# Patient Record
Sex: FEMALE | Race: WHITE | NOT HISPANIC OR LATINO
[De-identification: names, ages, dates, MRNs, and addresses within clinical notes are randomized per-mention and may not be internally consistent; named-entity substitution may affect disease eponyms.]

---

## 2017-02-06 ENCOUNTER — APPOINTMENT (OUTPATIENT)
Dept: CARDIOLOGY | Facility: CLINIC | Age: 82
End: 2017-02-06

## 2017-02-06 VITALS
HEART RATE: 100 BPM | HEIGHT: 64 IN | SYSTOLIC BLOOD PRESSURE: 130 MMHG | DIASTOLIC BLOOD PRESSURE: 80 MMHG | BODY MASS INDEX: 19.81 KG/M2 | WEIGHT: 116 LBS | OXYGEN SATURATION: 95 %

## 2017-02-12 LAB
ALBUMIN SERPL ELPH-MCNC: 4 G/DL
ALP BLD-CCNC: 78 U/L
ALT SERPL-CCNC: 15 U/L
ANION GAP SERPL CALC-SCNC: 14 MMOL/L
AST SERPL-CCNC: 16 U/L
BASOPHILS # BLD AUTO: 0.03 K/UL
BASOPHILS NFR BLD AUTO: 0.7 %
BILIRUB SERPL-MCNC: 0.2 MG/DL
BUN SERPL-MCNC: 34 MG/DL
CALCIUM SERPL-MCNC: 9.7 MG/DL
CHLORIDE SERPL-SCNC: 106 MMOL/L
CO2 SERPL-SCNC: 24 MMOL/L
CREAT SERPL-MCNC: 1.11 MG/DL
EOSINOPHIL # BLD AUTO: 0.13 K/UL
EOSINOPHIL NFR BLD AUTO: 2.9 %
GLUCOSE SERPL-MCNC: 95 MG/DL
HCT VFR BLD CALC: 44.9 %
HGB BLD-MCNC: 13.9 G/DL
IMM GRANULOCYTES NFR BLD AUTO: 0 %
LYMPHOCYTES # BLD AUTO: 0.59 K/UL
LYMPHOCYTES NFR BLD AUTO: 13.3 %
MAGNESIUM SERPL-MCNC: 2.2 MG/DL
MAN DIFF?: NORMAL
MCHC RBC-ENTMCNC: 31 GM/DL
MCHC RBC-ENTMCNC: 31 PG
MCV RBC AUTO: 100 FL
MONOCYTES # BLD AUTO: 0.32 K/UL
MONOCYTES NFR BLD AUTO: 7.2 %
NEUTROPHILS # BLD AUTO: 3.35 K/UL
NEUTROPHILS NFR BLD AUTO: 75.9 %
PLATELET # BLD AUTO: 218 K/UL
POTASSIUM SERPL-SCNC: 4.7 MMOL/L
PROT SERPL-MCNC: 6 G/DL
RBC # BLD: 4.49 M/UL
RBC # FLD: 14.2 %
SODIUM SERPL-SCNC: 144 MMOL/L
TSH SERPL-ACNC: 5.51 UIU/ML
WBC # FLD AUTO: 4.42 K/UL

## 2017-08-24 ENCOUNTER — RX RENEWAL (OUTPATIENT)
Age: 82
End: 2017-08-24

## 2017-09-25 ENCOUNTER — LABORATORY RESULT (OUTPATIENT)
Age: 82
End: 2017-09-25

## 2017-09-25 ENCOUNTER — APPOINTMENT (OUTPATIENT)
Dept: CARDIOLOGY | Facility: CLINIC | Age: 82
End: 2017-09-25
Payer: MEDICARE

## 2017-09-25 ENCOUNTER — NON-APPOINTMENT (OUTPATIENT)
Age: 82
End: 2017-09-25

## 2017-09-25 VITALS
HEIGHT: 64 IN | HEART RATE: 90 BPM | WEIGHT: 117 LBS | SYSTOLIC BLOOD PRESSURE: 130 MMHG | BODY MASS INDEX: 19.97 KG/M2 | OXYGEN SATURATION: 96 % | DIASTOLIC BLOOD PRESSURE: 80 MMHG

## 2017-09-25 PROCEDURE — 99215 OFFICE O/P EST HI 40 MIN: CPT

## 2017-09-25 PROCEDURE — 93000 ELECTROCARDIOGRAM COMPLETE: CPT

## 2017-09-29 LAB
25(OH)D3 SERPL-MCNC: 39 NG/ML
ALBUMIN SERPL ELPH-MCNC: 4 G/DL
ALP BLD-CCNC: 79 U/L
ALT SERPL-CCNC: 18 U/L
ANION GAP SERPL CALC-SCNC: 13 MMOL/L
AST SERPL-CCNC: 20 U/L
BASOPHILS # BLD AUTO: 0.03 K/UL
BASOPHILS NFR BLD AUTO: 0.7 %
BILIRUB SERPL-MCNC: 0.3 MG/DL
BUN SERPL-MCNC: 32 MG/DL
CALCIUM SERPL-MCNC: 9.7 MG/DL
CHLORIDE SERPL-SCNC: 107 MMOL/L
CHOLEST SERPL-MCNC: 184 MG/DL
CHOLEST/HDLC SERPL: 3.3 RATIO
CO2 SERPL-SCNC: 25 MMOL/L
CREAT SERPL-MCNC: 1.19 MG/DL
CRP SERPL HS-MCNC: 2.2 MG/L
EOSINOPHIL # BLD AUTO: 0.07 K/UL
EOSINOPHIL NFR BLD AUTO: 1.7 %
FOLATE SERPL-MCNC: >20 NG/ML
GLUCOSE SERPL-MCNC: 94 MG/DL
HBA1C MFR BLD HPLC: 5.7 %
HCT VFR BLD CALC: 43.2 %
HDLC SERPL-MCNC: 55 MG/DL
HGB BLD-MCNC: 13.5 G/DL
IMM GRANULOCYTES NFR BLD AUTO: 0.2 %
LDLC SERPL CALC-MCNC: 102 MG/DL
LYMPHOCYTES # BLD AUTO: 0.44 K/UL
LYMPHOCYTES NFR BLD AUTO: 10.4 %
MAGNESIUM SERPL-MCNC: 2.2 MG/DL
MAN DIFF?: NORMAL
MCHC RBC-ENTMCNC: 31.3 GM/DL
MCHC RBC-ENTMCNC: 31.8 PG
MCV RBC AUTO: 101.9 FL
MONOCYTES # BLD AUTO: 0.33 K/UL
MONOCYTES NFR BLD AUTO: 7.8 %
NEUTROPHILS # BLD AUTO: 3.36 K/UL
NEUTROPHILS NFR BLD AUTO: 79.2 %
PLATELET # BLD AUTO: 229 K/UL
POTASSIUM SERPL-SCNC: 4.6 MMOL/L
PROT SERPL-MCNC: 6.2 G/DL
RBC # BLD: 4.24 M/UL
RBC # FLD: 15.2 %
SODIUM SERPL-SCNC: 145 MMOL/L
T3FREE SERPL-MCNC: 2.66 PG/ML
T4 FREE SERPL-MCNC: 1.3 NG/DL
TRIGL SERPL-MCNC: 133 MG/DL
TSH SERPL-ACNC: 4.85 UIU/ML
VIT B12 SERPL-MCNC: 1586 PG/ML
WBC # FLD AUTO: 4.24 K/UL

## 2017-11-06 ENCOUNTER — RX RENEWAL (OUTPATIENT)
Age: 82
End: 2017-11-06

## 2018-03-26 ENCOUNTER — APPOINTMENT (OUTPATIENT)
Dept: CARDIOLOGY | Facility: CLINIC | Age: 83
End: 2018-03-26
Payer: MEDICARE

## 2018-03-26 ENCOUNTER — NON-APPOINTMENT (OUTPATIENT)
Age: 83
End: 2018-03-26

## 2018-03-26 VITALS
HEART RATE: 88 BPM | BODY MASS INDEX: 20.32 KG/M2 | DIASTOLIC BLOOD PRESSURE: 80 MMHG | HEIGHT: 64 IN | OXYGEN SATURATION: 95 % | WEIGHT: 119 LBS | SYSTOLIC BLOOD PRESSURE: 140 MMHG

## 2018-03-26 PROCEDURE — 93306 TTE W/DOPPLER COMPLETE: CPT

## 2018-03-26 PROCEDURE — 99215 OFFICE O/P EST HI 40 MIN: CPT

## 2018-03-26 PROCEDURE — 93000 ELECTROCARDIOGRAM COMPLETE: CPT

## 2018-03-28 ENCOUNTER — EMERGENCY (EMERGENCY)
Facility: HOSPITAL | Age: 83
LOS: 0 days | Discharge: HOME | End: 2018-03-28
Attending: EMERGENCY MEDICINE
Payer: MEDICARE

## 2018-03-28 VITALS
WEIGHT: 119.05 LBS | TEMPERATURE: 98 F | SYSTOLIC BLOOD PRESSURE: 183 MMHG | HEIGHT: 63 IN | DIASTOLIC BLOOD PRESSURE: 95 MMHG | RESPIRATION RATE: 19 BRPM | HEART RATE: 82 BPM | OXYGEN SATURATION: 95 %

## 2018-03-28 VITALS
RESPIRATION RATE: 18 BRPM | SYSTOLIC BLOOD PRESSURE: 153 MMHG | OXYGEN SATURATION: 96 % | DIASTOLIC BLOOD PRESSURE: 79 MMHG | HEART RATE: 88 BPM

## 2018-03-28 DIAGNOSIS — Z86.73 PERSONAL HISTORY OF TRANSIENT ISCHEMIC ATTACK (TIA), AND CEREBRAL INFARCTION WITHOUT RESIDUAL DEFICITS: ICD-10-CM

## 2018-03-28 DIAGNOSIS — I10 ESSENTIAL (PRIMARY) HYPERTENSION: ICD-10-CM

## 2018-03-28 DIAGNOSIS — K92.2 GASTROINTESTINAL HEMORRHAGE, UNSPECIFIED: ICD-10-CM

## 2018-03-28 DIAGNOSIS — R19.00 INTRA-ABDOMINAL AND PELVIC SWELLING, MASS AND LUMP, UNSPECIFIED SITE: ICD-10-CM

## 2018-03-28 DIAGNOSIS — M54.5 LOW BACK PAIN: ICD-10-CM

## 2018-03-28 DIAGNOSIS — K64.9 UNSPECIFIED HEMORRHOIDS: ICD-10-CM

## 2018-03-28 DIAGNOSIS — Z85.41 PERSONAL HISTORY OF MALIGNANT NEOPLASM OF CERVIX UTERI: ICD-10-CM

## 2018-03-28 DIAGNOSIS — Z88.0 ALLERGY STATUS TO PENICILLIN: ICD-10-CM

## 2018-03-28 DIAGNOSIS — K57.92 DIVERTICULITIS OF INTESTINE, PART UNSPECIFIED, WITHOUT PERFORATION OR ABSCESS WITHOUT BLEEDING: ICD-10-CM

## 2018-03-28 DIAGNOSIS — F32.9 MAJOR DEPRESSIVE DISORDER, SINGLE EPISODE, UNSPECIFIED: ICD-10-CM

## 2018-03-28 DIAGNOSIS — Z98.89 OTHER SPECIFIED POSTPROCEDURAL STATES: Chronic | ICD-10-CM

## 2018-03-28 PROCEDURE — 71045 X-RAY EXAM CHEST 1 VIEW: CPT | Mod: 26

## 2018-03-28 PROCEDURE — 70450 CT HEAD/BRAIN W/O DYE: CPT | Mod: 26

## 2018-03-28 PROCEDURE — 73502 X-RAY EXAM HIP UNI 2-3 VIEWS: CPT | Mod: 26,LT

## 2018-03-28 PROCEDURE — 99284 EMERGENCY DEPT VISIT MOD MDM: CPT

## 2018-03-28 NOTE — ED PROVIDER NOTE - MUSCULOSKELETAL, MLM
Spine appears normal, range of motion is not limited, no muscle or joint tenderness. Able to ambulate. Legs equal lengths

## 2018-03-28 NOTE — ED ADULT NURSE NOTE - PMH
Abdominal mass    Cervical cancer  last treatment May 25, 2011. s/p external and internal radiation  CVA (cerebral infarction)    Depression    Depression (emotion)    Diverticulitis    GI (gastrointestinal bleed)    Hemorrhoid    Hypertension

## 2018-03-28 NOTE — ED PROVIDER NOTE - OBJECTIVE STATEMENT
Pt is a 87 yo lady with a pmhx of HTN, HL, CVA w residual l. sided weakness who presents to the ED with buttock pain after a fall. Was a mechanical fall, foot tanged in rug when she turned around, and she fell on L. side. No head strike, no neck pain, no loc. Is on plavix. No weakness or back pain, no bowel or bladder incontinence, no saddle anesthesia. Able to ambulate here. Was feeling fine, but EMS convinced her to come and get checked out. Was only on ground for 15 minutes. Lives with daughter.

## 2018-03-28 NOTE — ED PROVIDER NOTE - MEDICAL DECISION MAKING DETAILS
Ddx: ro fracture, although pt ambulating normally here/ ro ich  Plan: ct head, pelvic and cxr, pt declining pain medication.

## 2018-03-28 NOTE — ED PROVIDER NOTE - PROGRESS NOTE DETAILS
Pt is comfortable, joking with daughter. CT head, and xrays negative. Pt declines admission, her and daughter feels safe going home. Ok for d/c.

## 2018-04-03 ENCOUNTER — MEDICATION RENEWAL (OUTPATIENT)
Age: 83
End: 2018-04-03

## 2018-05-20 ENCOUNTER — MESSAGE (OUTPATIENT)
Age: 83
End: 2018-05-20

## 2018-05-22 ENCOUNTER — RX RENEWAL (OUTPATIENT)
Age: 83
End: 2018-05-22

## 2018-06-27 ENCOUNTER — OTHER (OUTPATIENT)
Age: 83
End: 2018-06-27

## 2018-06-29 ENCOUNTER — RESULT REVIEW (OUTPATIENT)
Age: 83
End: 2018-06-29

## 2018-10-01 ENCOUNTER — APPOINTMENT (OUTPATIENT)
Dept: CARDIOLOGY | Facility: CLINIC | Age: 83
End: 2018-10-01
Payer: MEDICARE

## 2018-10-01 ENCOUNTER — NON-APPOINTMENT (OUTPATIENT)
Age: 83
End: 2018-10-01

## 2018-10-01 VITALS
DIASTOLIC BLOOD PRESSURE: 60 MMHG | WEIGHT: 120 LBS | OXYGEN SATURATION: 94 % | HEART RATE: 59 BPM | HEIGHT: 64 IN | SYSTOLIC BLOOD PRESSURE: 130 MMHG | BODY MASS INDEX: 20.49 KG/M2

## 2018-10-01 PROCEDURE — 99214 OFFICE O/P EST MOD 30 MIN: CPT

## 2018-10-01 PROCEDURE — 93000 ELECTROCARDIOGRAM COMPLETE: CPT

## 2018-12-26 NOTE — ED ADULT NURSE NOTE - CARDIO ASSESSMENT
Called patient and she states she is in a lot of pain with her tooth, she is still taking the antibiotic but does not seem to be helping.  She states she is going to try to get into a free clinic tomorrow , please advise?   WDL

## 2019-04-10 ENCOUNTER — APPOINTMENT (OUTPATIENT)
Dept: CARDIOLOGY | Facility: CLINIC | Age: 84
End: 2019-04-10
Payer: MEDICARE

## 2019-04-10 VITALS
DIASTOLIC BLOOD PRESSURE: 60 MMHG | HEIGHT: 64 IN | HEART RATE: 94 BPM | OXYGEN SATURATION: 96 % | WEIGHT: 118 LBS | SYSTOLIC BLOOD PRESSURE: 144 MMHG | BODY MASS INDEX: 20.14 KG/M2

## 2019-04-10 PROCEDURE — 93000 ELECTROCARDIOGRAM COMPLETE: CPT

## 2019-04-10 PROCEDURE — 99215 OFFICE O/P EST HI 40 MIN: CPT

## 2019-04-19 LAB
ALBUMIN SERPL ELPH-MCNC: 4.1 G/DL
ALP BLD-CCNC: 85 U/L
ALT SERPL-CCNC: 15 U/L
ANION GAP SERPL CALC-SCNC: 14 MMOL/L
AST SERPL-CCNC: 17 U/L
BASOPHILS # BLD AUTO: 0.05 K/UL
BASOPHILS NFR BLD AUTO: 0.9 %
BILIRUB SERPL-MCNC: 0.3 MG/DL
BUN SERPL-MCNC: 33 MG/DL
CALCIUM SERPL-MCNC: 9.2 MG/DL
CHLORIDE SERPL-SCNC: 108 MMOL/L
CO2 SERPL-SCNC: 22 MMOL/L
CREAT SERPL-MCNC: 1.26 MG/DL
EOSINOPHIL # BLD AUTO: 0.1 K/UL
EOSINOPHIL NFR BLD AUTO: 1.9 %
ESTIMATED AVERAGE GLUCOSE: 120 MG/DL
GLUCOSE SERPL-MCNC: 160 MG/DL
HBA1C MFR BLD HPLC: 5.8 %
HCT VFR BLD CALC: 44.6 %
HGB BLD-MCNC: 13.9 G/DL
IMM GRANULOCYTES NFR BLD AUTO: 0.2 %
LYMPHOCYTES # BLD AUTO: 0.56 K/UL
LYMPHOCYTES NFR BLD AUTO: 10.5 %
MAGNESIUM SERPL-MCNC: 2.4 MG/DL
MAN DIFF?: NORMAL
MCHC RBC-ENTMCNC: 31 PG
MCHC RBC-ENTMCNC: 31.2 GM/DL
MCV RBC AUTO: 99.6 FL
MONOCYTES # BLD AUTO: 0.36 K/UL
MONOCYTES NFR BLD AUTO: 6.7 %
NEUTROPHILS # BLD AUTO: 4.26 K/UL
NEUTROPHILS NFR BLD AUTO: 79.8 %
PLATELET # BLD AUTO: 237 K/UL
POTASSIUM SERPL-SCNC: 4.4 MMOL/L
PROT SERPL-MCNC: 6.4 G/DL
RBC # BLD: 4.48 M/UL
RBC # FLD: 14.2 %
SODIUM SERPL-SCNC: 144 MMOL/L
T3FREE SERPL-MCNC: 2.47 PG/ML
T4 FREE SERPL-MCNC: 1.1 NG/DL
TSH SERPL-ACNC: 5.77 UIU/ML
WBC # FLD AUTO: 5.34 K/UL

## 2019-04-23 ENCOUNTER — RESULT REVIEW (OUTPATIENT)
Age: 84
End: 2019-04-23

## 2019-04-23 LAB
CHOLEST SERPL-MCNC: 180 MG/DL
CHOLEST/HDLC SERPL: 3.3 RATIO
HDLC SERPL-MCNC: 55 MG/DL
LDLC SERPL CALC-MCNC: 82 MG/DL
TRIGL SERPL-MCNC: 217 MG/DL

## 2019-05-13 ENCOUNTER — MEDICATION RENEWAL (OUTPATIENT)
Age: 84
End: 2019-05-13

## 2019-05-20 ENCOUNTER — RX RENEWAL (OUTPATIENT)
Age: 84
End: 2019-05-20

## 2019-05-22 ENCOUNTER — NON-APPOINTMENT (OUTPATIENT)
Age: 84
End: 2019-05-22

## 2019-05-22 NOTE — REASON FOR VISIT
[Follow-Up - Clinic] : a clinic follow-up of [Cardiomyopathy] : cardiomyopathy [Heart Failure] : congestive heart failure

## 2019-05-22 NOTE — PHYSICAL EXAM
[General Appearance - Well Developed] : well developed [Normal Appearance] : normal appearance [Well Groomed] : well groomed [General Appearance - Well Nourished] : well nourished [No Deformities] : no deformities [General Appearance - In No Acute Distress] : no acute distress [Normal Conjunctiva] : the conjunctiva exhibited no abnormalities [Eyelids - No Xanthelasma] : the eyelids demonstrated no xanthelasmas [Normal Oral Mucosa] : normal oral mucosa [No Oral Pallor] : no oral pallor [No Oral Cyanosis] : no oral cyanosis [Normal Jugular Venous A Waves Present] : normal jugular venous A waves present [Normal Jugular Venous V Waves Present] : normal jugular venous V waves present [No Jugular Venous Yang A Waves] : no jugular venous yang A waves [Respiration, Rhythm And Depth] : normal respiratory rhythm and effort [Exaggerated Use Of Accessory Muscles For Inspiration] : no accessory muscle use [Auscultation Breath Sounds / Voice Sounds] : lungs were clear to auscultation bilaterally [Heart Rate And Rhythm] : heart rate and rhythm were normal [Heart Sounds] : normal S1 and S2 [Murmurs] : no murmurs present [Abdomen Soft] : soft [Abdomen Tenderness] : non-tender [Abdomen Mass (___ Cm)] : no abdominal mass palpated [Abnormal Walk] : normal gait [Gait - Sufficient For Exercise Testing] : the gait was sufficient for exercise testing [Nail Clubbing] : no clubbing of the fingernails [Cyanosis, Localized] : no localized cyanosis [Petechial Hemorrhages (___cm)] : no petechial hemorrhages [Skin Color & Pigmentation] : normal skin color and pigmentation [] : no rash [No Venous Stasis] : no venous stasis [Skin Lesions] : no skin lesions [No Skin Ulcers] : no skin ulcer [No Xanthoma] : no  xanthoma was observed [Oriented To Time, Place, And Person] : oriented to person, place, and time [Affect] : the affect was normal [Mood] : the mood was normal [No Anxiety] : not feeling anxious

## 2019-05-22 NOTE — HISTORY OF PRESENT ILLNESS
[FreeTextEntry1] : 89-year-old female here for routine followup. \par \par H/O chronic systolic/diastolic heart failure , known ejection fraction of 40% with evidence of diastolic dysfunction, moderate mitral insufficiency and moderate aortic insufficiency. . No further dyspnea noted  but patient is having continued difficulty to ambulate because of generalized leg weakness and right knee pain/swelling. No significant edema noted.

## 2019-05-22 NOTE — DISCUSSION/SUMMARY
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 \par Patient told to continue her current medical regimen. I reiterated the management of her heart failure and advised increasing her water pill as needed if she notices any dyspnea, lower extremity edema or increase in weight. Patient needs to have labs every 6 months, to be done soon by PCP. . Advised increase activity daily and leg elevation as much as feasible.\par

## 2019-06-08 ENCOUNTER — EMERGENCY (EMERGENCY)
Facility: HOSPITAL | Age: 84
LOS: 0 days | Discharge: HOME | End: 2019-06-08
Attending: EMERGENCY MEDICINE
Payer: MEDICARE

## 2019-06-08 VITALS
OXYGEN SATURATION: 100 % | HEART RATE: 91 BPM | DIASTOLIC BLOOD PRESSURE: 84 MMHG | WEIGHT: 113.1 LBS | HEIGHT: 60 IN | TEMPERATURE: 98 F | SYSTOLIC BLOOD PRESSURE: 145 MMHG | RESPIRATION RATE: 19 BRPM

## 2019-06-08 VITALS
OXYGEN SATURATION: 100 % | SYSTOLIC BLOOD PRESSURE: 150 MMHG | RESPIRATION RATE: 18 BRPM | TEMPERATURE: 98 F | HEART RATE: 90 BPM | DIASTOLIC BLOOD PRESSURE: 85 MMHG

## 2019-06-08 DIAGNOSIS — R53.1 WEAKNESS: ICD-10-CM

## 2019-06-08 DIAGNOSIS — Z98.89 OTHER SPECIFIED POSTPROCEDURAL STATES: Chronic | ICD-10-CM

## 2019-06-08 DIAGNOSIS — K92.2 GASTROINTESTINAL HEMORRHAGE, UNSPECIFIED: ICD-10-CM

## 2019-06-08 DIAGNOSIS — S09.90XA UNSPECIFIED INJURY OF HEAD, INITIAL ENCOUNTER: ICD-10-CM

## 2019-06-08 DIAGNOSIS — K64.9 UNSPECIFIED HEMORRHOIDS: ICD-10-CM

## 2019-06-08 DIAGNOSIS — S50.12XA CONTUSION OF LEFT FOREARM, INITIAL ENCOUNTER: ICD-10-CM

## 2019-06-08 DIAGNOSIS — Z79.01 LONG TERM (CURRENT) USE OF ANTICOAGULANTS: ICD-10-CM

## 2019-06-08 DIAGNOSIS — Z88.0 ALLERGY STATUS TO PENICILLIN: ICD-10-CM

## 2019-06-08 DIAGNOSIS — Z86.73 PERSONAL HISTORY OF TRANSIENT ISCHEMIC ATTACK (TIA), AND CEREBRAL INFARCTION WITHOUT RESIDUAL DEFICITS: ICD-10-CM

## 2019-06-08 DIAGNOSIS — I10 ESSENTIAL (PRIMARY) HYPERTENSION: ICD-10-CM

## 2019-06-08 DIAGNOSIS — F32.9 MAJOR DEPRESSIVE DISORDER, SINGLE EPISODE, UNSPECIFIED: ICD-10-CM
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
## 2019-06-08 DIAGNOSIS — Z04.3 ENCOUNTER FOR EXAMINATION AND OBSERVATION FOLLOWING OTHER ACCIDENT: ICD-10-CM
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
## 2019-06-08 PROCEDURE — 73090 X-RAY EXAM OF FOREARM: CPT | Mod: 26,LT

## 2019-06-08 PROCEDURE — 72125 CT NECK SPINE W/O DYE: CPT | Mod: 26

## 2019-06-08 PROCEDURE — 72170 X-RAY EXAM OF PELVIS: CPT | Mod: 26

## 2019-06-08 PROCEDURE — 70450 CT HEAD/BRAIN W/O DYE: CPT | Mod: 26

## 2019-06-08 PROCEDURE — 99284 EMERGENCY DEPT VISIT MOD MDM: CPT

## 2019-06-08 NOTE — ED PROVIDER NOTE - OBJECTIVE STATEMENT
89yo female from home with pmh HTN, HL, CVA with residual left side weakness, on plavix presents s/p mechanical fall. Pt was using walking going down ramp at store when she was trying to turn and walker got caught and she fell onto buttock and backwards and hit her head. denies loc. Pt with some abrasion to left forearm but otherwise denies any pan.     ROS: No fever/chills. No photophobia/eye pain/changes in vision, No ear pain/sore throat/dysphagia, No chest pain/palpitations. No SOB/cough. No abdominal pain, No N/V/D, no black/bloody bm. No dysuria/frequency/discharge, No headache. No Dizziness.  No rash.  No numbness/tingling/weakness.

## 2019-06-08 NOTE — ED PROVIDER NOTE - CLINICAL SUMMARY MEDICAL DECISION MAKING FREE TEXT BOX
CT scan demonstrates no acute pathology. xray no fx. Discussed results and outcome of testing with the patient.  Patient given copy of available results. Patient advised to please follow up with their PMD within the next 24 hours and return to the Emergency Department for worsening symptoms or any other concerns.

## 2019-06-08 NOTE — ED PROVIDER NOTE - PHYSICAL EXAMINATION
Gen: Alert, Well appearing. NAD    Head: NC, AT, PERRL, normal lids/conjunctiva   ENT: Bilateral TM WNL, patent oropharynx without erythema/exudate, uvula midline  Neck: supple, no tenderness/meningismus  Pulm: Bilateral clear BS, normal resp effort  CV: RRR, no M/R/G, +dist pulses   Abd: soft, NT/ND, +BS, no guarding/rebound tenderness  Mskel: extremities x4 with normal ROM. no ctl spine ttp. no edema/erythema/cyanosis   Skin: + eccymosis to mid forearm, no gross tenderness  Neuro: AAOx3, + baseline drift of LUE and LLE, no sensory deficits, CN 2-12 intact

## 2019-10-02 ENCOUNTER — APPOINTMENT (OUTPATIENT)
Dept: CARDIOLOGY | Facility: CLINIC | Age: 84
End: 2019-10-02
Payer: MEDICARE

## 2019-10-02 ENCOUNTER — NON-APPOINTMENT (OUTPATIENT)
Age: 84
End: 2019-10-02

## 2019-10-02 VITALS
HEART RATE: 71 BPM | HEIGHT: 64 IN | OXYGEN SATURATION: 92 % | SYSTOLIC BLOOD PRESSURE: 140 MMHG | DIASTOLIC BLOOD PRESSURE: 70 MMHG | BODY MASS INDEX: 19.46 KG/M2 | WEIGHT: 114 LBS

## 2019-10-02 PROCEDURE — 99214 OFFICE O/P EST MOD 30 MIN: CPT

## 2019-10-02 PROCEDURE — 93000 ELECTROCARDIOGRAM COMPLETE: CPT

## 2019-10-02 NOTE — DISCUSSION/SUMMARY
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 Patient told to continue her current medical regimen. Continue dosing water pill as needed if she notices any dyspnea, lower extremity edema or increase in weight. Patient needs to have surveillance labs every 6 months. Advised continued activity daily and leg elevation as much as feasible.\par \par Lengthy discussion with daughter regarding safety issues eg. removing obstacles from floor, availability of hand holds etc.

## 2019-10-02 NOTE — HISTORY OF PRESENT ILLNESS
[FreeTextEntry1] : 89-year-old female here for routine followup. \par \par H/O chronic systolic/diastolic heart failure , known ejection fraction of 45% with evidence of diastolic dysfunction, moderate mitral insufficiency and moderate aortic insufficiency. . No further dyspnea noted  but patient is having continued difficulty to ambulate because of generalized leg weakness and right knee pain/swelling. s/p fall. No change in edema noted.

## 2020-01-10 ENCOUNTER — RX RENEWAL (OUTPATIENT)
Age: 85
End: 2020-01-10

## 2020-03-26 ENCOUNTER — RX RENEWAL (OUTPATIENT)
Age: 85
End: 2020-03-26

## 2020-04-20 ENCOUNTER — APPOINTMENT (OUTPATIENT)
Dept: CARDIOLOGY | Facility: CLINIC | Age: 85
End: 2020-04-20

## 2020-05-22 ENCOUNTER — RX RENEWAL (OUTPATIENT)
Age: 85
End: 2020-05-22

## 2020-07-27 ENCOUNTER — EMERGENCY (EMERGENCY)
Facility: HOSPITAL | Age: 85
LOS: 0 days | Discharge: HOME | End: 2020-07-27
Attending: STUDENT IN AN ORGANIZED HEALTH CARE EDUCATION/TRAINING PROGRAM
Payer: MEDICARE

## 2020-07-27 VITALS
SYSTOLIC BLOOD PRESSURE: 191 MMHG | RESPIRATION RATE: 15 BRPM | HEART RATE: 70 BPM | OXYGEN SATURATION: 95 % | DIASTOLIC BLOOD PRESSURE: 85 MMHG

## 2020-07-27 VITALS
TEMPERATURE: 98 F | RESPIRATION RATE: 17 BRPM | DIASTOLIC BLOOD PRESSURE: 81 MMHG | HEART RATE: 89 BPM | HEIGHT: 62 IN | WEIGHT: 110.01 LBS | OXYGEN SATURATION: 98 % | SYSTOLIC BLOOD PRESSURE: 188 MMHG

## 2020-07-27 DIAGNOSIS — Z98.89 OTHER SPECIFIED POSTPROCEDURAL STATES: Chronic | ICD-10-CM

## 2020-07-27 PROCEDURE — 99284 EMERGENCY DEPT VISIT MOD MDM: CPT

## 2020-07-27 PROCEDURE — 70490 CT SOFT TISSUE NECK W/O DYE: CPT | Mod: 26

## 2020-07-27 NOTE — ED PROVIDER NOTE - OBJECTIVE STATEMENT
89 year old female presents today c/o feeling some food particles stuck in her throat, pt states that she was eating lasagna with meat and 89 year old female presents today c/o feeling some food particles stuck in her throat, pt states that she was eating lasagna with meat (-) sob (-) cough (-) wheezing, pt is able to speak in complete sentences

## 2020-07-27 NOTE — ED PROVIDER NOTE - PATIENT PORTAL LINK FT
You can access the FollowMyHealth Patient Portal offered by [facility] by registering at the following website: [email or web address]. By joining [facility]’s FollowMyHealth portal, you will also be able to view your health information using other applications (apps) compatible with our system.

## 2020-07-27 NOTE — ED ADULT NURSE NOTE - CAS EDN DISCHARGE ASSESSMENT
Met with dtr Keri and informed her PT is recommending home with PT   Deon Calloway stated pt lives in apartment attached to son's house  Pt has family support daily with someone in and out  the home  Dtr also refused home PT   Dtr stated pt uses [facility] on [address] and could afford meds  The discharge plan is home with family support  Dtr had no other issues or concerns  Alert and oriented to person, place and time

## 2020-07-27 NOTE — ED PROVIDER NOTE - CLINICAL SUMMARY MEDICAL DECISION MAKING FREE TEXT BOX
pt presented c/o feeling she may have food stuck in her throat, ct today Is negative for foreign body, pt discharged home, told to follow up with her pmd

## 2020-07-27 NOTE — ED ADULT NURSE NOTE - BREATHING, MLM
In order to meet Medicare requirements, the clinical documentation must support the information cited in the admission order.  Please be sure to provide detailed and clear documentation about the following in the admitting note/history and physical:
Spontaneous, unlabored and symmetrical

## 2020-07-27 NOTE — ED ADULT NURSE NOTE - OBJECTIVE STATEMENT
present to ED with c/o foreign sensation her throat, as per Pt she was eating lasagna for dinner with new set of dentures that she had worn this evening for the first time, Pt also report prior swallowing difficulty and Left sided weakness due to CVA 10 years ago, as she was eating she found it difficult to chew and swallow and started coughing to spit out the food but reports no food particles came out, now reports foreign sensation in throat, Pt is no apparent acute respiratory distress, no drooling, no dysphonia, speaking in full clear sentences, denies chest pain, SOB, N/V

## 2020-07-27 NOTE — ED ADULT NURSE NOTE - CHPI ED NUR SYMPTOMS NEG
no loss of consciousness/no chills/no fever/no nausea/no vomiting/no bleeding gums/no weakness/no numbness/no syncope

## 2020-07-29 DIAGNOSIS — R09.89 OTHER SPECIFIED SYMPTOMS AND SIGNS INVOLVING THE CIRCULATORY AND RESPIRATORY SYSTEMS: ICD-10-CM

## 2020-07-29 DIAGNOSIS — K64.9 UNSPECIFIED HEMORRHOIDS: ICD-10-CM

## 2020-07-29 DIAGNOSIS — Z88.0 ALLERGY STATUS TO PENICILLIN: ICD-10-CM

## 2020-07-29 DIAGNOSIS — I10 ESSENTIAL (PRIMARY) HYPERTENSION: ICD-10-CM

## 2020-07-29 DIAGNOSIS — Z86.73 PERSONAL HISTORY OF TRANSIENT ISCHEMIC ATTACK (TIA), AND CEREBRAL INFARCTION WITHOUT RESIDUAL DEFICITS: ICD-10-CM

## 2020-07-29 DIAGNOSIS — F32.9 MAJOR DEPRESSIVE DISORDER, SINGLE EPISODE, UNSPECIFIED: ICD-10-CM

## 2020-10-05 ENCOUNTER — RX RENEWAL (OUTPATIENT)
Age: 85
End: 2020-10-05

## 2020-10-26 ENCOUNTER — RX RENEWAL (OUTPATIENT)
Age: 85
End: 2020-10-26

## 2020-12-03 ENCOUNTER — APPOINTMENT (OUTPATIENT)
Dept: CARDIOLOGY | Facility: CLINIC | Age: 85
End: 2020-12-03
Payer: MEDICARE

## 2020-12-03 VITALS
HEIGHT: 64 IN | TEMPERATURE: 97.7 F | SYSTOLIC BLOOD PRESSURE: 130 MMHG | DIASTOLIC BLOOD PRESSURE: 70 MMHG | HEART RATE: 71 BPM | OXYGEN SATURATION: 92 % | BODY MASS INDEX: 19.81 KG/M2 | WEIGHT: 116 LBS

## 2020-12-03 PROCEDURE — 99214 OFFICE O/P EST MOD 30 MIN: CPT

## 2020-12-03 PROCEDURE — 93000 ELECTROCARDIOGRAM COMPLETE: CPT

## 2020-12-03 RX ORDER — TOLTERODINE TARTRATE 4 MG/1
4 CAPSULE, EXTENDED RELEASE ORAL
Refills: 0 | Status: ACTIVE | COMMUNITY

## 2020-12-03 RX ORDER — BRINZOLAMIDE/BRIMONIDINE TARTRATE 10; 2 MG/ML; MG/ML
1-0.2 SUSPENSION/ DROPS OPHTHALMIC
Refills: 0 | Status: ACTIVE | COMMUNITY

## 2020-12-04 LAB
ALBUMIN SERPL ELPH-MCNC: 4.1 G/DL
ALP BLD-CCNC: 91 U/L
ALT SERPL-CCNC: 18 U/L
ANION GAP SERPL CALC-SCNC: 8 MMOL/L
AST SERPL-CCNC: 24 U/L
BASOPHILS # BLD AUTO: 0.04 K/UL
BASOPHILS NFR BLD AUTO: 0.9 %
BILIRUB SERPL-MCNC: 0.2 MG/DL
BUN SERPL-MCNC: 34 MG/DL
CALCIUM SERPL-MCNC: 9.4 MG/DL
CHLORIDE SERPL-SCNC: 105 MMOL/L
CHOLEST SERPL-MCNC: 184 MG/DL
CO2 SERPL-SCNC: 28 MMOL/L
CREAT SERPL-MCNC: 1.07 MG/DL
EOSINOPHIL # BLD AUTO: 0.12 K/UL
EOSINOPHIL NFR BLD AUTO: 2.8 %
ESTIMATED AVERAGE GLUCOSE: 120 MG/DL
GLUCOSE SERPL-MCNC: 129 MG/DL
HBA1C MFR BLD HPLC: 5.8 %
HCT VFR BLD CALC: 44.5 %
HDLC SERPL-MCNC: 52 MG/DL
HGB BLD-MCNC: 13.7 G/DL
IMM GRANULOCYTES NFR BLD AUTO: 0.2 %
LDLC SERPL CALC-MCNC: 75 MG/DL
LYMPHOCYTES # BLD AUTO: 0.63 K/UL
LYMPHOCYTES NFR BLD AUTO: 14.5 %
MAGNESIUM SERPL-MCNC: 2.5 MG/DL
MAN DIFF?: NORMAL
MCHC RBC-ENTMCNC: 30.6 PG
MCHC RBC-ENTMCNC: 30.8 GM/DL
MCV RBC AUTO: 99.6 FL
MONOCYTES # BLD AUTO: 0.44 K/UL
MONOCYTES NFR BLD AUTO: 10.2 %
NEUTROPHILS # BLD AUTO: 3.09 K/UL
NEUTROPHILS NFR BLD AUTO: 71.4 %
NONHDLC SERPL-MCNC: 131 MG/DL
PLATELET # BLD AUTO: 216 K/UL
POTASSIUM SERPL-SCNC: 5.1 MMOL/L
PROT SERPL-MCNC: 6.2 G/DL
RBC # BLD: 4.47 M/UL
RBC # FLD: 14.2 %
SODIUM SERPL-SCNC: 142 MMOL/L
TRIGL SERPL-MCNC: 282 MG/DL
TSH SERPL-ACNC: 5.12 UIU/ML
WBC # FLD AUTO: 4.33 K/UL

## 2020-12-13 NOTE — HISTORY OF PRESENT ILLNESS
[FreeTextEntry1] : 89-year-old female here for routine followup. \par \par H/O chronic systolic/diastolic heart failure , known ejection fraction of 45% with evidence of diastolic dysfunction, moderate mitral insufficiency and moderate aortic insufficiency. . No further dyspnea noted  but patient is having continued difficulty to ambulate because of generalized leg weakness and right knee pain/swelling.  No change in edema noted.

## 2020-12-13 NOTE — DISCUSSION/SUMMARY
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 Patient told to continue her current medical regimen. Continue dosing water pill as needed if she notices any dyspnea, lower extremity edema or increase in weight. Labs drawn today.\par

## 2020-12-22 ENCOUNTER — RX RENEWAL (OUTPATIENT)
Age: 85
End: 2020-12-22

## 2021-02-17 ENCOUNTER — RX RENEWAL (OUTPATIENT)
Age: 86
End: 2021-02-17

## 2021-06-09 ENCOUNTER — APPOINTMENT (OUTPATIENT)
Dept: CARDIOLOGY | Facility: CLINIC | Age: 86
End: 2021-06-09
Payer: MEDICARE

## 2021-06-09 ENCOUNTER — NON-APPOINTMENT (OUTPATIENT)
Age: 86
End: 2021-06-09

## 2021-06-09 VITALS
HEART RATE: 87 BPM | OXYGEN SATURATION: 96 % | TEMPERATURE: 98 F | WEIGHT: 118 LBS | SYSTOLIC BLOOD PRESSURE: 120 MMHG | DIASTOLIC BLOOD PRESSURE: 70 MMHG | BODY MASS INDEX: 20.14 KG/M2 | HEIGHT: 64 IN

## 2021-06-09 DIAGNOSIS — R00.1 BRADYCARDIA, UNSPECIFIED: ICD-10-CM

## 2021-06-09 DIAGNOSIS — T50.905A BRADYCARDIA, UNSPECIFIED: ICD-10-CM

## 2021-06-09 PROCEDURE — 93306 TTE W/DOPPLER COMPLETE: CPT

## 2021-06-09 PROCEDURE — 99214 OFFICE O/P EST MOD 30 MIN: CPT

## 2021-06-09 PROCEDURE — 93000 ELECTROCARDIOGRAM COMPLETE: CPT

## 2021-06-09 NOTE — DISCUSSION/SUMMARY
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  Continue current medical regimen.  Labs from PCP were reviewed with patient.

## 2021-06-09 NOTE — HISTORY OF PRESENT ILLNESS
Detail Level: Simple
Quality 137: Melanoma: Continuity Of Care - Recall System: Patient information entered into a recall system that includes: target date for the next exam specified AND a process to follow up with patients regarding missed or unscheduled appointments
When Should The Patient Follow-Up For Their Next Full-Body Skin Exam?: 1 Year
Detail Level: Detailed
Detail Level: Zone
[FreeTextEntry1] : 89-year-old female here for routine followup. \par \par H/O chronic systolic/diastolic heart failure , known ejection fraction of 45% with evidence of diastolic dysfunction, moderate mitral insufficiency and moderate aortic insufficiency. . No further dyspnea noted  but patient is having continued difficulty to ambulate because of generalized leg weakness and right knee pain/swelling.  No change in edema noted.

## 2021-06-09 NOTE — CARDIOLOGY SUMMARY
[No Ischemia] : no Ischemia [No Exercise Ind Arr] : no exercise induced arrhythmias [No Symptoms] : no Symptoms [___] : [unfilled] [LVEF ___%] : LVEF [unfilled]% [de-identified] : 6/9/21, Sinus  Rhythm \par -Left atrial enlargement. \par  -Poor R-wave progression -nonspecific -consider old anterior infarct. \par  -  Nonspecific T-abnormality. \par  [de-identified] : 2015 - WNL [de-identified] : 6/9/21,

## 2021-06-09 NOTE — PHYSICAL EXAM
[Well Nourished] : well nourished [No Acute Distress] : no acute distress [Frail] : frail [Normal Conjunctiva] : normal conjunctiva [Normal Venous Pressure] : normal venous pressure [No Carotid Bruit] : no carotid bruit [Normal S1, S2] : normal S1, S2 [No Rub] : no rub [No Gallop] : no gallop [Clear Lung Fields] : clear lung fields [Good Air Entry] : good air entry [No Respiratory Distress] : no respiratory distress  [Soft] : abdomen soft [Non Tender] : non-tender [No Masses/organomegaly] : no masses/organomegaly [Normal Bowel Sounds] : normal bowel sounds [Normal Gait] : normal gait [No Edema] : no edema [No Cyanosis] : no cyanosis [No Clubbing] : no clubbing [No Varicosities] : no varicosities [No Rash] : no rash [No Skin Lesions] : no skin lesions [Moves all extremities] : moves all extremities [No Focal Deficits] : no focal deficits [Normal Speech] : normal speech [Alert and Oriented] : alert and oriented [Normal memory] : normal memory [de-identified] : 2/6 systolic ejection murmur at LLSB and apex

## 2021-06-21 ENCOUNTER — NON-APPOINTMENT (OUTPATIENT)
Age: 86
End: 2021-06-21

## 2021-07-27 ENCOUNTER — NON-APPOINTMENT (OUTPATIENT)
Age: 86
End: 2021-07-27

## 2021-11-28 ENCOUNTER — INPATIENT (INPATIENT)
Facility: HOSPITAL | Age: 86
LOS: 1 days | Discharge: HOME | End: 2021-11-30
Attending: INTERNAL MEDICINE | Admitting: INTERNAL MEDICINE
Payer: MEDICARE

## 2021-11-28 VITALS
WEIGHT: 115.96 LBS | RESPIRATION RATE: 20 BRPM | TEMPERATURE: 98 F | SYSTOLIC BLOOD PRESSURE: 166 MMHG | OXYGEN SATURATION: 99 % | HEIGHT: 62 IN | HEART RATE: 84 BPM | DIASTOLIC BLOOD PRESSURE: 76 MMHG

## 2021-11-28 DIAGNOSIS — Z98.89 OTHER SPECIFIED POSTPROCEDURAL STATES: Chronic | ICD-10-CM

## 2021-11-28 DIAGNOSIS — I10 ESSENTIAL (PRIMARY) HYPERTENSION: ICD-10-CM

## 2021-11-28 DIAGNOSIS — I50.32 CHRONIC DIASTOLIC (CONGESTIVE) HEART FAILURE: ICD-10-CM

## 2021-11-28 LAB
ALBUMIN SERPL ELPH-MCNC: 3.3 G/DL — SIGNIFICANT CHANGE UP (ref 3.3–5)
ALP SERPL-CCNC: 87 U/L — SIGNIFICANT CHANGE UP (ref 40–120)
ALT FLD-CCNC: 24 U/L — SIGNIFICANT CHANGE UP (ref 12–78)
ANION GAP SERPL CALC-SCNC: 4 MMOL/L — LOW (ref 5–17)
APTT BLD: 26.7 SEC — LOW (ref 27.5–35.5)
AST SERPL-CCNC: 18 U/L — SIGNIFICANT CHANGE UP (ref 15–37)
BASOPHILS # BLD AUTO: 0.03 K/UL — SIGNIFICANT CHANGE UP (ref 0–0.2)
BASOPHILS NFR BLD AUTO: 0.7 % — SIGNIFICANT CHANGE UP (ref 0–2)
BILIRUB SERPL-MCNC: 0.4 MG/DL — SIGNIFICANT CHANGE UP (ref 0.2–1.2)
BUN SERPL-MCNC: 33 MG/DL — HIGH (ref 7–23)
CALCIUM SERPL-MCNC: 9.4 MG/DL — SIGNIFICANT CHANGE UP (ref 8.5–10.1)
CHLORIDE SERPL-SCNC: 110 MMOL/L — HIGH (ref 96–108)
CO2 SERPL-SCNC: 30 MMOL/L — SIGNIFICANT CHANGE UP (ref 22–31)
CREAT SERPL-MCNC: 1.03 MG/DL — SIGNIFICANT CHANGE UP (ref 0.5–1.3)
D DIMER BLD IA.RAPID-MCNC: 216 NG/ML DDU — SIGNIFICANT CHANGE UP
EOSINOPHIL # BLD AUTO: 0.18 K/UL — SIGNIFICANT CHANGE UP (ref 0–0.5)
EOSINOPHIL NFR BLD AUTO: 4.2 % — SIGNIFICANT CHANGE UP (ref 0–6)
FLUAV AG NPH QL: SIGNIFICANT CHANGE UP
FLUBV AG NPH QL: SIGNIFICANT CHANGE UP
GLUCOSE SERPL-MCNC: 94 MG/DL — SIGNIFICANT CHANGE UP (ref 70–99)
HCT VFR BLD CALC: 43.3 % — SIGNIFICANT CHANGE UP (ref 34.5–45)
HGB BLD-MCNC: 13.7 G/DL — SIGNIFICANT CHANGE UP (ref 11.5–15.5)
IMM GRANULOCYTES NFR BLD AUTO: 0.2 % — SIGNIFICANT CHANGE UP (ref 0–1.5)
INR BLD: 0.97 RATIO — SIGNIFICANT CHANGE UP (ref 0.88–1.16)
LYMPHOCYTES # BLD AUTO: 0.73 K/UL — LOW (ref 1–3.3)
LYMPHOCYTES # BLD AUTO: 16.9 % — SIGNIFICANT CHANGE UP (ref 13–44)
MAGNESIUM SERPL-MCNC: 2.6 MG/DL — SIGNIFICANT CHANGE UP (ref 1.6–2.6)
MCHC RBC-ENTMCNC: 30.8 PG — SIGNIFICANT CHANGE UP (ref 27–34)
MCHC RBC-ENTMCNC: 31.6 GM/DL — LOW (ref 32–36)
MCV RBC AUTO: 97.3 FL — SIGNIFICANT CHANGE UP (ref 80–100)
MONOCYTES # BLD AUTO: 0.46 K/UL — SIGNIFICANT CHANGE UP (ref 0–0.9)
MONOCYTES NFR BLD AUTO: 10.6 % — SIGNIFICANT CHANGE UP (ref 2–14)
NEUTROPHILS # BLD AUTO: 2.91 K/UL — SIGNIFICANT CHANGE UP (ref 1.8–7.4)
NEUTROPHILS NFR BLD AUTO: 67.4 % — SIGNIFICANT CHANGE UP (ref 43–77)
NRBC # BLD: 0 /100 WBCS — SIGNIFICANT CHANGE UP (ref 0–0)
NT-PROBNP SERPL-SCNC: 5212 PG/ML — HIGH (ref 0–450)
PLATELET # BLD AUTO: 220 K/UL — SIGNIFICANT CHANGE UP (ref 150–400)
POTASSIUM SERPL-MCNC: 4.4 MMOL/L — SIGNIFICANT CHANGE UP (ref 3.5–5.3)
POTASSIUM SERPL-SCNC: 4.4 MMOL/L — SIGNIFICANT CHANGE UP (ref 3.5–5.3)
PROT SERPL-MCNC: 6.5 GM/DL — SIGNIFICANT CHANGE UP (ref 6–8.3)
PROTHROM AB SERPL-ACNC: 11.3 SEC — SIGNIFICANT CHANGE UP (ref 10.6–13.6)
RBC # BLD: 4.45 M/UL — SIGNIFICANT CHANGE UP (ref 3.8–5.2)
RBC # FLD: 13.9 % — SIGNIFICANT CHANGE UP (ref 10.3–14.5)
SARS-COV-2 RNA SPEC QL NAA+PROBE: SIGNIFICANT CHANGE UP
SODIUM SERPL-SCNC: 144 MMOL/L — SIGNIFICANT CHANGE UP (ref 135–145)
TROPONIN I, HIGH SENSITIVITY RESULT: 34.3 NG/L — SIGNIFICANT CHANGE UP
WBC # BLD: 4.32 K/UL — SIGNIFICANT CHANGE UP (ref 3.8–10.5)
WBC # FLD AUTO: 4.32 K/UL — SIGNIFICANT CHANGE UP (ref 3.8–10.5)

## 2021-11-28 PROCEDURE — 71045 X-RAY EXAM CHEST 1 VIEW: CPT | Mod: 26

## 2021-11-28 PROCEDURE — 99285 EMERGENCY DEPT VISIT HI MDM: CPT

## 2021-11-28 PROCEDURE — 93970 EXTREMITY STUDY: CPT | Mod: 26

## 2021-11-28 PROCEDURE — 93010 ELECTROCARDIOGRAM REPORT: CPT

## 2021-11-28 RX ORDER — FUROSEMIDE 40 MG
40 TABLET ORAL DAILY
Refills: 0 | Status: DISCONTINUED | OUTPATIENT
Start: 2021-11-29 | End: 2021-11-29

## 2021-11-28 RX ORDER — CLOPIDOGREL BISULFATE 75 MG/1
75 TABLET, FILM COATED ORAL DAILY
Refills: 0 | Status: DISCONTINUED | OUTPATIENT
Start: 2021-11-28 | End: 2021-11-30

## 2021-11-28 RX ORDER — LOSARTAN POTASSIUM 100 MG/1
50 TABLET, FILM COATED ORAL DAILY
Refills: 0 | Status: DISCONTINUED | OUTPATIENT
Start: 2021-11-28 | End: 2021-11-30

## 2021-11-28 RX ORDER — LOSARTAN POTASSIUM 100 MG/1
50 TABLET, FILM COATED ORAL ONCE
Refills: 0 | Status: COMPLETED | OUTPATIENT
Start: 2021-11-28 | End: 2021-11-28

## 2021-11-28 RX ORDER — ENOXAPARIN SODIUM 100 MG/ML
40 INJECTION SUBCUTANEOUS DAILY
Refills: 0 | Status: DISCONTINUED | OUTPATIENT
Start: 2021-11-28 | End: 2021-11-30

## 2021-11-28 RX ORDER — DORZOLAMIDE HYDROCHLORIDE 20 MG/ML
1 SOLUTION/ DROPS OPHTHALMIC THREE TIMES A DAY
Refills: 0 | Status: DISCONTINUED | OUTPATIENT
Start: 2021-11-29 | End: 2021-11-30

## 2021-11-28 RX ORDER — METOPROLOL TARTRATE 50 MG
25 TABLET ORAL DAILY
Refills: 0 | Status: DISCONTINUED | OUTPATIENT
Start: 2021-11-29 | End: 2021-11-30

## 2021-11-28 RX ORDER — SENNA PLUS 8.6 MG/1
2 TABLET ORAL AT BEDTIME
Refills: 0 | Status: DISCONTINUED | OUTPATIENT
Start: 2021-11-28 | End: 2021-11-30

## 2021-11-28 RX ORDER — DORZOLAMIDE HYDROCHLORIDE 20 MG/ML
SOLUTION/ DROPS OPHTHALMIC
Refills: 0 | Status: DISCONTINUED | OUTPATIENT
Start: 2021-11-29 | End: 2021-11-30

## 2021-11-28 RX ORDER — MAGNESIUM HYDROXIDE 400 MG/1
30 TABLET, CHEWABLE ORAL DAILY
Refills: 0 | Status: DISCONTINUED | OUTPATIENT
Start: 2021-11-28 | End: 2021-11-30

## 2021-11-28 RX ORDER — OXYBUTYNIN CHLORIDE 5 MG
5 TABLET ORAL
Refills: 0 | Status: DISCONTINUED | OUTPATIENT
Start: 2021-11-28 | End: 2021-11-30

## 2021-11-28 RX ORDER — DORZOLAMIDE HYDROCHLORIDE 20 MG/ML
1 SOLUTION/ DROPS OPHTHALMIC ONCE
Refills: 0 | Status: COMPLETED | OUTPATIENT
Start: 2021-11-28 | End: 2021-11-28

## 2021-11-28 RX ORDER — FUROSEMIDE 40 MG
40 TABLET ORAL ONCE
Refills: 0 | Status: COMPLETED | OUTPATIENT
Start: 2021-11-28 | End: 2021-11-28

## 2021-11-28 NOTE — ED ADULT NURSE REASSESSMENT NOTE - NS ED NURSE REASSESS COMMENT FT1
Recv'd patient A&Ox3. Hard of hearing. Patient c/o weakness x 3 days. Admitted to [facility] for CHF. Patient sent to [place] to R/O dvts for bi lateral leg swelling.

## 2021-11-28 NOTE — ED PROVIDER NOTE - NSICDXPASTMEDICALHX_GEN_ALL_CORE_FT
PAST MEDICAL HISTORY:  Abdominal mass     Cervical cancer last treatment May 25, 2011. s/p external and internal radiation    CVA (cerebral infarction)     Depression     Depression (emotion)     Diverticulitis     GI (gastrointestinal bleed)     Hemorrhoid     Hypertension

## 2021-11-28 NOTE — ED PROVIDER NOTE - CLINICAL SUMMARY MEDICAL DECISION MAKING FREE TEXT BOX
Pt well appearing, comfortable, sat 96%, no obv rales on exam, elev probnp. pt likely with some fluid overload. d/w pt and daughter, two options, either diuresis in ER and repeat CE or admit. pt and family would like admission. dw dr valencia and dr unger. Pt well appearing, comfortable, sat 96%, no obv rales on exam, elev probnp. pt likely with some fluid overload. d/w pt and daughter, two options, either diuresis in ER and repeat CE or admit. pt and family would like admission. nelida valencia.  dr cornel pritchard

## 2021-11-28 NOTE — ED ADULT NURSE NOTE - OBJECTIVE STATEMENT
Pt c/o shortness of breath on exertion. Pt was recently taken off of Lasix by cardiologist. Per family, shortness of breath has persisted for 1x week, in the past three days it has gotten worse. Pt has unsteady gait and weakness in legs. Pt recently fell on thanksgiving. Did not hit head. Pt has hx of fall. PMH stroke, dysphagia, HTN, glaucoma. Pt is AxOx3 at baseline with loss of long-term memory.

## 2021-11-28 NOTE — H&P ADULT - NSICDXFAMHXNEG_GEN_ALL
asthma/atrial fibrillation/brain aneurysm/COPD/coronary disease/irritable bowel syndrome/stroke/thyroid disease

## 2021-11-28 NOTE — H&P ADULT - NSHPREVIEWOFSYSTEMS_GEN_ALL_CORE
REVIEW OF SYSTEMS:    CONSTITUTIONAL: No weakness, fevers or chills  EYES/ENT: No visual changes;  No vertigo or throat pain   NECK: No pain or stiffness  RESPIRATORY: No cough, wheezing, hemoptysis; No shortness of breath  CARDIOVASCULAR: No chest pain or palpitations [ ] CHF [ ] MI [ ] CABG [ ]  GASTROINTESTINAL: No abdominal or epigastric pain. No nausea, vomiting, or hematemesis; No diarrhea or constipation. No melena or hematochezia. [ ]abdominal surgery [ ] prostrate problems   GENITOURINARY: No dysuria, frequency or hematuria   NEUROLOGICAL: Minimal left sided weakness from old cva. No hx of seizures  SKIN: No itching, burning, rashes, or lesions   All other review of systems is negative unless indicated above.

## 2021-11-28 NOTE — ED PROVIDER NOTE - PHYSICAL EXAMINATION
Gen: Alert, Well appearing. NAD    Head: NC, AT, PERRL, normal lids/conjunctiva   ENT: patent oropharynx without erythema/exudate, uvula midline  Neck: supple, no tenderness/meningismus  Pulm: scant rales  CV: RRR, no M/R/G, +dist pulses   Abd: soft, NT/ND, +BS, no guarding/rebound tenderness  Mskel: extremities x4 with normal ROM. no ctl spine ttp. no edema/erythema/cyanosis   Skin: no rash, no bruising  Neuro: AAOx3, no sensory/motor deficits, CN 2-12 intact

## 2021-11-28 NOTE — ED PROVIDER NOTE - OBJECTIVE STATEMENT
89 yo F w/PMH of HLD, HTN, Stroke w/ mild left residual weakness (on Plavix) presents to the ED for with dyspnea  for x1 week. Pt states it was worse today and she unable to walk a few steps. Denies any fevers, abdominal pain, dysuria, N/V/D, or CP. Pt has H/O DVT /PE. Pt's daughter states pt was dehydrated x2 months ago and her PMD took her off Lasix 1 mg daily. Called cardiologist today and told pt to take Lasix 20 mg.     No fever/chills, No photophobia/eye pain/changes in vision, No ear pain/sore throat/dysphagia, No chest pain/palpitations, no SOB/cough/wheeze/stridor, No abdominal pain, No N/V/D, no dysuria/frequency/discharge, No neck/back pain, no rash, no changes in neurological status/function.

## 2021-11-28 NOTE — ED ADULT NURSE NOTE - CHPI ED NUR SYMPTOMS NEG
no back pain/no chest pain/no chills/no congestion/no diaphoresis/no fever/no nausea/no syncope/no vomiting

## 2021-11-28 NOTE — H&P ADULT - ASSESSMENT
89 year old female with dyspnea. and wekkaness.  ankle edema   chf  HTN   HLD   Old cva with mild residual left sided weakness

## 2021-11-28 NOTE — ED ADULT NURSE NOTE - NSIMPLEMENTINTERV_GEN_ALL_ED
Implemented All Fall with Harm Risk Interventions:  [place] to call system. Call bell, personal items and telephone within reach. Instruct patient to call for assistance. Room bathroom lighting operational. Non-slip footwear when patient is off stretcher. Physically safe environment: no spills, clutter or unnecessary equipment. Stretcher in lowest position, wheels locked, appropriate side rails in place. Provide visual cue, wrist band, yellow gown, etc. Monitor gait and stability. Monitor for mental status changes and reorient to person, place, and time. Review medications for side effects contributing to fall risk. Reinforce activity limits and safety measures with patient and family. Provide visual clues: red socks.

## 2021-11-28 NOTE — H&P ADULT - NSHPPHYSICALEXAM_GEN_ALL_CORE
General: A/ox 3, No acute Distress  skin : Normal  Head. [place]. No lacerations  Neck: Supple, NO JVD  Cardiac: S1 S2, No M/R/G  Pulmonary: CTAP, Breathing unlabored, No Rhonchi/Rales/Wheezing  Abdomen: Soft, Non -tender, +BS x 4 quads  Neuro: A/o x 3, No focal deficits. Normal Motor and sensory exam  Vascular: Normal distal pulses.  Extremities: . No rashes. Mild ankle edema  Decubiti ; None

## 2021-11-28 NOTE — H&P ADULT - HISTORY OF PRESENT ILLNESS
89 yo F w/PMH of HLD, HTN, Stroke w/ mild left residual weakness (on Plavix) presents to the ED for with dyspnea  for x1 week. Pt states it was worse today and she unable to walk a few steps. Denies any fevers, abdominal pain, dysuria, N/V/D, or CP. Pt has H/O DVT /PE. Pt's daughter states pt was dehydrated x2 months ago and her PMD took her off Lasix 1 mg daily. Called cardiologist today and told pt to take Lasix 20 mg.     	No fever/chills, No photophobia/eye pain/changes in vision, No ear pain/sore throat/dysphagia, No chest pain/palpitations, no SOB/cough/wheeze/stridor, No abdominal pain, No N/V/D, no dysuria/frequency/discharge, No neck/back pain, no rash, no changes in neurological status/function.

## 2021-11-28 NOTE — H&P ADULT - NSHPLABSRESULTS_GEN_ALL_CORE
13.7                 144  | 30   | 33           4.32  >-----------< 220     ------------------------< 94                    43.3                 4.4  | 110  | 1.03                                         Ca 9.4   Mg 2.6   Ph x              Probnp -5K  Troponin , high sensitivity-53  Chest Xray- cardiomegaly    EKG sinus rhythm,LVH

## 2021-11-29 DIAGNOSIS — R53.1 WEAKNESS: ICD-10-CM

## 2021-11-29 DIAGNOSIS — R06.02 SHORTNESS OF BREATH: ICD-10-CM

## 2021-11-29 DIAGNOSIS — Z51.5 ENCOUNTER FOR PALLIATIVE CARE: ICD-10-CM

## 2021-11-29 LAB
ANION GAP SERPL CALC-SCNC: 7 MMOL/L — SIGNIFICANT CHANGE UP (ref 5–17)
BUN SERPL-MCNC: 30 MG/DL — HIGH (ref 7–23)
CALCIUM SERPL-MCNC: 9.2 MG/DL — SIGNIFICANT CHANGE UP (ref 8.5–10.1)
CHLORIDE SERPL-SCNC: 108 MMOL/L — SIGNIFICANT CHANGE UP (ref 96–108)
CHOLEST SERPL-MCNC: 187 MG/DL — SIGNIFICANT CHANGE UP
CO2 SERPL-SCNC: 26 MMOL/L — SIGNIFICANT CHANGE UP (ref 22–31)
COVID-19 NUCLEOCAPSID GAM AB INTERP: POSITIVE
COVID-19 NUCLEOCAPSID TOTAL GAM ANTIBODY RESULT: 7.61 INDEX — HIGH
COVID-19 SPIKE DOMAIN AB INTERP: POSITIVE
COVID-19 SPIKE DOMAIN ANTIBODY RESULT: >250 U/ML — HIGH
CREAT SERPL-MCNC: 1.09 MG/DL — SIGNIFICANT CHANGE UP (ref 0.5–1.3)
GLUCOSE SERPL-MCNC: 95 MG/DL — SIGNIFICANT CHANGE UP (ref 70–99)
HCT VFR BLD CALC: 44.7 % — SIGNIFICANT CHANGE UP (ref 34.5–45)
HDLC SERPL-MCNC: 55 MG/DL — SIGNIFICANT CHANGE UP
HGB BLD-MCNC: 14.4 G/DL — SIGNIFICANT CHANGE UP (ref 11.5–15.5)
LIPID PNL WITH DIRECT LDL SERPL: 113 MG/DL — HIGH
MCHC RBC-ENTMCNC: 30.8 PG — SIGNIFICANT CHANGE UP (ref 27–34)
MCHC RBC-ENTMCNC: 32.2 GM/DL — SIGNIFICANT CHANGE UP (ref 32–36)
MCV RBC AUTO: 95.7 FL — SIGNIFICANT CHANGE UP (ref 80–100)
NON HDL CHOLESTEROL: 133 MG/DL — HIGH
NRBC # BLD: 0 /100 WBCS — SIGNIFICANT CHANGE UP (ref 0–0)
PLATELET # BLD AUTO: 228 K/UL — SIGNIFICANT CHANGE UP (ref 150–400)
POTASSIUM SERPL-MCNC: 4 MMOL/L — SIGNIFICANT CHANGE UP (ref 3.5–5.3)
POTASSIUM SERPL-SCNC: 4 MMOL/L — SIGNIFICANT CHANGE UP (ref 3.5–5.3)
RBC # BLD: 4.67 M/UL — SIGNIFICANT CHANGE UP (ref 3.8–5.2)
RBC # FLD: 13.9 % — SIGNIFICANT CHANGE UP (ref 10.3–14.5)
SARS-COV-2 IGG+IGM SERPL QL IA: 7.61 INDEX — HIGH
SARS-COV-2 IGG+IGM SERPL QL IA: >250 U/ML — HIGH
SARS-COV-2 IGG+IGM SERPL QL IA: POSITIVE
SARS-COV-2 IGG+IGM SERPL QL IA: POSITIVE
SODIUM SERPL-SCNC: 141 MMOL/L — SIGNIFICANT CHANGE UP (ref 135–145)
TRIGL SERPL-MCNC: 96 MG/DL — SIGNIFICANT CHANGE UP
TSH SERPL-MCNC: 3.31 UIU/ML — SIGNIFICANT CHANGE UP (ref 0.36–3.74)
WBC # BLD: 5.29 K/UL — SIGNIFICANT CHANGE UP (ref 3.8–10.5)
WBC # FLD AUTO: 5.29 K/UL — SIGNIFICANT CHANGE UP (ref 3.8–10.5)

## 2021-11-29 PROCEDURE — 99223 1ST HOSP IP/OBS HIGH 75: CPT

## 2021-11-29 RX ORDER — FUROSEMIDE 40 MG
20 TABLET ORAL DAILY
Refills: 0 | Status: DISCONTINUED | OUTPATIENT
Start: 2021-11-29 | End: 2021-11-30

## 2021-11-29 RX ADMIN — LOSARTAN POTASSIUM 50 MILLIGRAM(S): 100 TABLET, FILM COATED ORAL at 00:03

## 2021-11-29 RX ADMIN — Medication 5 MILLIGRAM(S): at 06:42

## 2021-11-29 RX ADMIN — DORZOLAMIDE HYDROCHLORIDE 1 DROP(S): 20 SOLUTION/ DROPS OPHTHALMIC at 13:36

## 2021-11-29 RX ADMIN — CLOPIDOGREL BISULFATE 75 MILLIGRAM(S): 75 TABLET, FILM COATED ORAL at 11:35

## 2021-11-29 RX ADMIN — Medication 25 MILLIGRAM(S): at 06:42

## 2021-11-29 RX ADMIN — ENOXAPARIN SODIUM 40 MILLIGRAM(S): 100 INJECTION SUBCUTANEOUS at 11:35

## 2021-11-29 RX ADMIN — Medication 40 MILLIGRAM(S): at 02:49

## 2021-11-29 RX ADMIN — LOSARTAN POTASSIUM 50 MILLIGRAM(S): 100 TABLET, FILM COATED ORAL at 06:42

## 2021-11-29 RX ADMIN — DORZOLAMIDE HYDROCHLORIDE 1 DROP(S): 20 SOLUTION/ DROPS OPHTHALMIC at 21:53

## 2021-11-29 RX ADMIN — Medication 5 MILLIGRAM(S): at 17:35

## 2021-11-29 NOTE — CONSULT NOTE ADULT - ASSESSMENT
The chart has been reviewed but the patient has not yet been examined.  Full note to follow. 89-year-old female, well-known to me in my service for many years.  History of hyperlipidemia, hypertension and history of stroke.  No prior history of any significant coronary disease but has had on and off diastolic heart failure.  She has been off diuretic therapy for several weeks because of episodes of low blood pressure.  Brought in by daughter because she was under the impression that she had labored breathing.  There is no evidence of edema or chest pain noted.  EKG appears unchanged, chest x-ray shows no evidence of infiltrate, effusion or congestion.  All in all patient appears quite comfortable lying in bed today and denies any dyspnea whatsoever.  Initial troponins were negative, elevated BNP likely from chronic diastolic dysfunction.    Suggest ambulation and assessment for hypoxemia.  If patient stable, no barriers to discharge from cardiovascular perspective.  Given patient's age, would not pursue aggressive diagnostic testing and patient can be managed as an outpatient.  This was discussed at length via telephone with her daughter Daniel.  Suggest follow-up in our office within 7 to 14 days for reevaluation.  Suggested we keep her on a low-dose of diuretic therapy on discharge and continue to monitor her blood pressures closely.

## 2021-11-29 NOTE — DIETITIAN INITIAL EVALUATION ADULT. - OTHER INFO
Pt sleeping during visit; unable to obtain information regarding diet/wt hx. Per nursing staff, pt on soft & bite sized diet & tolerating/managing well; consuming 50-60% of meals. Per chart review, pt weighed 117.5 lbs. in 02/2016, & current wt 95 lbs. Unable to conduct nutrition-focused physical exam to assess for malnutrition, as pt under covers and sleeping on side, so unable to accurately assess for signs of muscle wasting/fat depletion. Pt sleeping during visit; unable to obtain information regarding diet/wt hx. Per nursing staff, pt on soft & bite sized diet & tolerating/managing well; consuming mostly 50% of meals. Per chart review, pt weighed 117.5 lbs. in 02/2016, & current wt 95 lbs. Unable to conduct nutrition-focused physical exam to assess for malnutrition, as pt under covers and sleeping on side, so unable to accurately assess for signs of muscle wasting/fat depletion.

## 2021-11-29 NOTE — PROGRESS NOTE ADULT - SUBJECTIVE AND OBJECTIVE BOX
Patient is a 89y old  Female who presents with a chief complaint of dyspnea at rest and on exertion/HTN/ leg edema (29 Nov 2021 07:43)  alert , oriented X3. . states her breathing is Ok. denies chest pains.    Vitals stable   No fever. no leg or calf pains   Telemetry- no events.  sinus rhythm.   Labs reviewed.   Venous doppler of legs - No dvt   TTE pending.   Seen by cardiogy- Dr Reynolds    INTERVAL HPI/OVERNIGHT EVENTS:  PAST MEDICAL & SURGICAL HISTORY:  Hypertension    Cervical cancer  last treatment May 25, 2011. s/p external and internal radiation    Abdominal mass    Depression (emotion)    GI (gastrointestinal bleed)    Hemorrhoid    CVA (cerebral infarction)    Depression    Diverticulitis    S/P abdominal surgery, follow-up exam    S/P lumpectomy of breast        MEDICATIONS  (STANDING):  clopidogrel Tablet 75 milliGRAM(s) Oral daily  dorzolamide 2% Ophthalmic Solution      dorzolamide 2% Ophthalmic Solution 1 Drop(s) Both EYES three times a day  enoxaparin Injectable 40 milliGRAM(s) SubCutaneous daily  furosemide   Injectable 40 milliGRAM(s) IV Push daily  losartan 50 milliGRAM(s) Oral daily  metoprolol succinate ER 25 milliGRAM(s) Oral daily  oxybutynin 5 milliGRAM(s) Oral two times a day  senna 2 Tablet(s) Oral at bedtime    MEDICATIONS  (PRN):  magnesium hydroxide Suspension 30 milliLiter(s) Oral daily PRN Constipation      Allergies    penicillin (Hives)    Intolerances        REVIEW OF SYSTEMS:  CONSTITUTIONAL: No fever, weight loss, or fatigue  EYES: No eye pain, visual disturbances, or discharge  ENMT:  No difficulty hearing, tinnitus, vertigo; No sinus or throat pain  NECK: No pain or stiffness  BREASTS: No pain, masses, or nipple discharge  RESPIRATORY: No cough, wheezing, chills or hemoptysis; No shortness of breath  CARDIOVASCULAR: No chest pain, palpitations, dizziness, or leg swelling  GASTROINTESTINAL: No abdominal or epigastric pain. No nausea, vomiting, or hematemesis; No diarrhea or constipation. No melena or hematochezia.  GENITOURINARY: No dysuria, frequency, hematuria, or incontinence  NEUROLOGICAL: No headaches, memory loss, loss of strength, numbness, or tremors  SKIN: No itching, burning, rashes, or lesions   LYMPH NODES: No enlarged glands  ENDOCRINE: No heat or cold intolerance; No hair loss  MUSCULOSKELETAL: No joint pain or swelling; No muscle, back, or extremity pain  PSYCHIATRIC: No depression, anxiety, mood swings, or difficulty sleeping  HEME/LYMPH: No easy bruising, or bleeding gums  ALLERY AND IMMUNOLOGIC: No hives or eczema    Vital Signs Last 24 Hrs  T(C): 36.6 (29 Nov 2021 05:59), Max: 36.8 (28 Nov 2021 21:06)  T(F): 97.9 (29 Nov 2021 05:59), Max: 98.2 (28 Nov 2021 21:06)  HR: 74 (29 Nov 2021 05:59) (67 - 84)  BP: 140/91 (29 Nov 2021 05:59) (140/91 - 196/86)  BP(mean): --  RR: 16 (29 Nov 2021 05:59) (15 - 20)  SpO2: 96% (29 Nov 2021 05:59) (96% - 99%)    PHYSICAL EXAM:  GENERAL: NAD, well-groomed, well-developed  HEAD:  Atraumatic, Normocephalic  EYES: EOMI, PERRLA, conjunctiva and sclera clear  ENMT: No tonsillar erythema, exudates, or enlargement; Moist mucous membranes, Good dentition, No lesions  NECK: Supple, No JVD, Normal thyroid  NERVOUS SYSTEM:  Alert & Oriented X3, Good concentration; Motor Strength 5/5 B/L upper and lower extremities; DTRs 2+ intact and symmetric  CHEST/LUNG: Clear to percussion bilaterally; No rales, rhonchi, wheezing, or rubs  HEART: Regular rate and rhythm; No murmurs, rubs, or gallops  ABDOMEN: Soft, Nontender, Nondistended; Bowel sounds present  EXTREMITIES:  2+ Peripheral Pulses, No clubbing, . capillary refill on feet  and hands Normal.   LYMPH: No lymphadenopathy noted  SKIN: No rashes or lesions    LABS:                        14.4   5.29  )-----------( 228      ( 29 Nov 2021 06:29 )             44.7     11-29    141  |  108  |  30<H>  ----------------------------<  95  4.0   |  26  |  1.09    Ca    9.2      29 Nov 2021 06:29  Mg     2.6     11-28    TPro  6.5  /  Alb  3.3  /  TBili  0.4  /  DBili  x   /  AST  18  /  ALT  24  /  AlkPhos  87  11-28      PT/INR - ( 28 Nov 2021 18:34 )   PT: 11.3 sec;   INR: 0.97 ratio         PTT - ( 28 Nov 2021 18:34 )  PTT:26.7 sec    CAPILLARY BLOOD GLUCOSE                    RADIOLOGY & ADDITIONAL TESTS:    Imaging Personally Reviewed:  [ ] YES  [ ] NO    Consultant(s) Notes Reviewed:  [x ] YES  [ ] NO    Care Discussed with Consultants/Other Providers [ ] YES  [ ] NO    Care discussed with family,         [  ]   yes  [  ]  No    imp:    stable[ ]    unstable[  ]     improving [  x ]       unchanged  [  ]                Plans:  Continue present plans  [x  ] cardiology follow up/ PT eval.                New consult [  ]   specialty  .......               order test[  ]    test name. BMP in am                 Discharge Planning  [  ]

## 2021-11-29 NOTE — CONSULT NOTE ADULT - PROBLEM SELECTOR RECOMMENDATION 4
on losartan and metoprolol ER  BP improved since arrival to hospital continue with home BP meds  BP improved since arrival to hospital

## 2021-11-29 NOTE — DIETITIAN INITIAL EVALUATION ADULT. - PERTINENT LABORATORY DATA
11-29 Na141 mmol/L Glu 95 mg/dL K+ 4.0 mmol/L Cr  1.09 mg/dL BUN 30 mg/dL<H> WBC 5.29 K/uL 11-28 Alb 3.3 g/dL 11-29 Chol 187 mg/dL  mg/dL  HDL 55 mg/dL Trig 96 mg/dL

## 2021-11-29 NOTE — DIETITIAN INITIAL EVALUATION ADULT. - REASON
Unable to conduct nutrition focused physical exam due to pt sleeping, under covers, laying on side & unable to visually observe for signs of malnutrition

## 2021-11-29 NOTE — DIETITIAN INITIAL EVALUATION ADULT. - PERTINENT MEDS FT
MEDICATIONS  (STANDING):  clopidogrel Tablet 75 milliGRAM(s) Oral daily  dorzolamide 2% Ophthalmic Solution      dorzolamide 2% Ophthalmic Solution 1 Drop(s) Both EYES three times a day  enoxaparin Injectable 40 milliGRAM(s) SubCutaneous daily  furosemide    Tablet 20 milliGRAM(s) Oral daily  losartan 50 milliGRAM(s) Oral daily  metoprolol succinate ER 25 milliGRAM(s) Oral daily  oxybutynin 5 milliGRAM(s) Oral two times a day  senna 2 Tablet(s) Oral at bedtime    MEDICATIONS  (PRN):  magnesium hydroxide Suspension 30 milliLiter(s) Oral daily PRN Constipation

## 2021-11-29 NOTE — CONSULT NOTE ADULT - SUBJECTIVE AND OBJECTIVE BOX
CARDIOLOGY CONSULTATION NOTE                                                                             MARTHA LEIGH is a 89y Female w/PMH of HLD, HTN, Stroke w/ mild left residual weakness (on Plavix) presents to the ED for with dyspnea  for x1 week. Pt states it was worse today and she unable to walk a few steps. Denies any fevers, abdominal pain, dysuria, N/V/D, or CP. Pt has H/O DVT /PE. Pt's daughter states pt was dehydrated x2 months ago and her PMD took her off Lasix 1 mg daily. Called cardiologist today and told pt to take Lasix 20 mg.   	No fever/chills, No photophobia/eye pain/changes in vision, No ear pain/sore throat/dysphagia, No chest pain/palpitations, no SOB/cough/wheeze/stridor, No abdominal pain, No N/V/D, no dysuria/frequency/discharge, No neck/back pain, no rash, no changes in neurological status/function.  (28 Nov 2021 19:53)   REVIEW OF SYSTEMS: NA -----------------------------  Home Medications: Azopt 1% ophthalmic suspension:  to each affected eye  (28 Mar 2018 19:41) B-12 Resin 1000 mcg oral tablet: 1 tab(s) orally once a day (28 Mar 2018 19:41) Centrum:  orally  (28 Mar 2018 19:41) clopidogrel 75 mg oral tablet: 1 tab(s) orally once a day (28 Mar 2018 19:41) Co Q-10 100 mg oral capsule: 1 cap(s) orally once a day (28 Mar 2018 19:41) dorzolamide 2% ophthalmic solution:  to each affected eye 2 times a day (28 Mar 2018 19:41) ferrous sulfate (as elemental iron) 45 mg oral tablet, extended release: 1 tab(s) orally once a day (28 Mar 2018 19:41) Fish Oil 1000 mg oral capsule:  orally once a day (28 Mar 2018 19:41) Lasix 20 mg oral tablet: 0.5 tab(s) orally once a day (28 Mar 2018 19:41) Lopressor: 25 milligram(s) orally once a day (28 Mar 2018 19:41) losartan 50 mg oral tablet: 1 tab(s) orally once a day (28 Mar 2018 19:41) VESIcare 10 mg oral tablet: 1 tab(s) orally once a day (28 Mar 2018 19:41) Vitamin D3 1000 intl units oral tablet:  orally  (28 Mar 2018 19:41)   MEDICATIONS  (STANDING): clopidogrel Tablet 75 milliGRAM(s) Oral daily dorzolamide 2% Ophthalmic Solution 1 Drop(s) Both EYES three times a day enoxaparin Injectable 40 milliGRAM(s) SubCutaneous daily furosemide   Injectable 40 milliGRAM(s) IV Push daily losartan 50 milliGRAM(s) Oral daily metoprolol succinate ER 25 milliGRAM(s) Oral daily oxybutynin 5 milliGRAM(s) Oral two times a day senna 2 Tablet(s) Oral at bedtime   ALLERGIES: penicillin (Hives)   FAMILY HISTORY: No pertinent family history in first degree relatives    PHYSICAL EXAMINATION: ----------------------------- T(C): 36.6 (11-29-21 @ 05:59), Max: 36.8 (11-28-21 @ 21:06) HR: 74 (11-29-21 @ 05:59) (67 - 84) BP: 140/91 (11-29-21 @ 05:59) (140/91 - 196/86) RR: 16 (11-29-21 @ 05:59) (15 - 20) SpO2: 96% (11-29-21 @ 05:59) (96% - 99%) Wt(kg): --  Height (cm): 157.5 (11-28 @ 17:39) Weight (kg): 52.6 (11-28 @ 17:39) BMI (kg/m2): 21.2 (11-28 @ 17:39) BSA (m2): 1.52 (11-28 @ 17:39)  Constitutional: well developed, normal appearance, well groomed, well nourished, no deformities and no acute distress.  Eyes: the conjunctiva exhibited no abnormalities and the eyelids demonstrated no xanthelasmas.  HEENT: normal oral mucosa, no oral pallor and no oral cyanosis.  Neck: normal jugular venous A waves present, normal jugular venous V waves present and no jugular venous gaona A waves.  Pulmonary: no respiratory distress, normal respiratory rhythm and effort, no accessory muscle use and lungs were clear to auscultation bilaterally.  Cardiovascular: heart rate and rhythm were normal, normal S1 and S2 and no murmur, gallop, rub, heave or thrill are present.  Abdomen: soft, non-tender, no hepato-splenomegaly and no abdominal mass palpated.  Musculoskeletal: the gait could not be assessed..  Extremities: no clubbing of the fingernails, no localized cyanosis, no petechial hemorrhages and no ischemic changes.  Skin: normal skin color and pigmentation, no rash, no venous stasis, no skin lesions, no skin ulcer and no xanthoma was observed.  Psychiatric: oriented to person, place, and time, the affect was normal, the mood was normal and not feeling anxious.   ECG: -------    LABS:  -------- 11-28  144  |  110<H>  |  33<H> ----------------------------<  94 4.4   |  30  |  1.03  Ca    9.4      28 Nov 2021 18:34 Mg     2.6     11-28  TPro  6.5  /  Alb  3.3  /  TBili  0.4  /  DBili  x   /  AST  18  /  ALT  24  /  AlkPhos  87  11-28                       14.4  5.29  )-----------( 228      ( 29 Nov 2021 06:29 )            44.7    PT/INR - ( 28 Nov 2021 18:34 )   PT: 11.3 sec;   INR: 0.97 ratio      PTT - ( 28 Nov 2021 18:34 )  PTT:26.7 sec 11-28 @ 18:34 BNP: 5212 pg/mL      RADIOLOGY REPORTS: -----------------------------    ECHOCARDIOGRAM: ---------------------------      CARDIOLOGY CONSULTATION NOTE                                                                             MARTHA LEIGH is a 89y Female w/PMH of HLD, HTN, Stroke w/ mild left residual weakness (on Plavix) presents to the ED for with dyspnea  for x1 week. Pt states it was worse today and she unable to walk a few steps. Denies any fevers, abdominal pain, dysuria, N/V/D, or CP. Pt has H/O DVT /PE. Pt's daughter states pt was dehydrated x2 months ago and her PMD took her off Lasix 1 mg daily. Called cardiologist today and told pt to take Lasix 20 mg.   	No fever/chills, No photophobia/eye pain/changes in vision, No ear pain/sore throat/dysphagia, No chest pain/palpitations, no SOB/cough/wheeze/stridor, No abdominal pain, No N/V/D, no dysuria/frequency/discharge, No neck/back pain, no rash, no changes in neurological status/function.  (28 Nov 2021 19:53)   REVIEW OF SYSTEMS: NA -----------------------------  Home Medications: Azopt 1% ophthalmic suspension:  to each affected eye  (28 Mar 2018 19:41) B-12 Resin 1000 mcg oral tablet: 1 tab(s) orally once a day (28 Mar 2018 19:41) Centrum:  orally  (28 Mar 2018 19:41) clopidogrel 75 mg oral tablet: 1 tab(s) orally once a day (28 Mar 2018 19:41) Co Q-10 100 mg oral capsule: 1 cap(s) orally once a day (28 Mar 2018 19:41) dorzolamide 2% ophthalmic solution:  to each affected eye 2 times a day (28 Mar 2018 19:41) ferrous sulfate (as elemental iron) 45 mg oral tablet, extended release: 1 tab(s) orally once a day (28 Mar 2018 19:41) Fish Oil 1000 mg oral capsule:  orally once a day (28 Mar 2018 19:41) Lasix 20 mg oral tablet: 0.5 tab(s) orally once a day (28 Mar 2018 19:41) Lopressor: 25 milligram(s) orally once a day (28 Mar 2018 19:41) losartan 50 mg oral tablet: 1 tab(s) orally once a day (28 Mar 2018 19:41) VESIcare 10 mg oral tablet: 1 tab(s) orally once a day (28 Mar 2018 19:41) Vitamin D3 1000 intl units oral tablet:  orally  (28 Mar 2018 19:41)   MEDICATIONS  (STANDING): clopidogrel Tablet 75 milliGRAM(s) Oral daily dorzolamide 2% Ophthalmic Solution 1 Drop(s) Both EYES three times a day enoxaparin Injectable 40 milliGRAM(s) SubCutaneous daily furosemide   Injectable 40 milliGRAM(s) IV Push daily losartan 50 milliGRAM(s) Oral daily metoprolol succinate ER 25 milliGRAM(s) Oral daily oxybutynin 5 milliGRAM(s) Oral two times a day senna 2 Tablet(s) Oral at bedtime   ALLERGIES: penicillin (Hives)   FAMILY HISTORY: No pertinent family history in first degree relatives    PHYSICAL EXAMINATION: ----------------------------- T(C): 36.6 (11-29-21 @ 05:59), Max: 36.8 (11-28-21 @ 21:06) HR: 74 (11-29-21 @ 05:59) (67 - 84) BP: 140/91 (11-29-21 @ 05:59) (140/91 - 196/86) RR: 16 (11-29-21 @ 05:59) (15 - 20) SpO2: 96% (11-29-21 @ 05:59) (96% - 99%) Wt(kg): --  Height (cm): 157.5 (11-28 @ 17:39) Weight (kg): 52.6 (11-28 @ 17:39) BMI (kg/m2): 21.2 (11-28 @ 17:39) BSA (m2): 1.52 (11-28 @ 17:39)  Constitutional: well developed, normal appearance, well groomed, well nourished, no deformities and no acute distress.  Eyes: the conjunctiva exhibited no abnormalities and the eyelids demonstrated no xanthelasmas.  HEENT: normal oral mucosa, no oral pallor and no oral cyanosis.  Neck: normal jugular venous A waves present, normal jugular venous V waves present and no jugular venous gaona A waves.  Pulmonary: no respiratory distress, normal respiratory rhythm and effort, no accessory muscle use and lungs were clear to auscultation bilaterally.  Cardiovascular: heart rate and rhythm were normal, normal S1 and S2 and no murmur, gallop, rub, heave or thrill are present.  Abdomen: soft, non-tender, no hepato-splenomegaly and no abdominal mass palpated.  Musculoskeletal: the gait could not be assessed..  Extremities: no clubbing of the fingernails, no localized cyanosis, no petechial hemorrhages and no ischemic changes.  Skin: normal skin color and pigmentation, no rash, no venous stasis, no skin lesions, no skin ulcer and no xanthoma was observed.  Psychiatric: oriented to person, place, and time, the affect was normal, the mood was normal and not feeling anxious.   ECG: ------- < from: 12 Lead ECG (11.28.21 @ 17:51) >  Ventricular Rate 88 BPM  Atrial Rate 88 BPM  P-R Interval 184 ms  QRS Duration 94 ms  Q-T Interval 378 ms  QTC Calculation(Bazett) 457 ms  P Axis 27 degrees  R Axis -30 degrees  T Axis 77 degrees  Diagnosis Line Sinus rhythm with occasional premature ventricular complexes Possible Left atrial enlargement Left axis deviation Left ventricular hypertrophy Cannot rule out Septal infarct , age undetermined Abnormal ECG No previous ECGs available Confirmed by Frankie Reynolds MD ([id number]) on 11/29/2021 1:26:45 PM  < end of copied text >    LABS:  -------- 11-28  144  |  110<H>  |  33<H> ----------------------------<  94 4.4   |  30  |  1.03  Ca    9.4      28 Nov 2021 18:34 Mg     2.6     11-28  TPro  6.5  /  Alb  3.3  /  TBili  0.4  /  DBili  x   /  AST  18  /  ALT  24  /  AlkPhos  87  11-28                       14.4  5.29  )-----------( 228      ( 29 Nov 2021 06:29 )            44.7    PT/INR - ( 28 Nov 2021 18:34 )   PT: 11.3 sec;   INR: 0.97 ratio      PTT - ( 28 Nov 2021 18:34 )  PTT:26.7 sec 11-28 @ 18:34 BNP: 5212 pg/mL      RADIOLOGY REPORTS: ----------------------------- `< from: Xray Chest 1 View- PORTABLE-Urgent (11.28.21 @ 18:22) >  EXAM:  XR CHEST PORTABLE URGENT 1V                         PROCEDURE DATE:  11/28/2021      INTERPRETATION:  History: Chest pain  Portable semierect chest x-rays compared to 3/28/2018.  The heart is again enlarged. The trachea is midline. There is no focal infiltrate or pleural effusion. There is osteopenia and degenerative change of the bony structures.  IMPRESSION: Cardiomegaly. No active pulmonary disease.  --- End of Report ---      ERIC MEEKS MD; Attending Radiologist This document has been electronically signed. Nov 29 2021  2:06PM  < end of copied text >    ECHOCARDIOGRAM: ---------------------------   6/9/21 from my office: Normal left ventricular size, ejection fraction 45 to 50%.  Moderate AR, mild MR, mild TR, mild UT.  Mildly dilated aortic root.  No significant interval changes as compared to 3/26/2018.

## 2021-11-29 NOTE — PROGRESS NOTE ADULT - TIME BILLING
clinical eval, review labs, discuss with patient and rn/ team. VM left for daughter regarding clinical condition of patient.

## 2021-11-29 NOTE — CONSULT NOTE ADULT - CONVERSATION DETAILS
Spoke with patient at the bedside who said she lives with her daughter, Daniel and has another daughter Umu who lives in [place].  She uses a walker to get around but with difficulty.  Asked patient if she had given any thought to her wishes regarding advance directives and patient shared she would not want a breathing tube if the need arouse and would not want chest compressions if her heart stopped and would want to allow for a natural passing.    Called and spoke with daughter, Daniel regarding patient's wishes.  She said she was not surprised by what her mother said because she said, "my mother has no quality of life." She has great difficulty getting around, her friends have  and she talks about going to be with her  who is  for the past few years.  Daniel was made aware that her mother's wishes will be reflected on the chart in a MOLST.  Discussed MOLST form with patient's daughter.  Daniel aware and agreeable.  DNR/I on MOLST.

## 2021-11-29 NOTE — CONSULT NOTE ADULT - SUBJECTIVE AND OBJECTIVE BOX
HPI:   89 yo F w/PMH of HLD, HTN, Stroke w/ mild left residual weakness (on Plavix) presents to the ED for with dyspnea  for x1 week. Pt states it was worse today and she unable to walk a few steps. Denies any fevers, abdominal pain, dysuria, N/V/D, or CP. Pt has H/O DVT /PE. Pt's daughter states pt was dehydrated x2 months ago and her PMD took her off Lasix 1 mg daily. Called cardiologist today and told pt to take Lasix 20 mg.     	No fever/chills, No photophobia/eye pain/changes in vision, No ear pain/sore throat/dysphagia, No chest pain/palpitations, no SOB/cough/wheeze/stridor, No abdominal pain, No N/V/D, no dysuria/frequency/discharge, No neck/back pain, no rash, no changes in neurological status/function.   (28 Nov 2021 19:53)    PERTINENT PM/SXH:   Hypertension    Cervical cancer    Abdominal mass    Depression (emotion)    GI (gastrointestinal bleed)    Hemorrhoid    CVA (cerebral infarction)    Depression    Diverticulitis      S/P abdominal surgery, follow-up exam    S/P lumpectomy of breast      FAMILY HISTORY:  No pertinent family history in first degree relatives      ITEMS NOT CHECKED ARE NOT PRESENT    SOCIAL HISTORY:   Significant other/partner:  [ ]  Children:  [x] Daughters: Raul  Catholic/Spirituality: Pentecostal   Substance hx:  [ ]   Tobacco hx:  [ ]   Alcohol hx: [ ]   Home Opioid hx:  [x] I-Stop Reference No: Reference #: [id number]  Living Situation: [x]Home-lives with Daniel saldana  [ ]Long term care  [ ]Rehab [ ]Other    ADVANCE DIRECTIVES:    DNR  MOLST  [ ]  Living Will  [ ]   DECISION MAKER(s): Daniel elaine Sanz [phone]  [ ] Health Care Proxy(s)  [ ] Surrogate(s)  [ ] Guardian           Name(s): Phone Number(s):    BASELINE (I)ADL(s) (prior to admission):  [place]: [ ]Total  [x] Moderate [ ]Dependent    Allergies    penicillin (Hives)    Intolerances    MEDICATIONS  (STANDING):  clopidogrel Tablet 75 milliGRAM(s) Oral daily  dorzolamide 2% Ophthalmic Solution      dorzolamide 2% Ophthalmic Solution 1 Drop(s) Both EYES three times a day  enoxaparin Injectable 40 milliGRAM(s) SubCutaneous daily  furosemide   Injectable 40 milliGRAM(s) IV Push daily  losartan 50 milliGRAM(s) Oral daily  metoprolol succinate ER 25 milliGRAM(s) Oral daily  oxybutynin 5 milliGRAM(s) Oral two times a day  senna 2 Tablet(s) Oral at bedtime    MEDICATIONS  (PRN):  magnesium hydroxide Suspension 30 milliLiter(s) Oral daily PRN Constipation    PRESENT SYMPTOMS: [ ]Unable to obtain due to poor mentation   Source if other than patient:  [ ]Family   [ ]Team     Pain: [ ] yes [x] no  QOL impact -   Location -                    Aggravating factors -  Quality -  Radiation -  Timing-  Severity (0-10 scale):  Minimal acceptable level (0-10 scale):     PAIN AD Score:     [email or web address]/[email or web address].pdf (press ctrl +  left click to view)    Dyspnea:                           [x]Mild [ ]Moderate [ ]Severe  Anxiety:                             [ ]Mild [ ]Moderate [ ]Severe  Fatigue:                             [ ]Mild [x]Moderate [ ]Severe  Nausea:                             [ ]Mild [ ]Moderate [ ]Severe  Loss of appetite:              [ ]Mild [ ]Moderate [ ]Severe  Constipation:                    [ ]Mild [ ]Moderate [ ]Severe    Other Symptoms:  [x]All other review of systems negative     Karnofsky Performance Score/Palliative Performance Status Version 2:     50%    http://npcrc.org/files/news/palliative_performance_scale_ppsv2.pdf  PHYSICAL EXAM:  Vital Signs Last 24 Hrs  T(C): 36.8 (29 Nov 2021 10:50), Max: 36.8 (28 Nov 2021 21:06)  T(F): 98.2 (29 Nov 2021 10:50), Max: 98.2 (28 Nov 2021 21:06)  HR: 100 (29 Nov 2021 10:50) (67 - 100)  BP: 125/73 (29 Nov 2021 10:50) (125/73 - 196/86)  BP(mean): --  RR: 19 (29 Nov 2021 10:50) (15 - 20)  SpO2: 95% (29 Nov 2021 10:50) (95% - 99%) I&O's Summary    28 Nov 2021 07:01  -  29 Nov 2021 07:00  --------------------------------------------------------  IN: 0 mL / OUT: 400 mL / NET: -400 mL    GENERAL:  [x]Alert  [x]Oriented x  3 [ ]Lethargic  [ ]Cachexia  [ ]Unarousable  [x]Verbal  [ ]Non-Verbal  Behavioral:   [ ] Anxiety  [ ] Delirium [ ] Agitation [ ] Other  HEENT:  [x]Normal   [ ]Dry mouth   [ ]ET Tube/Trach  [ ]Oral lesions  PULMONARY:   [x]Clear [ ]Tachypnea  [ ]Audible excessive secretions   [ ]Rhonchi        [ ]Right [ ]Left [ ]Bilateral  [ ]Crackles        [ ]Right [ ]Left [ ]Bilateral  [ ]Wheezing     [ ]Right [ ]Left [ ]Bilateral  CARDIOVASCULAR:    [x]Regular [ ]Irregular [ ]Tachy  [ ]Cayden [ ]Murmur [ ]Other  GASTROINTESTINAL:  [x]Soft  [ ]Distended   [x]+BS  [x]Non tender [ ]Tender  [ ]PEG [ ]OGT/ NGT  Last BM: patient unsure said a few days ago GENITOURINARY: primafit in place  [ ]Normal [ ] Incontinent   [ ]Oliguria/Anuria   [ ][place]  MUSCULOSKELETAL:   [ ]Normal   [x]Weakness  [ ]Bed/Wheelchair bound [ ]Edema  NEUROLOGIC:   [x]No focal deficits  [ ] Cognitive impairment  [ ] Dysphagia [ ]Dysarthria [ ] Paresis [ ]Other   SKIN:   [x]Normal   [ ]Pressure ulcer(s)  [ ]Rash    CRITICAL CARE:  [ ] Shock Present  [ ]Septic [ ]Cardiogenic [ ]Neurologic [ ]Hypovolemic  [ ]  Vasopressors [ ]  Inotropes   [ ] Respiratory failure present [ ] mechanical ventilation [ ] non-invasive ventilatory support [ ] High flow  [ ] Acute  [ ] Chronic [ ] Hypoxic  [ ] Hypercarbic [ ] Other  [ ] Other organ failure     LABS:                        14.4   5.29  )-----------( 228      ( 29 Nov 2021 06:29 )             44.7   11-29    141  |  108  |  30<H>  ----------------------------<  95  4.0   |  26  |  1.09    Ca    9.2      29 Nov 2021 06:29  Mg     2.6     11-28    TPro  6.5  /  Alb  3.3  /  TBili  0.4  /  DBili  x   /  AST  18  /  ALT  24  /  AlkPhos  87  11-28  PT/INR - ( 28 Nov 2021 18:34 )   PT: 11.3 sec;   INR: 0.97 ratio         PTT - ( 28 Nov 2021 18:34 )  PTT:26.7 sec    RADIOLOGY & ADDITIONAL STUDIES:    < from: US Duplex Venous Lower Ext Complete, Bilateral (11.28.21 @ 21:53) >  EXAM:  US DPLX LWR EXT VEINS COMPL BI                        PROCEDURE DATE:  11/28/2021    INTERPRETATION:  CLINICAL INFORMATION: Shortness of breath.  COMPARISON: None available.  TECHNIQUE: Duplex sonography of the BILATERAL LOWER extremity veins with color and spectral Doppler, with and without compression.    FINDINGS:    RIGHT:  Normal compressibility of the RIGHT common femoral, femoral and popliteal veins.  Doppler examination shows normal spontaneous and phasic flow.  No RIGHT calf vein thrombosis is detected.    LEFT:  Normal compressibility of the LEFT common femoral, femoral and popliteal veins.  Doppler examination shows normal spontaneous and phasic flow.  No LEFT calf vein thrombosis is detected.    IMPRESSION:  No evidence of deep venous thrombosis in either lower extremity.    --- End of Report ---    < end of copied text >    PROTEIN CALORIE MALNUTRITION PRESENT: [ ] Yes [x] No  [ ] PPSV2 < or = to 30% [ ] significant weight loss  [ ] poor nutritional intake [ ] catabolic state [ ] anasarca     Artificial Nutrition [ ]     REFERRALS:   [ ]Chaplaincy  [ ] Hospice  [ ]Child Life  [ ]Social Work  [ ]Case management [ ]Holistic Therapy     Goals of Care Document:     HPI:   89 yo F w/PMH of HLD, HTN, Stroke w/ mild left residual weakness (on Plavix) presents to the ED for with dyspnea  for x1 week. Pt states it was worse today and she unable to walk a few steps. Denies any fevers, abdominal pain, dysuria, N/V/D, or CP. Pt has H/O DVT /PE. Pt's daughter states pt was dehydrated x2 months ago and her PMD took her off Lasix 1 mg daily. Called cardiologist today and told pt to take Lasix 20 mg.     	No fever/chills, No photophobia/eye pain/changes in vision, No ear pain/sore throat/dysphagia, No chest pain/palpitations, no SOB/cough/wheeze/stridor, No abdominal pain, No N/V/D, no dysuria/frequency/discharge, No neck/back pain, no rash, no changes in neurological status/function.   (28 Nov 2021 19:53)    PERTINENT PM/SXH:   Hypertension    Cervical cancer    Abdominal mass    Depression (emotion)    GI (gastrointestinal bleed)    Hemorrhoid    CVA (cerebral infarction)    Depression    Diverticulitis      S/P abdominal surgery, follow-up exam    S/P lumpectomy of breast      FAMILY HISTORY:  No pertinent family history in first degree relatives      ITEMS NOT CHECKED ARE NOT PRESENT    SOCIAL HISTORY:   Significant other/partner:  [ ]  Children:  [x] Daughters: Raul  Pentecostal/Spirituality: Christianity   Substance hx:  [ ]   Tobacco hx:  [ ]   Alcohol hx: [ ]   Home Opioid hx:  [x] I-Stop Reference No: Reference #: [id number]  Living Situation: [x]Home-lives with Daniel saldana  [ ]Long term care  [ ]Rehab [ ]Other    ADVANCE DIRECTIVES:    DNR  MOLST  [ ]  Living Will  [ ]   DECISION MAKER(s): Daniel elaine Sanz [phone]  [ ] Health Care Proxy(s)  [x] Surrogate(s)  [ ] Guardian           Name(s): Phone Number(s):    BASELINE (I)ADL(s) (prior to admission):  [place]: [ ]Total  [x] Moderate [ ]Dependent    Allergies    penicillin (Hives)    Intolerances    MEDICATIONS  (STANDING):  clopidogrel Tablet 75 milliGRAM(s) Oral daily  dorzolamide 2% Ophthalmic Solution      dorzolamide 2% Ophthalmic Solution 1 Drop(s) Both EYES three times a day  enoxaparin Injectable 40 milliGRAM(s) SubCutaneous daily  furosemide   Injectable 40 milliGRAM(s) IV Push daily  losartan 50 milliGRAM(s) Oral daily  metoprolol succinate ER 25 milliGRAM(s) Oral daily  oxybutynin 5 milliGRAM(s) Oral two times a day  senna 2 Tablet(s) Oral at bedtime    MEDICATIONS  (PRN):  magnesium hydroxide Suspension 30 milliLiter(s) Oral daily PRN Constipation    PRESENT SYMPTOMS: [ ]Unable to obtain due to poor mentation   Source if other than patient:  [ ]Family   [ ]Team     Pain: [ ] yes [x] no  QOL impact -   Location -                    Aggravating factors -  Quality -  Radiation -  Timing-  Severity (0-10 scale):  Minimal acceptable level (0-10 scale):     PAIN AD Score:     [email or web address]/painad.pdf (press ctrl +  left click to view)    Dyspnea:                           [x]Mild [ ]Moderate [ ]Severe  Anxiety:                             [ ]Mild [ ]Moderate [ ]Severe  Fatigue:                             [ ]Mild [x]Moderate [ ]Severe  Nausea:                             [ ]Mild [ ]Moderate [ ]Severe  Loss of appetite:              [ ]Mild [ ]Moderate [ ]Severe  Constipation:                    [ ]Mild [ ]Moderate [ ]Severe    Other Symptoms:  [x]All other review of systems negative     Karnofsky Performance Score/Palliative Performance Status Version 2:     50%    http://[email or web address]/files/news/palliative_performance_scale_[email or web address].pdf  PHYSICAL EXAM:  Vital Signs Last 24 Hrs  T(C): 36.8 (29 Nov 2021 10:50), Max: 36.8 (28 Nov 2021 21:06)  T(F): 98.2 (29 Nov 2021 10:50), Max: 98.2 (28 Nov 2021 21:06)  HR: 100 (29 Nov 2021 10:50) (67 - 100)  BP: 125/73 (29 Nov 2021 10:50) (125/73 - 196/86)  BP(mean): --  RR: 19 (29 Nov 2021 10:50) (15 - 20)  SpO2: 95% (29 Nov 2021 10:50) (95% - 99%) I&O's Summary    28 Nov 2021 07:01  -  29 Nov 2021 07:00  --------------------------------------------------------  IN: 0 mL / OUT: 400 mL / NET: -400 mL    GENERAL:  [x]Alert  [x]Oriented x  3 [ ]Lethargic  [ ]Cachexia  [ ]Unarousable  [x]Verbal  [ ]Non-Verbal  Behavioral:   [ ] Anxiety  [ ] Delirium [ ] Agitation [ ] Other  HEENT:  [x]Normal   [ ]Dry mouth   [ ]ET Tube/Trach  [ ]Oral lesions  PULMONARY:   [x]Clear [ ]Tachypnea  [ ]Audible excessive secretions   [ ]Rhonchi        [ ]Right [ ]Left [ ]Bilateral  [ ]Crackles        [ ]Right [ ]Left [ ]Bilateral  [ ]Wheezing     [ ]Right [ ]Left [ ]Bilateral  CARDIOVASCULAR:    [x]Regular [ ]Irregular [ ]Tachy  [ ]Cayden [ ]Murmur [ ]Other  GASTROINTESTINAL:  [x]Soft  [ ]Distended   [x]+BS  [x]Non tender [ ]Tender  [ ]PEG [ ]OGT/ NGT  Last BM: patient unsure said a few days ago GENITOURINARY: primafit in place  [ ]Normal [ ] Incontinent   [ ]Oliguria/Anuria   [ ]Medina  MUSCULOSKELETAL:   [ ]Normal   [x]Weakness  [ ]Bed/Wheelchair bound [ ]Edema  NEUROLOGIC:   [x]No focal deficits  [ ] Cognitive impairment  [ ] Dysphagia [ ]Dysarthria [ ] Paresis [ ]Other   SKIN:   [x]Normal   [ ]Pressure ulcer(s)  [ ]Rash    CRITICAL CARE:  [ ] Shock Present  [ ]Septic [ ]Cardiogenic [ ]Neurologic [ ]Hypovolemic  [ ]  Vasopressors [ ]  Inotropes   [ ] Respiratory failure present [ ] mechanical ventilation [ ] non-invasive ventilatory support [ ] High flow  [ ] Acute  [ ] Chronic [ ] Hypoxic  [ ] Hypercarbic [ ] Other  [ ] Other organ failure     LABS:                        14.4   5.29  )-----------( 228      ( 29 Nov 2021 06:29 )             44.7   11-29    141  |  108  |  30<H>  ----------------------------<  95  4.0   |  26  |  1.09    Ca    9.2      29 Nov 2021 06:29  Mg     2.6     11-28    TPro  6.5  /  Alb  3.3  /  TBili  0.4  /  DBili  x   /  AST  18  /  ALT  24  /  AlkPhos  87  11-28  PT/INR - ( 28 Nov 2021 18:34 )   PT: 11.3 sec;   INR: 0.97 ratio         PTT - ( 28 Nov 2021 18:34 )  PTT:26.7 sec    RADIOLOGY & ADDITIONAL STUDIES:    < from: US Duplex Venous Lower Ext Complete, Bilateral (11.28.21 @ 21:53) >  EXAM:  US DPLX LWR EXT VEINS COMPL BI                        PROCEDURE DATE:  11/28/2021    INTERPRETATION:  CLINICAL INFORMATION: Shortness of breath.  COMPARISON: None available.  TECHNIQUE: Duplex sonography of the BILATERAL LOWER extremity veins with color and spectral Doppler, with and without compression.    FINDINGS:    RIGHT:  Normal compressibility of the RIGHT common femoral, femoral and popliteal veins.  Doppler examination shows normal spontaneous and phasic flow.  No RIGHT calf vein thrombosis is detected.    LEFT:  Normal compressibility of the LEFT common femoral, femoral and popliteal veins.  Doppler examination shows normal spontaneous and phasic flow.  No LEFT calf vein thrombosis is detected.    IMPRESSION:  No evidence of deep venous thrombosis in either lower extremity.    --- End of Report ---    < end of copied text >    PROTEIN CALORIE MALNUTRITION PRESENT: [ ] Yes [x] No  [ ] PPSV2 < or = to 30% [ ] significant weight loss  [ ] poor nutritional intake [ ] catabolic state [ ] anasarca     Artificial Nutrition [ ]     REFERRALS:   [ ]Chaplaincy  [ ] Hospice  [ ]Child Life  [ ]Social Work  [ ]Case management [ ]Holistic Therapy     Goals of Care Document:     HPI:   89 yo F w/PMH of HLD, HTN, Stroke w/ mild left residual weakness (on Plavix) presents to the ED for with dyspnea  for x1 week. Pt states it was worse today and she unable to walk a few steps. Denies any fevers, abdominal pain, dysuria, N/V/D, or CP. Pt has H/O DVT /PE. Pt's daughter states pt was dehydrated x2 months ago and her PMD took her off Lasix 1 mg daily. Called cardiologist today and told pt to take Lasix 20 mg.     	No fever/chills, No photophobia/eye pain/changes in vision, No ear pain/sore throat/dysphagia, No chest pain/palpitations, no SOB/cough/wheeze/stridor, No abdominal pain, No N/V/D, no dysuria/frequency/discharge, No neck/back pain, no rash, no changes in neurological status/function.   (28 Nov 2021 19:53)    PERTINENT PM/SXH:   Hypertension    Cervical cancer    Abdominal mass    Depression (emotion)    GI (gastrointestinal bleed)    Hemorrhoid    CVA (cerebral infarction)    Depression    Diverticulitis      S/P abdominal surgery, follow-up exam    S/P lumpectomy of breast      FAMILY HISTORY:  No pertinent family history in first degree relatives      ITEMS NOT CHECKED ARE NOT PRESENT    SOCIAL HISTORY:   Significant other/partner:  [ ]  Children:  [x] Daughters: Raul  Druze/Spirituality: Confucianist   Substance hx:  [ ]   Tobacco hx:  [ ]   Alcohol hx: [ ]   Home Opioid hx:  [x] I-Stop Reference No: Reference #: [id number]  Living Situation: [x]Home-lives with Daniel saldana  [ ]Long term care  [ ]Rehab [ ]Other    ADVANCE DIRECTIVES:    DNR  MOLST  [ ]  Living Will  [ ]   DECISION MAKER(s): [ ] Health Care Proxy(s)  [x] Surrogate(s)  [ ] Guardian           Name(s): Phone Number(s): Raul [phone]    BASELINE (I)ADL(s) (prior to admission):  [place]: [ ]Total  [x] Moderate [ ]Dependent    Allergies    penicillin (Hives)    Intolerances    MEDICATIONS  (STANDING):  clopidogrel Tablet 75 milliGRAM(s) Oral daily  dorzolamide 2% Ophthalmic Solution      dorzolamide 2% Ophthalmic Solution 1 Drop(s) Both EYES three times a day  enoxaparin Injectable 40 milliGRAM(s) SubCutaneous daily  furosemide   Injectable 40 milliGRAM(s) IV Push daily  losartan 50 milliGRAM(s) Oral daily  metoprolol succinate ER 25 milliGRAM(s) Oral daily  oxybutynin 5 milliGRAM(s) Oral two times a day  senna 2 Tablet(s) Oral at bedtime    MEDICATIONS  (PRN):  magnesium hydroxide Suspension 30 milliLiter(s) Oral daily PRN Constipation    PRESENT SYMPTOMS: [ ]Unable to obtain due to poor mentation   Source if other than patient:  [ ]Family   [ ]Team     Pain: [ ] yes [x] no  QOL impact -   Location -                    Aggravating factors -  Quality -  Radiation -  Timing-  Severity (0-10 scale):  Minimal acceptable level (0-10 scale):     PAIN AD Score:     [email or web address]/[email or web address].pdf (press ctrl +  left click to view)    Dyspnea:                           [x]Mild [ ]Moderate [ ]Severe  Anxiety:                             [ ]Mild [ ]Moderate [ ]Severe  Fatigue:                             [ ]Mild [x]Moderate [ ]Severe  Nausea:                             [ ]Mild [ ]Moderate [ ]Severe  Loss of appetite:              [ ]Mild [ ]Moderate [ ]Severe  Constipation:                    [ ]Mild [ ]Moderate [ ]Severe    Other Symptoms:  [x]All other review of systems negative     Karnofsky Performance Score/Palliative Performance Status Version 2:     50%    http://[email or web address]/files/news/palliative_performance_scale_[email or web address].pdf  PHYSICAL EXAM:  Vital Signs Last 24 Hrs  T(C): 36.8 (29 Nov 2021 10:50), Max: 36.8 (28 Nov 2021 21:06)  T(F): 98.2 (29 Nov 2021 10:50), Max: 98.2 (28 Nov 2021 21:06)  HR: 100 (29 Nov 2021 10:50) (67 - 100)  BP: 125/73 (29 Nov 2021 10:50) (125/73 - 196/86)  BP(mean): --  RR: 19 (29 Nov 2021 10:50) (15 - 20)  SpO2: 95% (29 Nov 2021 10:50) (95% - 99%) I&O's Summary    28 Nov 2021 07:01  -  29 Nov 2021 07:00  --------------------------------------------------------  IN: 0 mL / OUT: 400 mL / NET: -400 mL    GENERAL:  [x]Alert  [x]Oriented x  3 [ ]Lethargic  [ ]Cachexia  [ ]Unarousable  [x]Verbal  [ ]Non-Verbal  Behavioral:   [ ] Anxiety  [ ] Delirium [ ] Agitation [ ] Other  HEENT:  [x]Normal   [ ]Dry mouth   [ ]ET Tube/Trach  [ ]Oral lesions  PULMONARY:   [x]Clear [ ]Tachypnea  [ ]Audible excessive secretions   [ ]Rhonchi        [ ]Right [ ]Left [ ]Bilateral  [ ]Crackles        [ ]Right [ ]Left [ ]Bilateral  [ ]Wheezing     [ ]Right [ ]Left [ ]Bilateral  CARDIOVASCULAR:    [x]Regular [ ]Irregular [ ]Tachy  [ ]Cayden [ ]Murmur [ ]Other  GASTROINTESTINAL:  [x]Soft  [ ]Distended   [x]+BS  [x]Non tender [ ]Tender  [ ]PEG [ ]OGT/ NGT  Last BM: patient unsure said a few days ago GENITOURINARY: primafit in place  [ ]Normal [ ] Incontinent   [ ]Oliguria/Anuria   [ ]Medina  MUSCULOSKELETAL:   [ ]Normal   [x]Weakness  [ ]Bed/Wheelchair bound [ ]Edema  NEUROLOGIC:   [x]No focal deficits  [ ] Cognitive impairment  [ ] Dysphagia [ ]Dysarthria [ ] Paresis [ ]Other   SKIN:   [x]Normal   [ ]Pressure ulcer(s)  [ ]Rash    CRITICAL CARE:  [ ] Shock Present  [ ]Septic [ ]Cardiogenic [ ]Neurologic [ ]Hypovolemic  [ ]  Vasopressors [ ]  Inotropes   [ ] Respiratory failure present [ ] mechanical ventilation [ ] non-invasive ventilatory support [ ] High flow  [ ] Acute  [ ] Chronic [ ] Hypoxic  [ ] Hypercarbic [ ] Other  [ ] Other organ failure     LABS:                        14.4   5.29  )-----------( 228      ( 29 Nov 2021 06:29 )             44.7   11-29    141  |  108  |  30<H>  ----------------------------<  95  4.0   |  26  |  1.09    Ca    9.2      29 Nov 2021 06:29  Mg     2.6     11-28    TPro  6.5  /  Alb  3.3  /  TBili  0.4  /  DBili  x   /  AST  18  /  ALT  24  /  AlkPhos  87  11-28  PT/INR - ( 28 Nov 2021 18:34 )   PT: 11.3 sec;   INR: 0.97 ratio         PTT - ( 28 Nov 2021 18:34 )  PTT:26.7 sec    RADIOLOGY & ADDITIONAL STUDIES:    < from: US Duplex Venous Lower Ext Complete, Bilateral (11.28.21 @ 21:53) >  EXAM:  US DPLX LWR EXT VEINS COMPL BI                        PROCEDURE DATE:  11/28/2021    INTERPRETATION:  CLINICAL INFORMATION: Shortness of breath.  COMPARISON: None available.  TECHNIQUE: Duplex sonography of the BILATERAL LOWER extremity veins with color and spectral Doppler, with and without compression.    FINDINGS:    RIGHT:  Normal compressibility of the RIGHT common femoral, femoral and popliteal veins.  Doppler examination shows normal spontaneous and phasic flow.  No RIGHT calf vein thrombosis is detected.    LEFT:  Normal compressibility of the LEFT common femoral, femoral and popliteal veins.  Doppler examination shows normal spontaneous and phasic flow.  No LEFT calf vein thrombosis is detected.    IMPRESSION:  No evidence of deep venous thrombosis in either lower extremity.    --- End of Report ---    < end of copied text >    PROTEIN CALORIE MALNUTRITION PRESENT: [ ] Yes [x] No  [ ] PPSV2 < or = to 30% [ ] significant weight loss  [ ] poor nutritional intake [ ] catabolic state [ ] anasarca     Artificial Nutrition [ ]     REFERRALS:   [ ]Chaplaincy  [ ] Hospice  [ ]Child Life  [ ]Social Work  [ ]Case management [ ]Holistic Therapy     Goals of Care Document:     HPI:   89 yo F w/PMH of HLD, HTN, Stroke w/ mild left residual weakness (on Plavix) presents to the ED for with dyspnea  for x1 week. Pt states it was worse today and she unable to walk a few steps. Denies any fevers, abdominal pain, dysuria, N/V/D, or CP. Pt has H/O DVT /PE. Pt's daughter states pt was dehydrated x2 months ago and her PMD took her off Lasix 1 mg daily. Called cardiologist today and told pt to take Lasix 20 mg.     	No fever/chills, No photophobia/eye pain/changes in vision, No ear pain/sore throat/dysphagia, No chest pain/palpitations, no SOB/cough/wheeze/stridor, No abdominal pain, No N/V/D, no dysuria/frequency/discharge, No neck/back pain, no rash, no changes in neurological status/function.   (28 Nov 2021 19:53)    PERTINENT PM/SXH:   Hypertension    Cervical cancer    Abdominal mass    Depression (emotion)    GI (gastrointestinal bleed)    Hemorrhoid    CVA (cerebral infarction)    Depression    Diverticulitis      S/P abdominal surgery, follow-up exam    S/P lumpectomy of breast      FAMILY HISTORY:  No pertinent family history in first degree relatives      ITEMS NOT CHECKED ARE NOT PRESENT    SOCIAL HISTORY:   Significant other/partner:  [ ]  Children:  [x] Daughters: Raul  Roman Catholic/Spirituality: Samaritan   Substance hx:  [ ]   Tobacco hx:  [ ]   Alcohol hx: [ ]   Home Opioid hx:  [x] I-Stop Reference No: Reference #: [id number]  Living Situation: [x]Home-lives with Daniel saldana  [ ]Long term care  [ ]Rehab [ ]Other    ADVANCE DIRECTIVES:    DNR  MOLST  [ ]  Living Will  [ ]   DECISION MAKER(s): [ ] Health Care Proxy(s)  [x] Surrogate(s)  [ ] Guardian           Name(s): Phone Number(s): Raul [phone]    BASELINE (I)ADL(s) (prior to admission):  [place]: [ ]Total  [x] Moderate [ ]Dependent    Allergies    penicillin (Hives)    Intolerances    MEDICATIONS  (STANDING):  clopidogrel Tablet 75 milliGRAM(s) Oral daily  dorzolamide 2% Ophthalmic Solution      dorzolamide 2% Ophthalmic Solution 1 Drop(s) Both EYES three times a day  enoxaparin Injectable 40 milliGRAM(s) SubCutaneous daily  furosemide   Injectable 40 milliGRAM(s) IV Push daily  losartan 50 milliGRAM(s) Oral daily  metoprolol succinate ER 25 milliGRAM(s) Oral daily  oxybutynin 5 milliGRAM(s) Oral two times a day  senna 2 Tablet(s) Oral at bedtime    MEDICATIONS  (PRN):  magnesium hydroxide Suspension 30 milliLiter(s) Oral daily PRN Constipation    PRESENT SYMPTOMS: [ ]Unable to obtain due to poor mentation   Source if other than patient:  [ ]Family   [ ]Team     Pain: [ ] yes [x] no  QOL impact -   Location -                    Aggravating factors -  Quality -  Radiation -  Timing-  Severity (0-10 scale):  Minimal acceptable level (0-10 scale):     PAIN AD Score:     [email or web address] (press ctrl +  left click to view)    Dyspnea:                           [x]Mild [ ]Moderate [ ]Severe  Anxiety:                             [ ]Mild [ ]Moderate [ ]Severe  Fatigue:                             [ ]Mild [x]Moderate [ ]Severe  Nausea:                             [ ]Mild [ ]Moderate [ ]Severe  Loss of appetite:              [ ]Mild [ ]Moderate [ ]Severe  Constipation:                    [ ]Mild [ ]Moderate [ ]Severe    Other Symptoms:  [x]All other review of systems negative     Karnofsky Performance Score/Palliative Performance Status Version 2:     50%    [email or web address]  PHYSICAL EXAM:  Vital Signs Last 24 Hrs  T(C): 36.8 (29 Nov 2021 10:50), Max: 36.8 (28 Nov 2021 21:06)  T(F): 98.2 (29 Nov 2021 10:50), Max: 98.2 (28 Nov 2021 21:06)  HR: 100 (29 Nov 2021 10:50) (67 - 100)  BP: 125/73 (29 Nov 2021 10:50) (125/73 - 196/86)  BP(mean): --  RR: 19 (29 Nov 2021 10:50) (15 - 20)  SpO2: 95% (29 Nov 2021 10:50) (95% - 99%) I&O's Summary    28 Nov 2021 07:01  -  29 Nov 2021 07:00  --------------------------------------------------------  IN: 0 mL / OUT: 400 mL / NET: -400 mL    GENERAL:  [x]Alert  [x]Oriented x  3 [ ]Lethargic  [ ]Cachexia  [ ]Unarousable  [x]Verbal  [ ]Non-Verbal  Behavioral:   [ ] Anxiety  [ ] Delirium [ ] Agitation [ ] Other  HEENT:  [x]Normal   [ ]Dry mouth   [ ]ET Tube/Trach  [ ]Oral lesions  PULMONARY:   [x]Clear-diminished on the left [ ]Tachypnea  [ ]Audible excessive secretions   [ ]Rhonchi        [ ]Right [ ]Left [ ]Bilateral  [ ]Crackles        [ ]Right [ ]Left [ ]Bilateral  [ ]Wheezing     [ ]Right [ ]Left [ ]Bilateral  CARDIOVASCULAR:    [x]Regular [ ]Irregular [ ]Tachy  [ ]Cayden [ ]Murmur [ ]Other  GASTROINTESTINAL:  [x]Soft  [ ]Distended   [x]+BS  [x]Non tender [ ]Tender  [ ]PEG [ ]OGT/ NGT  Last BM: patient unsure said a few days ago GENITOURINARY: primafit in place  [ ]Normal [ ] Incontinent   [ ]Oliguria/Anuria   [ ]Medina  MUSCULOSKELETAL:   [ ]Normal   [x]Weakness  [ ]Bed/Wheelchair bound [ ]Edema  NEUROLOGIC:   [x]No focal deficits  [ ] Cognitive impairment  [ ] Dysphagia [ ]Dysarthria [ ] Paresis [ ]Other   SKIN:   [x]Normal   [ ]Pressure ulcer(s)  [ ]Rash    CRITICAL CARE:  [ ] Shock Present  [ ]Septic [ ]Cardiogenic [ ]Neurologic [ ]Hypovolemic  [ ]  Vasopressors [ ]  Inotropes   [ ] Respiratory failure present [ ] mechanical ventilation [ ] non-invasive ventilatory support [ ] High flow  [ ] Acute  [ ] Chronic [ ] Hypoxic  [ ] Hypercarbic [ ] Other  [ ] Other organ failure     LABS:                        14.4   5.29  )-----------( 228      ( 29 Nov 2021 06:29 )             44.7   11-29    141  |  108  |  30<H>  ----------------------------<  95  4.0   |  26  |  1.09    Ca    9.2      29 Nov 2021 06:29  Mg     2.6     11-28    TPro  6.5  /  Alb  3.3  /  TBili  0.4  /  DBili  x   /  AST  18  /  ALT  24  /  AlkPhos  87  11-28  PT/INR - ( 28 Nov 2021 18:34 )   PT: 11.3 sec;   INR: 0.97 ratio         PTT - ( 28 Nov 2021 18:34 )  PTT:26.7 sec    RADIOLOGY & ADDITIONAL STUDIES:    < from: US Duplex Venous Lower Ext Complete, Bilateral (11.28.21 @ 21:53) >  EXAM:  US DPLX LWR EXT VEINS COMPL BI                        PROCEDURE DATE:  11/28/2021    INTERPRETATION:  CLINICAL INFORMATION: Shortness of breath.  COMPARISON: None available.  TECHNIQUE: Duplex sonography of the BILATERAL LOWER extremity veins with color and spectral Doppler, with and without compression.    FINDINGS:    RIGHT:  Normal compressibility of the RIGHT common femoral, femoral and popliteal veins.  Doppler examination shows normal spontaneous and phasic flow.  No RIGHT calf vein thrombosis is detected.    LEFT:  Normal compressibility of the LEFT common femoral, femoral and popliteal veins.  Doppler examination shows normal spontaneous and phasic flow.  No LEFT calf vein thrombosis is detected.    IMPRESSION:  No evidence of deep venous thrombosis in either lower extremity.    --- End of Report ---    < end of copied text >    PROTEIN CALORIE MALNUTRITION PRESENT: [ ] Yes [x] No  [ ] PPSV2 < or = to 30% [ ] significant weight loss  [ ] poor nutritional intake [ ] catabolic state [ ] anasarca     Artificial Nutrition [ ]     REFERRALS:   [ ]Chaplaincy  [ ] Hospice  [ ]Child Life  [ ]Social Work  [ ]Case management [ ]Holistic Therapy     Goals of Care Document:     HPI:   89 yo F w/PMH of HLD, HTN, Stroke w/ mild left residual weakness (on Plavix) presents to the ED for with dyspnea  for x1 week. Pt states it was worse today and she unable to walk a few steps. Denies any fevers, abdominal pain, dysuria, N/V/D, or CP. Pt has H/O DVT /PE. Pt's daughter states pt was dehydrated x2 months ago and her PMD took her off Lasix 1 mg daily. Called cardiologist today and told pt to take Lasix 20 mg.     	No fever/chills, No photophobia/eye pain/changes in vision, No ear pain/sore throat/dysphagia, No chest pain/palpitations, no SOB/cough/wheeze/stridor, No abdominal pain, No N/V/D, no dysuria/frequency/discharge, No neck/back pain, no rash, no changes in neurological status/function.   (28 Nov 2021 19:53)    PERTINENT PM/SXH:   Hypertension    Cervical cancer    Abdominal mass    Depression (emotion)    GI (gastrointestinal bleed)    Hemorrhoid    CVA (cerebral infarction)    Depression    Diverticulitis      S/P abdominal surgery, follow-up exam    S/P lumpectomy of breast      FAMILY HISTORY:  No pertinent family history in first degree relatives      ITEMS NOT CHECKED ARE NOT PRESENT    SOCIAL HISTORY:   Significant other/partner:  [ ]  Children:  [x] Daughters: Raul  Yarsanism/Spirituality: Jain   Substance hx:  [ ]   Tobacco hx:  [ ]   Alcohol hx: [ ]   Home Opioid hx:  [x] I-Stop Reference No: Reference #: [id number]  Living Situation: [x]Home-lives with Daniel saldana  [ ]Long term care  [ ]Rehab [ ]Other    ADVANCE DIRECTIVES:    DNR  MOLST  [ ]  Living Will  [ ]   DECISION MAKER(s): [ ] Health Care Proxy(s)  [x] Surrogate(s)  [ ] Guardian           Name(s): Phone Number(s): Raul [phone]    BASELINE (I)ADL(s) (prior to admission):  [place]ferro: [ ]Total  [x] Moderate [ ]Dependent    Allergies    penicillin (Hives)    Intolerances    MEDICATIONS  (STANDING):  clopidogrel Tablet 75 milliGRAM(s) Oral daily  dorzolamide 2% Ophthalmic Solution      dorzolamide 2% Ophthalmic Solution 1 Drop(s) Both EYES three times a day  enoxaparin Injectable 40 milliGRAM(s) SubCutaneous daily  furosemide   Injectable 40 milliGRAM(s) IV Push daily  losartan 50 milliGRAM(s) Oral daily  metoprolol succinate ER 25 milliGRAM(s) Oral daily  oxybutynin 5 milliGRAM(s) Oral two times a day  senna 2 Tablet(s) Oral at bedtime    MEDICATIONS  (PRN):  magnesium hydroxide Suspension 30 milliLiter(s) Oral daily PRN Constipation    PRESENT SYMPTOMS: [ ]Unable to obtain due to poor mentation   Source if other than patient:  [ ]Family   [ ]Team     Pain: [ ] yes [x] no  QOL impact -   Location -                    Aggravating factors -  Quality -  Radiation -  Timing-  Severity (0-10 scale):  Minimal acceptable level (0-10 scale):     PAIN AD Score:     [email or web address] (press ctrl +  left click to view)    Dyspnea:                           [x]Mild [ ]Moderate [ ]Severe  Anxiety:                             [ ]Mild [ ]Moderate [ ]Severe  Fatigue:                             [ ]Mild [x]Moderate [ ]Severe  Nausea:                             [ ]Mild [ ]Moderate [ ]Severe  Loss of appetite:              [ ]Mild [ ]Moderate [ ]Severe  Constipation:                    [ ]Mild [ ]Moderate [ ]Severe    Other Symptoms:  [x]All other review of systems negative     Karnofsky Performance Score/Palliative Performance Status Version 2:     50%    [email or web address]  PHYSICAL EXAM:  Vital Signs Last 24 Hrs  T(C): 36.8 (29 Nov 2021 10:50), Max: 36.8 (28 Nov 2021 21:06)  T(F): 98.2 (29 Nov 2021 10:50), Max: 98.2 (28 Nov 2021 21:06)  HR: 100 (29 Nov 2021 10:50) (67 - 100)  BP: 125/73 (29 Nov 2021 10:50) (125/73 - 196/86)  BP(mean): --  RR: 19 (29 Nov 2021 10:50) (15 - 20)  SpO2: 95% (29 Nov 2021 10:50) (95% - 99%) I&O's Summary    28 Nov 2021 07:01  -  29 Nov 2021 07:00  --------------------------------------------------------  IN: 0 mL / OUT: 400 mL / NET: -400 mL    GENERAL:  [x]Alert  [x]Oriented x  3 [ ]Lethargic  [ ]Cachexia  [ ]Unarousable  [x]Verbal  [ ]Non-Verbal  Behavioral:   [ ] Anxiety  [ ] Delirium [ ] Agitation [ ] Other  HEENT:  [x]Normal   [ ]Dry mouth   [ ]ET Tube/Trach  [ ]Oral lesions  PULMONARY:   [x]Clear-diminished on the left [ ]Tachypnea  [ ]Audible excessive secretions   [ ]Rhonchi        [ ]Right [ ]Left [ ]Bilateral  [ ]Crackles        [ ]Right [ ]Left [ ]Bilateral  [ ]Wheezing     [ ]Right [ ]Left [ ]Bilateral  CARDIOVASCULAR:    [x]Regular [ ]Irregular [ ]Tachy  [ ]Cayden [ ]Murmur [ ]Other  GASTROINTESTINAL:  [x]Soft  [ ]Distended   [x]+BS  [x]Non tender [ ]Tender  [ ]PEG [ ]OGT/ NGT  Last BM: patient unsure said a few days ago   GENITOURINARY: primafit in place  [ ]Normal [ ] Incontinent   [ ]Oliguria/Anuria   [ ]Medina  MUSCULOSKELETAL:   [ ]Normal   [x]Weakness  [ ]Bed/Wheelchair bound [ ]Edema  NEUROLOGIC:   [x]No focal deficits  [ ] Cognitive impairment  [ ] Dysphagia [ ]Dysarthria [ ] Paresis [ ]Other   SKIN:   [x]Normal   [ ]Pressure ulcer(s)  [ ]Rash    CRITICAL CARE:  [ ] Shock Present  [ ]Septic [ ]Cardiogenic [ ]Neurologic [ ]Hypovolemic  [ ]  Vasopressors [ ]  Inotropes   [ ] Respiratory failure present [ ] mechanical ventilation [ ] non-invasive ventilatory support [ ] High flow  [ ] Acute  [ ] Chronic [ ] Hypoxic  [ ] Hypercarbic [ ] Other  [ ] Other organ failure     LABS:                        14.4   5.29  )-----------( 228      ( 29 Nov 2021 06:29 )             44.7   11-29    141  |  108  |  30<H>  ----------------------------<  95  4.0   |  26  |  1.09    Ca    9.2      29 Nov 2021 06:29  Mg     2.6     11-28    TPro  6.5  /  Alb  3.3  /  TBili  0.4  /  DBili  x   /  AST  18  /  ALT  24  /  AlkPhos  87  11-28  PT/INR - ( 28 Nov 2021 18:34 )   PT: 11.3 sec;   INR: 0.97 ratio         PTT - ( 28 Nov 2021 18:34 )  PTT:26.7 sec    RADIOLOGY & ADDITIONAL STUDIES:    < from: US Duplex Venous Lower Ext Complete, Bilateral (11.28.21 @ 21:53) >  EXAM:  US DPLX LWR EXT VEINS COMPL BI                        PROCEDURE DATE:  11/28/2021    INTERPRETATION:  CLINICAL INFORMATION: Shortness of breath.  COMPARISON: None available.  TECHNIQUE: Duplex sonography of the BILATERAL LOWER extremity veins with color and spectral Doppler, with and without compression.    FINDINGS:    RIGHT:  Normal compressibility of the RIGHT common femoral, femoral and popliteal veins.  Doppler examination shows normal spontaneous and phasic flow.  No RIGHT calf vein thrombosis is detected.    LEFT:  Normal compressibility of the LEFT common femoral, femoral and popliteal veins.  Doppler examination shows normal spontaneous and phasic flow.  No LEFT calf vein thrombosis is detected.    IMPRESSION:  No evidence of deep venous thrombosis in either lower extremity.    --- End of Report ---    < end of copied text >    PROTEIN CALORIE MALNUTRITION PRESENT: [ ] Yes [x] No  [ ] PPSV2 < or = to 30% [ ] significant weight loss  [ ] poor nutritional intake [ ] catabolic state [ ] anasarca     Artificial Nutrition [ ]     REFERRALS:   [ ]Chaplaincy  [ ] Hospice  [ ]Child Life  [ ]Social Work  [ ]Case management [ ]Holistic Therapy     Goals of Care Document:

## 2021-11-29 NOTE — CONSULT NOTE ADULT - ATTENDING COMMENTS
pt seen and examined with NP Bruna, agree with above assessment and plan, document adjusted where needed. This is a 89 year old female with prior history of hypertensive heart disease, CVA with some residual weakness recently taken off lasix presenting with dyspnea and debility. Pt has noted that her 2 daughters are involved in her care and decisions and requests that she be DNR/I. She resides with daughter Daniel who NP Bruna and I counseled via phone on her mother's chosen directives. MEME on chart. Will follow clinical course

## 2021-11-29 NOTE — CONSULT NOTE ADULT - PROBLEM SELECTOR RECOMMENDATION 9
dyspnea at rest likely from volume overload  was off of lasix x 2 months  breathing feels improved since she arrived to the hospital  on 2L oxygen via NC-consider titrating to room air

## 2021-11-29 NOTE — CONSULT NOTE ADULT - ASSESSMENT
89 year old female admitted for dyspnea at rest.  Patient was on lasix at home which was discontinued x 2 months for dehydration concern.  Patient on 2L NC and reports breathing feels improved since she arrived.   89 year old female w/PMH HTN, HLD, CVA, admitted for dyspnea at rest.  Patient was on lasix at home which was discontinued x 2 months for dehydration concern.  Patient on 2L NC and reports breathing feels improved since she arrived.   89 year old female w/PMH HTN, HLD, CVA, admitted for dyspnea at rest.  Patient was previously on lasix at home which was discontinued x 2 months for dehydration concern.  Palliative Care consulted for GOC and symptom management.

## 2021-11-29 NOTE — PHYSICAL THERAPY INITIAL EVALUATION ADULT - IMPAIRMENTS CONTRIBUTING TO GAIT DEVIATIONS, PT EVAL
impaired balance/narrow base of support/impaired postural control/decreased ROM/scissoring/decreased strength

## 2021-11-30 ENCOUNTER — TRANSCRIPTION ENCOUNTER (OUTPATIENT)
Age: 86
End: 2021-11-30

## 2021-11-30 VITALS
HEART RATE: 85 BPM | SYSTOLIC BLOOD PRESSURE: 141 MMHG | RESPIRATION RATE: 18 BRPM | OXYGEN SATURATION: 97 % | DIASTOLIC BLOOD PRESSURE: 78 MMHG | TEMPERATURE: 98 F

## 2021-11-30 LAB
ANION GAP SERPL CALC-SCNC: 7 MMOL/L — SIGNIFICANT CHANGE UP (ref 5–17)
BUN SERPL-MCNC: 41 MG/DL — HIGH (ref 7–23)
CALCIUM SERPL-MCNC: 8.5 MG/DL — SIGNIFICANT CHANGE UP (ref 8.5–10.1)
CHLORIDE SERPL-SCNC: 109 MMOL/L — HIGH (ref 96–108)
CO2 SERPL-SCNC: 26 MMOL/L — SIGNIFICANT CHANGE UP (ref 22–31)
CREAT SERPL-MCNC: 1.1 MG/DL — SIGNIFICANT CHANGE UP (ref 0.5–1.3)
GLUCOSE SERPL-MCNC: 100 MG/DL — HIGH (ref 70–99)
POTASSIUM SERPL-MCNC: 3.9 MMOL/L — SIGNIFICANT CHANGE UP (ref 3.5–5.3)
POTASSIUM SERPL-SCNC: 3.9 MMOL/L — SIGNIFICANT CHANGE UP (ref 3.5–5.3)
SODIUM SERPL-SCNC: 142 MMOL/L — SIGNIFICANT CHANGE UP (ref 135–145)

## 2021-11-30 RX ORDER — FUROSEMIDE 40 MG
1 TABLET ORAL
Qty: 7 | Refills: 0
Start: 2021-11-30 | End: 2021-12-06

## 2021-11-30 RX ORDER — FUROSEMIDE 40 MG
0.5 TABLET ORAL
Qty: 3.5 | Refills: 0
Start: 2021-11-30 | End: 2021-12-06

## 2021-11-30 RX ORDER — FUROSEMIDE 40 MG
0.5 TABLET ORAL
Qty: 0 | Refills: 0 | DISCHARGE

## 2021-11-30 RX ADMIN — ENOXAPARIN SODIUM 40 MILLIGRAM(S): 100 INJECTION SUBCUTANEOUS at 12:33

## 2021-11-30 RX ADMIN — Medication 5 MILLIGRAM(S): at 06:25

## 2021-11-30 RX ADMIN — Medication 20 MILLIGRAM(S): at 06:26

## 2021-11-30 RX ADMIN — Medication 25 MILLIGRAM(S): at 06:25

## 2021-11-30 RX ADMIN — CLOPIDOGREL BISULFATE 75 MILLIGRAM(S): 75 TABLET, FILM COATED ORAL at 12:33

## 2021-11-30 RX ADMIN — DORZOLAMIDE HYDROCHLORIDE 1 DROP(S): 20 SOLUTION/ DROPS OPHTHALMIC at 06:24

## 2021-11-30 RX ADMIN — LOSARTAN POTASSIUM 50 MILLIGRAM(S): 100 TABLET, FILM COATED ORAL at 06:24

## 2021-11-30 NOTE — DISCHARGE NOTE NURSING/CASE MANAGEMENT/SOCIAL WORK - NSDCPEFALRISK_GEN_ALL_CORE
For information on Fall & Injury Prevention, visit: [email or web address] OR  [email or web address] OR  https://www.cdc.gov/steadi/patient.html

## 2021-11-30 NOTE — DISCHARGE NOTE PROVIDER - NSDCMRMEDTOKEN_GEN_ALL_CORE_FT
Azopt 1% ophthalmic suspension:  to each affected eye   B-12 Resin 1000 mcg oral tablet: 1 tab(s) orally once a day  Centrum:  orally   clopidogrel 75 mg oral tablet: 1 tab(s) orally once a day  Co Q-10 100 mg oral capsule: 1 cap(s) orally once a day  dorzolamide 2% ophthalmic solution:  to each affected eye 2 times a day  ferrous sulfate (as elemental iron) 45 mg oral tablet, extended release: 1 tab(s) orally once a day  Fish Oil 1000 mg oral capsule:  orally once a day  Lopressor: 25 milligram(s) orally once a day  losartan 50 mg oral tablet: 1 tab(s) orally once a day  VESIcare 10 mg oral tablet: 1 tab(s) orally once a day  Vitamin D3 1000 intl units oral tablet:  orally    Azopt 1% ophthalmic suspension:  to each affected eye   B-12 Resin 1000 mcg oral tablet: 1 tab(s) orally once a day  Centrum:  orally   clopidogrel 75 mg oral tablet: 1 tab(s) orally once a day  Co Q-10 100 mg oral capsule: 1 cap(s) orally once a day  dorzolamide 2% ophthalmic solution:  to each affected eye 2 times a day  ferrous sulfate (as elemental iron) 45 mg oral tablet, extended release: 1 tab(s) orally once a day  Fish Oil 1000 mg oral capsule:  orally once a day  furosemide 20 mg oral tablet: 0.5 tab(s) orally once a day   Lopressor: 25 milligram(s) orally once a day  losartan 50 mg oral tablet: 1 tab(s) orally once a day  VESIcare 10 mg oral tablet: 1 tab(s) orally once a day  Vitamin D3 1000 intl units oral tablet:  orally

## 2021-11-30 NOTE — DOWNTIME INTERRUPTION NOTE - WHICH MANUAL FORMS INITIATED?
Documentation hold for POC, A&I, flowsheets and Pediatric Patient Profile. see paper record for additional documentation recorded during downtime

## 2021-11-30 NOTE — DISCHARGE NOTE NURSING/CASE MANAGEMENT/SOCIAL WORK - PATIENT PORTAL LINK FT
You can access the FollowMyHealth Patient Portal offered by [facility] by registering at the following website: [email or web address]. By joining [employer]’s FollowMyHealth portal, you will also be able to view your health information using other applications (apps) compatible with our system.

## 2021-11-30 NOTE — DISCHARGE NOTE PROVIDER - CARE PROVIDER_API CALL
Frankie Reynolds)  Cardiology; Cardiovascular Disease; Nuclear Cardiology  [address] [place] [address]  Phone: [phone]  Fax: [phone]  Follow Up Time:

## 2021-11-30 NOTE — DISCHARGE NOTE PROVIDER - NSDCCPCAREPLAN_GEN_ALL_CORE_FT
PRINCIPAL DISCHARGE DIAGNOSIS  Diagnosis: Congestive heart failure (CHF)  Assessment and Plan of Treatment: Congestive Heart Failure (CHF)  Congestive heart failure is a chronic condition in which the heart has trouble pumping blood. In some cases of heart failure, fluid may back up into your lungs or you may have swelling (edema) in your lower legs. There are many causes of heart failure including high blood pressure, coronary artery disease, abnormal heart valves, heart muscle disease, lung disease, diabetes, etc. Symptoms include shortness of breath with activity or when lying flat, cough, swelling of the legs, fatigue, or increased urination during the night.   Treatment is aimed at managing the symptoms of heart failure and may include lifestyle changes, medications, or surgical procedures. Take medicines only as directed by your health care provider and do not stop unless instructed to do so. Eat heart-healthy foods with low or no trans/saturated fats, cholesterol and salt. Weigh yourself every day for early recognition of fluid accumulation.  SEEK IMMEDIATE MEDICAL CARE IF YOU HAVE ANY OF THE FOLLOWING SYMPTOMS: shortness of breath, change in mental status, chest pain, lightheadedness/dizziness/fainting, or worsening of symptoms including not being able to conduct normal physical activity.

## 2021-11-30 NOTE — DISCHARGE NOTE PROVIDER - NSDCFUADDAPPT_GEN_ALL_CORE_FT
Please follow up with cardiology within 7 days tolerating PO, well hydrated, no resp distress. Encourage hydration, f/u with pmd in 1-2 days. Return precautions explained.

## 2021-11-30 NOTE — DISCHARGE NOTE PROVIDER - HOSPITAL COURSE
MARTHA LEIGH is a 89y Female w/PMH of HLD, HTN, Stroke w/ mild left residual weakness (on Plavix) presents to the ED for with dyspnea  for x1 week. Denies any fevers, abdominal pain, dysuria, N/V/D, or CP. Pt has H/O DVT /PE. Pt's daughter states pt was dehydrated x2 months ago and her PMD took her off Lasix 10 mg daily.    6/9/21 echocardiogram from outpatient office revealed: Normal left ventricular size, ejection fraction 45 to 50%.  Moderate AR, mild MR, mild TR, mild OR.  Mildly dilated aortic root.  No significant interval changes as compared to 3/26/2018.    She was treated for acute diastolic heart failure exacerbation with diuretics. Patient was seen by cardiology and advised to reinitiate low dose diuretic daily and follow up within 7 days.      On 11/30/21  this case was reviewed with Dr. Rivera, the patient is medically stable and optimized for discharge. All medications were reviewed and appropriate prescriptions were sent to mutually agreed upon pharmacy.

## 2021-12-02 ENCOUNTER — NON-APPOINTMENT (OUTPATIENT)
Age: 86
End: 2021-12-02

## 2021-12-04 DIAGNOSIS — E78.5 HYPERLIPIDEMIA, UNSPECIFIED: ICD-10-CM

## 2021-12-04 DIAGNOSIS — I50.33 ACUTE ON CHRONIC DIASTOLIC (CONGESTIVE) HEART FAILURE: ICD-10-CM

## 2021-12-04 DIAGNOSIS — F32.A DEPRESSION, UNSPECIFIED: ICD-10-CM

## 2021-12-04 DIAGNOSIS — Z88.0 ALLERGY STATUS TO PENICILLIN: ICD-10-CM

## 2021-12-04 DIAGNOSIS — I69.354 HEMIPLEGIA AND HEMIPARESIS FOLLOWING CEREBRAL INFARCTION AFFECTING LEFT NON-DOMINANT SIDE: ICD-10-CM

## 2021-12-04 DIAGNOSIS — I11.0 HYPERTENSIVE HEART DISEASE WITH HEART FAILURE: ICD-10-CM

## 2021-12-04 DIAGNOSIS — Z79.02 LONG TERM (CURRENT) USE OF ANTITHROMBOTICS/ANTIPLATELETS: ICD-10-CM

## 2021-12-04 DIAGNOSIS — Z85.41 PERSONAL HISTORY OF MALIGNANT NEOPLASM OF CERVIX UTERI: ICD-10-CM

## 2021-12-08 ENCOUNTER — APPOINTMENT (OUTPATIENT)
Dept: CARDIOLOGY | Facility: CLINIC | Age: 86
End: 2021-12-08
Payer: MEDICARE

## 2021-12-08 ENCOUNTER — NON-APPOINTMENT (OUTPATIENT)
Age: 86
End: 2021-12-08

## 2021-12-08 VITALS
SYSTOLIC BLOOD PRESSURE: 126 MMHG | BODY MASS INDEX: 20.14 KG/M2 | DIASTOLIC BLOOD PRESSURE: 64 MMHG | WEIGHT: 118 LBS | HEART RATE: 102 BPM | HEIGHT: 64 IN

## 2021-12-08 DIAGNOSIS — Z00.00 ENCOUNTER FOR GENERAL ADULT MEDICAL EXAMINATION W/OUT ABNORMAL FINDINGS: ICD-10-CM

## 2021-12-08 PROCEDURE — 99496 TRANSJ CARE MGMT HIGH F2F 7D: CPT

## 2021-12-08 PROCEDURE — 93000 ELECTROCARDIOGRAM COMPLETE: CPT

## 2021-12-08 PROCEDURE — 99214 OFFICE O/P EST MOD 30 MIN: CPT

## 2021-12-08 NOTE — DISCUSSION/SUMMARY
The defibrillation pads were placed in the anterior/lateral position.   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  Patient currently on Lasix 10 mg every other day and appears to be maintaining her euvolemia.  I had a lengthy discussion with patient's daughter regarding need for constant surveillance for signs of dyspnea, weight gain or lower extremity edema.  In this event, patient advised to increase her Lasix to daily for several days until she diuresis again.  I have aware of her mildly elevated renal function noted on previous evaluation but clearly requires mandated diuresis to maintain euvolemia.  Patient should have follow-up labs in 2 to 3 weeks for reassessment of renal function and electrolytes, patient's daughter prefers to the PCP.  We will see patient in 8 to 12 weeks for reevaluation.

## 2021-12-08 NOTE — PHYSICAL EXAM
[Well Nourished] : well nourished [No Acute Distress] : no acute distress [Frail] : frail [Normal Conjunctiva] : normal conjunctiva [Normal Venous Pressure] : normal venous pressure [No Carotid Bruit] : no carotid bruit [Normal S1, S2] : normal S1, S2 [No Rub] : no rub [No Gallop] : no gallop [Clear Lung Fields] : clear lung fields [Good Air Entry] : good air entry [No Respiratory Distress] : no respiratory distress  [Soft] : abdomen soft [Non Tender] : non-tender [No Masses/organomegaly] : no masses/organomegaly [Normal Bowel Sounds] : normal bowel sounds [Normal Gait] : normal gait [No Edema] : no edema [No Cyanosis] : no cyanosis [No Clubbing] : no clubbing [No Varicosities] : no varicosities [No Rash] : no rash [No Skin Lesions] : no skin lesions [Moves all extremities] : moves all extremities [No Focal Deficits] : no focal deficits [Normal Speech] : normal speech [Alert and Oriented] : alert and oriented [Normal memory] : normal memory [de-identified] : 2/6 systolic ejection murmur at LLSB and apex

## 2021-12-08 NOTE — PHYSICAL EXAM
[Well Nourished] : well nourished [No Acute Distress] : no acute distress [Frail] : frail [Normal Conjunctiva] : normal conjunctiva [Normal Venous Pressure] : normal venous pressure [No Carotid Bruit] : no carotid bruit [Normal S1, S2] : normal S1, S2 [No Rub] : no rub [No Gallop] : no gallop [Clear Lung Fields] : clear lung fields [Good Air Entry] : good air entry [No Respiratory Distress] : no respiratory distress  [Soft] : abdomen soft [Non Tender] : non-tender [No Masses/organomegaly] : no masses/organomegaly [Normal Bowel Sounds] : normal bowel sounds [Normal Gait] : normal gait [No Edema] : no edema [No Cyanosis] : no cyanosis [No Clubbing] : no clubbing [No Varicosities] : no varicosities [No Rash] : no rash [No Skin Lesions] : no skin lesions [Moves all extremities] : moves all extremities [No Focal Deficits] : no focal deficits [Normal Speech] : normal speech [Alert and Oriented] : alert and oriented [Normal memory] : normal memory [de-identified] : 2/6 systolic ejection murmur at LLSB and apex

## 2021-12-08 NOTE — DISCUSSION/SUMMARY
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  Patient currently on Lasix 10 mg every other day and appears to be maintaining her euvolemia.  I had a lengthy discussion with patient's daughter regarding need for constant surveillance for signs of dyspnea, weight gain or lower extremity edema.  In this event, patient advised to increase her Lasix to daily for several days until she diuresis again.  I have aware of her mildly elevated renal function noted on previous evaluation but clearly requires mandated diuresis to maintain euvolemia.  Patient should have follow-up labs in 2 to 3 weeks for reassessment of renal function and electrolytes, patient's daughter prefers to the PCP.  We will see patient in 8 to 12 weeks for reevaluation.

## 2021-12-08 NOTE — HISTORY OF PRESENT ILLNESS
[FreeTextEntry1] : 89-year-old female here for followup post-hospitalization. \par \par H/O chronic systolic/diastolic heart failure , known ejection fraction of 45% with evidence of diastolic dysfunction, moderate mitral insufficiency and moderate aortic insufficiency. h/o CVA with residual weakness.\par \par She presented to [facility] on 11/28/21 with c/o dyspnea (actually heavy breathing)  x1 week. \par Pt's daughter states pt was dehydrated x2 months ago and her PMD took her off Lasix.\par \par She was treated for acute diastolic heart failure exacerbation with diuretics. Patient was seen by cardiology and advised to reinitiate low dose diuretic. Discharged from hospital on 12/2  and told to follow up within 7 days.\par \par Since discharge no further complaints noted.\par

## 2021-12-08 NOTE — CARDIOLOGY SUMMARY
[de-identified] : 12/8/21, Sinus Tachycardia -Left atrial enlargement. Voltage criteria for LVH  -Poor R-wave progression -nonspecific -consider old anterior infarct. - Nonspecific T-abnormality.\par  6/9/21, Sinus  Rhythm -Left atrial enlargement.  -Poor R-wave progression -nonspecific -consider old anterior infarct. \par  -  Nonspecific T-abnormality. \par  [de-identified] : 2015 - WNL [de-identified] : 6/9/21, Normal left ventricular size, ejection fraction 45 to 50%. Moderate AR, mild MR, mild TR, mild NV.\par Mildly dilated aortic root. No significant interval changes as compared to 3/26/2018.\par

## 2021-12-08 NOTE — CARDIOLOGY SUMMARY
[de-identified] : 12/8/21, Sinus Tachycardia -Left atrial enlargement. Voltage criteria for LVH  -Poor R-wave progression -nonspecific -consider old anterior infarct. - Nonspecific T-abnormality.\par  6/9/21, Sinus  Rhythm -Left atrial enlargement.  -Poor R-wave progression -nonspecific -consider old anterior infarct. \par  -  Nonspecific T-abnormality. \par  [de-identified] : 2015 - WNL [de-identified] : 6/9/21, Normal left ventricular size, ejection fraction 45 to 50%. Moderate AR, mild MR, mild TR, mild MO.\par Mildly dilated aortic root. No significant interval changes as compared to 3/26/2018.\par  negative...

## 2021-12-08 NOTE — HISTORY OF PRESENT ILLNESS
[FreeTextEntry1] : 89-year-old female here for followup post-hospitalization. \par \par H/O chronic systolic/diastolic heart failure , known ejection fraction of 45% with evidence of diastolic dysfunction, moderate mitral insufficiency and moderate aortic insufficiency. h/o CVA with residual weakness.\par \par She presented to [facility] on 11/28/21 with c/o dyspnea (actually heavy breathing)  x1 week. \par Pt's daughter states pt was dehydrated x2 months ago and her PMD took her off Lasix.\par \par She was treated for acute diastolic heart failure exacerbation with diuretics. Patient was seen by cardiology and advised to reinitiate low dose diuretic. Discharged from hospital on 12/2  and told to follow up within 7 days.\par \par Since discharge no further complaints noted.\par

## 2021-12-10 ENCOUNTER — RX RENEWAL (OUTPATIENT)
Age: 86
End: 2021-12-10

## 2022-02-02 ENCOUNTER — EMERGENCY (EMERGENCY)
Facility: HOSPITAL | Age: 87
LOS: 0 days | Discharge: HOME | End: 2022-02-02
Attending: EMERGENCY MEDICINE
Payer: MEDICARE

## 2022-02-02 VITALS
HEART RATE: 78 BPM | SYSTOLIC BLOOD PRESSURE: 197 MMHG | TEMPERATURE: 98 F | OXYGEN SATURATION: 98 % | DIASTOLIC BLOOD PRESSURE: 70 MMHG | HEIGHT: 62 IN | RESPIRATION RATE: 18 BRPM | WEIGHT: 115.96 LBS

## 2022-02-02 VITALS
DIASTOLIC BLOOD PRESSURE: 79 MMHG | SYSTOLIC BLOOD PRESSURE: 180 MMHG | HEART RATE: 87 BPM | OXYGEN SATURATION: 96 % | RESPIRATION RATE: 16 BRPM

## 2022-02-02 DIAGNOSIS — Z88.0 ALLERGY STATUS TO PENICILLIN: ICD-10-CM

## 2022-02-02 DIAGNOSIS — Z87.19 PERSONAL HISTORY OF OTHER DISEASES OF THE DIGESTIVE SYSTEM: ICD-10-CM

## 2022-02-02 DIAGNOSIS — Z85.41 PERSONAL HISTORY OF MALIGNANT NEOPLASM OF CERVIX UTERI: ICD-10-CM

## 2022-02-02 DIAGNOSIS — R05.9 COUGH, UNSPECIFIED: ICD-10-CM

## 2022-02-02 DIAGNOSIS — Z98.89 OTHER SPECIFIED POSTPROCEDURAL STATES: Chronic | ICD-10-CM

## 2022-02-02 DIAGNOSIS — I10 ESSENTIAL (PRIMARY) HYPERTENSION: ICD-10-CM
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
## 2022-02-02 DIAGNOSIS — R13.10 DYSPHAGIA, UNSPECIFIED: ICD-10-CM

## 2022-02-02 DIAGNOSIS — T17.928A FOOD IN RESPIRATORY TRACT, PART UNSPECIFIED CAUSING OTHER INJURY, INITIAL ENCOUNTER: ICD-10-CM

## 2022-02-02 DIAGNOSIS — I69.354 HEMIPLEGIA AND HEMIPARESIS FOLLOWING CEREBRAL INFARCTION AFFECTING LEFT NON-DOMINANT SIDE: ICD-10-CM

## 2022-02-02 DIAGNOSIS — Z79.02 LONG TERM (CURRENT) USE OF ANTITHROMBOTICS/ANTIPLATELETS: ICD-10-CM

## 2022-02-02 DIAGNOSIS — Z86.59 PERSONAL HISTORY OF OTHER MENTAL AND BEHAVIORAL DISORDERS: ICD-10-CM
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
## 2022-02-02 PROCEDURE — 99283 EMERGENCY DEPT VISIT LOW MDM: CPT

## 2022-02-02 PROCEDURE — 71046 X-RAY EXAM CHEST 2 VIEWS: CPT | Mod: 26

## 2022-02-02 NOTE — ED ADULT NURSE NOTE - NSIMPLEMENTINTERV_GEN_ALL_ED
Implemented All Fall with Harm Risk Interventions:  [place] to call system. Call bell, personal items and telephone within reach. Instruct patient to call for assistance. Room bathroom lighting operational. Non-slip footwear when patient is off stretcher. Physically safe environment: no spills, clutter or unnecessary equipment. Stretcher in lowest position, wheels locked, appropriate side rails in place. Provide visual cue, wrist band, yellow gown, etc. Monitor gait and stability. Monitor for mental status changes and reorient to person, place, and time. Review medications for side effects contributing to fall risk. Reinforce activity limits and safety measures with patient and family. Provide visual clues: red socks.

## 2022-02-02 NOTE — ED PROVIDER NOTE - OBJECTIVE STATEMENT
89 yo female w/PMH of HTN, CVA (w/residual left sided deficits), cervical CA presents to the ED BIBEMS as per daughter pt has a hx of choking on her food and was choking and coughing up her chinese food tonight around 7pm, 911 called. Pt states she was eating a wonton at the time. Pt coughed up small pieces of food. Denies difficulty swallowing or SOB. Pt is not on a food regimen.

## 2022-02-02 NOTE — ED PROVIDER NOTE - CLINICAL SUMMARY MEDICAL DECISION MAKING FREE TEXT BOX
DDx: R/o aspiration PNA. Pt now tolerating PO and swallowing fine, no evidence of esophageal FB.  Plan: CXR and d/c to follow up with GI.

## 2022-02-02 NOTE — ED PROVIDER NOTE - PROGRESS NOTE DETAILS
Pt symptoms resolved prior to arrival, is breathing and swallowing comfortably, and is tolerating PO without problem. Referred to GI for f/u for swallow evaluation, and is eager and ready for d/c w daughter.

## 2022-02-02 NOTE — ED PROVIDER NOTE - PATIENT PORTAL LINK FT
You can access the FollowMyHealth Patient Portal offered by [facility] by registering at the following website: [email or web address]. By joining [facility]’s FollowMyHealth portal, you will also be able to view your health information using other applications (apps) compatible with our system.

## 2022-02-02 NOTE — ED ADULT NURSE NOTE - OBJECTIVE STATEMENT
89f states that she was eating and started to choke on a wonton, states that she feels okay, denies difficulty breathing at this time. no coughing, choking or excessive drooling at this time

## 2022-02-02 NOTE — ED PROVIDER NOTE - CARE PROVIDER_API CALL
Idris Ma)  Internal Medicine  [address] [facility], [address] [place] [address]  Phone: [phone]  Fax: [phone]  Follow Up Time: 4-6 Days

## 2022-04-20 ENCOUNTER — APPOINTMENT (OUTPATIENT)
Dept: CARDIOLOGY | Facility: CLINIC | Age: 87
End: 2022-04-20

## 2022-05-03 ENCOUNTER — APPOINTMENT (OUTPATIENT)
Dept: CARDIOLOGY | Facility: CLINIC | Age: 87
End: 2022-05-03
Payer: MEDICARE

## 2022-05-03 VITALS
SYSTOLIC BLOOD PRESSURE: 144 MMHG | WEIGHT: 121 LBS | HEIGHT: 64 IN | OXYGEN SATURATION: 96 % | TEMPERATURE: 97.6 F | DIASTOLIC BLOOD PRESSURE: 60 MMHG | BODY MASS INDEX: 20.66 KG/M2 | HEART RATE: 69 BPM

## 2022-05-03 VITALS — DIASTOLIC BLOOD PRESSURE: 60 MMHG | SYSTOLIC BLOOD PRESSURE: 146 MMHG

## 2022-05-03 DIAGNOSIS — Z86.73 PERSONAL HISTORY OF TRANSIENT ISCHEMIC ATTACK (TIA), AND CEREBRAL INFARCTION W/OUT RESIDUAL DEFICITS: ICD-10-CM

## 2022-05-03 PROCEDURE — 93000 ELECTROCARDIOGRAM COMPLETE: CPT

## 2022-05-03 PROCEDURE — 99214 OFFICE O/P EST MOD 30 MIN: CPT

## 2022-05-05 ENCOUNTER — NON-APPOINTMENT (OUTPATIENT)
Age: 87
End: 2022-05-05

## 2022-05-05 PROBLEM — Z86.73 STATUS POST CVA: Status: ACTIVE | Noted: 2022-05-05

## 2022-05-05 NOTE — CARDIOLOGY SUMMARY
[de-identified] : 12/8/21, Sinus Tachycardia -Left atrial enlargement. Voltage criteria for LVH  -Poor R-wave progression -nonspecific -consider old anterior infarct. - Nonspecific T-abnormality.\par  6/9/21, Sinus  Rhythm -Left atrial enlargement.  -Poor R-wave progression -nonspecific -consider old anterior infarct. \par  -  Nonspecific T-abnormality. \par  [de-identified] : 2015 - WNL [de-identified] : 6/9/21, Normal left ventricular size, ejection fraction 45 to 50%. Moderate AR, mild MR, mild TR, mild WV.\par Mildly dilated aortic root. No significant interval changes as compared to 3/26/2018.\par

## 2022-05-05 NOTE — PHYSICAL EXAM
[Well Nourished] : well nourished [No Acute Distress] : no acute distress [Frail] : frail [Normal Conjunctiva] : normal conjunctiva [Normal Venous Pressure] : normal venous pressure [No Carotid Bruit] : no carotid bruit [Normal S1, S2] : normal S1, S2 [No Rub] : no rub [No Gallop] : no gallop [Clear Lung Fields] : clear lung fields [Good Air Entry] : good air entry [No Respiratory Distress] : no respiratory distress  [Soft] : abdomen soft [Non Tender] : non-tender [No Masses/organomegaly] : no masses/organomegaly [Normal Bowel Sounds] : normal bowel sounds [Normal Gait] : normal gait [No Edema] : no edema [No Cyanosis] : no cyanosis [No Clubbing] : no clubbing [No Rash] : no rash [No Varicosities] : no varicosities [No Skin Lesions] : no skin lesions [Moves all extremities] : moves all extremities [No Focal Deficits] : no focal deficits [Normal Speech] : normal speech [Alert and Oriented] : alert and oriented [Normal memory] : normal memory [de-identified] : 2/6 systolic ejection murmur at LLSB and apex

## 2022-05-05 NOTE — HISTORY OF PRESENT ILLNESS
[FreeTextEntry1] : 89-year-old female with H/O chronic systolic/diastolic heart failure, known ejection fraction of 45%, moderate mitral insufficiency and moderate aortic insufficiency. h/o CVA with residual weakness.\par \par She presented to [facility] on 11/28/21 with c/o dyspnea  x1 week. \par She was treated for acute diastolic heart failure exacerbation with diuretics. Patient was seen by cardiology and advised to reinitiate low dose diuretic. \par Since discharge no further complaints of dyspnea noted.\par \par Daughter says she has had some spot bleeding (GI? ?)\par

## 2022-05-05 NOTE — DISCUSSION/SUMMARY
[Hypertrophic Cardiomyopathy] : hypertrophic cardiomyopathy [Hypertension] : hypertension [Stable] : stable [Patient] : the patient [Guardian] : the guardian [___ Month(s)] : in [unfilled] month(s) [FreeTextEntry1] : 89-year-old female with chronic systolic/diastolic heart failure now appearing euvolemic.  Had lengthy discussion with patient's daughter regarding need for constant surveillance for signs of dyspnea, weight gain or lower extremity edema.  In this event, patient advised to increase her Lasix to daily for several days until she diuresis again. Continue mandated diuresis to maintain euvolemia.  \par Blood pressures remain mildly elevated today, will continue monitoring and consideration of further titration of her antihypertensives.  Requested self-monitoring blood pressures at home.  Will need follow-up labs to reassess her renal function.  Would recommend continuation of her clopidogrel at this time, given by neurology for her CVA, unless there is definitive evidence of significant bleed.

## 2022-05-16 NOTE — ED PROVIDER NOTE - CPE EDP EYES NORM
[Patient Optimized for Surgery] : Patient optimized for surgery [No Further Testing Recommended] : no further testing recommended [FreeTextEntry4] : Patient is undergoing low risk surgery.  He has medically stable for planned surgery.   normal...

## 2022-07-12 NOTE — ED PROVIDER NOTE - PRO INTERPRETER NEED 2
FREE:[LAST:[Monse],PHONE:[(   )    -],FAX:[(   )    -],ADDRESS:[Follow up with Dr. Seha within 1 week, call the office for appointment]],PROVIDER:[TOKEN:[[id number]]] English PROVIDER:[TOKEN:[[id number]],FOLLOWUP:[1 week]],FREE:[LAST:[Primary Care Provider],PHONE:[(   )    -],FAX:[(   )    -],ADDRESS:[Please follow-up with your PCP within 1 week of hospital discharge, call their office to make an appointment]]

## 2022-08-29 ENCOUNTER — RX RENEWAL (OUTPATIENT)
Age: 87
End: 2022-08-29

## 2022-12-30 ENCOUNTER — NON-APPOINTMENT (OUTPATIENT)
Age: 87
End: 2022-12-30

## 2022-12-30 ENCOUNTER — RX RENEWAL (OUTPATIENT)
Age: 87
End: 2022-12-30

## 2023-01-27 ENCOUNTER — INPATIENT (INPATIENT)
Facility: HOSPITAL | Age: 88
LOS: 3 days | Discharge: HOME HEALTH | End: 2023-01-31
Attending: INTERNAL MEDICINE | Admitting: INTERNAL MEDICINE
Payer: MEDICARE

## 2023-01-27 VITALS
HEART RATE: 88 BPM | SYSTOLIC BLOOD PRESSURE: 168 MMHG | TEMPERATURE: 98 F | WEIGHT: 139.99 LBS | RESPIRATION RATE: 23 BRPM | DIASTOLIC BLOOD PRESSURE: 98 MMHG | OXYGEN SATURATION: 100 % | HEIGHT: 60 IN

## 2023-01-27 DIAGNOSIS — Z98.89 OTHER SPECIFIED POSTPROCEDURAL STATES: Chronic | ICD-10-CM

## 2023-01-27 LAB
ALBUMIN SERPL ELPH-MCNC: 3.3 G/DL — SIGNIFICANT CHANGE UP (ref 3.3–5)
ALP SERPL-CCNC: 75 U/L — SIGNIFICANT CHANGE UP (ref 40–120)
ALT FLD-CCNC: 20 U/L — SIGNIFICANT CHANGE UP (ref 12–78)
ANION GAP SERPL CALC-SCNC: 5 MMOL/L — SIGNIFICANT CHANGE UP (ref 5–17)
APTT BLD: 32.7 SEC — SIGNIFICANT CHANGE UP (ref 27.5–35.5)
AST SERPL-CCNC: 19 U/L — SIGNIFICANT CHANGE UP (ref 15–37)
BASOPHILS # BLD AUTO: 0.02 K/UL — SIGNIFICANT CHANGE UP (ref 0–0.2)
BASOPHILS NFR BLD AUTO: 0.5 % — SIGNIFICANT CHANGE UP (ref 0–2)
BILIRUB SERPL-MCNC: 0.4 MG/DL — SIGNIFICANT CHANGE UP (ref 0.2–1.2)
BUN SERPL-MCNC: 32 MG/DL — HIGH (ref 7–23)
CALCIUM SERPL-MCNC: 9.2 MG/DL — SIGNIFICANT CHANGE UP (ref 8.5–10.1)
CHLORIDE SERPL-SCNC: 108 MMOL/L — SIGNIFICANT CHANGE UP (ref 96–108)
CK MB BLD-MCNC: 6.1 % — HIGH (ref 0–3.5)
CK MB CFR SERPL CALC: 2.7 NG/ML — SIGNIFICANT CHANGE UP (ref 0.5–3.6)
CK SERPL-CCNC: 44 U/L — SIGNIFICANT CHANGE UP (ref 26–192)
CO2 SERPL-SCNC: 28 MMOL/L — SIGNIFICANT CHANGE UP (ref 22–31)
CREAT SERPL-MCNC: 1.18 MG/DL — SIGNIFICANT CHANGE UP (ref 0.5–1.3)
EGFR: 43 ML/MIN/1.73M2 — LOW
EOSINOPHIL # BLD AUTO: 0.11 K/UL — SIGNIFICANT CHANGE UP (ref 0–0.5)
EOSINOPHIL NFR BLD AUTO: 2.6 % — SIGNIFICANT CHANGE UP (ref 0–6)
FLUAV AG NPH QL: SIGNIFICANT CHANGE UP
FLUBV AG NPH QL: SIGNIFICANT CHANGE UP
GLUCOSE SERPL-MCNC: 91 MG/DL — SIGNIFICANT CHANGE UP (ref 70–99)
HCT VFR BLD CALC: 41.7 % — SIGNIFICANT CHANGE UP (ref 34.5–45)
HGB BLD-MCNC: 12.9 G/DL — SIGNIFICANT CHANGE UP (ref 11.5–15.5)
IMM GRANULOCYTES NFR BLD AUTO: 0 % — SIGNIFICANT CHANGE UP (ref 0–0.9)
INR BLD: 0.98 RATIO — SIGNIFICANT CHANGE UP (ref 0.88–1.16)
LYMPHOCYTES # BLD AUTO: 0.6 K/UL — LOW (ref 1–3.3)
LYMPHOCYTES # BLD AUTO: 14.4 % — SIGNIFICANT CHANGE UP (ref 13–44)
MAGNESIUM SERPL-MCNC: 2.5 MG/DL — SIGNIFICANT CHANGE UP (ref 1.6–2.6)
MCHC RBC-ENTMCNC: 30.8 PG — SIGNIFICANT CHANGE UP (ref 27–34)
MCHC RBC-ENTMCNC: 30.9 G/DL — LOW (ref 32–36)
MCV RBC AUTO: 99.5 FL — SIGNIFICANT CHANGE UP (ref 80–100)
MONOCYTES # BLD AUTO: 0.42 K/UL — SIGNIFICANT CHANGE UP (ref 0–0.9)
MONOCYTES NFR BLD AUTO: 10 % — SIGNIFICANT CHANGE UP (ref 2–14)
NEUTROPHILS # BLD AUTO: 3.03 K/UL — SIGNIFICANT CHANGE UP (ref 1.8–7.4)
NEUTROPHILS NFR BLD AUTO: 72.5 % — SIGNIFICANT CHANGE UP (ref 43–77)
NRBC # BLD: 0 /100 WBCS — SIGNIFICANT CHANGE UP (ref 0–0)
NT-PROBNP SERPL-SCNC: HIGH PG/ML (ref 0–450)
PLATELET # BLD AUTO: 188 K/UL — SIGNIFICANT CHANGE UP (ref 150–400)
POTASSIUM SERPL-MCNC: 4.4 MMOL/L — SIGNIFICANT CHANGE UP (ref 3.5–5.3)
POTASSIUM SERPL-SCNC: 4.4 MMOL/L — SIGNIFICANT CHANGE UP (ref 3.5–5.3)
PROT SERPL-MCNC: 6.2 GM/DL — SIGNIFICANT CHANGE UP (ref 6–8.3)
PROTHROM AB SERPL-ACNC: 11.8 SEC — SIGNIFICANT CHANGE UP (ref 10.5–13.4)
RBC # BLD: 4.19 M/UL — SIGNIFICANT CHANGE UP (ref 3.8–5.2)
RBC # FLD: 13.9 % — SIGNIFICANT CHANGE UP (ref 10.3–14.5)
SARS-COV-2 RNA SPEC QL NAA+PROBE: SIGNIFICANT CHANGE UP
SODIUM SERPL-SCNC: 141 MMOL/L — SIGNIFICANT CHANGE UP (ref 135–145)
TROPONIN I, HIGH SENSITIVITY RESULT: 53.2 NG/L — SIGNIFICANT CHANGE UP
WBC # BLD: 4.18 K/UL — SIGNIFICANT CHANGE UP (ref 3.8–10.5)
WBC # FLD AUTO: 4.18 K/UL — SIGNIFICANT CHANGE UP (ref 3.8–10.5)

## 2023-01-27 PROCEDURE — 71045 X-RAY EXAM CHEST 1 VIEW: CPT | Mod: 26

## 2023-01-27 PROCEDURE — 99285 EMERGENCY DEPT VISIT HI MDM: CPT

## 2023-01-27 PROCEDURE — 93010 ELECTROCARDIOGRAM REPORT: CPT

## 2023-01-27 PROCEDURE — 99223 1ST HOSP IP/OBS HIGH 75: CPT

## 2023-01-27 RX ORDER — FERROUS SULFATE 325(65) MG
325 TABLET ORAL DAILY
Refills: 0 | Status: DISCONTINUED | OUTPATIENT
Start: 2023-01-27 | End: 2023-01-31

## 2023-01-27 RX ORDER — POLYETHYLENE GLYCOL 3350 17 G/17G
17 POWDER, FOR SOLUTION ORAL DAILY
Refills: 0 | Status: DISCONTINUED | OUTPATIENT
Start: 2023-01-27 | End: 2023-01-31

## 2023-01-27 RX ORDER — FUROSEMIDE 40 MG
20 TABLET ORAL
Refills: 0 | Status: DISCONTINUED | OUTPATIENT
Start: 2023-01-27 | End: 2023-01-28

## 2023-01-27 RX ORDER — CLOPIDOGREL BISULFATE 75 MG/1
75 TABLET, FILM COATED ORAL DAILY
Refills: 0 | Status: DISCONTINUED | OUTPATIENT
Start: 2023-01-27 | End: 2023-01-31

## 2023-01-27 RX ORDER — CHOLECALCIFEROL (VITAMIN D3) 125 MCG
1000 CAPSULE ORAL DAILY
Refills: 0 | Status: DISCONTINUED | OUTPATIENT
Start: 2023-01-27 | End: 2023-01-31

## 2023-01-27 RX ORDER — FUROSEMIDE 40 MG
60 TABLET ORAL ONCE
Refills: 0 | Status: COMPLETED | OUTPATIENT
Start: 2023-01-27 | End: 2023-01-27

## 2023-01-27 RX ORDER — HEPARIN SODIUM 5000 [USP'U]/ML
5000 INJECTION INTRAVENOUS; SUBCUTANEOUS EVERY 12 HOURS
Refills: 0 | Status: DISCONTINUED | OUTPATIENT
Start: 2023-01-27 | End: 2023-01-31

## 2023-01-27 RX ORDER — SENNA PLUS 8.6 MG/1
2 TABLET ORAL AT BEDTIME
Refills: 0 | Status: DISCONTINUED | OUTPATIENT
Start: 2023-01-27 | End: 2023-01-31

## 2023-01-27 RX ORDER — METOPROLOL TARTRATE 50 MG
25 TABLET ORAL DAILY
Refills: 0 | Status: DISCONTINUED | OUTPATIENT
Start: 2023-01-27 | End: 2023-01-31

## 2023-01-27 RX ORDER — DORZOLAMIDE HYDROCHLORIDE 20 MG/ML
1 SOLUTION/ DROPS OPHTHALMIC THREE TIMES A DAY
Refills: 0 | Status: DISCONTINUED | OUTPATIENT
Start: 2023-01-27 | End: 2023-01-28

## 2023-01-27 RX ORDER — LOSARTAN POTASSIUM 100 MG/1
50 TABLET, FILM COATED ORAL DAILY
Refills: 0 | Status: DISCONTINUED | OUTPATIENT
Start: 2023-01-27 | End: 2023-01-30

## 2023-01-27 RX ORDER — PANTOPRAZOLE SODIUM 20 MG/1
40 TABLET, DELAYED RELEASE ORAL
Refills: 0 | Status: DISCONTINUED | OUTPATIENT
Start: 2023-01-27 | End: 2023-01-31

## 2023-01-27 RX ADMIN — Medication 20 MILLIGRAM(S): at 23:53

## 2023-01-27 RX ADMIN — Medication 60 MILLIGRAM(S): at 18:12

## 2023-01-27 NOTE — H&P ADULT - ASSESSMENT
89 year old female here with daughter c/o sob on exertion and lying down pt has to use extra pillow to improve breathing for past week. Pt is unable to walk to her bathroom without sob. Daughter sts pt has a history of CHF and takes furosemide unknown dose daily. Pt denies headache, cough, chest pain, nausea, vomiting, fever, chills, abd pain, dysuria.     Plan:  89 year old female here with daughter c/o sob on exertion and lying down pt has to use extra pillow to improve breathing for past week. Pt is unable to walk to her bathroom without sob. Daughter sts pt has a history of CHF and takes furosemide unknown dose daily. Pt denies headache, cough, chest pain, nausea, vomiting, fever, chills, abd pain, dysuria.     Plan: Admit to tele for likely CHF exacerbation. Will change Lasix to 20 mg IVP bid. Likely not on enough Lasix at home. Trop normal, follow in AM.   BNP consistent with CHF, BNP 18,271. CXR appears consistent with CHF.  Will hold on TTE, likely not  on 89 year old patient.     Continue  home meds: Plavix, Toprol, Cozaar and Glaucoma eye drops.    89 year old female here with daughter c/o sob on exertion and lying down pt has to use extra pillow to improve breathing for past week. Pt is unable to walk to her bathroom without sob. Daughter sts pt has a history of CHF and takes furosemide unknown dose daily. Pt denies headache, cough, chest pain, nausea, vomiting, fever, chills, abd pain, dysuria.     Plan: Admit to tele for likely CHF exacerbation. Will change Lasix to 20 mg IVP bid. Likely not on enough Lasix at home. Trop normal, follow in AM.   BNP consistent with CHF, BNP 18,271. CXR appears consistent with CHF.  Will order CT chest non-contrast to confirm. Will hold on TTE, likely not  on 89 year old patient.     Continue  home meds: Plavix, Toprol, Cozaar and Glaucoma eye drops.

## 2023-01-27 NOTE — H&P ADULT - HISTORY OF PRESENT ILLNESS
89 year old female here with daughter c/o sob on exertion and lying down pt has to use extra pillow to improve breathing for past week. Pt is unable to walk to her bathroom without sob. Daughter sts pt has a history of CHF and takes furosemide unknown dose daily. Pt denies headache, cough, chest pain, nausea, vomiting, fever, chills, abd pain, dysuria.

## 2023-01-27 NOTE — ED ADULT NURSE NOTE - OBJECTIVE STATEMENT
Received 89yr old female patient A&Ox3, breathing even breaths on 3L nasal canula. Patient c/o worsening SOB for 2 days with edema bilateral lower extremities. Pt made comfortable to room 25. 20g IV placed to right AC. PMH: HTN, CHF, GERD, glaucoma, previous CVA in 2007 with left side deficits,   EMS 18G left AC

## 2023-01-27 NOTE — H&P ADULT - NSHPPHYSICALEXAM_GEN_ALL_CORE
PHYSICAL EXAMINATION:  Vital Signs Last 24 Hrs  T(C): 36.5 (27 Jan 2023 19:49), Max: 36.8 (27 Jan 2023 16:48)  T(F): 97.7 (27 Jan 2023 19:49), Max: 98.2 (27 Jan 2023 16:48)  HR: 78 (27 Jan 2023 19:49) (76 - 88)  BP: 169/85 (27 Jan 2023 19:49) (165/88 - 169/85)  BP(mean): --  RR: 20 (27 Jan 2023 19:49) (20 - 23)  SpO2: 100% (27 Jan 2023 19:49) (100% - 100%)    Parameters below as of 27 Jan 2023 19:49  Patient On (Oxygen Delivery Method): nasal cannula  O2 Flow (L/min): 2    CAPILLARY BLOOD GLUCOSE          GENERAL: NAD,   ENMT: mucous membranes moist  NECK: supple, No JVD  CHEST/LUNG: clear to auscultation bilaterally; no rales, rhonchi, or wheezing b/l  HEART: normal S1, S2  ABDOMEN: BS+, soft, ND, NT   EXTREMITIES:  pulses palpable; no clubbing, cyanosis, or edema b/l LEs  NEURO: awake, alert, interactive; moves all extremities PHYSICAL EXAMINATION:  Vital Signs Last 24 Hrs  T(C): 36.5 (27 Jan 2023 19:49), Max: 36.8 (27 Jan 2023 16:48)  T(F): 97.7 (27 Jan 2023 19:49), Max: 98.2 (27 Jan 2023 16:48)  HR: 78 (27 Jan 2023 19:49) (76 - 88)  BP: 169/85 (27 Jan 2023 19:49) (165/88 - 169/85)  BP(mean): --  RR: 20 (27 Jan 2023 19:49) (20 - 23)  SpO2: 100% (27 Jan 2023 19:49) (100% - 100%)    Parameters below as of 27 Jan 2023 19:49  Patient On (Oxygen Delivery Method): nasal cannula  O2 Flow (L/min): 2    CAPILLARY BLOOD GLUCOSE          GENERAL: NAD, seen on 1-D, comfortable in bed, asleep, on NC   ENMT: mucous membranes moist  NECK: supple, No JVD  CHEST/LUNG: clear to auscultation bilaterally; no rales, rhonchi, or wheezing b/l  HEART: normal S1, S2  ABDOMEN: BS+, soft, ND, NT   EXTREMITIES:  pulses palpable; no clubbing, cyanosis, or edema b/l LEs  NEURO: awake, alert, interactive; moves all extremities

## 2023-01-27 NOTE — H&P ADULT - NSHPLABSRESULTS_GEN_ALL_CORE
LABS:                        12.9   4.18  )-----------( 188      ( 27 Jan 2023 17:30 )             41.7     01-27    141  |  108  |  32<H>  ----------------------------<  91  4.4   |  28  |  1.18    Ca    9.2      27 Jan 2023 17:30  Mg     2.5     01-27    TPro  6.2  /  Alb  3.3  /  TBili  0.4  /  DBili  x   /  AST  19  /  ALT  20  /  AlkPhos  75  01-27    PT/INR - ( 27 Jan 2023 17:30 )   PT: 11.8 sec;   INR: 0.98 ratio         PTT - ( 27 Jan 2023 17:30 )  PTT:32.7 sec        RADIOLOGY & ADDITIONAL TESTS:

## 2023-01-27 NOTE — ED PROVIDER NOTE - CONSTITUTIONAL, MLM
normal... Well appearing, awake, alert, oriented to person, place, time/situation and in no apparent distress. no nasal flaring no shoulders retractions no diaphoresis, appears comfortable sitting up in the stretcher

## 2023-01-27 NOTE — ED ADULT NURSE NOTE - ED STAT RN HANDOFF DETAILS
Report endorsed to ED Hold DAVEY Thomas. Safety checks compld this shift/Safety rounds completed hourly.  IV sites checked Q2+remains WDL. Meds given as ord with no s/s of adverse RXNs. Fall +skin precs in place. Any issues endorsed to oncoming RN for follow up.

## 2023-01-27 NOTE — ED ADULT NURSE NOTE - NSIMPLEMENTINTERV_GEN_ALL_ED
Implemented All Fall with Harm Risk Interventions:  [place] to call system. Call bell, personal items and telephone within reach. Instruct patient to call for assistance. Room bathroom lighting operational. Non-slip footwear when patient is off stretcher. Physically safe environment: no spills, clutter or unnecessary equipment. Stretcher in lowest position, wheels locked, appropriate side rails in place. Provide visual cue, wrist band, yellow gown, etc. Monitor gait and stability. Monitor for mental status changes and reorient to person, place, and time. Review medications for side effects contributing to fall risk. Reinforce activity limits and safety measures with patient and family. Provide visual clues: red socks.

## 2023-01-27 NOTE — ED PROVIDER NOTE - NSICDXPASTMEDICALHX_GEN_ALL_CORE_FT
PAST MEDICAL HISTORY:  Abdominal mass     Cervical cancer last treatment May 25, 2011. s/p external and internal radiation    CHF (congestive heart failure)     CVA (cerebral infarction)     Depression     Depression (emotion)     Diverticulitis     GI (gastrointestinal bleed)     Hemorrhoid     Hypertension

## 2023-01-27 NOTE — ED PROVIDER NOTE - OBJECTIVE STATEMENT
89 years old female here with daughter c/o sob on exertion and lying down pt has to use extra pillow to improve breathing lately for one week Pt is unable to walk to her bathroom without sob. Daughter sts pt has a hx of chf takes furosemide unknown dose daily. Pt denies headache, dizziness, blurred visions, light sensitivities, focal/distal weakness or numbness, neck/back/hips, calfs pain, cough, chest pain, nausea, vomiting, fever, chills, abd pain, dysuria or irregular bowel movements.

## 2023-01-28 LAB
ANION GAP SERPL CALC-SCNC: 9 MMOL/L — SIGNIFICANT CHANGE UP (ref 5–17)
BUN SERPL-MCNC: 32 MG/DL — HIGH (ref 7–23)
CALCIUM SERPL-MCNC: 9.3 MG/DL — SIGNIFICANT CHANGE UP (ref 8.5–10.1)
CHLORIDE SERPL-SCNC: 103 MMOL/L — SIGNIFICANT CHANGE UP (ref 96–108)
CO2 SERPL-SCNC: 29 MMOL/L — SIGNIFICANT CHANGE UP (ref 22–31)
CREAT SERPL-MCNC: 1.35 MG/DL — HIGH (ref 0.5–1.3)
EGFR: 37 ML/MIN/1.73M2 — LOW
GLUCOSE SERPL-MCNC: 99 MG/DL — SIGNIFICANT CHANGE UP (ref 70–99)
POTASSIUM SERPL-MCNC: 3.5 MMOL/L — SIGNIFICANT CHANGE UP (ref 3.5–5.3)
POTASSIUM SERPL-SCNC: 3.5 MMOL/L — SIGNIFICANT CHANGE UP (ref 3.5–5.3)
SODIUM SERPL-SCNC: 141 MMOL/L — SIGNIFICANT CHANGE UP (ref 135–145)
TROPONIN I, HIGH SENSITIVITY RESULT: 53.1 NG/L — SIGNIFICANT CHANGE UP

## 2023-01-28 PROCEDURE — 99232 SBSQ HOSP IP/OBS MODERATE 35: CPT

## 2023-01-28 PROCEDURE — 99222 1ST HOSP IP/OBS MODERATE 55: CPT

## 2023-01-28 PROCEDURE — 71250 CT THORAX DX C-: CPT | Mod: 26

## 2023-01-28 RX ORDER — FUROSEMIDE 40 MG
40 TABLET ORAL EVERY 12 HOURS
Refills: 0 | Status: DISCONTINUED | OUTPATIENT
Start: 2023-01-28 | End: 2023-01-30

## 2023-01-28 RX ADMIN — DORZOLAMIDE HYDROCHLORIDE 1 DROP(S): 20 SOLUTION/ DROPS OPHTHALMIC at 05:34

## 2023-01-28 RX ADMIN — PANTOPRAZOLE SODIUM 40 MILLIGRAM(S): 20 TABLET, DELAYED RELEASE ORAL at 10:07

## 2023-01-28 RX ADMIN — Medication 20 MILLIGRAM(S): at 05:34

## 2023-01-28 RX ADMIN — Medication 25 MILLIGRAM(S): at 05:36

## 2023-01-28 RX ADMIN — LOSARTAN POTASSIUM 50 MILLIGRAM(S): 100 TABLET, FILM COATED ORAL at 05:33

## 2023-01-28 RX ADMIN — CLOPIDOGREL BISULFATE 75 MILLIGRAM(S): 75 TABLET, FILM COATED ORAL at 12:21

## 2023-01-28 RX ADMIN — Medication 40 MILLIGRAM(S): at 12:20

## 2023-01-28 RX ADMIN — Medication 325 MILLIGRAM(S): at 12:25

## 2023-01-28 RX ADMIN — HEPARIN SODIUM 5000 UNIT(S): 5000 INJECTION INTRAVENOUS; SUBCUTANEOUS at 18:50

## 2023-01-28 RX ADMIN — HEPARIN SODIUM 5000 UNIT(S): 5000 INJECTION INTRAVENOUS; SUBCUTANEOUS at 05:34

## 2023-01-28 RX ADMIN — Medication 1 TABLET(S): at 12:25

## 2023-01-28 RX ADMIN — Medication 1000 UNIT(S): at 12:21

## 2023-01-28 NOTE — CONSULT NOTE ADULT - SUBJECTIVE AND OBJECTIVE BOX
CARDIOLOGY CONSULT NOTE    Patient is a 89y Female with a known history of :    HPI:  89yo lady with a PMH of chronic systolic CHF - last LVEF 35-40% in 2016 with moderate MR/AI, Cervical cancer last treatment May 25, 2011. s/p external and internal radiation, prior CVA on plavix, HTN;   admitted with dyspnea on exertion and orthopnea.  Takes furosemide at home but has had recent dietary indiscretions.  Denied chest pain/palpitations.  ECG: sinus 88bpm; LVH  BNP 18,000  creat 1.35  Received 20IV lasix this AM but no UOP.  Appears in moderate distress with rales and wheezing      REVIEW OF SYSTEMS:  CONSTITUTIONAL: No fever, weight loss, or fatigue  EYES: No eye pain, visual disturbances, or discharge  ENMT:  No difficulty hearing, tinnitus, vertigo; No sinus or throat pain  NECK: No pain or stiffness  BREASTS: No pain, masses, or nipple discharge  RESPIRATORY: No cough, wheezing, chills or hemoptysis; + shortness of breath  CARDIOVASCULAR: No chest pain, palpitations, dizziness, or leg swelling  GASTROINTESTINAL: No abdominal or epigastric pain. No nausea, vomiting, or hematemesis; No diarrhea or constipation. No melena or hematochezia.  GENITOURINARY: No dysuria, frequency, hematuria, or incontinence  NEUROLOGICAL: No headaches, memory loss, loss of strength, numbness, or tremors  SKIN: No itching, burning, rashes, or lesions   LYMPH NODES: No enlarged glands  ENDOCRINE: No heat or cold intolerance; No hair loss  MUSCULOSKELETAL: No joint pain or swelling; No muscle, back, or extremity pain  PSYCHIATRIC: No depression, anxiety, mood swings, or difficulty sleeping  HEME/LYMPH: No easy bruising, or bleeding gums  ALLERGY AND IMMUNOLOGIC: No hives or eczema    MEDICATIONS  (STANDING):  cholecalciferol 1000 Unit(s) Oral daily  clopidogrel Tablet 75 milliGRAM(s) Oral daily  dorzolamide 2% Ophthalmic Solution 1 Drop(s) Both EYES three times a day  ferrous    sulfate 325 milliGRAM(s) Oral daily  furosemide   Injectable 40 milliGRAM(s) IV Push every 12 hours  heparin   Injectable 5000 Unit(s) SubCutaneous every 12 hours  losartan 50 milliGRAM(s) Oral daily  metoprolol succinate ER 25 milliGRAM(s) Oral daily  multivitamin 1 Tablet(s) Oral daily  pantoprazole    Tablet 40 milliGRAM(s) Oral before breakfast  polyethylene glycol 3350 17 Gram(s) Oral daily  senna 2 Tablet(s) Oral at bedtime    MEDICATIONS  (PRN):      ALLERGIES: penicillin (Hives)      FAMILY HISTORY:      PHYSICAL EXAMINATION:  -----------------------------  T(C): 36.3 (01-28-23 @ 12:05), Max: 36.8 (01-27-23 @ 16:48)  HR: 81 (01-28-23 @ 12:05) (76 - 88)  BP: 143/70 (01-28-23 @ 12:05) (137/75 - 169/85)  RR: 24 (01-28-23 @ 12:05) (13 - 24)  SpO2: 97% (01-28-23 @ 12:05) (94% - 100%)    Constitutional: well developed, normal appearance, well groomed, well nourished, no deformities and no acute distress.   Eyes: the conjunctiva exhibited no abnormalities and the eyelids demonstrated no xanthelasmas.   HEENT: normal oral mucosa, no oral pallor and no oral cyanosis.   Neck: normal jugular venous A waves present, normal jugular venous V waves present and no jugular venous gaona A waves.   Pulmonary: +diminished BS at bases; +rales and wheezing  Cardiovascular: heart rate and rhythm were normal; 2/6 SM  Abdomen: soft, non-tender, no hepato-splenomegaly and no abdominal mass palpated.   Musculoskeletal: the gait could not be assessed..   Extremities: no clubbing of the fingernails, no localized cyanosis, no petechial hemorrhages and no ischemic changes.   Skin: normal skin color and pigmentation, no rash, no venous stasis, no skin lesions, no skin ulcer and no xanthoma was observed.   Psychiatric: oriented to person, place, and time, the affect was normal, the mood was normal and not feeling anxious.       LABS:   --------  01-28    141  |  103  |  32<H>  ----------------------------<  99  3.5   |  29  |  1.35<H>    Ca    9.3      28 Jan 2023 08:10  Mg     2.5     01-27    TPro  6.2  /  Alb  3.3  /  TBili  0.4  /  DBili  x   /  AST  19  /  ALT  20  /  AlkPhos  75  01-27                         12.9   4.18  )-----------( 188      ( 27 Jan 2023 17:30 )             41.7     PT/INR - ( 27 Jan 2023 17:30 )   PT: 11.8 sec;   INR: 0.98 ratio         PTT - ( 27 Jan 2023 17:30 )  PTT:32.7 sec  01-27 @ 17:30 BNP: 86517 pg/mL            RADIOLOGY:  -----------------    ECG: sinus 88bpm; LVH    < from: TTE Echo Doppler w/o Cont (02.12.16 @ 08:21) >   IMPRESSION:  Summary:   1. Left ventricular ejection fraction, by visual estimation, is 35 to   40%.   2. Technically fair study.   3. Moderately decreased global left ventricular systolic function.   4. Mildly increased left ventricular internal cavity size.   5. Spectral Doppler shows pseudonormal pattern of left ventricular   myocardial filling (Grade II diastolic dysfunction).   6. There is mild asymmetric left ventricular hypertrophy.   7. Normal right ventricular size and function.   8. There is no evidence of pericardial effusion.   9. Mild mitral annular calcification.  10. Moderate mitral valve regurgitation.  11. Thickening and calcification of the anterior and posterior mitral   valve leaflets.  12. Degenerative tricuspid valve.  13. Moderate aortic regurgitation.  14. Sclerotic aortic valve with normal opening.  15. Compared with the echo report from 1/3/13, LVEF appears to have   declined.  16. Mildly enlarged left atrium.  17. There is mild aortic root calcification.     Kwesi Chavarria MD, FACC, FASE, FACP  Electronically signed on 2/12/2016 at 10:06:34PM    < end of copied text >

## 2023-01-28 NOTE — PATIENT PROFILE ADULT - LOCATION #2
[facility]  Chief Complaint   Patient presents with     [facility] Regulatory     Advance Medical Record Number:  [id number]  Place of Service where encounter took place:  [facility] ([facility]) [[id number]]    HPI:    Eder Pearson  is 85 year old (7/9/1933), who is being seen today for a federally mandated E/M visit.  HPI information obtained from: facility chart records, facility staff, patient report and [facility] chart review.     Today's concerns are:  Recent hospital stay in May for acute respiratory failure with hypoxia 2/2  aspiration pneumonia. Remains on 2LPM NC with O2 93-98%. -142 for the last 2 weeks with HR 62-74. Weight 168.2lbs on 6/30, was 164.4lbs when transferred on 5/23. Requires osvaldo lift for transfers, utilizing Juditta chair. Good appetite, total assist with ADLs & eating. Staff without acute concerns. Patient trying to engage in conversation with writer today. Soft spoken.     ALLERGIES:Patient has no known allergies.  PAST MEDICAL HISTORY:   has a past medical history of Anxiety, Dementia, and Prostate cancer (H).  PAST SURGICAL HISTORY:   has no past surgical history on file.  FAMILY HISTORY: family history is not on file.  SOCIAL HISTORY:  reports that he has never smoked. He does not have any smokeless tobacco history on file. He reports that he does not drink alcohol.    MEDICATIONS:  Current Outpatient Medications   Medication Sig Dispense Refill     acetaminophen (TYLENOL) 500 MG tablet Take 1,000 mg by mouth 3 times daily 0800, 1400, 2000 AND 1000 mg daily as needed       bisacodyl (DULCOLAX) 10 MG Suppository Place 10 mg rectally daily as needed for constipation       Cholecalciferol (VITAMIN D3 PO) Take 2,000 Units by mouth daily        Cyanocobalamin (VITAMIN B 12 PO) Take 1,000 mcg by mouth Every Mon, Wed, Fri Morning        DULoxetine (CYMBALTA) 20 MG capsule Take 20 mg by mouth 2 times daily       Infant Care Products (JOHNSONS BABY SHAMPOO  EX) Externally apply topically daily AND Apply to both eyes topically one time a day for eye drainage Use warm wash cloth and wipe eyes       Infant Care Products (JOHNSONS BABY SHAMPOO EX) Apply externally to both eyes as needed for eye drainage.       Memantine HCl (NAMENDA PO) Take 10 mg by mouth daily       miconazole with skin protectant (LOPEZ ANTIFUNGAL) 2 % CREA cream Apply topically every 8 hours Apply to buttocks topically every shift for barrier cream with each toileting       phenylephrine-shark liver oil-mineral oil-petrolatum (PREPARATION H) 0.25-14-74.9 % rectal ointment Place rectally daily as needed for hemorrhoids       senna-docusate (SENOKOT-S;PERICOLACE) 8.6-50 MG per tablet Take 1 tablet by mouth every 12 hours as needed for constipation       sodium chloride (OCEAN) 0.65 % nasal spray Spray 2 sprays into both nostrils 2 times daily , at 0900 & 2100       sodium chloride (OCEAN) 0.65 % nasal spray Spray 2 sprays into both nostrils every 8 hours as needed for congestion       tamsulosin (FLOMAX) 0.4 MG capsule Take 0.4 mg by mouth At Bedtime       menthol-zinc oxide (CALMOSEPTINE) 0.44-20.625 % OINT ointment Apply topically every 8 hours as needed for skin protection         Case Management:  I have reviewed the care plan and MDS and do agree with the plan. Patient's desire to return to the community is not assessible due to cognitive impairment. Information reviewed:  Medications, vital signs, orders, and nursing notes.    ROS:  Limited secondary to cognitive impairment     Vitals:  /82   Pulse 74   Temp 97.2  F (36.2  C)   Resp 16   Wt 76.3 kg (168 lb 3.2 oz)   SpO2 96%   BMI 22.81 kg/m    Body mass index is 22.81 kg/m .  Exam:  GENERAL APPEARANCE:  Alert, in no distress, cooperative, thin, trying to engage in conversation this morning   ENT:  Mouth and posterior oropharynx normal, moist mucous membranes  EYES:  EOM normal, Conjunctiva and lids normal  RESP:  lungs clear to  auscultation, no respiratory distress  CV:  RRR, no murmur  ABDOMEN:  no guarding or rebound, bowel sounds normal, soft, non-tender  M/S:   decreased muscle tone, no edema  Skin: thin- wearing sleeve protectors on arms & heel protectors   PSYCH:  insight and judgement impaired, memory impaired , affect and mood normal    Lab/Diagnostic data:     Most Recent 3 CBC's:  Recent Labs   Lab Test 05/20/19  0701 05/19/19  1810 05/12/17  0637   WBC 9.8 11.6* 9.4   HGB 10.6* 12.7* 8.5*   * 103* 98    373 375     Most Recent 3 BMP's:  Recent Labs   Lab Test 05/20/19  0701 05/19/19  1810 05/12/17  0637    139 139   POTASSIUM 3.6 4.0 3.5   CHLORIDE 108 105 105   CO2 28 29 28   BUN 16 18 13   CR 0.77 0.82 0.98   ANIONGAP 5 5 6   ARANZA 8.0* 8.9 8.6   GLC 89 93 96       ASSESSMENT/PLAN  (R13.10) Dysphagia, unspecified type  Comment: back-to-back hospitalizations for acute respiratory failure 2/2 aspiration pneumonia, completed IV antibiotic in hospital & PO Augmentin, remains on 2LMP NC   Plan:   --continue pureed texture, moderately thick consistency   --monitor O2, continue 2LPM, wean as able     (G30.1,  F02.81) Late onset Alzheimer's disease with behavioral disturbance  Comment: Seroquel discontinued d/t encephalopathy   Plan:   --monitor for increase in behaviors or change in sleep & eating patterns  --continues to be appropriate for secure Memory Care Unit  --continue Namenda, B12 & duloxetine      (R35.0) Urinary frequency  Comment: unclear history of prostate cancer  Plan:   --continue Flomax 0.4mg daily    (D64.9) Anemia   Comment: Hgb 10.6 from 12.7, no evidence of bleed  Plan:  --follow Hgb     Orders written by provider at facility  1. Recheck hemoglobin       Electronically signed by:  Penelope Seo NP           Lt Upper back 2

## 2023-01-28 NOTE — PATIENT PROFILE ADULT - FUNCTIONAL ASSESSMENT - BASIC MOBILITY 6.
2-calculated by average/Not able to assess (calculate score using [facility] averaging method)

## 2023-01-28 NOTE — CHART NOTE - NSCHARTNOTEFT_GEN_A_CORE
per chart code status prior, no documents or forms in chart  per discussion w/ daughter pt is Not dnr and she is full code.   will update to full code.

## 2023-01-28 NOTE — PROGRESS NOTE ADULT - ASSESSMENT
89 y/o F with EDEN, and orthopnea, worsening over past week  admitted for acute chf exac / elevated trops     Acute CHF exac  hx dietary non compliance   IV lasix  monitor lytes/ Creat  Cardio eval appreciated- increased lasix dose- check echo   FU CT chest   c/w Toprol, plavix, cozar    Glaucoma  c/w eye drops    SQ heparin for vte ppx

## 2023-01-28 NOTE — PATIENT PROFILE ADULT - FALL HARM RISK - HARM RISK INTERVENTIONS

## 2023-01-28 NOTE — PROGRESS NOTE ADULT - SUBJECTIVE AND OBJECTIVE BOX
CHIEF COMPLAINT/INTERVAL HISTORY:    Patient is a 89y old  Female who presents with a chief complaint of SOB from CHF exacerbation. (27 Jan 2023 21:17)      HPI:  89 year old female here with daughter c/o sob on exertion and lying down pt has to use extra pillow to improve breathing for past week. Pt is unable to walk to her bathroom without sob. Daughter sts pt has a history of CHF and takes furosemide unknown dose daily. Pt denies headache, cough, chest pain, nausea, vomiting, fever, chills, abd pain, dysuria.  (27 Jan 2023 21:17)      SUBJECTIVE & OBJECTIVE: Pt seen and examined at bedside.     ICU Vital Signs Last 24 Hrs  T(C): 36.4 (28 Jan 2023 07:31), Max: 36.8 (27 Jan 2023 16:48)  T(F): 97.5 (28 Jan 2023 07:31), Max: 98.2 (27 Jan 2023 16:48)  HR: 78 (28 Jan 2023 07:31) (76 - 88)  BP: 158/74 (28 Jan 2023 07:31) (137/75 - 169/85)  BP(mean): --  ABP: --  ABP(mean): --  RR: 13 (28 Jan 2023 07:31) (13 - 23)  SpO2: 96% (28 Jan 2023 07:31) (94% - 100%)    O2 Parameters below as of 28 Jan 2023 07:31  Patient On (Oxygen Delivery Method): nasal cannula  O2 Flow (L/min): 2            MEDICATIONS  (STANDING):  cholecalciferol 1000 Unit(s) Oral daily  clopidogrel Tablet 75 milliGRAM(s) Oral daily  dorzolamide 2% Ophthalmic Solution 1 Drop(s) Both EYES three times a day  ferrous    sulfate 325 milliGRAM(s) Oral daily  furosemide   Injectable 20 milliGRAM(s) IV Push two times a day  heparin   Injectable 5000 Unit(s) SubCutaneous every 12 hours  losartan 50 milliGRAM(s) Oral daily  metoprolol succinate ER 25 milliGRAM(s) Oral daily  multivitamin 1 Tablet(s) Oral daily  pantoprazole    Tablet 40 milliGRAM(s) Oral before breakfast  polyethylene glycol 3350 17 Gram(s) Oral daily  senna 2 Tablet(s) Oral at bedtime    MEDICATIONS  (PRN):        PHYSICAL EXAM:    GENERAL: NAD, well-groomed, well-developed  HEAD:  Atraumatic, Normocephalic  EYES: EOMI, PERRLA, conjunctiva and sclera clear  ENMT: Moist mucous membranes  NECK: Supple, No JVD  NERVOUS SYSTEM:  Alert & Oriented X3, Motor Strength 5/5 B/L upper and lower extremities; DTRs 2+ intact and symmetric  CHEST/LUNG: Clear to auscultation bilaterally; No rales, rhonchi, wheezing, or rubs  HEART: Regular rate and rhythm; No murmurs, rubs, or gallops  ABDOMEN: Soft, Nontender, Nondistended; Bowel sounds present  EXTREMITIES:  2+ Peripheral Pulses, No clubbing, cyanosis, or edema    LABS:                        12.9   4.18  )-----------( 188      ( 27 Jan 2023 17:30 )             41.7     01-28    141  |  103  |  32<H>  ----------------------------<  99  3.5   |  29  |  1.35<H>    Ca    9.3      28 Jan 2023 08:10  Mg     2.5     01-27    TPro  6.2  /  Alb  3.3  /  TBili  0.4  /  DBili  x   /  AST  19  /  ALT  20  /  AlkPhos  75  01-27    PT/INR - ( 27 Jan 2023 17:30 )   PT: 11.8 sec;   INR: 0.98 ratio         PTT - ( 27 Jan 2023 17:30 )  PTT:32.7 sec      CAPILLARY BLOOD GLUCOSE          RECENT CULTURES:      RADIOLOGY & ADDITIONAL TESTS:    Health Issues:  DIFFICULTY BREATHING    CHF EXACERBATION    CHF exacerbation        DVT/GI ppx  Discussed with pt @ bedside     CHIEF COMPLAINT/INTERVAL HISTORY:    Patient is a 89y old  Female who presents with a chief complaint of SOB from CHF exacerbation. (27 Jan 2023 21:17)      HPI:  89 year old female here with daughter c/o sob on exertion and lying down pt has to use extra pillow to improve breathing for past week. Pt is unable to walk to her bathroom without sob. Daughter sts pt has a history of CHF and takes furosemide unknown dose daily. Pt denies headache, cough, chest pain, nausea, vomiting, fever, chills, abd pain, dysuria.  (27 Jan 2023 21:17)      SUBJECTIVE & OBJECTIVE: Pt seen and examined at bedside.   denies any complaints at this time  feels better      Vital Signs Last 24 Hrs  T(C): 36.4 (28 Jan 2023 07:31), Max: 36.8 (27 Jan 2023 16:48)  T(F): 97.5 (28 Jan 2023 07:31), Max: 98.2 (27 Jan 2023 16:48)  HR: 78 (28 Jan 2023 07:31) (76 - 88)  BP: 158/74 (28 Jan 2023 07:31) (137/75 - 169/85)  BP(mean): --  ABP: --  ABP(mean): --  RR: 13 (28 Jan 2023 07:31) (13 - 23)  SpO2: 96% (28 Jan 2023 07:31) (94% - 100%)    O2 Parameters below as of 28 Jan 2023 07:31  Patient On (Oxygen Delivery Method): nasal cannula  O2 Flow (L/min): 2            MEDICATIONS  (STANDING):  cholecalciferol 1000 Unit(s) Oral daily  clopidogrel Tablet 75 milliGRAM(s) Oral daily  dorzolamide 2% Ophthalmic Solution 1 Drop(s) Both EYES three times a day  ferrous    sulfate 325 milliGRAM(s) Oral daily  furosemide   Injectable 20 milliGRAM(s) IV Push two times a day  heparin   Injectable 5000 Unit(s) SubCutaneous every 12 hours  losartan 50 milliGRAM(s) Oral daily  metoprolol succinate ER 25 milliGRAM(s) Oral daily  multivitamin 1 Tablet(s) Oral daily  pantoprazole    Tablet 40 milliGRAM(s) Oral before breakfast  polyethylene glycol 3350 17 Gram(s) Oral daily  senna 2 Tablet(s) Oral at bedtime          PHYSICAL EXAM:    GENERAL: NAD, well-developed  HEAD:  Atraumatic, Normocephalic  EYES: EOMI, PERRLA, conjunctiva and sclera clear  ENMT: Moist mucous membranes  NECK: Supple  NERVOUS SYSTEM:  Alert & awake, follows commands  CHEST/LUNG: Clear to auscultation bilaterally; decreased air entry at lung bases b/l   HEART: S1, S2  ABDOMEN: Soft, Nontender,  Bowel sounds present  EXTREMITIES: no cyanosis, or edema    LABS:                        12.9   4.18  )-----------( 188      ( 27 Jan 2023 17:30 )             41.7     01-28    141  |  103  |  32<H>  ----------------------------<  99  3.5   |  29  |  1.35<H>    Ca    9.3      28 Jan 2023 08:10  Mg     2.5     01-27    TPro  6.2  /  Alb  3.3  /  TBili  0.4  /  DBili  x   /  AST  19  /  ALT  20  /  AlkPhos  75  01-27    PT/INR - ( 27 Jan 2023 17:30 )   PT: 11.8 sec;   INR: 0.98 ratio         PTT - ( 27 Jan 2023 17:30 )  PTT:32.7 sec

## 2023-01-29 PROCEDURE — 93306 TTE W/DOPPLER COMPLETE: CPT | Mod: 26

## 2023-01-29 PROCEDURE — 99232 SBSQ HOSP IP/OBS MODERATE 35: CPT

## 2023-01-29 PROCEDURE — 99231 SBSQ HOSP IP/OBS SF/LOW 25: CPT

## 2023-01-29 RX ADMIN — Medication 1 TABLET(S): at 12:26

## 2023-01-29 RX ADMIN — Medication 1000 UNIT(S): at 12:26

## 2023-01-29 RX ADMIN — CLOPIDOGREL BISULFATE 75 MILLIGRAM(S): 75 TABLET, FILM COATED ORAL at 12:26

## 2023-01-29 RX ADMIN — HEPARIN SODIUM 5000 UNIT(S): 5000 INJECTION INTRAVENOUS; SUBCUTANEOUS at 17:28

## 2023-01-29 RX ADMIN — Medication 325 MILLIGRAM(S): at 12:25

## 2023-01-29 RX ADMIN — POLYETHYLENE GLYCOL 3350 17 GRAM(S): 17 POWDER, FOR SOLUTION ORAL at 12:27

## 2023-01-29 RX ADMIN — PANTOPRAZOLE SODIUM 40 MILLIGRAM(S): 20 TABLET, DELAYED RELEASE ORAL at 05:44

## 2023-01-29 RX ADMIN — HEPARIN SODIUM 5000 UNIT(S): 5000 INJECTION INTRAVENOUS; SUBCUTANEOUS at 05:45

## 2023-01-29 RX ADMIN — Medication 40 MILLIGRAM(S): at 17:24

## 2023-01-29 NOTE — PROGRESS NOTE ADULT - ASSESSMENT
89yo lady with a PMH of chronic systolic CHF - last LVEF 35-40% in 2016 with moderate MR/AI, Cervical cancer last treatment May 25, 2011. s/p external and internal radiation, prior CVA on plavix, HTN;   admitted with dyspnea on exertion and orthopnea.  Takes furosemide at home but has had recent dietary indiscretions.  Denied chest pain/palpitations.  ECG: sinus 88bpm; LVH  BNP 18,000  creat 1.1 -> 1.35 yesterday; no labs today  On lasix 40IV BID    -monitor on tele  -cont diuresis with IV lasix; creat 1.1 -> 1.3.  Would check CMP today ... if creat increasing, would change lasix to daily dosing  -monitor lytes and creat  -repeat echo to eval BOTH LVEF and MR/AI  -cont home metoprolol and losartan  -cont Plavix post CVA

## 2023-01-29 NOTE — PROGRESS NOTE ADULT - SUBJECTIVE AND OBJECTIVE BOX
Patient is a 89y old  Female who presents with a chief complaint of SOB from CHF exacerbation. (28 Jan 2023 12:31)      INTERVAL HPI/ OVERNIGHT EVENTS: Pt was seen and examined at bedside today, No significant overnight events, pt denies any CP or SOB    MEDICATIONS  (STANDING):  cholecalciferol 1000 Unit(s) Oral daily  clopidogrel Tablet 75 milliGRAM(s) Oral daily  ferrous    sulfate 325 milliGRAM(s) Oral daily  furosemide   Injectable 40 milliGRAM(s) IV Push every 12 hours  heparin   Injectable 5000 Unit(s) SubCutaneous every 12 hours  losartan 50 milliGRAM(s) Oral daily  metoprolol succinate ER 25 milliGRAM(s) Oral daily  multivitamin 1 Tablet(s) Oral daily  pantoprazole    Tablet 40 milliGRAM(s) Oral before breakfast  polyethylene glycol 3350 17 Gram(s) Oral daily  senna 2 Tablet(s) Oral at bedtime  Simbrinza (brinzolamide &amp; Brimonidine ) 1 Drop(s) 1 Drop(s) Both EYES two times a day    MEDICATIONS  (PRN):          Allergies    penicillin (Hives)    Intolerances        REVIEW OF SYSTEMS:    Unable to examine due to [ ] Encephalopathy [ ] Advanced Dementia [ ] Expressive Aphasia [ ] Non-verbal patient    CONSTITUTIONAL: No fever, NO generalized weakness/Fatigue, No weight loss  EYES: No eye pain, visual disturbances, or discharge  ENMT:  No difficulty hearing, tinnitus, vertigo; No sinus or throat pain  NECK: No pain or stiffness  RESPIRATORY: No shortness of breath,  cough, wheezing, sputum or hemoptysis   CARDIOVASCULAR: No chest pain, palpitations, or leg swelling  GASTROINTESTINAL: No abdominal pain. No nausea, vomiting, diarrhea or constipation. No melena or hematochezia.  GENITOURINARY: No dysuria, frequency, hematuria, or incontinence  NEUROLOGICAL: No headaches, Dizziness, memory loss, loss of strength, numbness, or tremors  SKIN: No itching, burning, rashes, or lesions   MUSCULOSKELETAL: No joint pain or swelling; No muscle, back, or extremity pain  PSYCHIATRIC: No depression, anxiety, mood swings, or difficulty sleeping  HEME/LYMPH: No easy bruising, or bleeding gums      Vital Signs Last 24 Hrs  T(C): 36.3 (29 Jan 2023 12:30), Max: 36.6 (28 Jan 2023 19:40)  T(F): 97.3 (29 Jan 2023 12:30), Max: 97.9 (28 Jan 2023 19:40)  HR: 85 (29 Jan 2023 12:30) (76 - 99)  BP: 116/68 (29 Jan 2023 12:30) (109/66 - 126/55)  BP(mean): --  RR: 20 (29 Jan 2023 12:30) (18 - 22)  SpO2: 96% (29 Jan 2023 12:30) (95% - 98%)    Parameters below as of 29 Jan 2023 00:19  Patient On (Oxygen Delivery Method): nasal cannula        PHYSICAL EXAM:  GENERAL: NAD on NC, well-developed, well-groomed  HEAD:  Atraumatic, Normocephalic  EYES: conjunctiva and sclera clear  ENMT: Moist mucous membranes  NECK: Supple, No JVD, Normal thyroid  CHEST/LUNG: Clear to Auscultation bilaterally; No rales, rhonchi, wheezing, or rubs  HEART: Regular rate and rhythm; No murmurs, rubs, or gallops  ABDOMEN: Soft, Nontender, Nondistended; Bowel sounds present  EXTREMITIES:  2+ Peripheral Pulses, No clubbing, cyanosis, or edema  SKIN: No rashes or lesions  NERVOUS SYSTEM:  Alert & Oriented X3, Poor historian, Motor Strength 5/5 B/L upper and lower extremities    LABS:                        12.9   4.18  )-----------( 188      ( 27 Jan 2023 17:30 )             41.7     01-28    141  |  103  |  32<H>  ----------------------------<  99  3.5   |  29  |  1.35<H>    Ca    9.3      28 Jan 2023 08:10  Mg     2.5     01-27    TPro  6.2  /  Alb  3.3  /  TBili  0.4  /  DBili  x   /  AST  19  /  ALT  20  /  AlkPhos  75  01-27    PT/INR - ( 27 Jan 2023 17:30 )   PT: 11.8 sec;   INR: 0.98 ratio         PTT - ( 27 Jan 2023 17:30 )  PTT:32.7 sec    CAPILLARY BLOOD GLUCOSE              RADIOLOGY & ADDITIONAL TESTS:          Imaging Personally Reviewed:  [ ] YES  [ ] NO    Consultant(s) Notes Reviewed:  [ ] YES  [ ] NO    Care Discussed with Consultants/Other Providers [x ] YES  [ ] NO

## 2023-01-29 NOTE — PROGRESS NOTE ADULT - ASSESSMENT
89 y/o F with EDEN, and orthopnea, worsening over past week  admitted for acute chf exac / elevated trops     Acute on Chronic CHF  Associated acute respiratory failure with Hypoxia  Cardiology consult appreciated   cont w/ Lasix, Metoprolol, losartan   f/u Echo   titrate oxygen off as tolerated     Glaucoma  cont with Simbrinza     DVTp: Heparin   Disposition: f/u PT evaluation    89 y/o F with EDEN, and orthopnea, worsening over past week  admitted for acute chf exac / elevated trops     Acute on Chronic CHF  Associated acute respiratory failure with Hypoxia  CT chest: Small Left pleural effusion   Cardiology consult appreciated   cont w/ Lasix, Metoprolol, losartan   f/u Echo   titrate oxygen off as tolerated     Glaucoma  cont with Simbrinza     DVTp: Heparin   Disposition: f/u PT evaluation

## 2023-01-29 NOTE — PROGRESS NOTE ADULT - SUBJECTIVE AND OBJECTIVE BOX
CARDIOLOGY CONSULT NOTE    Patient is a 89y Female with a known history of :    HPI:  89yo lady with a PMH of chronic systolic CHF - last LVEF 35-40% in 2016 with moderate MR/AI, Cervical cancer last treatment May 25, 2011. s/p external and internal radiation, prior CVA on plavix, HTN;   admitted with dyspnea on exertion and orthopnea.  Takes furosemide at home but has had recent dietary indiscretions.  Denied chest pain/palpitations.  ECG: sinus 88bpm; LVH  BNP 18,000  creat 1.35  Re      REVIEW OF SYSTEMS:  CONSTITUTIONAL: No fever, weight loss, or fatigue  EYES: No eye pain, visual disturbances, or discharge  ENMT:  No difficulty hearing, tinnitus, vertigo; No sinus or throat pain  NECK: No pain or stiffness  BREASTS: No pain, masses, or nipple discharge  RESPIRATORY: No cough, wheezing, chills or hemoptysis; + shortness of breath  CARDIOVASCULAR: No chest pain, palpitations, dizziness, or leg swelling  GASTROINTESTINAL: No abdominal or epigastric pain. No nausea, vomiting, or hematemesis; No diarrhea or constipation. No melena or hematochezia.  GENITOURINARY: No dysuria, frequency, hematuria, or incontinence  NEUROLOGICAL: No headaches, memory loss, loss of strength, numbness, or tremors  SKIN: No itching, burning, rashes, or lesions   LYMPH NODES: No enlarged glands  ENDOCRINE: No heat or cold intolerance; No hair loss  MUSCULOSKELETAL: No joint pain or swelling; No muscle, back, or extremity pain  PSYCHIATRIC: No depression, anxiety, mood swings, or difficulty sleeping  HEME/LYMPH: No easy bruising, or bleeding gums  ALLERGY AND IMMUNOLOGIC: No hives or eczema    MEDICATIONS  (STANDING):  cholecalciferol 1000 Unit(s) Oral daily  clopidogrel Tablet 75 milliGRAM(s) Oral daily  ferrous    sulfate 325 milliGRAM(s) Oral daily  furosemide   Injectable 40 milliGRAM(s) IV Push every 12 hours  heparin   Injectable 5000 Unit(s) SubCutaneous every 12 hours  losartan 50 milliGRAM(s) Oral daily  metoprolol succinate ER 25 milliGRAM(s) Oral daily  multivitamin 1 Tablet(s) Oral daily  pantoprazole    Tablet 40 milliGRAM(s) Oral before breakfast  polyethylene glycol 3350 17 Gram(s) Oral daily  senna 2 Tablet(s) Oral at bedtime  Simbrinza (brinzolamide &amp; Brimonidine ) 1 Drop(s) 1 Drop(s) Both EYES two times a day    MEDICATIONS  (PRN):          ALLERGIES: penicillin (Hives)      FAMILY HISTORY:      PHYSICAL EXAMINATION:  -----------------------------  Vital Signs Last 24 Hrs  T(C): 36.3 (29 Jan 2023 12:30), Max: 36.6 (28 Jan 2023 19:40)  T(F): 97.3 (29 Jan 2023 12:30), Max: 97.9 (28 Jan 2023 19:40)  HR: 85 (29 Jan 2023 12:30) (76 - 99)  BP: 116/68 (29 Jan 2023 12:30) (109/66 - 126/55)  RR: 20 (29 Jan 2023 12:30) (18 - 22)  SpO2: 96% (29 Jan 2023 12:30) (95% - 98%)          Constitutional: well developed, normal appearance, well groomed, well nourished, no deformities and no acute distress.   Eyes: the conjunctiva exhibited no abnormalities and the eyelids demonstrated no xanthelasmas.   HEENT: normal oral mucosa, no oral pallor and no oral cyanosis.   Neck: normal jugular venous A waves present, normal jugular venous V waves present and no jugular venous gaona A waves.   Pulmonary: +diminished BS at bases; +rales and wheezing  Cardiovascular: heart rate and rhythm were normal; 2/6 SM  Abdomen: soft, non-tender, no hepato-splenomegaly and no abdominal mass palpated.   Musculoskeletal: the gait could not be assessed..   Extremities: no clubbing of the fingernails, no localized cyanosis, no petechial hemorrhages and no ischemic changes.   Skin: normal skin color and pigmentation, no rash, no venous stasis, no skin lesions, no skin ulcer and no xanthoma was observed.   Psychiatric: oriented to person, place, and time, the affect was normal, the mood was normal and not feeling anxious.       LABS:   --------                          12.9   4.18  )-----------( 188      ( 27 Jan 2023 17:30 )             41.7     01-28    141  |  103  |  32<H>  ----------------------------<  99  3.5   |  29  |  1.35<H>    Ca    9.3      28 Jan 2023 08:10  Mg     2.5     01-27    TPro  6.2  /  Alb  3.3  /  TBili  0.4  /  DBili  x   /  AST  19  /  ALT  20  /  AlkPhos  75  01-27      RADIOLOGY:  -----------------    ECG: sinus 88bpm; LVH    < from: TTE Echo Doppler w/o Cont (02.12.16 @ 08:21) >   IMPRESSION:  Summary:   1. Left ventricular ejection fraction, by visual estimation, is 35 to   40%.   2. Technically fair study.   3. Moderately decreased global left ventricular systolic function.   4. Mildly increased left ventricular internal cavity size.   5. Spectral Doppler shows pseudonormal pattern of left ventricular   myocardial filling (Grade II diastolic dysfunction).   6. There is mild asymmetric left ventricular hypertrophy.   7. Normal right ventricular size and function.   8. There is no evidence of pericardial effusion.   9. Mild mitral annular calcification.  10. Moderate mitral valve regurgitation.  11. Thickening and calcification of the anterior and posterior mitral   valve leaflets.  12. Degenerative tricuspid valve.  13. Moderate aortic regurgitation.  14. Sclerotic aortic valve with normal opening.  15. Compared with the echo report from 1/3/13, LVEF appears to have   declined.  16. Mildly enlarged left atrium.  17. There is mild aortic root calcification.     Kwesi Chavarria MD, FACC, FASE, FACP  Electronically signed on 2/12/2016 at 10:06:34PM    < end of copied text >

## 2023-01-30 DIAGNOSIS — E44.0 MODERATE PROTEIN-CALORIE MALNUTRITION: ICD-10-CM

## 2023-01-30 DIAGNOSIS — R53.81 OTHER MALAISE: ICD-10-CM

## 2023-01-30 DIAGNOSIS — Z51.5 ENCOUNTER FOR PALLIATIVE CARE: ICD-10-CM

## 2023-01-30 DIAGNOSIS — I50.9 HEART FAILURE, UNSPECIFIED: ICD-10-CM

## 2023-01-30 LAB
ANION GAP SERPL CALC-SCNC: 9 MMOL/L — SIGNIFICANT CHANGE UP (ref 5–17)
BUN SERPL-MCNC: 52 MG/DL — HIGH (ref 7–23)
CALCIUM SERPL-MCNC: 9 MG/DL — SIGNIFICANT CHANGE UP (ref 8.5–10.1)
CHLORIDE SERPL-SCNC: 105 MMOL/L — SIGNIFICANT CHANGE UP (ref 96–108)
CO2 SERPL-SCNC: 29 MMOL/L — SIGNIFICANT CHANGE UP (ref 22–31)
CREAT SERPL-MCNC: 1.84 MG/DL — HIGH (ref 0.5–1.3)
EGFR: 25 ML/MIN/1.73M2 — LOW
GLUCOSE SERPL-MCNC: 119 MG/DL — HIGH (ref 70–99)
HCT VFR BLD CALC: 47.2 % — HIGH (ref 34.5–45)
HGB BLD-MCNC: 14.9 G/DL — SIGNIFICANT CHANGE UP (ref 11.5–15.5)
MCHC RBC-ENTMCNC: 30.7 PG — SIGNIFICANT CHANGE UP (ref 27–34)
MCHC RBC-ENTMCNC: 31.6 G/DL — LOW (ref 32–36)
MCV RBC AUTO: 97.1 FL — SIGNIFICANT CHANGE UP (ref 80–100)
NRBC # BLD: 0 /100 WBCS — SIGNIFICANT CHANGE UP (ref 0–0)
PLATELET # BLD AUTO: 221 K/UL — SIGNIFICANT CHANGE UP (ref 150–400)
POTASSIUM SERPL-MCNC: 4.1 MMOL/L — SIGNIFICANT CHANGE UP (ref 3.5–5.3)
POTASSIUM SERPL-SCNC: 4.1 MMOL/L — SIGNIFICANT CHANGE UP (ref 3.5–5.3)
RBC # BLD: 4.86 M/UL — SIGNIFICANT CHANGE UP (ref 3.8–5.2)
RBC # FLD: 13.5 % — SIGNIFICANT CHANGE UP (ref 10.3–14.5)
SODIUM SERPL-SCNC: 143 MMOL/L — SIGNIFICANT CHANGE UP (ref 135–145)
WBC # BLD: 5.7 K/UL — SIGNIFICANT CHANGE UP (ref 3.8–10.5)
WBC # FLD AUTO: 5.7 K/UL — SIGNIFICANT CHANGE UP (ref 3.8–10.5)

## 2023-01-30 PROCEDURE — 99232 SBSQ HOSP IP/OBS MODERATE 35: CPT

## 2023-01-30 PROCEDURE — 99233 SBSQ HOSP IP/OBS HIGH 50: CPT | Mod: FS

## 2023-01-30 PROCEDURE — 99223 1ST HOSP IP/OBS HIGH 75: CPT | Mod: FS

## 2023-01-30 RX ORDER — LOSARTAN POTASSIUM 100 MG/1
50 TABLET, FILM COATED ORAL DAILY
Refills: 0 | Status: DISCONTINUED | OUTPATIENT
Start: 2023-01-31 | End: 2023-01-31

## 2023-01-30 RX ORDER — FUROSEMIDE 40 MG
40 TABLET ORAL DAILY
Refills: 0 | Status: DISCONTINUED | OUTPATIENT
Start: 2023-01-31 | End: 2023-01-31

## 2023-01-30 RX ADMIN — SENNA PLUS 2 TABLET(S): 8.6 TABLET ORAL at 21:44

## 2023-01-30 RX ADMIN — CLOPIDOGREL BISULFATE 75 MILLIGRAM(S): 75 TABLET, FILM COATED ORAL at 11:18

## 2023-01-30 RX ADMIN — Medication 325 MILLIGRAM(S): at 11:18

## 2023-01-30 RX ADMIN — HEPARIN SODIUM 5000 UNIT(S): 5000 INJECTION INTRAVENOUS; SUBCUTANEOUS at 18:06

## 2023-01-30 RX ADMIN — Medication 25 MILLIGRAM(S): at 06:06

## 2023-01-30 RX ADMIN — Medication 1000 UNIT(S): at 11:20

## 2023-01-30 RX ADMIN — Medication 40 MILLIGRAM(S): at 06:04

## 2023-01-30 RX ADMIN — PANTOPRAZOLE SODIUM 40 MILLIGRAM(S): 20 TABLET, DELAYED RELEASE ORAL at 06:05

## 2023-01-30 RX ADMIN — LOSARTAN POTASSIUM 50 MILLIGRAM(S): 100 TABLET, FILM COATED ORAL at 06:05

## 2023-01-30 RX ADMIN — HEPARIN SODIUM 5000 UNIT(S): 5000 INJECTION INTRAVENOUS; SUBCUTANEOUS at 06:05

## 2023-01-30 RX ADMIN — POLYETHYLENE GLYCOL 3350 17 GRAM(S): 17 POWDER, FOR SOLUTION ORAL at 11:18

## 2023-01-30 RX ADMIN — Medication 1 TABLET(S): at 11:18

## 2023-01-30 NOTE — PROGRESS NOTE ADULT - ASSESSMENT
89yo female with a PMH of chronic systolic CHF - last LVEF 35-40% in 2016 with moderate MR/AI, Cervical cancer last treatment May 25, 2011. s/p external and internal radiation, prior CVA on plavix, HTN;   admitted with dyspnea on exertion and orthopnea.  Takes furosemide at home but has had recent dietary indiscretions. Denied chest pain/palpitations.  ECG: sinus 88bpm; LVH  BNP 18,000  creat 1.1 -> 1.35 (1/28) -> 1.8 (1/30)    1) Acute on chronic systolic CHF   2) Hx of CVA   3) HTN    - Creatinine increased from 1.3 -> 1.8. Decreased IV lasix to 40mg daily, d/nathalie losartan today  - monitor lytes and creat  - repeat echo to eval BOTH LVEF and MR/AI  - continue home metoprolol and losartan  - continue Plavix post CVA 89yo female with a PMH of chronic systolic CHF - last LVEF 35-40% in 2016 with moderate MR/AI, Cervical cancer last treatment May 25, 2011. s/p external and internal radiation, prior CVA on plavix, HTN;   admitted with dyspnea on exertion and orthopnea.  Takes furosemide at home but has had recent dietary indiscretions. Denied chest pain/palpitations.  ECG: sinus 88bpm; LVH  BNP 18,000  creat 1.1 -> 1.35 (1/28) -> 1.8 (1/30)    1) Acute on chronic systolic CHF   2) Hx of CVA   3) HTN    - Creatinine increased from 1.3 -> 1.8. Decreased IV lasix to 40mg daily, d/nathalie losartan today, avoid ACE/ARB w/ worsening renal function  - monitor lytes and creat  - continue Toprol XL 25mg   - continue Plavix post CVA  - repeat Echo pending results  - agree with palliative care consult in view of advanced age, CHF 89yo female with a PMH of chronic systolic CHF - last LVEF 35-40% in 2016 with moderate MR/AI, Cervical cancer last treatment May 25, 2011. s/p external and internal radiation, prior CVA on plavix, HTN;   admitted with dyspnea on exertion and orthopnea.  Takes furosemide at home but has had recent dietary indiscretions. Denied chest pain/palpitations.  ECG: sinus 88bpm; LVH  BNP 18,000  creat 1.1 -> 1.35 (1/28) -> 1.8 (1/30)    1) Acute on chronic systolic CHF   2) Hx of CVA   3) HTN    - c/w tele monitoring  - strict I/O, daily weights  - Creatinine increased from 1.3 -> 1.8. Decreased IV lasix to 40mg daily, d/nathalie losartan today, avoid ACE/ARB w/ worsening renal function  - monitor electrolytes, keep potassium >4.0, Mag >2.0  - continue Toprol XL 25mg   - continue Plavix post CVA  - repeat Echo pending results  - agree with palliative care consult in view of advanced age, CHF 89yo female with a PMH of chronic systolic CHF - last LVEF 35-40% in 2016 with moderate MR/AI, Cervical cancer last treatment May 25, 2011. s/p external and internal radiation, prior CVA on plavix, HTN;   admitted with dyspnea on exertion and orthopnea.  Takes furosemide at home but has had recent dietary indiscretions. Denied chest pain/palpitations.  ECG: sinus 88bpm; LVH  BNP 18,000  creat 1.1 -> 1.35 (1/28) -> 1.8 (1/30)    Stress Test: 2015 WNL  Echo: 6/9/21: LVEF 45-50%. Moderate AR, mild MR, mild TR, mild     1) Acute on chronic systolic CHF   2) Hx of CVA   3) HTN    - c/w tele monitoring  - strict I/O, daily weights  - Creatinine increased from 1.3 -> 1.8, c/w monitor renal function, will switch to Lasix 40mg PO (home dose is Lasix 10mg daily)  - monitor electrolytes, keep potassium >4.0, Mag >2.0  - continue Toprol XL 25mg, Losartan 50mg   - continue Plavix post CVA  - repeat Echo pending results  - agree with palliative care consult in view of advanced age, CHF

## 2023-01-30 NOTE — PHYSICAL THERAPY INITIAL EVALUATION ADULT - PERTINENT HX OF CURRENT PROBLEM, REHAB EVAL
89 year old female here with daughter c/o sob on exertion and lying down pt has to use extra pillow to improve breathing for past week. Pt is unable to walk to her bathroom without sob.

## 2023-01-30 NOTE — DIETITIAN INITIAL EVALUATION ADULT - OTHER CALCULATIONS
Height (cm): 152.4 (01-28)  Weight (kg): 50.2 (01-28)  BMI (kg/m2): 21.6 (01-28)  IBW: 45.3 kg   % IBW: 110%     UBW:  52.7 kg   %UBW: 95%

## 2023-01-30 NOTE — DIETITIAN INITIAL EVALUATION ADULT - NSFNSPHYEXAMSKINFT_GEN_A_CORE
Pressure ulcers x 3  1. 01/28-29, left back; stage II  2. 01/28-29, left upper back stage II  3. 01/28, left upper back; stage II

## 2023-01-30 NOTE — DIETITIAN INITIAL EVALUATION ADULT - NS FNS WEIGHT CHANGE REASON
Pt stated that she is unsteady and not able to weigh herself, however her wt. was 116 LBS (52.7 kg) 1 year ago, height 5'0"(used to be 5'6")./unintentional

## 2023-01-30 NOTE — PHYSICAL THERAPY INITIAL EVALUATION ADULT - TRANSFER SAFETY CONCERNS NOTED: SIT/STAND, REHAB EVAL
Consent Type: Consent 1 (Standard) losing balance/decreased sequencing ability/decreased step length/stepping too close to front of assistive device

## 2023-01-30 NOTE — CONSULT NOTE ADULT - PROBLEM SELECTOR RECOMMENDATION 9
last echo on file from 2016 with EF of 35-40% and moderate aortic and mitral regurgitation, echo donel pending results  elevated proBNP, on oxygen via NC, reports she does not use oxygen at home  on lasix at home, lasix 40mg IVP daily last echo on file from 2016 with EF of 35-40% and moderate aortic and mitral regurgitation, echo done pending results  elevated proBNP, on oxygen via NC, reports she does not use oxygen at home  on lasix at home, lasix 40mg IVP daily-monitor I&Os

## 2023-01-30 NOTE — DIETITIAN INITIAL EVALUATION ADULT - PERTINENT LABORATORY DATA
01-30    143  |  105  |  52<H>  ----------------------------<  119<H>  4.1   |  29  |  1.84<H>    Ca    9.0      30 Jan 2023 07:20

## 2023-01-30 NOTE — CONSULT NOTE ADULT - SUBJECTIVE AND OBJECTIVE BOX
HPI:  89 year old female here with daughter c/o sob on exertion and lying down pt has to use extra pillow to improve breathing for past week. Pt is unable to walk to her bathroom without sob. Daughter sts pt has a history of CHF and takes furosemide unknown dose daily. Pt denies headache, cough, chest pain, nausea, vomiting, fever, chills, abd pain, dysuria.  (27 Jan 2023 21:17)    PERTINENT PM/SXH:   Hypertension    Cervical cancer    Abdominal mass    Depression (emotion)    GI (gastrointestinal bleed)    Hemorrhoid    CVA (cerebral infarction)    Depression    Diverticulitis    CHF (congestive heart failure)      S/P abdominal surgery, follow-up exam    S/P lumpectomy of breast      FAMILY HISTORY:    ITEMS NOT CHECKED ARE NOT PRESENT    SOCIAL HISTORY:   Significant other/partner:  [ ]  Children: yes  [ ]  Episcopalian/Spirituality:Christianity  Substance hx:  [ ]   Tobacco hx:  [ ]   Alcohol hx: [ ]   Home Opioid hx:  [ ] I-Stop Reference No: Reference #: [id number]  Living Situation: [ ]Home  [ ]Long term care  [ ]Rehab [ ]Other    ADVANCE DIRECTIVES:    DNR  MOLST  [ ]  Living Will  [ ]   DECISION MAKER(s):  [ ] Health Care Proxy(s)  [x ] Surrogate(s)  [ ] Guardian           Name(s): Phone Number(s): Daniel (daughter) [phone]    BASELINE (I)ADL(s) (prior to admission):  [place]: [ ]Total  [ ] Moderate [ ]Dependent    Allergies    penicillin (Hives)    Intolerances    MEDICATIONS  (STANDING):  cholecalciferol 1000 Unit(s) Oral daily  clopidogrel Tablet 75 milliGRAM(s) Oral daily  ferrous    sulfate 325 milliGRAM(s) Oral daily  heparin   Injectable 5000 Unit(s) SubCutaneous every 12 hours  metoprolol succinate ER 25 milliGRAM(s) Oral daily  multivitamin 1 Tablet(s) Oral daily  pantoprazole    Tablet 40 milliGRAM(s) Oral before breakfast  polyethylene glycol 3350 17 Gram(s) Oral daily  senna 2 Tablet(s) Oral at bedtime  Simbrinza (brinzolamide &amp; Brimonidine ) 1 Drop(s) 1 Drop(s) Both EYES two times a day    MEDICATIONS  (PRN):    PRESENT SYMPTOMS: [ ]Unable to obtain due to poor mentation   Source if other than patient:  [ ]Family   [ ]Team     Pain: [ ] yes [ ] no  QOL impact -   Location -                    Aggravating factors -  Quality -  Radiation -  Timing-  Severity (0-10 scale):  Minimal acceptable level (0-10 scale):     PAIN AD Score:     [email or web address] (press ctrl +  left click to view)    Dyspnea:                           [ ]Mild [ ]Moderate [ ]Severe  Anxiety:                             [ ]Mild [ ]Moderate [ ]Severe  Fatigue:                             [ ]Mild [ ]Moderate [ ]Severe  Nausea:                             [ ]Mild [ ]Moderate [ ]Severe  Loss of appetite:              [ ]Mild [ ]Moderate [ ]Severe  Constipation:                    [ ]Mild [ ]Moderate [ ]Severe    Other Symptoms:  [ ]All other review of systems negative     Karnofsky Performance Score/Palliative Performance Status Version 2:         %    http://npcrc.org/files/news/palliative_performance_scale_ppsv2.pdf  PHYSICAL EXAM:  Vital Signs Last 24 Hrs  T(C): 36.5 (30 Jan 2023 10:55), Max: 36.9 (29 Jan 2023 16:39)  T(F): 97.7 (30 Jan 2023 10:55), Max: 98.5 (29 Jan 2023 16:39)  HR: 96 (30 Jan 2023 10:55) (77 - 105)  BP: 117/72 (30 Jan 2023 10:55) (114/71 - 145/67)  BP(mean): --  RR: 19 (30 Jan 2023 10:55) (14 - 20)  SpO2: 97% (30 Jan 2023 10:55) (94% - 97%)    Parameters below as of 30 Jan 2023 10:55  Patient On (Oxygen Delivery Method): room air     I&O's Summary  GENERAL:  [ ]Alert  [ ]Oriented x   [ ]Lethargic  [ ]Cachexia  [ ]Unarousable  [ ]Verbal  [ ]Non-Verbal  Behavioral:   [ ] Anxiety  [ ] Delirium [ ] Agitation [ ] Other  HEENT:  [ ]Normal   [ ]Dry mouth   [ ]ET Tube/Trach  [ ]Oral lesions  PULMONARY:   [ ]Clear [ ]Tachypnea  [ ]Audible excessive secretions   [ ]Rhonchi        [ ]Right [ ]Left [ ]Bilateral  [ ]Crackles        [ ]Right [ ]Left [ ]Bilateral  [ ]Wheezing     [ ]Right [ ]Left [ ]Bilateral  CARDIOVASCULAR:    [ ]Regular [ ]Irregular [ ]Tachy  [ ][place] [ ]Murmur [ ]Other  GASTROINTESTINAL:  [ ]Soft  [ ]Distended   [ ]+BS  [ ]Non tender [ ]Tender  [ ]PEG [ ]OGT/ NGT  Last BM: GENITOURINARY:  [ ]Normal [ ] Incontinent   [ ]Oliguria/Anuria   [ ][place]  MUSCULOSKELETAL:   [ ]Normal   [ ]Weakness  [ ]Bed/Wheelchair bound [ ]Edema  NEUROLOGIC:   [ ]No focal deficits  [ ] Cognitive impairment  [ ] Dysphagia [ ]Dysarthria [ ] Paresis [ ]Other   SKIN:   [ ]Normal   [ ]Pressure ulcer(s)  [ ]Rash    CRITICAL CARE:  [ ] Shock Present  [ ]Septic [ ]Cardiogenic [ ]Neurologic [ ]Hypovolemic  [ ]  Vasopressors [ ]  Inotropes   [ ] Respiratory failure present [ ] mechanical ventilation [ ] non-invasive ventilatory support [ ] High flow  [ ] Acute  [ ] Chronic [ ] Hypoxic  [ ] Hypercarbic [ ] Other  [ ] Other organ failure     LABS:                        14.9   5.70  )-----------( 221      ( 30 Jan 2023 07:20 )             47.2   01-30    143  |  105  |  52<H>  ----------------------------<  119<H>  4.1   |  29  |  1.84<H>    Ca    9.0      30 Jan 2023 07:20          RADIOLOGY & ADDITIONAL STUDIES:    PROTEIN CALORIE MALNUTRITION PRESENT: [ ] Yes [ ] No  [ ] PPSV2 < or = to 30% [ ] significant weight loss  [ ] poor nutritional intake [ ] catabolic state [ ] anasarca     Artificial Nutrition [ ]     REFERRALS:   [ ]Chaplaincy  [ ] Hospice  [ ]Child Life  [ ]Social Work  [ ]Case management [ ]Holistic Therapy     Goals of Care Document:     HPI:  89 year old female here with daughter c/o sob on exertion and lying down pt has to use extra pillow to improve breathing for past week. Pt is unable to walk to her bathroom without sob. Daughter sts pt has a history of CHF and takes furosemide unknown dose daily. Pt denies headache, cough, chest pain, nausea, vomiting, fever, chills, abd pain, dysuria.  (27 Jan 2023 21:17)    PERTINENT PM/SXH:   Hypertension    Cervical cancer    Abdominal mass    Depression (emotion)    GI (gastrointestinal bleed)    Hemorrhoid    CVA (cerebral infarction)    Depression    Diverticulitis    CHF (congestive heart failure)      S/P abdominal surgery, follow-up exam    S/P lumpectomy of breast      FAMILY HISTORY:    ITEMS NOT CHECKED ARE NOT PRESENT    SOCIAL HISTORY:   Significant other/partner:  [ ]  Children: yes  [ ]  Oriental orthodox/Spirituality:Baptist  Substance hx:  [ ]   Tobacco hx:  [ ]   Alcohol hx: [ ]   Home Opioid hx:  [ ] I-Stop Reference No: Reference #: [id number]  Living Situation: [x ]Home  [ ]Long term care  [ ]Rehab [ ]Other    ADVANCE DIRECTIVES:    DNR  MOLST  [ ]  Living Will  [ ]   DECISION MAKER(s):  [ ] Health Care Proxy(s)  [x ] Surrogate(s)  [ ] Guardian           Name(s): Phone Number(s): Daniel (daughter) [phone]    BASELINE (I)ADL(s) (prior to admission):  [place]: [ ]Total  [x ] Moderate [ ]Dependent    Allergies    penicillin (Hives)    Intolerances    MEDICATIONS  (STANDING):  cholecalciferol 1000 Unit(s) Oral daily  clopidogrel Tablet 75 milliGRAM(s) Oral daily  ferrous    sulfate 325 milliGRAM(s) Oral daily  heparin   Injectable 5000 Unit(s) SubCutaneous every 12 hours  metoprolol succinate ER 25 milliGRAM(s) Oral daily  multivitamin 1 Tablet(s) Oral daily  pantoprazole    Tablet 40 milliGRAM(s) Oral before breakfast  polyethylene glycol 3350 17 Gram(s) Oral daily  senna 2 Tablet(s) Oral at bedtime  Simbrinza (brinzolamide &amp; Brimonidine ) 1 Drop(s) 1 Drop(s) Both EYES two times a day    MEDICATIONS  (PRN):    PRESENT SYMPTOMS: [ ]Unable to obtain due to poor mentation   Source if other than patient:  [ ]Family   [ ]Team     Pain: [ ] yes [x ] no  QOL impact -   Location -                    Aggravating factors -  Quality -  Radiation -  Timing-  Severity (0-10 scale):  Minimal acceptable level (0-10 scale):     PAIN AD Score:     [email or web address] (press ctrl +  left click to view)    Dyspnea:                           [ ]Mild [ ]Moderate [ ]Severe  Anxiety:                             [ ]Mild [ ]Moderate [ ]Severe  Fatigue:                             [ ]Mild [x ]Moderate [ ]Severe  Nausea:                             [ ]Mild [ ]Moderate [ ]Severe  Loss of appetite:              [ ]Mild [ ]Moderate [ ]Severe  Constipation:                    [ ]Mild [ ]Moderate [ ]Severe    Other Symptoms:  [x ]All other review of systems negative     Karnofsky Performance Score/Palliative Performance Status Version 2:        40-50 %    http://npcrc.org/files/news/palliative_performance_scale_ppsv2.pdf  PHYSICAL EXAM:  Vital Signs Last 24 Hrs  T(C): 36.5 (30 Jan 2023 10:55), Max: 36.9 (29 Jan 2023 16:39)  T(F): 97.7 (30 Jan 2023 10:55), Max: 98.5 (29 Jan 2023 16:39)  HR: 96 (30 Jan 2023 10:55) (77 - 105)  BP: 117/72 (30 Jan 2023 10:55) (114/71 - 145/67)  BP(mean): --  RR: 19 (30 Jan 2023 10:55) (14 - 20)  SpO2: 97% (30 Jan 2023 10:55) (94% - 97%)    Parameters below as of 30 Jan 2023 10:55  Patient On (Oxygen Delivery Method): room air     I&O's Summary  GENERAL:  [x ]Alert  [x ]Oriented x 3  [ ]Lethargic  [ ]Cachexia  [ ]Unarousable  [x ]Verbal  [ ]Non-Verbal  Behavioral:   [ ] Anxiety  [ ] Delirium [ ] Agitation [ ] Other  HEENT:  [x ]Normal   [ ]Dry mouth   [ ]ET Tube/Trach  [ ]Oral lesions  PULMONARY:   [x ]Clear diminished breath sounds at bases bilaterally [ ]Tachypnea  [ ]Audible excessive secretions   [ ]Rhonchi        [ ]Right [ ]Left [ ]Bilateral  [ ]Crackles        [ ]Right [ ]Left [ ]Bilateral  [ ]Wheezing     [ ]Right [ ]Left [ ]Bilateral  CARDIOVASCULAR:    [x ]Regular [ ]Irregular [ ]Tachy  [ ][place] [ ]Murmur [ ]Other  GASTROINTESTINAL:  [x ]Soft  [ ]Distended   [ ]+BS  [x ]Non tender [ ]Tender  [ ]PEG [ ]OGT/ NGT  Last BM: ???GENITOURINARY:  [ ]Normal [x ] Incontinent   [ ]Oliguria/Anuria   [ ][place]  MUSCULOSKELETAL:   [ ]Normal   [x ]Weakness  [ ]Bed/Wheelchair bound [ ]Edema  NEUROLOGIC:   [ ]No focal deficits  [x ] Cognitive impairment forgetful [ ] Dysphagia [ ]Dysarthria [ ] Paresis [ ]Other   SKIN:   [ ]Normal   [ x]Pressure ulcer(s) left upper back stage II [ ]Rash    CRITICAL CARE:  [ ] Shock Present  [ ]Septic [ ]Cardiogenic [ ]Neurologic [ ]Hypovolemic  [ ]  Vasopressors [ ]  Inotropes   [ ] Respiratory failure present [ ] mechanical ventilation [ ] non-invasive ventilatory support [ ] High flow  [ ] Acute  [ ] Chronic [ ] Hypoxic  [ ] Hypercarbic [ ] Other  [ ] Other organ failure     LABS:                        14.9   5.70  )-----------( 221      ( 30 Jan 2023 07:20 )             47.2   01-30    143  |  105  |  52<H>  ----------------------------<  119<H>  4.1   |  29  |  1.84<H>    Ca    9.0      30 Jan 2023 07:20    Serum Pro-Brain Natriuretic Peptide (01.27.23 @ 17:30)    Serum Pro-Brain Natriuretic Peptide: 52267 pg/mL    RADIOLOGY & ADDITIONAL STUDIES:  < from: CT Chest No Cont (01.28.23 @ 11:02) >  FINDINGS:  Lungs/Airways/Pleura: Mild centrilobular emphysema. The central airways   are patent. Small left pleural effusion. Mild lingular, right middle and   left lower lobe atelectasis. No pulmonary edema or pneumonia.  Mediastinum/Lymph nodes: No thoracic adenopathy. Small hiatal hernia.  Heart and Vessels: Cardiomegaly with left atrial and left ventricular   enlargement. Aortic root measures up to 3.9 cm at the sinuses of   Valsalva. The pulmonary artery is normal in size. Coronary arterial,   aortic valvular and mitral annular calcification. No pericardial effusion.  Upper Abdomen: Nonobstructing right nephrolithiasis.  Osseous structures and Soft Tissues: No aggressive bone lesions. Clip   versus calcification in the lateralright breast.  IMPRESSION:  Small left pleural effusion with basilar atelectasis.  No pulmonary edema or pneumonia.  --- End of Report ---  < end of copied text >    < from: Xray Chest 1 View-PORTABLE IMMEDIATE (Xray Chest 1 View-PORTABLE IMMEDIATE .) (01.27.23 @ 18:18) >  IMPRESSION:   LEFT retrocardiac airspace consolidation and/or effusion   obscures LEFT diaphragm contour.  RIGHT perihilar focal airspace disease.  --- End of Report ---  < end of copied text >    < from: TTE Echo Doppler w/o Cont (02.12.16 @ 08:21) >  IMPRESSION:  Summary:   1. Left ventricular ejection fraction, by visual estimation, is 35 to   40%.   2. Technically fair study.   3. Moderately decreased global left ventricular systolic function.   4. Mildly increased left ventricular internal cavity size.   5. Spectral Doppler shows pseudonormal pattern of left ventricular   myocardial filling (Grade II diastolic dysfunction).   6. There is mild asymmetric left ventricular hypertrophy.   7. Normal right ventricular size and function.   8. There is no evidence of pericardial effusion.   9. Mild mitral annular calcification.  10. Moderate mitral valve regurgitation.  11. Thickening and calcification of the anterior and posterior mitral   valve leaflets.  12. Degenerative tricuspid valve.  13. Moderate aortic regurgitation.  14. Sclerotic aortic valve with normal opening.  15. Compared with the echo report from 1/3/13, LVEF appears to have   declined.  16. Mildly enlarged left atrium.  17. There is mild aortic root calcification.     Kwesi Chavarria MD, FACC, FASE, FACP  Electronically signed on 2/12/2016 at 10:06:34PM  < end of copied text >    PROTEIN CALORIE MALNUTRITION PRESENT: [x ] Yes [ ] No  [ ] PPSV2 < or = to 30% [ ] significant weight loss  [ ] poor nutritional intake [ ] catabolic state [ ] anasarca     Artificial Nutrition [ ]     REFERRALS:   [ ]Chaplaincy  [ ] Hospice  [ ]Child Life  [ ]Social Work  [ ]Case management [ ]Holistic Therapy     Goals of Care Document:

## 2023-01-30 NOTE — CONSULT NOTE ADULT - ASSESSMENT
89yo lady with a PMH of chronic systolic CHF - last LVEF 35-40% in 2016 with moderate MR/AI, Cervical cancer last treatment May 25, 2011. s/p external and internal radiation, prior CVA on plavix, HTN;   admitted with dyspnea on exertion and orthopnea.  Takes furosemide at home but has had recent dietary indiscretions.  Denied chest pain/palpitations.  ECG: sinus 88bpm; LVH  BNP 18,000  creat 1.35  Received 20IV lasix this AM but no UOP.  Appears in moderate distress with rales and wheezing    -monitor on tele  -cont diuresis with IV lasix, but increase to 40IV as no UOP on 20  -monitor lytes and creat  -would repeat CMP tonight  -repeat echo to eval BOTH LVEF and MR/AI  -cont home metoprolol and losartan  -cont Plavix post CVA
89 year old female PMH of HF, HTN, CVA and cervical cancer presented to the ED for dyspnea on exertion and orthopnea x 1 week.   Patient reportedly on lasix at home with elevated proBNP and h/o HFrEF.  Palliative care consulted for GOC.

## 2023-01-30 NOTE — DIETITIAN INITIAL EVALUATION ADULT - ORAL INTAKE PTA/DIET HISTORY
Pt is alert, oriented, sitting out of bed in chair when seen, forgetfulness noted.  Pt lived c daughter who did the shopping/cooking.  Pt stated that she is unsure if daughter uses salt in cooking.  Pt without bottom dentures, requested mashed potato, willing to try ground foods.  Pt ate very little of soft breakfast foods when seen.  Pt was taking Ensure supplement 2 x day PTA.

## 2023-01-30 NOTE — PROGRESS NOTE ADULT - SUBJECTIVE AND OBJECTIVE BOX
Patient is a 89y old  Female who presents with a chief complaint of SOB from CHF exacerbation. (28 Jan 2023 12:31)      INTERVAL HPI/ OVERNIGHT EVENTS: Pt was seen and examined at bedside today, No significant overnight events, pt denies any CP or SOB    MEDICATIONS  (STANDING):  cholecalciferol 1000 Unit(s) Oral daily  clopidogrel Tablet 75 milliGRAM(s) Oral daily  ferrous    sulfate 325 milliGRAM(s) Oral daily  heparin   Injectable 5000 Unit(s) SubCutaneous every 12 hours  metoprolol succinate ER 25 milliGRAM(s) Oral daily  multivitamin 1 Tablet(s) Oral daily  pantoprazole    Tablet 40 milliGRAM(s) Oral before breakfast  polyethylene glycol 3350 17 Gram(s) Oral daily  senna 2 Tablet(s) Oral at bedtime  Simbrinza (brinzolamide &amp; Brimonidine ) 1 Drop(s) 1 Drop(s) Both EYES two times a day    MEDICATIONS  (PRN):  bisacodyl 5 milliGRAM(s) Oral every 12 hours PRN Constipation            Allergies    penicillin (Hives)    Intolerances        REVIEW OF SYSTEMS:    Unable to examine due to [ ] Encephalopathy [ ] Advanced Dementia [ ] Expressive Aphasia [ ] Non-verbal patient    CONSTITUTIONAL: No fever, NO generalized weakness/Fatigue, No weight loss  EYES: No eye pain, visual disturbances, or discharge  ENMT:  No difficulty hearing, tinnitus, vertigo; No sinus or throat pain  NECK: No pain or stiffness  RESPIRATORY: No shortness of breath,  cough, wheezing, sputum or hemoptysis   CARDIOVASCULAR: No chest pain, palpitations, or leg swelling  GASTROINTESTINAL: No abdominal pain. No nausea, vomiting, diarrhea or constipation. No melena or hematochezia.  GENITOURINARY: No dysuria, frequency, hematuria, or incontinence  NEUROLOGICAL: No headaches, Dizziness, memory loss, loss of strength, numbness, or tremors  SKIN: No itching, burning, rashes, or lesions   MUSCULOSKELETAL: No joint pain or swelling; No muscle, back, or extremity pain  PSYCHIATRIC: No depression, anxiety, mood swings, or difficulty sleeping  HEME/LYMPH: No easy bruising, or bleeding gums      Vital Signs Last 24 Hrs  T(C): 36.3 (30 Jan 2023 17:18), Max: 36.8 (29 Jan 2023 23:42)  T(F): 97.3 (30 Jan 2023 17:18), Max: 98.3 (29 Jan 2023 23:42)  HR: 89 (30 Jan 2023 17:18) (77 - 105)  BP: 139/60 (30 Jan 2023 17:18) (114/71 - 145/67)  BP(mean): --  ABP: --  ABP(mean): --  RR: 18 (30 Jan 2023 17:18) (18 - 20)  SpO2: 96% (30 Jan 2023 17:18) (96% - 97%)    O2 Parameters below as of 30 Jan 2023 17:18  Patient On (Oxygen Delivery Method): nasal cannula        PHYSICAL EXAM:  GENERAL: NAD on NC, well-developed, well-groomed  HEAD:  Atraumatic, Normocephalic  EYES: conjunctiva and sclera clear  ENMT: Moist mucous membranes  NECK: Supple, No JVD, Normal thyroid  CHEST/LUNG: Clear to Auscultation bilaterally; No rales, rhonchi, wheezing, or rubs  HEART: Regular rate and rhythm; No murmurs, rubs, or gallops  ABDOMEN: Soft, Nontender, Nondistended; Bowel sounds present  EXTREMITIES:  2+ Peripheral Pulses, No clubbing, cyanosis, or edema  SKIN: No rashes or lesions  NERVOUS SYSTEM:  Alert & Oriented X3, Poor historian, Motor Strength 5/5 B/L upper and lower extremities    LABS:                                              14.9   5.70  )-----------( 221      ( 30 Jan 2023 07:20 )             47.2     01-30    143  |  105  |  52<H>  ----------------------------<  119<H>  4.1   |  29  |  1.84<H>    Ca    9.0      30 Jan 2023 07:20            CAPILLARY BLOOD GLUCOSE              RADIOLOGY & ADDITIONAL TESTS:          Imaging Personally Reviewed:  [ ] YES  [ ] NO    Consultant(s) Notes Reviewed:  [ ] YES  [ ] NO    Care Discussed with Consultants/Other Providers [x ] YES  [ ] NO

## 2023-01-30 NOTE — CONSULT NOTE ADULT - PROBLEM SELECTOR RECOMMENDATION 2
Clinical evidence indicates that the patient has Moderate protein calorie malnutrition/ 2nd degree  In context of Chronic Illness (>1 month)  Energy/Food intake <75% of estimated energy requirement >7 days  Weight loss: Mild  (lbs lost recently)  Body Fat loss: Mild    Muscle mass loss: Mild   Fluid Accumulation: Mild    Strength: weakened Mild     Recommend:   c/w soft diet, aspiration precautions, keep head of bed at least 45 degrees during feeding, nutrition supplements

## 2023-01-30 NOTE — PHYSICAL THERAPY INITIAL EVALUATION ADULT - GAIT TRAINING, PT EVAL
Independent in ambulation with use of  RW device up to 100feet observing proper gait pattern, posture and use of walking device safely.

## 2023-01-30 NOTE — DIETITIAN INITIAL EVALUATION ADULT - OTHER INFO
Per chart review, pt was on supplements of ferrous sulfate, B-12, fish oil, Co Q-10, vitamin D, Centrum PTA.  Per cardiology note, recent dietary indiscretions noted.  Per previous adm RD notes; hx of CHF adm noted.  Pt provided c written education for low sodium nutrition therapy with c RD's contact # for pt's daughter.

## 2023-01-30 NOTE — PHYSICAL THERAPY INITIAL EVALUATION ADULT - ADDITIONAL COMMENTS
Pt reports she lives in a house and her daughter lives with her. There are 5 HUEY B HR and 1 flight of steps to 2nd floor bedroom but stair lift available. Prior to admission she was able to ambulate indep with RW and assist with ADLS as needed. HHA 3 days/wk.

## 2023-01-30 NOTE — PHYSICAL THERAPY INITIAL EVALUATION ADULT - ASR WT BEARING STATUS EVAL
[FreeTextEntry3] : I have reviewed the above and agree with all pertinent clinical information including history and physical examination and agree with treatment plan.\par  no weight-bearing restrictions

## 2023-01-30 NOTE — PROGRESS NOTE ADULT - ASSESSMENT
89 y/o F with EDEN, and orthopnea, worsening over past week  admitted for acute chf exac / elevated trops     Acute on Chronic CHF  Associated acute respiratory failure with Hypoxia  CT chest: Small Left pleural effusion   Cardiology consult appreciated   cont w/ Lasix, Metoprolol, losartan   f/u Echo results   titrate oxygen off as tolerated     Glaucoma  cont with Simbrinza     DVTp: Heparin   Disposition: YANN recommended.

## 2023-01-30 NOTE — CONSULT NOTE ADULT - PROBLEM SELECTOR RECOMMENDATION 3
OOB to chair, uses rolling walker at home, lives with her daughter in private residence  PT recommendation of YANN

## 2023-01-30 NOTE — PROGRESS NOTE ADULT - NS ATTEND AMEND GEN_ALL_CORE FT
Despite elevation in Creat, patient remains fluid overloaded, continue IV Lasix and ARB. Will continue to monitor renal function and electrolytes. Palliative care consultation appreciated.

## 2023-01-30 NOTE — PROGRESS NOTE ADULT - SUBJECTIVE AND OBJECTIVE BOX
Patient is a 89y old  Female who presents with a chief complaint of SOB from CHF exacerbation. (30 Jan 2023 12:27)      INTERVAL HX:  Patient seen and examined @ bedside.  No respiratory distress, chest pain or SOB.    PAST MEDICAL & SURGICAL HISTORY:  Hypertension      Cervical cancer  last treatment May 25, 2011. s/p external and internal radiation      Abdominal mass      Depression (emotion)      GI (gastrointestinal bleed)      Hemorrhoid      CVA (cerebral infarction)      Depression      Diverticulitis      CHF (congestive heart failure)      S/P abdominal surgery, follow-up exam      S/P lumpectomy of breast        	  MEDICATIONS:  MEDICATIONS  (STANDING):  cholecalciferol 1000 Unit(s) Oral daily  clopidogrel Tablet 75 milliGRAM(s) Oral daily  ferrous    sulfate 325 milliGRAM(s) Oral daily  heparin   Injectable 5000 Unit(s) SubCutaneous every 12 hours  metoprolol succinate ER 25 milliGRAM(s) Oral daily  multivitamin 1 Tablet(s) Oral daily  pantoprazole    Tablet 40 milliGRAM(s) Oral before breakfast  polyethylene glycol 3350 17 Gram(s) Oral daily  senna 2 Tablet(s) Oral at bedtime  Simbrinza (brinzolamide &amp; Brimonidine ) 1 Drop(s) 1 Drop(s) Both EYES two times a day    MEDICATIONS  (PRN):      Vitals:  T(F): 97.7 (01-30-23 @ 10:55), Max: 98.5 (01-29-23 @ 16:39)  HR: 96 (01-30-23 @ 10:55) (77 - 105)  BP: 117/72 (01-30-23 @ 10:55) (114/71 - 145/67)  RR: 19 (01-30-23 @ 10:55) (14 - 20)  SpO2: 97% (01-30-23 @ 10:55) (94% - 97%)  Wt(kg): --    Weight (kg): 50.2 (01-28 @ 19:40)  BMI (kg/m2): 21.6 (01-28 @ 19:40)      PHYSICAL EXAM:  General: Awake, responsive, sitting up in chair  CV: S1 S2 RRR  Lungs: CTABL  GI: Soft, BS +, ND, NT  Extremities: No edema     TELEMETRY: NSR at 90bpm. 3 beats of nonsustained VTach at 2:35am on 1/30, bigeminy on 1/29 at 8:20p, 13 beats of SVT on 1/28 at 7:47p.  	    LABS:	 	    CARDIAC MARKERS:          30 Jan 2023 07:20    143    |  105    |  52     ----------------------------<  119    4.1     |  29     |  1.84   28 Jan 2023 08:10    141    |  103    |  32     ----------------------------<  99     3.5     |  29     |  1.35   27 Jan 2023 17:30    141    |  108    |  32     ----------------------------<  91     4.4     |  28     |  1.18     Ca    9.0        30 Jan 2023 07:20                            14.9   5.70  )-----------( 221      ( 30 Jan 2023 07:20 )             47.2 ,                       12.9   4.18  )-----------( 188      ( 27 Jan 2023 17:30 )             41.7   proBNP: Serum Pro-Brain Natriuretic Peptide: 50510 pg/mL (01-27 @ 17:30)      INR: 0.98 ratio (01-27 @ 17:30)

## 2023-01-30 NOTE — DIETITIAN INITIAL EVALUATION ADULT - PERTINENT MEDS FT
MEDICATIONS  (STANDING):  cholecalciferol 1000 Unit(s) Oral daily  clopidogrel Tablet 75 milliGRAM(s) Oral daily  ferrous    sulfate 325 milliGRAM(s) Oral daily  heparin   Injectable 5000 Unit(s) SubCutaneous every 12 hours  metoprolol succinate ER 25 milliGRAM(s) Oral daily  multivitamin 1 Tablet(s) Oral daily  pantoprazole    Tablet 40 milliGRAM(s) Oral before breakfast  polyethylene glycol 3350 17 Gram(s) Oral daily  senna 2 Tablet(s) Oral at bedtime  Simbrinza (brinzolamide &amp; Brimonidine ) 1 Drop(s) 1 Drop(s) Both EYES two times a day    MEDICATIONS  (PRN):

## 2023-01-30 NOTE — CONSULT NOTE ADULT - PROBLEM SELECTOR RECOMMENDATION 4
Spoke with patient who said both of her daughters would make decisions if she was unable and said that they are familiar with her wishes.  Spoke with patient's daughter, Daniel [phone] who said patient has no HCP and she and her sister are more or less aware of her wishes.  Discussed prior echo with reduced EF, pending results of echo. Expressed concern that should patient's heart stop, it would cause further damage and reduced function.  Patient's daughter agreeable to speaking further after echo results are back.  Patient remains full code.      Message sent to Dr. Fisher

## 2023-01-30 NOTE — PROGRESS NOTE ADULT - NUTRITIONAL ASSESSMENT
This patient has been assessed with a concern for Malnutrition and has been determined to have a diagnosis/diagnoses of Moderate protein-calorie malnutrition.    This patient is being managed with:   Diet DASH/TLC-  Sodium & Cholesterol Restricted  Entered: Jan 27 2023  9:14PM    The following pending diet order is being considered for treatment of Moderate protein-calorie malnutrition:  Diet Minced and Moist-  Low Sodium  Supplement Feeding Modality:  Oral  Ensure Pudding Cans or Servings Per Day:  1       Frequency:  Two Times a day  Entered: Jan 30 2023 11:09AM  
This patient has been assessed with a concern for Malnutrition and has been determined to have a diagnosis/diagnoses of Moderate protein-calorie malnutrition.    This patient is being managed with:   Diet Minced and Moist-  Low Sodium  Supplement Feeding Modality:  Oral  Ensure Pudding Cans or Servings Per Day:  1       Frequency:  Two Times a day  Entered: Jan 30 2023 11:09AM

## 2023-01-30 NOTE — DIETITIAN INITIAL EVALUATION ADULT - ETIOLOGY
inadequate/increased  protein-energy intake in setting of hx of CHF, CVA, cervical cancer, depression, altered GI function, pressure ulcers, debility

## 2023-01-30 NOTE — DIETITIAN NUTRITION RISK NOTIFICATION - TREATMENT: THE FOLLOWING DIET HAS BEEN RECOMMENDED
Diet, Minced and Moist:   Low Sodium  Supplement Feeding Modality:  Oral  Ensure Pudding Cans or Servings Per Day:  1       Frequency:  Two Times a day (01-30-23 @ 11:10) [Pending Verification By Attending]  Diet, DASH/TLC:   Sodium & Cholesterol Restricted (01-27-23 @ 21:17) [Active]

## 2023-01-31 ENCOUNTER — TRANSCRIPTION ENCOUNTER (OUTPATIENT)
Age: 88
End: 2023-01-31

## 2023-01-31 VITALS
HEART RATE: 83 BPM | DIASTOLIC BLOOD PRESSURE: 64 MMHG | SYSTOLIC BLOOD PRESSURE: 134 MMHG | TEMPERATURE: 98 F | OXYGEN SATURATION: 96 % | RESPIRATION RATE: 18 BRPM

## 2023-01-31 LAB
ANION GAP SERPL CALC-SCNC: 8 MMOL/L — SIGNIFICANT CHANGE UP (ref 5–17)
BUN SERPL-MCNC: 58 MG/DL — HIGH (ref 7–23)
CALCIUM SERPL-MCNC: 9.1 MG/DL — SIGNIFICANT CHANGE UP (ref 8.5–10.1)
CHLORIDE SERPL-SCNC: 103 MMOL/L — SIGNIFICANT CHANGE UP (ref 96–108)
CO2 SERPL-SCNC: 29 MMOL/L — SIGNIFICANT CHANGE UP (ref 22–31)
CREAT SERPL-MCNC: 1.72 MG/DL — HIGH (ref 0.5–1.3)
EGFR: 27 ML/MIN/1.73M2 — LOW
GLUCOSE SERPL-MCNC: 116 MG/DL — HIGH (ref 70–99)
HCT VFR BLD CALC: 46.2 % — HIGH (ref 34.5–45)
HGB BLD-MCNC: 14.7 G/DL — SIGNIFICANT CHANGE UP (ref 11.5–15.5)
MAGNESIUM SERPL-MCNC: 2.6 MG/DL — SIGNIFICANT CHANGE UP (ref 1.6–2.6)
MCHC RBC-ENTMCNC: 30.5 PG — SIGNIFICANT CHANGE UP (ref 27–34)
MCHC RBC-ENTMCNC: 31.8 G/DL — LOW (ref 32–36)
MCV RBC AUTO: 95.9 FL — SIGNIFICANT CHANGE UP (ref 80–100)
NRBC # BLD: 0 /100 WBCS — SIGNIFICANT CHANGE UP (ref 0–0)
PHOSPHATE SERPL-MCNC: 3.9 MG/DL — SIGNIFICANT CHANGE UP (ref 2.5–4.5)
PLATELET # BLD AUTO: 224 K/UL — SIGNIFICANT CHANGE UP (ref 150–400)
POTASSIUM SERPL-MCNC: 3.8 MMOL/L — SIGNIFICANT CHANGE UP (ref 3.5–5.3)
POTASSIUM SERPL-SCNC: 3.8 MMOL/L — SIGNIFICANT CHANGE UP (ref 3.5–5.3)
RBC # BLD: 4.82 M/UL — SIGNIFICANT CHANGE UP (ref 3.8–5.2)
RBC # FLD: 13.3 % — SIGNIFICANT CHANGE UP (ref 10.3–14.5)
SODIUM SERPL-SCNC: 140 MMOL/L — SIGNIFICANT CHANGE UP (ref 135–145)
WBC # BLD: 5.69 K/UL — SIGNIFICANT CHANGE UP (ref 3.8–10.5)
WBC # FLD AUTO: 5.69 K/UL — SIGNIFICANT CHANGE UP (ref 3.8–10.5)

## 2023-01-31 PROCEDURE — 99233 SBSQ HOSP IP/OBS HIGH 50: CPT

## 2023-01-31 PROCEDURE — 99497 ADVNCD CARE PLAN 30 MIN: CPT

## 2023-01-31 PROCEDURE — 99239 HOSP IP/OBS DSCHRG MGMT >30: CPT

## 2023-01-31 RX ORDER — FUROSEMIDE 40 MG
20 TABLET ORAL DAILY
Refills: 0 | Status: DISCONTINUED | OUTPATIENT
Start: 2023-02-01 | End: 2023-01-31

## 2023-01-31 RX ORDER — POLYETHYLENE GLYCOL 3350 17 G/17G
17 POWDER, FOR SOLUTION ORAL
Qty: 0 | Refills: 0 | DISCHARGE
Start: 2023-01-31

## 2023-01-31 RX ORDER — SENNA PLUS 8.6 MG/1
2 TABLET ORAL
Qty: 0 | Refills: 0 | DISCHARGE
Start: 2023-01-31

## 2023-01-31 RX ORDER — FUROSEMIDE 40 MG
1 TABLET ORAL
Qty: 30 | Refills: 0
Start: 2023-01-31 | End: 2023-03-01

## 2023-01-31 RX ADMIN — Medication 1000 UNIT(S): at 11:25

## 2023-01-31 RX ADMIN — LOSARTAN POTASSIUM 50 MILLIGRAM(S): 100 TABLET, FILM COATED ORAL at 05:15

## 2023-01-31 RX ADMIN — HEPARIN SODIUM 5000 UNIT(S): 5000 INJECTION INTRAVENOUS; SUBCUTANEOUS at 18:19

## 2023-01-31 RX ADMIN — Medication 325 MILLIGRAM(S): at 11:25

## 2023-01-31 RX ADMIN — HEPARIN SODIUM 5000 UNIT(S): 5000 INJECTION INTRAVENOUS; SUBCUTANEOUS at 05:15

## 2023-01-31 RX ADMIN — Medication 40 MILLIGRAM(S): at 05:17

## 2023-01-31 RX ADMIN — PANTOPRAZOLE SODIUM 40 MILLIGRAM(S): 20 TABLET, DELAYED RELEASE ORAL at 05:16

## 2023-01-31 RX ADMIN — Medication 25 MILLIGRAM(S): at 05:16

## 2023-01-31 RX ADMIN — POLYETHYLENE GLYCOL 3350 17 GRAM(S): 17 POWDER, FOR SOLUTION ORAL at 11:25

## 2023-01-31 RX ADMIN — CLOPIDOGREL BISULFATE 75 MILLIGRAM(S): 75 TABLET, FILM COATED ORAL at 11:25

## 2023-01-31 RX ADMIN — Medication 1 TABLET(S): at 11:25

## 2023-01-31 NOTE — DISCHARGE NOTE PROVIDER - ATTENDING DISCHARGE PHYSICAL EXAMINATION:
GENERAL: NAD on NC, well-developed, well-groomed  HEAD:  Atraumatic, Normocephalic  EYES: conjunctiva and sclera clear  ENMT: Moist mucous membranes  NECK: Supple, No JVD, Normal thyroid  CHEST/LUNG: Clear to Auscultation bilaterally; No rales, rhonchi, wheezing, or rubs  HEART: Regular rate and rhythm; No murmurs, rubs, or gallops  ABDOMEN: Soft, Nontender, Nondistended; Bowel sounds present  EXTREMITIES:  2+ Peripheral Pulses, No clubbing, cyanosis, or edema  SKIN: No rashes or lesions  NERVOUS SYSTEM:  Alert & Oriented X3, Poor historian, Motor Strength 5/5 B/L upper and lower extremities

## 2023-01-31 NOTE — DISCHARGE NOTE PROVIDER - CARE PROVIDER_API CALL
Frankie Reynolds)  Cardiology; Cardiovascular Disease; Nuclear Cardiology  [address] [facility], [place] [address]  Phone: [phone]  Fax: [phone]  Follow Up Time:

## 2023-01-31 NOTE — DISCHARGE NOTE PROVIDER - NSDCCPCAREPLAN_GEN_ALL_CORE_FT
PRINCIPAL DISCHARGE DIAGNOSIS  Diagnosis: CHF exacerbation  Assessment and Plan of Treatment: Acute on Chronic CHF  Associated acute respiratory failure with Hypoxia  CT chest: Small Left pleural effusion   Echo: 40-45%, grade II, LVH, mod MVR   continue with Lasix 20mg daily   continue with Metoprolol, losartan      SECONDARY DISCHARGE DIAGNOSES  Diagnosis: Glaucoma  Assessment and Plan of Treatment: continue with Simbrinza

## 2023-01-31 NOTE — DISCHARGE NOTE NURSING/CASE MANAGEMENT/SOCIAL WORK - PATIENT PORTAL LINK FT
You can access the FollowMyHealth Patient Portal offered by [facility] by registering at the following website: [email or web address]. By joining [email or web address]’s FollowMyHealth portal, you will also be able to view your health information using other applications (apps) compatible with our system.

## 2023-01-31 NOTE — PROGRESS NOTE ADULT - CONVERSATION DETAILS
Spoke with patient and her daughter, Daniel at bedside.  Patient confirmed she would want both of her daughters to make decisions for her if needed.  She said she would want a shot at being revived.  Patient's daughter said she knows her mother would not want to live in a persistent vegetative state and would make decisions based on clinical situation. Patient confirmed she would not want to live that way as well.  Patient remains full code.

## 2023-01-31 NOTE — DISCHARGE NOTE NURSING/CASE MANAGEMENT/SOCIAL WORK - NSDCPEFALRISK_GEN_ALL_CORE
For information on Fall & Injury Prevention, visit: [email or web address] OR  [email or web address] OR  https://www.cdc.gov/steadi/patient.html

## 2023-01-31 NOTE — DISCHARGE NOTE PROVIDER - NSDCFUSCHEDAPPT_GEN_ALL_CORE_FT
Agata Platt  [facility] Physician [facility]  CARDIOLOGY [address] [facility]  Scheduled Appointment: 02/01/2023

## 2023-01-31 NOTE — DISCHARGE NOTE PROVIDER - DETAILS OF MALNUTRITION DIAGNOSIS/DIAGNOSES
This patient has been assessed with a concern for Malnutrition and was treated during this hospitalization for the following Nutrition diagnosis/diagnoses:     -  01/30/2023: Moderate protein-calorie malnutrition

## 2023-01-31 NOTE — DISCHARGE NOTE PROVIDER - HOSPITAL COURSE
89 year old female here with daughter c/o sob on exertion and lying down pt has to use extra pillow to improve breathing for past week. Pt is unable to walk to her bathroom without sob. Daughter sts pt has a history of CHF and takes furosemide unknown dose daily. Pt denies headache, cough, chest pain, nausea, vomiting, fever, chills, abd pain, dysuria.     The patient was admitted to medical bed with impression of acute respiratory failure with hypoxia secondary to acute on chronic  combined systolic/diastolic CHF. Cardiology was consulted and The patient was treated with IV lasix and her acute respiratory failure with hypoxia resolved.

## 2023-01-31 NOTE — PROGRESS NOTE ADULT - REASON FOR ADMISSION
SOB from CHF exacerbation.

## 2023-01-31 NOTE — PROGRESS NOTE ADULT - SUBJECTIVE AND OBJECTIVE BOX
Patient is a 89y old  Female who presents with a chief complaint of SOB from CHF exacerbation. (30 Jan 2023 17:51)      INTERVAL HX:  Patient seen and examined @ bedside.  No respiratory distress, chest pain or SOB.    PAST MEDICAL & SURGICAL HISTORY:  Hypertension      Cervical cancer  last treatment May 25, 2011. s/p external and internal radiation      Abdominal mass      Depression (emotion)      GI (gastrointestinal bleed)      Hemorrhoid      CVA (cerebral infarction)      Depression      Diverticulitis      CHF (congestive heart failure)      S/P abdominal surgery, follow-up exam      S/P lumpectomy of breast        	  MEDICATIONS:  MEDICATIONS  (STANDING):  cholecalciferol 1000 Unit(s) Oral daily  clopidogrel Tablet 75 milliGRAM(s) Oral daily  ferrous    sulfate 325 milliGRAM(s) Oral daily  furosemide    Tablet 40 milliGRAM(s) Oral daily  heparin   Injectable 5000 Unit(s) SubCutaneous every 12 hours  losartan 50 milliGRAM(s) Oral daily  metoprolol succinate ER 25 milliGRAM(s) Oral daily  multivitamin 1 Tablet(s) Oral daily  pantoprazole    Tablet 40 milliGRAM(s) Oral before breakfast  polyethylene glycol 3350 17 Gram(s) Oral daily  senna 2 Tablet(s) Oral at bedtime  Simbrinza (brinzolamide &amp; Brimonidine ) 1 Drop(s) 1 Drop(s) Both EYES two times a day    MEDICATIONS  (PRN):  bisacodyl 5 milliGRAM(s) Oral every 12 hours PRN Constipation      Vitals:  T(F): 97.8 (01-31-23 @ 10:59), Max: 97.8 (01-31-23 @ 10:59)  HR: 82 (01-31-23 @ 10:59) (81 - 105)  BP: 135/68 (01-31-23 @ 10:59) (115/69 - 139/60)  RR: 18 (01-31-23 @ 10:59) (17 - 18)  SpO2: 95% (01-31-23 @ 10:59) (94% - 97%)  Wt(kg): --    01-30 @ 07:01  -  01-31 @ 07:00  --------------------------------------------------------  IN:    Oral Fluid: 180 mL  Total IN: 180 mL    OUT:    Voided (mL): 400 mL  Total OUT: 400 mL    Total NET: -220 mL        Weight (kg): 50.2 (01-28 @ 19:40)  BMI (kg/m2): 21.6 (01-28 @ 19:40)      PHYSICAL EXAM:  General: Awake, responsive, sitting up in bed  CV: S1 S2 RRR  Lungs: CTABL  GI: Soft, BS +, ND, NT  Extremities: Trace edema in LE b/l    TELEMETRY: NSR at 90 bpm with couplets and PVCs  	    LABS:	 	    CARDIAC MARKERS:    31 Jan 2023 05:40    140    |  103    |  58     ----------------------------<  116    3.8     |  29     |  1.72   30 Jan 2023 07:20    143    |  105    |  52     ----------------------------<  119    4.1     |  29     |  1.84     Ca    9.1        31 Jan 2023 05:40  Phos  3.9       31 Jan 2023 05:40  Mg     2.6       31 Jan 2023 05:40                            14.7   5.69  )-----------( 224      ( 31 Jan 2023 05:40 )             46.2 ,                       14.9   5.70  )-----------( 221      ( 30 Jan 2023 07:20 )             47.2                Patient is a 89y old  Female who presents with a chief complaint of SOB from CHF exacerbation. (30 Jan 2023 17:51)      INTERVAL HX:  Patient seen and examined @ bedside.  No respiratory distress, chest pain or SOB.    PAST MEDICAL & SURGICAL HISTORY:  Hypertension      Cervical cancer  last treatment May 25, 2011. s/p external and internal radiation      Abdominal mass      Depression (emotion)      GI (gastrointestinal bleed)      Hemorrhoid      CVA (cerebral infarction)      Depression      Diverticulitis      CHF (congestive heart failure)      S/P abdominal surgery, follow-up exam      S/P lumpectomy of breast        	  MEDICATIONS:  MEDICATIONS  (STANDING):  cholecalciferol 1000 Unit(s) Oral daily  clopidogrel Tablet 75 milliGRAM(s) Oral daily  ferrous    sulfate 325 milliGRAM(s) Oral daily  furosemide    Tablet 40 milliGRAM(s) Oral daily  heparin   Injectable 5000 Unit(s) SubCutaneous every 12 hours  losartan 50 milliGRAM(s) Oral daily  metoprolol succinate ER 25 milliGRAM(s) Oral daily  multivitamin 1 Tablet(s) Oral daily  pantoprazole    Tablet 40 milliGRAM(s) Oral before breakfast  polyethylene glycol 3350 17 Gram(s) Oral daily  senna 2 Tablet(s) Oral at bedtime  Simbrinza (brinzolamide &amp; Brimonidine ) 1 Drop(s) 1 Drop(s) Both EYES two times a day    MEDICATIONS  (PRN):  bisacodyl 5 milliGRAM(s) Oral every 12 hours PRN Constipation      Vitals:  T(F): 97.8 (01-31-23 @ 10:59), Max: 97.8 (01-31-23 @ 10:59)  HR: 82 (01-31-23 @ 10:59) (81 - 105)  BP: 135/68 (01-31-23 @ 10:59) (115/69 - 139/60)  RR: 18 (01-31-23 @ 10:59) (17 - 18)  SpO2: 95% (01-31-23 @ 10:59) (94% - 97%)  Wt(kg): --    01-30 @ 07:01  -  01-31 @ 07:00  --------------------------------------------------------  IN:    Oral Fluid: 180 mL  Total IN: 180 mL    OUT:    Voided (mL): 400 mL  Total OUT: 400 mL    Total NET: -220 mL        Weight (kg): 50.2 (01-28 @ 19:40)  BMI (kg/m2): 21.6 (01-28 @ 19:40)      PHYSICAL EXAM:  General: Awake, responsive, sitting up in bed  CV: S1 S2 RRR +mumur  Lungs: CTABL  GI: Soft, BS +, ND, NT  Extremities: Trace edema in LE b/l    TELEMETRY: NSR at 90 bpm with couplets and PVCs  	    LABS:	 	    CARDIAC MARKERS:    31 Jan 2023 05:40    140    |  103    |  58     ----------------------------<  116    3.8     |  29     |  1.72   30 Jan 2023 07:20    143    |  105    |  52     ----------------------------<  119    4.1     |  29     |  1.84     Ca    9.1        31 Jan 2023 05:40  Phos  3.9       31 Jan 2023 05:40  Mg     2.6       31 Jan 2023 05:40                            14.7   5.69  )-----------( 224      ( 31 Jan 2023 05:40 )             46.2 ,                       14.9   5.70  )-----------( 221      ( 30 Jan 2023 07:20 )             47.2

## 2023-01-31 NOTE — PROGRESS NOTE ADULT - ASSESSMENT
89yo female with a PMH of chronic systolic CHF -LVEF 40-45% in 2016 with moderate MR/AI, Cervical cancer last treatment May 25, 2011. s/p external and internal radiation, prior CVA on plavix, HTN;   admitted with dyspnea on exertion and orthopnea.  Takes furosemide at home but has had recent dietary indiscretions. Denied chest pain/palpitations.  ECG: sinus 88bpm; LVH  BNP 18,000  creat 1.1 -> 1.35 (1/28) -> 1.8 (1/30) -> 1.72 (1/31)    Stress Test: 2015 WNL  Echo: 1/29/23: EF 40-45%, moderate MR, mild-mod TR, mild AR, mild GA, mild mitral calcification, mild LVH, G2DD, mild aortic root calcification.  Minimal change from prior echo on 6/9/21 (LVEF 45-50%. Moderate AR, mild MR, mild TR)    1) Acute on chronic systolic CHF   2) Hx of CVA   3) HTN    - c/w tele monitoring  - strict I/O, daily weights  - Creatinine initially increased from 1.3 -> 1.8, now down to 1.7 (1/31) today. c/w monitor renal function, continue with Lasix 40mg PO daily (home dose is Lasix 10mg daily)  - monitor electrolytes, keep potassium >4.0, Mag >2.0  - continue Toprol XL 25mg, Losartan 50mg   - continue Plavix post CVA  - f/u with cardiologist Dr. Reynolds upon discharge  - agree with palliative care consult in view of advanced age, CHF   89yo female with a PMH of chronic systolic CHF (1/29/23 echo showed LVEF 40-45%), Cervical cancer last treatment May 25, 2011. s/p external and internal radiation, prior CVA on plavix, HTN;   admitted with dyspnea on exertion and orthopnea.  Takes furosemide at home but has had recent dietary indiscretions. Denied chest pain/palpitations.  ECG: sinus 88bpm; LVH  BNP 18,000 on admission (1/27/23)  creat 1.1 -> 1.35 (1/28) -> 1.8 (1/30) -> 1.72 (1/31)    Stress Test: 2015 WNL  Echo: 1/29/23: EF 40-45%, moderate MR, mild-mod TR, mild AR, mild NV, mild mitral calcification, mild LVH, G2DD, mild aortic root calcification.  Minimal change from prior echo on 6/9/21 (LVEF 45-50%. Moderate AR, mild MR, mild TR)    1) Acute on chronic systolic CHF   2) Hx of CVA   3) HTN    - c/w tele monitoring  - strict I/O, daily weights  - Creatinine initially increased from 1.3 -> 1.8, now down to 1.7 (1/31) today. c/w monitor renal function, continue with Lasix 40mg PO daily (home dose is Lasix 10mg daily)  - monitor electrolytes, keep potassium >4.0, Mag >2.0  - continue Toprol XL 25mg, Losartan 50mg   - continue Plavix post CVA  - f/u with cardiologist Dr. Reynolds upon discharge  - agree with palliative care consult in view of advanced age, CHF   89yo female with a PMH of chronic systolic CHF (1/29/23 echo showed LVEF 40-45%), Cervical cancer last treatment May 25, 2011. s/p external and internal radiation, prior CVA on plavix, HTN;   admitted with dyspnea on exertion and orthopnea.  Takes furosemide at home but has had recent dietary indiscretions. Denied chest pain/palpitations.  ECG: sinus 88bpm; LVH  BNP 18,000 on admission (1/27/23)  creat 1.1 -> 1.35 (1/28) -> 1.8 (1/30) -> 1.72 (1/31)    Stress Test: 2015 WNL  Echo: 6/9/21 LVEF 45-50%. mod AR, mild-mod MR, mild TR  Echo: 1/29/23: EF 40-45%, moderate MR, mild-mod TR, mild AR, mild HI, mild mitral calcification, mild LVH, G2DD, mild aortic root calcification.    1) Acute on chronic CHF   2) Hx of CVA   3) HTN    - strict I/O, daily weights 115lbs --> 112lbs  - Creatinine initially increased from 1.3 -> 1.8, now down to 1.7 (1/31) today. c/w monitor renal function  - will decrease to Lasix 20mg daily (home dose is Lasix 10mg daily)  - monitor electrolytes, keep potassium >4.0, Mag >2.0  - continue Toprol XL 25mg, Losartan 50mg   - continue Plavix post CVA  - agree with palliative care consult in view of advanced age, CHF  - will sign off, f/u with cardiologist Dr. Reynolds upon discharge

## 2023-01-31 NOTE — CHART NOTE - NSCHARTNOTEFT_GEN_A_CORE
Palliative to sign off at this time as GOC appear clear.  Please reconsult should GOC change or symptoms arise.  Thank you.

## 2023-02-01 ENCOUNTER — NON-APPOINTMENT (OUTPATIENT)
Age: 88
End: 2023-02-01

## 2023-02-01 ENCOUNTER — APPOINTMENT (OUTPATIENT)
Dept: CARDIOLOGY | Facility: CLINIC | Age: 88
End: 2023-02-01

## 2023-02-02 PROBLEM — I50.9 HEART FAILURE, UNSPECIFIED: Chronic | Status: ACTIVE | Noted: 2023-01-27

## 2023-02-05 DIAGNOSIS — I11.0 HYPERTENSIVE HEART DISEASE WITH HEART FAILURE: ICD-10-CM

## 2023-02-05 DIAGNOSIS — F32.A DEPRESSION, UNSPECIFIED: ICD-10-CM

## 2023-02-05 DIAGNOSIS — Z79.02 LONG TERM (CURRENT) USE OF ANTITHROMBOTICS/ANTIPLATELETS: ICD-10-CM

## 2023-02-05 DIAGNOSIS — K57.92 DIVERTICULITIS OF INTESTINE, PART UNSPECIFIED, WITHOUT PERFORATION OR ABSCESS WITHOUT BLEEDING: ICD-10-CM

## 2023-02-05 DIAGNOSIS — K64.9 UNSPECIFIED HEMORRHOIDS: ICD-10-CM

## 2023-02-05 DIAGNOSIS — Z86.73 PERSONAL HISTORY OF TRANSIENT ISCHEMIC ATTACK (TIA), AND CEREBRAL INFARCTION WITHOUT RESIDUAL DEFICITS: ICD-10-CM

## 2023-02-05 DIAGNOSIS — R53.81 OTHER MALAISE: ICD-10-CM

## 2023-02-05 DIAGNOSIS — Z88.0 ALLERGY STATUS TO PENICILLIN: ICD-10-CM

## 2023-02-05 DIAGNOSIS — H40.9 UNSPECIFIED GLAUCOMA: ICD-10-CM

## 2023-02-05 DIAGNOSIS — I50.43 ACUTE ON CHRONIC COMBINED SYSTOLIC (CONGESTIVE) AND DIASTOLIC (CONGESTIVE) HEART FAILURE: ICD-10-CM

## 2023-02-05 DIAGNOSIS — Z79.899 OTHER LONG TERM (CURRENT) DRUG THERAPY: ICD-10-CM

## 2023-02-05 DIAGNOSIS — J96.01 ACUTE RESPIRATORY FAILURE WITH HYPOXIA: ICD-10-CM

## 2023-02-05 DIAGNOSIS — E44.0 MODERATE PROTEIN-CALORIE MALNUTRITION: ICD-10-CM

## 2023-02-06 ENCOUNTER — APPOINTMENT (OUTPATIENT)
Dept: CARE COORDINATION | Facility: HOME HEALTH | Age: 88
End: 2023-02-06
Payer: MEDICARE

## 2023-02-06 VITALS — HEART RATE: 88 BPM | OXYGEN SATURATION: 92 %

## 2023-02-06 DIAGNOSIS — H40.9 UNSPECIFIED GLAUCOMA: ICD-10-CM

## 2023-02-06 DIAGNOSIS — Z74.09 OTHER REDUCED MOBILITY: ICD-10-CM

## 2023-02-06 DIAGNOSIS — K59.00 CONSTIPATION, UNSPECIFIED: ICD-10-CM

## 2023-02-06 DIAGNOSIS — H91.90 UNSPECIFIED HEARING LOSS, UNSPECIFIED EAR: ICD-10-CM

## 2023-02-06 PROCEDURE — 99349 HOME/RES VST EST MOD MDM 40: CPT | Mod: 95

## 2023-02-06 RX ORDER — WHEAT DEXTRIN 3 G/4 G
POWDER (GRAM) ORAL
Refills: 0 | Status: ACTIVE | COMMUNITY
Start: 2023-02-06

## 2023-02-06 RX ORDER — FUROSEMIDE 20 MG/1
20 TABLET ORAL AS DIRECTED
Qty: 90 | Refills: 3 | Status: DISCONTINUED | COMMUNITY
End: 2023-02-06

## 2023-02-06 NOTE — HISTORY OF PRESENT ILLNESS
[Post-hospitalization from ___ Hospital] : Post-hospitalization from [unfilled] Hospital [Admitted on: ___] : The patient was admitted on [unfilled] [Discharged on ___] : discharged on [unfilled] [Home] : at home, [unfilled] , at the time of the visit. [Other Location: e.g. Home (Enter Location, City,State)___] : at [unfilled] [Family Member] : family member [FreeTextEntry3] : luann Sanchez [FreeTextEntry2] : 89 year old female w/PMH of HLD, HTN, Stroke w/ mild left residual weakness (on Plavix) here with daughter c/o sob on exertion and lying down pt has to use extra pillow to improve breathing for past week. Pt is unable to walk to her bathroom without sob. Daughter sts pt has a history of CHF and takes furosemide unknown dose daily. \par \par Televisit done with pt alert and oriented x 3, sitting with her dgtr Daniel who conducted most of the visit as pt is [place] wears lt ear hearing aide. Pt is also low vision to rt eye. She denies CP, SOB, BLE edema.  Said today "i feel like a rag doll. dozing off during the visit dgtr reporting pt is up more at nights naps during the day. Discussed her appetite decreased - has 1 banana and a 1 tbpn peanut butter and 1 can of ensure for lunch. Instructed dgtr on DASH diet, low sodium diet she is concerned about sodium in Ensure. Pt recommended for 1500 mg of sodium daily - states pt is not consuming that much. pt is not on  fluid restrictions. \par Pt with purple discolorations to both feet toes and both sides of both feet, denies pain, skin breaks. Remind them to elevate to prevent edema. \par Discussed GOC - dgtr states pt and her sister are all on board not to make pt DNR at this time. In the event pt has a decline without hope of survival or pt will be in a persistent vegetative state they will make the decision.  \par Pt to f/u with her PCP. She has CARD appt on 2/10. Role of TCM in pt's care explained to both pt and dgtr and number for RN and NP given for any questions or concerns. \par

## 2023-02-06 NOTE — PHYSICAL EXAM
[No Acute Distress] : no acute distress [Well Nourished] : well nourished [Normal Sclera/Conjunctiva] : normal sclera/conjunctiva [No Respiratory Distress] : no respiratory distress  [Normal Outer Ear/Nose] : the outer ears and nose were normal in appearance [No Accessory Muscle Use] : no accessory muscle use [No Edema] : there was no peripheral edema [Soft] : abdomen soft [Normal Affect] : the affect was normal [Alert and Oriented x3] : oriented to person, place, and time [Normal Mood] : the mood was normal [Normal Insight/Judgement] : insight and judgment were intact

## 2023-02-06 NOTE — REVIEW OF SYSTEMS
[Vision Problems] : vision problems [Hearing Loss] : hearing loss [Constipation] : constipation [Muscle Weakness] : muscle weakness [Unsteady Walking] : ataxia [Negative] : Heme/Lymph [Fever] : no fever [Fatigue] : no fatigue [Discharge] : no discharge [Earache] : no earache [Chest Pain] : no chest pain [Palpitations] : no palpitations [Lower Ext Edema] : no lower extremity edema [Shortness Of Breath] : no shortness of breath [Cough] : no cough [Abdominal Pain] : no abdominal pain [Nausea] : no nausea [Incontinence] : no incontinence [Joint Pain] : no joint pain [Joint Stiffness] : no joint stiffness [Muscle Pain] : no muscle pain [Back Pain] : no back pain [Skin Rash] : no skin rash [Headache] : no headache [Dizziness] : no dizziness [Memory Loss] : no memory loss [Suicidal] : not suicidal [Insomnia] : no insomnia [de-identified] : uses walker indoors

## 2023-02-06 NOTE — PLAN
[FreeTextEntry1] : 1.  continue all medications as prescribed\par 2. f/u with CARDS/PCP\par 3. Maintain  DASH diet\par 4. maintain fall precaution\par 5. Call PCP/TCM for any questions or concerns.\par

## 2023-02-09 ENCOUNTER — INPATIENT (INPATIENT)
Facility: HOSPITAL | Age: 88
LOS: 4 days | Discharge: NURSING, REHAB OR LONG-TERM CARE | End: 2023-02-14
Attending: STUDENT IN AN ORGANIZED HEALTH CARE EDUCATION/TRAINING PROGRAM | Admitting: STUDENT IN AN ORGANIZED HEALTH CARE EDUCATION/TRAINING PROGRAM
Payer: MEDICARE

## 2023-02-09 VITALS
DIASTOLIC BLOOD PRESSURE: 72 MMHG | HEART RATE: 82 BPM | SYSTOLIC BLOOD PRESSURE: 119 MMHG | RESPIRATION RATE: 18 BRPM | TEMPERATURE: 98 F | OXYGEN SATURATION: 97 % | WEIGHT: 5.07 LBS | HEIGHT: 60 IN

## 2023-02-09 DIAGNOSIS — Z98.89 OTHER SPECIFIED POSTPROCEDURAL STATES: Chronic | ICD-10-CM

## 2023-02-09 LAB
ALBUMIN SERPL ELPH-MCNC: 2.9 G/DL — LOW (ref 3.3–5)
ALP SERPL-CCNC: 79 U/L — SIGNIFICANT CHANGE UP (ref 40–120)
ALT FLD-CCNC: 20 U/L — SIGNIFICANT CHANGE UP (ref 12–78)
ANION GAP SERPL CALC-SCNC: 6 MMOL/L — SIGNIFICANT CHANGE UP (ref 5–17)
APPEARANCE UR: ABNORMAL
AST SERPL-CCNC: 19 U/L — SIGNIFICANT CHANGE UP (ref 15–37)
BACTERIA # UR AUTO: ABNORMAL
BASOPHILS # BLD AUTO: 0.04 K/UL — SIGNIFICANT CHANGE UP (ref 0–0.2)
BASOPHILS NFR BLD AUTO: 0.6 % — SIGNIFICANT CHANGE UP (ref 0–2)
BILIRUB SERPL-MCNC: 0.4 MG/DL — SIGNIFICANT CHANGE UP (ref 0.2–1.2)
BILIRUB UR-MCNC: NEGATIVE — SIGNIFICANT CHANGE UP
BUN SERPL-MCNC: 61 MG/DL — HIGH (ref 7–23)
CALCIUM SERPL-MCNC: 9.4 MG/DL — SIGNIFICANT CHANGE UP (ref 8.5–10.1)
CHLORIDE SERPL-SCNC: 102 MMOL/L — SIGNIFICANT CHANGE UP (ref 96–108)
CO2 SERPL-SCNC: 31 MMOL/L — SIGNIFICANT CHANGE UP (ref 22–31)
COLOR SPEC: YELLOW — SIGNIFICANT CHANGE UP
CREAT SERPL-MCNC: 1.69 MG/DL — HIGH (ref 0.5–1.3)
DIFF PNL FLD: NEGATIVE — SIGNIFICANT CHANGE UP
EGFR: 28 ML/MIN/1.73M2 — LOW
EOSINOPHIL # BLD AUTO: 0.06 K/UL — SIGNIFICANT CHANGE UP (ref 0–0.5)
EOSINOPHIL NFR BLD AUTO: 0.9 % — SIGNIFICANT CHANGE UP (ref 0–6)
EPI CELLS # UR: SIGNIFICANT CHANGE UP
FLUAV AG NPH QL: SIGNIFICANT CHANGE UP
FLUBV AG NPH QL: SIGNIFICANT CHANGE UP
GLUCOSE SERPL-MCNC: 114 MG/DL — HIGH (ref 70–99)
GLUCOSE UR QL: NEGATIVE MG/DL — SIGNIFICANT CHANGE UP
HCT VFR BLD CALC: 41.1 % — SIGNIFICANT CHANGE UP (ref 34.5–45)
HGB BLD-MCNC: 13.3 G/DL — SIGNIFICANT CHANGE UP (ref 11.5–15.5)
IMM GRANULOCYTES NFR BLD AUTO: 0.2 % — SIGNIFICANT CHANGE UP (ref 0–0.9)
KETONES UR-MCNC: NEGATIVE — SIGNIFICANT CHANGE UP
LACTATE SERPL-SCNC: 1.5 MMOL/L — SIGNIFICANT CHANGE UP (ref 0.7–2)
LEUKOCYTE ESTERASE UR-ACNC: ABNORMAL
LIDOCAIN IGE QN: 182 U/L — SIGNIFICANT CHANGE UP (ref 73–393)
LYMPHOCYTES # BLD AUTO: 0.59 K/UL — LOW (ref 1–3.3)
LYMPHOCYTES # BLD AUTO: 8.9 % — LOW (ref 13–44)
MCHC RBC-ENTMCNC: 31.5 PG — SIGNIFICANT CHANGE UP (ref 27–34)
MCHC RBC-ENTMCNC: 32.4 G/DL — SIGNIFICANT CHANGE UP (ref 32–36)
MCV RBC AUTO: 97.4 FL — SIGNIFICANT CHANGE UP (ref 80–100)
MONOCYTES # BLD AUTO: 0.64 K/UL — SIGNIFICANT CHANGE UP (ref 0–0.9)
MONOCYTES NFR BLD AUTO: 9.7 % — SIGNIFICANT CHANGE UP (ref 2–14)
NEUTROPHILS # BLD AUTO: 5.26 K/UL — SIGNIFICANT CHANGE UP (ref 1.8–7.4)
NEUTROPHILS NFR BLD AUTO: 79.7 % — HIGH (ref 43–77)
NITRITE UR-MCNC: NEGATIVE — SIGNIFICANT CHANGE UP
NRBC # BLD: 0 /100 WBCS — SIGNIFICANT CHANGE UP (ref 0–0)
PH UR: 5 — SIGNIFICANT CHANGE UP (ref 5–8)
PLATELET # BLD AUTO: 226 K/UL — SIGNIFICANT CHANGE UP (ref 150–400)
POTASSIUM SERPL-MCNC: 4.4 MMOL/L — SIGNIFICANT CHANGE UP (ref 3.5–5.3)
POTASSIUM SERPL-SCNC: 4.4 MMOL/L — SIGNIFICANT CHANGE UP (ref 3.5–5.3)
PROT SERPL-MCNC: 6.5 GM/DL — SIGNIFICANT CHANGE UP (ref 6–8.3)
PROT UR-MCNC: 15 MG/DL
RBC # BLD: 4.22 M/UL — SIGNIFICANT CHANGE UP (ref 3.8–5.2)
RBC # FLD: 13.6 % — SIGNIFICANT CHANGE UP (ref 10.3–14.5)
RBC CASTS # UR COMP ASSIST: SIGNIFICANT CHANGE UP /HPF (ref 0–4)
SARS-COV-2 RNA SPEC QL NAA+PROBE: SIGNIFICANT CHANGE UP
SODIUM SERPL-SCNC: 139 MMOL/L — SIGNIFICANT CHANGE UP (ref 135–145)
SP GR SPEC: 1.01 — SIGNIFICANT CHANGE UP (ref 1.01–1.02)
UROBILINOGEN FLD QL: NEGATIVE MG/DL — SIGNIFICANT CHANGE UP
WBC # BLD: 6.6 K/UL — SIGNIFICANT CHANGE UP (ref 3.8–10.5)
WBC # FLD AUTO: 6.6 K/UL — SIGNIFICANT CHANGE UP (ref 3.8–10.5)
WBC UR QL: >50

## 2023-02-09 PROCEDURE — 93010 ELECTROCARDIOGRAM REPORT: CPT

## 2023-02-09 PROCEDURE — 99285 EMERGENCY DEPT VISIT HI MDM: CPT

## 2023-02-09 PROCEDURE — 99223 1ST HOSP IP/OBS HIGH 75: CPT

## 2023-02-09 RX ORDER — ACETAMINOPHEN 500 MG
650 TABLET ORAL ONCE
Refills: 0 | Status: DISCONTINUED | OUTPATIENT
Start: 2023-02-09 | End: 2023-02-14

## 2023-02-09 RX ORDER — BRIMONIDINE TARTRATE 2 MG/MG
1 SOLUTION/ DROPS OPHTHALMIC
Refills: 0 | Status: DISCONTINUED | OUTPATIENT
Start: 2023-02-09 | End: 2023-02-09

## 2023-02-09 RX ORDER — ACETAMINOPHEN 500 MG
25 TABLET ORAL ONCE
Refills: 0 | Status: DISCONTINUED | OUTPATIENT
Start: 2023-02-09 | End: 2023-02-09

## 2023-02-09 RX ORDER — LIDOCAINE 4 G/100G
1 CREAM TOPICAL ONCE
Refills: 0 | Status: COMPLETED | OUTPATIENT
Start: 2023-02-09 | End: 2023-02-09

## 2023-02-09 RX ORDER — SENNA PLUS 8.6 MG/1
2 TABLET ORAL AT BEDTIME
Refills: 0 | Status: DISCONTINUED | OUTPATIENT
Start: 2023-02-09 | End: 2023-02-14

## 2023-02-09 RX ORDER — CEFTRIAXONE 500 MG/1
1000 INJECTION, POWDER, FOR SOLUTION INTRAMUSCULAR; INTRAVENOUS ONCE
Refills: 0 | Status: COMPLETED | OUTPATIENT
Start: 2023-02-09 | End: 2023-02-09

## 2023-02-09 RX ORDER — LOSARTAN POTASSIUM 100 MG/1
50 TABLET, FILM COATED ORAL DAILY
Refills: 0 | Status: DISCONTINUED | OUTPATIENT
Start: 2023-02-09 | End: 2023-02-14

## 2023-02-09 RX ORDER — FUROSEMIDE 40 MG
20 TABLET ORAL DAILY
Refills: 0 | Status: DISCONTINUED | OUTPATIENT
Start: 2023-02-09 | End: 2023-02-10

## 2023-02-09 RX ORDER — ACETAMINOPHEN 500 MG
650 TABLET ORAL EVERY 6 HOURS
Refills: 0 | Status: DISCONTINUED | OUTPATIENT
Start: 2023-02-09 | End: 2023-02-14

## 2023-02-09 RX ORDER — DORZOLAMIDE HYDROCHLORIDE 20 MG/ML
1 SOLUTION/ DROPS OPHTHALMIC THREE TIMES A DAY
Refills: 0 | Status: DISCONTINUED | OUTPATIENT
Start: 2023-02-09 | End: 2023-02-09

## 2023-02-09 RX ORDER — LANOLIN ALCOHOL/MO/W.PET/CERES
3 CREAM (GRAM) TOPICAL AT BEDTIME
Refills: 0 | Status: DISCONTINUED | OUTPATIENT
Start: 2023-02-09 | End: 2023-02-14

## 2023-02-09 RX ORDER — ACETAMINOPHEN 500 MG
2 TABLET ORAL
Qty: 24 | Refills: 0
Start: 2023-02-09 | End: 2023-02-11

## 2023-02-09 RX ORDER — ONDANSETRON 8 MG/1
4 TABLET, FILM COATED ORAL EVERY 8 HOURS
Refills: 0 | Status: DISCONTINUED | OUTPATIENT
Start: 2023-02-09 | End: 2023-02-14

## 2023-02-09 RX ORDER — SODIUM CHLORIDE 9 MG/ML
500 INJECTION INTRAMUSCULAR; INTRAVENOUS; SUBCUTANEOUS ONCE
Refills: 0 | Status: DISCONTINUED | OUTPATIENT
Start: 2023-02-09 | End: 2023-02-09

## 2023-02-09 RX ORDER — ENOXAPARIN SODIUM 100 MG/ML
30 INJECTION SUBCUTANEOUS EVERY 24 HOURS
Refills: 0 | Status: DISCONTINUED | OUTPATIENT
Start: 2023-02-10 | End: 2023-02-14

## 2023-02-09 RX ORDER — CLOPIDOGREL BISULFATE 75 MG/1
75 TABLET, FILM COATED ORAL DAILY
Refills: 0 | Status: DISCONTINUED | OUTPATIENT
Start: 2023-02-09 | End: 2023-02-14

## 2023-02-09 RX ORDER — LIDOCAINE 4 G/100G
1 CREAM TOPICAL EVERY 24 HOURS
Refills: 0 | Status: DISCONTINUED | OUTPATIENT
Start: 2023-02-09 | End: 2023-02-14

## 2023-02-09 RX ORDER — CEFTRIAXONE 500 MG/1
1000 INJECTION, POWDER, FOR SOLUTION INTRAMUSCULAR; INTRAVENOUS EVERY 24 HOURS
Refills: 0 | Status: COMPLETED | OUTPATIENT
Start: 2023-02-10 | End: 2023-02-11

## 2023-02-09 RX ORDER — POLYETHYLENE GLYCOL 3350 17 G/17G
17 POWDER, FOR SOLUTION ORAL DAILY
Refills: 0 | Status: DISCONTINUED | OUTPATIENT
Start: 2023-02-09 | End: 2023-02-14

## 2023-02-09 RX ORDER — CEFPODOXIME PROXETIL 100 MG
2 TABLET ORAL
Qty: 56 | Refills: 0
Start: 2023-02-09 | End: 2023-02-22

## 2023-02-09 RX ORDER — METOPROLOL TARTRATE 50 MG
25 TABLET ORAL DAILY
Refills: 0 | Status: DISCONTINUED | OUTPATIENT
Start: 2023-02-09 | End: 2023-02-14

## 2023-02-09 RX ADMIN — Medication 650 MILLIGRAM(S): at 21:41

## 2023-02-09 RX ADMIN — LIDOCAINE 1 PATCH: 4 CREAM TOPICAL at 17:14

## 2023-02-09 RX ADMIN — CEFTRIAXONE 1000 MILLIGRAM(S): 500 INJECTION, POWDER, FOR SOLUTION INTRAMUSCULAR; INTRAVENOUS at 18:52

## 2023-02-09 RX ADMIN — CEFTRIAXONE 100 MILLIGRAM(S): 500 INJECTION, POWDER, FOR SOLUTION INTRAMUSCULAR; INTRAVENOUS at 18:22

## 2023-02-09 NOTE — ED PROVIDER NOTE - PROGRESS NOTE DETAILS
daughter is given and explained and agrees to take pt home pt has no n/v no fever normal wbc. She is advised to bring pt back of symptoms persist or worsen or unable to tolerate orally. Ross WRIGHT (PGY-3)  pt unable to ambulate despite 3 ppl assisting her. will admit to medicine

## 2023-02-09 NOTE — ED PROVIDER NOTE - NS ED ATTENDING STATEMENT MOD
I have seen and examined this patient and fully participated in the care of this patient as the teaching attending.  The service was shared with the GRABIEL.  I reviewed and verified the documentation and independently performed the documented:

## 2023-02-09 NOTE — ED PROVIDER NOTE - NS ED ROS FT
CONST: no fevers, no chills  ENT: no sore throat  CV: no chest pain, no leg swelling  RESP: no shortness of breath, no cough  ABD: no abdominal pain, no nausea, no vomiting, no diarrhea  : no dysuria, +flank pain, +hematuria, +urinary frequency  MSK: +back pain, no extremity pain  NEURO: no headache or additional neurologic complaints  SKIN:  no rash

## 2023-02-09 NOTE — ED PROVIDER NOTE - ATTENDING CONTRIBUTION TO CARE
I have personally seen and examined this pt I have fully participated in the care of this pt I have made amendments to documentation where appropriate and otherwise hx, exam and plan as documented the resident Dr. Hawkins.

## 2023-02-09 NOTE — PATIENT PROFILE ADULT - FALL HARM RISK - HARM RISK INTERVENTIONS

## 2023-02-09 NOTE — ED PROVIDER NOTE - PHYSICAL EXAMINATION
Physical Exam:  Gen: A&Ox3, awake alert   HEENT: normal conjunctiva, oral mucosa moist  Lung: CTAB, no respiratory distress, no wheezes/rhonchi/rales B/L, speaking in full sentences  CV: RRR  Abd: soft, NT, ND, no guarding, no rigidity, no rebound tenderness, L CVA tenderness   MSK: no visible deformities, lifting legs, moving arms, no spinal tenderness   Neuro: No focal sensory or motor deficits  Skin: Warm, well perfused, no rash, no leg swelling  ~Vasquez Hawkins MD (PGY-3)

## 2023-02-09 NOTE — ED ADULT NURSE REASSESSMENT NOTE - NS ED NURSE REASSESS COMMENT FT1
Report received from Luz. Patient is a&ox4 with family at bedside. patient pending admission to hospital. NAD noted at this time

## 2023-02-09 NOTE — PATIENT PROFILE ADULT - FUNCTIONAL ASSESSMENT - BASIC MOBILITY 6.
2-calculated by average/Not able to assess (calculate score using [facility] averaging method)

## 2023-02-09 NOTE — ED PROVIDER NOTE - OBJECTIVE STATEMENT
89F PMH HTN, HLD, CHF, cervical ca in 2011, hemorrhoids p/w back pain. 2 days ago, pt had severe back pain which improved yest after pt was given tylenol and had a large BM. Pt was also having urinary frequency 2 days ago and had hematuria last week; denies dysuria. Today this AM, pt had severe back pain again; had a more difficult time moving but was still able to walk around. Went to PCP's office where she had midline pain although here pt has L flank pain. Last took tylenol 1000mg at 1:30PM. Sent to ED by pcp Dr. Nelson. No f/c, n/v, numbness/tingling, saddle anesthesia, bowel/bladder incontinence, leg swelling

## 2023-02-09 NOTE — ED PROVIDER NOTE - CLINICAL SUMMARY MEDICAL DECISION MAKING FREE TEXT BOX
89F PMH HTN, HLD, CHF, cervical ca in 2011, hemorrhoids p/w back pain, hematuria, urinary frequency. VS and exam as above  Cocnern for pyelo vs. kidney stone. Lower concern for spinal etiology as pain is not midline and pt has no red flag symptoms. No rash seen on exam so no skin infection. Will order labs, CT A/P, CT lumbosacral, ua/ucx, reassess symptoms 89F PMH HTN, HLD, CHF, cervical ca in 2011, hemorrhoids p/w back pain, hematuria, urinary frequency. ECG nondiagnostic. VS and exam as above  Cocnern for pyelo vs. kidney stone. Lower concern for spinal etiology as pain is not midline and pt has no red flag symptoms. No rash seen on exam so no skin infection. Will order labs, CT A/P, CT lumbosacral, ua/ucx, reassess symptoms

## 2023-02-09 NOTE — ED ADULT NURSE NOTE - NSIMPLEMENTINTERV_GEN_ALL_ED
Implemented All Fall with Harm Risk Interventions:  [place] to call system. Call bell, personal items and telephone within reach. Instruct patient to call for assistance. Room bathroom lighting operational. Non-slip footwear when patient is off stretcher. Physically safe environment: no spills, clutter or unnecessary equipment. Stretcher in lowest position, wheels locked, appropriate side rails in place. Provide visual cue, wrist band, yellow gown, etc. Monitor gait and stability. Monitor for mental status changes and reorient to person, place, and time. Review medications for side effects contributing to fall risk. Reinforce activity limits and safety measures with patient and family. Provide visual clues: red socks.

## 2023-02-09 NOTE — ED PROVIDER NOTE - CARE PLAN
1 Principal Discharge DX:	Acute UTI   Principal Discharge DX:	Pyelonephritis  Secondary Diagnosis:	Difficulty walking

## 2023-02-09 NOTE — ED ADULT NURSE NOTE - OBJECTIVE STATEMENT
patient BIBA, came in from PCP for back pain. pt alert and oriented x3 with difficulty hearing, daughter at bedside. pt daughter states that for the past 2 days pt has been having severe back pain, used biofreeze and tylenol to relieve but little relief, last dose of tylenol given 130pm. pt has been having frequent urination associated with incontinence, history of cervical CA with radiation no chemo. pt denies fever, chills, chest pain or SOB. pt placed on continuous cardiac monitor and continuous pulse ox, STAT blood work, EKG and straight cath done.

## 2023-02-09 NOTE — ED PROVIDER NOTE - PATIENT PORTAL LINK FT
You can access the FollowMyHealth Patient Portal offered by [facility] by registering at the following website: [email or web address]. By joining [facility]’s FollowMyHealth portal, you will also be able to view your health information using other applications (apps) compatible with our system.

## 2023-02-10 ENCOUNTER — APPOINTMENT (OUTPATIENT)
Dept: CARDIOLOGY | Facility: CLINIC | Age: 88
End: 2023-02-10

## 2023-02-10 DIAGNOSIS — I10 ESSENTIAL (PRIMARY) HYPERTENSION: ICD-10-CM

## 2023-02-10 DIAGNOSIS — M54.9 DORSALGIA, UNSPECIFIED: ICD-10-CM

## 2023-02-10 DIAGNOSIS — N39.0 URINARY TRACT INFECTION, SITE NOT SPECIFIED: ICD-10-CM

## 2023-02-10 DIAGNOSIS — I50.9 HEART FAILURE, UNSPECIFIED: ICD-10-CM

## 2023-02-10 LAB
ANION GAP SERPL CALC-SCNC: 7 MMOL/L — SIGNIFICANT CHANGE UP (ref 5–17)
BUN SERPL-MCNC: 58 MG/DL — HIGH (ref 7–23)
CALCIUM SERPL-MCNC: 9.3 MG/DL — SIGNIFICANT CHANGE UP (ref 8.5–10.1)
CHLORIDE SERPL-SCNC: 105 MMOL/L — SIGNIFICANT CHANGE UP (ref 96–108)
CO2 SERPL-SCNC: 26 MMOL/L — SIGNIFICANT CHANGE UP (ref 22–31)
CREAT SERPL-MCNC: 1.52 MG/DL — HIGH (ref 0.5–1.3)
EGFR: 32 ML/MIN/1.73M2 — LOW
GLUCOSE SERPL-MCNC: 91 MG/DL — SIGNIFICANT CHANGE UP (ref 70–99)
POTASSIUM SERPL-MCNC: 4.4 MMOL/L — SIGNIFICANT CHANGE UP (ref 3.5–5.3)
POTASSIUM SERPL-SCNC: 4.4 MMOL/L — SIGNIFICANT CHANGE UP (ref 3.5–5.3)
SODIUM SERPL-SCNC: 138 MMOL/L — SIGNIFICANT CHANGE UP (ref 135–145)

## 2023-02-10 PROCEDURE — 99233 SBSQ HOSP IP/OBS HIGH 50: CPT

## 2023-02-10 PROCEDURE — 76770 US EXAM ABDO BACK WALL COMP: CPT | Mod: 26

## 2023-02-10 RX ORDER — SODIUM CHLORIDE 9 MG/ML
1000 INJECTION INTRAMUSCULAR; INTRAVENOUS; SUBCUTANEOUS
Refills: 0 | Status: DISCONTINUED | OUTPATIENT
Start: 2023-02-10 | End: 2023-02-11

## 2023-02-10 RX ORDER — BRINZOLAMIDE/BRIMONIDINE TARTRATE 10; 2 MG/ML; MG/ML
0 SUSPENSION/ DROPS OPHTHALMIC
Qty: 0 | Refills: 0 | DISCHARGE

## 2023-02-10 RX ORDER — METOPROLOL TARTRATE 50 MG
25 TABLET ORAL
Qty: 0 | Refills: 0 | DISCHARGE

## 2023-02-10 RX ORDER — TOLTERODINE TARTRATE 1 MG/1
1 TABLET, FILM COATED ORAL
Qty: 0 | Refills: 0 | DISCHARGE

## 2023-02-10 RX ORDER — BRINZOLAMIDE/BRIMONIDINE TARTRATE 10; 2 MG/ML; MG/ML
1 SUSPENSION/ DROPS OPHTHALMIC
Qty: 0 | Refills: 0 | DISCHARGE

## 2023-02-10 RX ADMIN — Medication 20 MILLIGRAM(S): at 06:33

## 2023-02-10 RX ADMIN — SENNA PLUS 2 TABLET(S): 8.6 TABLET ORAL at 21:58

## 2023-02-10 RX ADMIN — POLYETHYLENE GLYCOL 3350 17 GRAM(S): 17 POWDER, FOR SOLUTION ORAL at 11:38

## 2023-02-10 RX ADMIN — ENOXAPARIN SODIUM 30 MILLIGRAM(S): 100 INJECTION SUBCUTANEOUS at 06:33

## 2023-02-10 RX ADMIN — SODIUM CHLORIDE 50 MILLILITER(S): 9 INJECTION INTRAMUSCULAR; INTRAVENOUS; SUBCUTANEOUS at 17:44

## 2023-02-10 RX ADMIN — LIDOCAINE 1 PATCH: 4 CREAM TOPICAL at 06:28

## 2023-02-10 RX ADMIN — LOSARTAN POTASSIUM 50 MILLIGRAM(S): 100 TABLET, FILM COATED ORAL at 06:32

## 2023-02-10 RX ADMIN — CLOPIDOGREL BISULFATE 75 MILLIGRAM(S): 75 TABLET, FILM COATED ORAL at 11:37

## 2023-02-10 RX ADMIN — Medication 25 MILLIGRAM(S): at 06:32

## 2023-02-10 RX ADMIN — CEFTRIAXONE 100 MILLIGRAM(S): 500 INJECTION, POWDER, FOR SOLUTION INTRAMUSCULAR; INTRAVENOUS at 17:44

## 2023-02-10 NOTE — PHYSICAL THERAPY INITIAL EVALUATION ADULT - ADDITIONAL COMMENTS
Pt resides in a private home with her dtr - Daniel in [place], 5 steps to enter, chair lift to the second. Daughter states that Patient has a walker, rollater, cane, wheelchair, and shower seat. Pt resides in a private home with her dtr - Daniel in [place], 5 steps to enter, chair lift to the second. Daughter states that Patient has a walker, rollator, cane, wheelchair, and shower seat.

## 2023-02-10 NOTE — H&P ADULT - NSHPREVIEWOFSYSTEMS_GEN_ALL_CORE
REVIEW OF SYSTEMS:    CONSTITUTIONAL: No weakness, fevers or chills  EYES/ENT: No visual changes;  No vertigo or throat pain   NECK: No pain or stiffness  RESPIRATORY: No cough, wheezing, hemoptysis; No shortness of breath  CARDIOVASCULAR: No chest pain or palpitations  GASTROINTESTINAL: No abdominal or epigastric pain. No nausea, vomiting, or hematemesis; No diarrhea or constipation. No melena or hematochezia.  GENITOURINARY: No dysuria,+ frequency +hematuria  NEUROLOGICAL: No numbness or weakness  MSK + back pain Left side   SKIN: No itching, rashes

## 2023-02-10 NOTE — PHYSICAL THERAPY INITIAL EVALUATION ADULT - STRENGTHENING, PT EVAL
Patient will improve strength by 1 grade in 6-8 weeks to improve overall functional mobility including gait, transfers, bed mobility and decrease risk of falls. Patient will improve strength where limited by 1 grade in 6-8 weeks to improve overall functional mobility including gait, transfers, bed mobility and decrease risk of falls.

## 2023-02-10 NOTE — PHYSICAL THERAPY INITIAL EVALUATION ADULT - BED MOBILITY TRAINING, PT EVAL
Independent in bed mobility- supine<>sit, rolling side<>side observing proper body mechanics, proper positioning, body alignment and precautions. Pt will be independent in bed mobility- supine<>sit, rolling side<>side observing proper body mechanics, proper positioning, body alignment and precautions.

## 2023-02-10 NOTE — PHYSICAL THERAPY INITIAL EVALUATION ADULT - GENERAL OBSERVATIONS, REHAB EVAL
Pt found semi supine in bed in NAD, +heplock, +wilkerson catheter, agreeable to PT DAVEY Sharma aware.

## 2023-02-10 NOTE — H&P ADULT - NSHPPHYSICALEXAM_GEN_ALL_CORE
This 71 year old male presents for follow-up of chronic kidney disease, hypertension and polycystic kidney disease.  He was last seen in July 2019 and was to have presented in late 2019 but failed to do so.    He has known polycystic kidney changes and a strong family history of polycystic kidney disease.  There has been longstanding chronic kidney disease with his creatinine having been 1.3 in 2004 and prior to that had been 1.1 in 2002.  In 2012 the creatinine was elevated at 1.79 and remained in the 1.8-2.0 range through 2017. In 2019 the creatinine ranged between 2.22 and 2.89.    In June 2020 he had labs including a creatinine 3.36 with normal electrolytes.  The urine microalbumin at that time was 283 mg of albumin per g of creatinine.    The most recent labs are from December 11, 2020 and revealed a creatinine of 3.49, BUN 47 and normal electrolytes and calcium. The serum iron was 54, ferritin 24, vitamin-D level 49 and parathyroid hormone was 140.    On past visits with me, the patient had been erratic in his compliance with antihypertensive therapy.  In reviewing his interim course and subsequent follow-up with his primary physician, I note that this continued to be the trend.  He was often without use of his antihypertensive medications on those visits and his blood pressures were consistently elevated 180 systolic range or greater.  He admitted that he had been without 1 of his blood pressure medications recently and was going to refill it.  This has been a recurrent pattern noted on prior visits.    He denies any voiding concerns.  He denies use of nonsteroidals.  He claims to observe generous hydration and states that he drinks “a lot of water”.  He is up 2-3 times at night for nocturia.        PAST MEDICAL HISTORY:   Past Medical History:   Diagnosis Date   • Blood transfusion, without reported diagnosis    • Central retinal artery occlusion of left eye 6/7/2017   • Degeneration of lumbar or lumbosacral  intervertebral disc     L5 radiculopathy   • Essential hypertension, benign    • Glaucoma    • Gonococcal infection (acute) of lower genitourinary tract     h/o Gonorrhea & trichomonias   • Intervertebral cervical disc disorder with myelopathy, cervical region     C6 radiculopathy   • Intestinal disaccharidase deficiencies and disaccharide malabsorption     lactose intolerance   • Nasal bones, closed fracture 2007   • Other and unspecified hyperlipidemia    • Other specified disorders of rotator cuff syndrome of shoulder and allied disorders     s/p repair, right   • Peptic ulcer, unspecified site, unspecified as acute or chronic, without mention of hemorrhage, perforation, or obstruction     Peptic Ulcer Disease   • Polycystic kidney disease    • Sciatica    • Unspecified asthma(493.90)     Asthma          PAST SURGICAL HISTORY:  Past Surgical History:   Procedure Laterality Date   • Colonoscopy remove lesion by snare  2/3/03    hamartomatous and adenomatous polyps   • Colonoscopy w biopsy  5/31/2012    tubular adenoma and hyperplastic polyps - next exam due in 3 years   • Colonoscopy w biopsy  9/28/2015    Tubular adenima polyps - next exam due in 3 years- Dr May   • Colonoscopy w biopsy  11/09/2018    Tubular adenoma polyps - ext exam due in 3 years- Dr May   • Foot/toes surgery proc unlisted Left 1968    left foot surgery   • Inj procedure sac jt, arth  6/22/05    Oscar/[facility]   • Knee scope,diagnostic Left 9/27/13    Left knee Arthroscopy-Dr Bond   • Open stomach,repr ulcer,suture  1976    gastric ulcer repair   • Repair rotator cuff,chronic Left 4/02    left   • Repair rotator cuff,chronic Right 10/18/12    Right Dr. pryor @ [facility]   • Shoulder arthroscopy Right 12/02    right rotator   • Wedge biopsy of liver  1976    during surgery for PUD.  Biopsy showed fatty liver         MEDICATIONS:  Current Outpatient Medications   Medication   • aspirin (Aspirin 81) 81 MG chewable tablet   • losartan (COZAAR)  100 MG tablet   • amLODIPine (NORVASC) 10 MG tablet   • hydroCORTisone (CORTIZONE) 2.5 % cream   • tiZANidine (ZANAFLEX) 4 MG tablet   • Cholecalciferol (VITAMIN D3) 5000 units capsule   • cloNIDine (CATAPRES) 0.1 MG tablet   • ferrous sulfate 325 (65 FE) MG tablet   • doxazosin (CARDURA) 2 MG tablet   • atorvastatin (LIPITOR) 80 MG tablet   • albuterol 108 (90 Base) MCG/ACT inhaler   • zoster vaccine recomb adjuvanted (SHINGRIX) 50 MCG/0.5ML injection         ALLERGIES:  Aspirin, Codeine camsylate, and Penicillins     REVIEW OF SYSTEMS:   HEENT: No headaches, seizure history, TIA, CVA, syncope or visual complaints.   RESPIRATORY: No dyspnea, cough, asthma or history of pneumonia.   CARDIAC: No chest pain, palpitations, congestive heart failure, angina.   GASTROINTESTINAL: A good appetite, stable weight, no bowel pattern changes, peptic ulcer disease, abdominal pain, rectal bleeding or hepatitis.   LYMPHATICS: No peripheral lymphadenopathy or lymphedema.   NEUROMUSCULAR: No muscle weakness, myoclonus or restless legs.   VASCULAR: No lower extremity claudication, venous thrombosis, or aortic aneurysm.   ENDOCRINE: No goiter, thyroid disease or diabetes.   EXTREMITIES: No peripheral edema or ulcers.  He has complaints of foot cramps 2-3 times per month.  SKIN: No rash.  He does report significant pruritus involving his legs and trunk.      PHYSICAL EXAMINATION:    General: Alert, oriented, no distress.  He is a fair historian and appears well nourished.  When informed of his renal function being about 20% of normal he appeared indifferent.   Vital signs: Blood pressure (!) 178/80, pulse 72, weight 60.3 kg.  His 1st blood pressure reading was 184/82.    Neck: No goiter, jugular venous distention, carotid bruits or lymphadenopathy.   Back: No vertebral or flank tenderness. No kyphosis.   Cardiac: Regular rate and rhythm without gallop, rub or murmur.   Abdomen: Nondistended, nontender, without organomegaly, bruits or  mass.   Extremities: No clubbing, edema, ulceration. Muscle strength testing is grossly normal and pulses are intact.   Neurologic: Nonfocal with cranial nerves being grossly intact.   Joints: No inflammatory changes.    A postvoid bladder scan was performed and demonstrated only 25 mL residual urine.    LABORATORY DATA:  Urinalysis on my review showed a clear, arturo specimen with a specific gravity 1.010.  The dipstick showed 1+ protein and trace of blood.  The the sediment was unremarkable.  The urine was submitted for a protein/creatinine ratio.    Component      Latest Ref Rng & Units 12/30/2020   PROTEIN, URINE (TOTAL)      <12 mg/dL 77 (H)   CREATININE, URINE (TOTAL)      mg/dL 104.00   PROTEIN/CREATININE RATIO      <=199 mgPR/gCR 740 (H)       IMPRESSIONS/PLAN:    1. Polycystic kidney disease.  2. Ongoing smoking.  3. Chronic hypertension. This appears to be under acceptable control at present, when taking his medication. The current level of 170/70 reflects him not having his antihypertensive therapy for 2 weeks.  4. Hyperlipidemia-on therapy.  5. 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 This has shown progression.   6. Degenerative joint disease of the knees.  7. Degenerative disease of the lumbar spine.  8. Vitamin D deficiency on replacement therapy with good correction.  9. Secondary hyperparathyroidism due to chronic kidney disease which is modest and not yet warranting intervention.  10. Modest proteinuria attributable to polycystic kidney disease and/or hypertensive nephropathy.  He needs to remain on the losartan.      Worsening of his chronic kidney disease may be related to simply progression of polycystic kidney disease in combination with hypertensive nephropathy due to what appears to be suboptimal blood pressure control related to compliance.  Additional consideration is that of possible atherosclerotic renal vascular disease given his vascular  risk factors and his current complaint of bilateral calf claudication which is undergoing evaluation.    I have asked the patient to undergo a renal artery duplex to rule out high-grade renal artery stenosis.      I will plan to see him in follow-up in 2 months to reassess his blood pressure control and renal status.  If no identifiable process contributing to his renal decline can be identified, he will need education to be prepared for a clinical action plan of renal replacement.  Labs will be requested to be obtained just prior to that visit in 2 months.       ROBERTO DUNBAR M.D.     VITALS:   T(C): 36.4 (02-10-23 @ 07:22), Max: 36.7 (02-09-23 @ 21:55)  HR: 80 (02-10-23 @ 07:22) (64 - 82)  BP: 101/57 (02-10-23 @ 07:22) (101/57 - 153/65)  RR: 18 (02-10-23 @ 07:22) (16 - 18)  SpO2: 94% (02-10-23 @ 07:22) (94% - 99%)    GENERAL: NAD, lying in bed comfortably  HEAD:  Atraumatic, Normocephalic  EYES: EOMI, PERRLA, conjunctiva and sclera clear  ENT: Moist mucous membranes  NECK: Supple, No JVD  CHEST/LUNG: Clear to auscultation bilaterally; No rales, rhonchi, wheezing, or rubs. Unlabored respirations  HEART: Regular rate and rhythm; No murmurs, rubs, or gallops  ABDOMEN: BSx4; Soft, nontender, nondistended  EXTREMITIES:  . No clubbing, cyanosis, or edema  MSK- Left paraspinal tenderness.   NERVOUS SYSTEM:  A&Ox3, no focal deficits   SKIN: No rashes or lesions

## 2023-02-10 NOTE — PHYSICAL THERAPY INITIAL EVALUATION ADULT - MANUAL MUSCLE TESTING RESULTS, REHAB EVAL
Pt's frailty; L UE grossly 3, R UE & b/l LE grossly 3+/grossly assessed due to Pt's frailty; L UE grossly 3, R UE & b/l LE grossly 3+/5/grossly assessed due to

## 2023-02-10 NOTE — H&P ADULT - HISTORY OF PRESENT ILLNESS
89F PMH HTN, HLD, CHF, cervical ca in 2011, hemorrhoids recent d/c 10 days ago for CHF here p/w back pain. 2 days ago, pt had severe back pain which improved yest after pt was given tylenol and had a large BM. Pt was also having urinary frequency 2 days ago and had hematuria last week; denies dysuria. Today this AM, pt had severe back pain again; had a more difficult time moving but was still able to walk around. Went to PCP's office where she had midline pain although here pt has L flank pain. Last took tylenol 1000mg at 1:30PM. Sent to ED by pcp Dr. Nelson. No f/c, n/v, numbness/tingling, saddle anesthesia, bowel/bladder incontinence, leg swelling
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
## 2023-02-10 NOTE — PHYSICAL THERAPY INITIAL EVALUATION ADULT - NSPTDMEREC_GEN_A_CORE
According to care coordination note 2/10/23, Pt's daughter states that patient has SC, Rolling Walker, 3:1 commode

## 2023-02-10 NOTE — PHYSICAL THERAPY INITIAL EVALUATION ADULT - GAIT DISTANCE, PT EVAL
Pt only able to take 1 side steps to the Right Pt only able to take 1 side steps to the Right secondary to weakness and frailty.

## 2023-02-10 NOTE — PROGRESS NOTE ADULT - ASSESSMENT
89F PMH HTN, HLD, CHF, cervical ca in 2011, hemorrhoids p/w back pain.and urinary symptoms. UA + for UTI.

## 2023-02-10 NOTE — H&P ADULT - PROBLEM SELECTOR PLAN 1
- exam with scoliosis and paraspinal tenderness left sided.   - tylenol  - lidocain patch  - if no relief will consider muscle relaxer carefully due to pts age and frailty,

## 2023-02-10 NOTE — H&P ADULT - NSHPLABSRESULTS_GEN_ALL_CORE
=======================================================  Labs:                        13.3   6.60  )-----------( 226      ( 09 Feb 2023 17:10 )             41.1     02-10    138  |  105  |  58<H>  ----------------------------<  91  4.4   |  26  |  1.52<H>    Ca    9.3      10 Feb 2023 05:08    TPro  6.5  /  Alb  2.9<L>  /  TBili  0.4  /  DBili  x   /  AST  19  /  ALT  20  /  AlkPhos  79  02-09      Creatinine, Serum: 1.52 mg/dL (02-10-23 @ 05:08)  Creatinine, Serum: 1.69 mg/dL (02-09-23 @ 17:10)            WBC Count: 6.60 K/uL (02-09-23 @ 17:10)    SARS-CoV-2 Result: NotDetec (02-09-23 @ 17:10)  SARS-CoV-2 Result: NotDetec (01-27-23 @ 17:30)      Alkaline Phosphatase, Serum: 79 U/L (02-09-23 @ 17:10)  Alanine Aminotransferase (ALT/SGPT): 20 U/L (02-09-23 @ 17:10)  Aspartate Aminotransferase (AST/SGOT): 19 U/L (02-09-23 @ 17:10)  Bilirubin Total, Serum: 0.4 mg/dL (02-09-23 @ 17:10)  < from: US Kidney and Bladder (02.10.23 @ 06:32) >    IMPRESSION:  No hydronephrosis.    Question layering debris in urinary bladder; correlation is recommended   with a urinalysis.    --- End of Report ---    < end of copied text >

## 2023-02-10 NOTE — PHYSICAL THERAPY INITIAL EVALUATION ADULT - TRANSFER TRAINING, PT EVAL
Independent in  transfer ability bed to chair and vice versa using appropriate assistive device  and prevent falls. Pt will be independent in  transfer ability bed to chair and vice versa using appropriate assistive device  and prevent falls. Pt will be independent in  transfer ability bed to chair and vice versa using appropriate assistive device and prevent falls.

## 2023-02-10 NOTE — PROGRESS NOTE ADULT - SUBJECTIVE AND OBJECTIVE BOX
Patient is a 89y old  Female who presents with a chief complaint of back pain , UTI (10 Feb 2023 10:50)      SUBJECTIVE / OVERNIGHT EVENTS: Patient seen and examined at bedside. No acute events overnight. Back pain has resolved    MEDICATIONS  (STANDING):  acetaminophen     Tablet .. 650 milliGRAM(s) Oral once  brinzolamide/brimonidine ophth 1%/0.2% 1 Drop(s) 1 Drop(s) Both EYES two times a day  cefTRIAXone   IVPB 1000 milliGRAM(s) IV Intermittent every 24 hours  clopidogrel Tablet 75 milliGRAM(s) Oral daily  enoxaparin Injectable 30 milliGRAM(s) SubCutaneous every 24 hours  lidocaine   4% Patch 1 Patch Transdermal every 24 hours  losartan 50 milliGRAM(s) Oral daily  metoprolol succinate ER 25 milliGRAM(s) Oral daily  polyethylene glycol 3350 17 Gram(s) Oral daily  senna 2 Tablet(s) Oral at bedtime  sodium chloride 0.9%. 1000 milliLiter(s) (50 mL/Hr) IV Continuous <Continuous>    MEDICATIONS  (PRN):  acetaminophen     Tablet .. 650 milliGRAM(s) Oral every 6 hours PRN Temp greater or equal to 38C (100.4F), Mild Pain (1 - 3)  aluminum hydroxide/magnesium hydroxide/simethicone Suspension 30 milliLiter(s) Oral every 4 hours PRN Dyspepsia  melatonin 3 milliGRAM(s) Oral at bedtime PRN Insomnia  ondansetron Injectable 4 milliGRAM(s) IV Push every 8 hours PRN Nausea and/or Vomiting      CAPILLARY BLOOD GLUCOSE        I&O's Summary      PHYSICAL EXAM:  Vital Signs Last 24 Hrs  T(C): 36.6 (10 Feb 2023 17:18), Max: 36.7 (2023 21:55)  T(F): 97.8 (10 Feb 2023 17:18), Max: 98.1 (2023 21:55)  HR: 89 (10 Feb 2023 17:18) (64 - 89)  BP: 138/70 (10 Feb 2023 17:18) (101/57 - 153/65)  BP(mean): --  RR: 18 (10 Feb 2023 17:18) (16 - 18)  SpO2: 96% (10 Feb 2023 17:18) (94% - 97%)    Parameters below as of 10 Feb 2023 17:18  Patient On (Oxygen Delivery Method): room air        GEN: female in NAD, appears comfortable, no diaphoresis  EYES: No scleral injection, PERRL, EOMI  ENTM: neck supple & symmetric without tracheal deviation, moist membranes, no gross hearing impairment, thyroid gland not enlarged  CV: +S1/S2, no m/r/g, no abdominal bruit, no LE edema  RESP: breathing comfortably, no respiratory accessory muscle use, CTAB, no w/r/r  GI: normoactive BS, soft, NTND, no rebounding/guarding, no palpable masses    LABS:                        13.3   6.60  )-----------( 226      ( 2023 17:10 )             41.1     02-10    138  |  105  |  58<H>  ----------------------------<  91  4.4   |  26  |  1.52<H>    Ca    9.3      10 Feb 2023 05:08    TPro  6.5  /  Alb  2.9<L>  /  TBili  0.4  /  DBili  x   /  AST  19  /  ALT  20  /  AlkPhos  79  02-09          Urinalysis Basic - ( 2023 17:10 )    Color: Yellow / Appearance: Slightly Turbid / S.010 / pH: x  Gluc: x / Ketone: Negative  / Bili: Negative / Urobili: Negative mg/dL   Blood: x / Protein: 15 mg/dL / Nitrite: Negative   Leuk Esterase: Moderate / RBC: 0-2 /HPF / WBC >50   Sq Epi: x / Non Sq Epi: Few / Bacteria: Many          RADIOLOGY & ADDITIONAL TESTS:  Results Reviewed:   Imaging Personally Reviewed:  Electrocardiogram Personally Reviewed:    COORDINATION OF CARE:  Care Discussed with Consultants/Other Providers [Y/N]:  Prior or Outpatient Records Reviewed [Y/N]:

## 2023-02-10 NOTE — PHYSICAL THERAPY INITIAL EVALUATION ADULT - NS ASR RISK AREAS PT EVAL
PHYSICIAN NEXT STEPS:  Review Only    CHIEF COMPLAINT:  Chief Complaint/Protocol Used: Symptom   Onset: N/A      ASSESSMENT:  -------------------------------------------------------    DISPOSITION:  Disposition Recommendation: Unspecified  Patient Directed To: Unspecified  Patient Intended Action: Go to Urgent Care Center      CALL NOTES:  01/10/2020 at 10:11 AM by Amalia Middleton  ? Offered Immediate Care.01/10/2020 at 10:10 AM by Amalia Middleton    DISPOSITION OVERRIDE/PROVIDER CONSULT:  Disposition Override: N/A  Override Source: Unspecified  Consulted with PCP: No  Consulted with On-Call Physician: No    CALLER CONTACT INFO:  Name: Dennys Benjamin (Self)  Phone 1: [phone] (Mobile)  Phone 2: [phone] (Mobile)      ENCOUNTER STARTED:  01/10/20 10:00:09 AM  ENCOUNTER ASSIGNED TO/CLOSED BY:  Amalia Middleton @ 01/10/20 10:11:25 AM      -------------------------------------------------------    -------------------------------------------------------  
fall

## 2023-02-10 NOTE — PHYSICAL THERAPY INITIAL EVALUATION ADULT - GAIT TRAINING, PT EVAL
Pt will be able to ambulate independently using assistive device up to 80 ft or more, be able to negotiate steps safely observing proper gait, posture and prevent falls.

## 2023-02-10 NOTE — PHYSICAL THERAPY INITIAL EVALUATION ADULT - BALANCE TRAINING, PT EVAL
Independent sitting, transfers, standing and ambulation with good balance using appropriate assistive device and prevent falls. Pt will be able to complete independent sitting, transfers, standing and ambulation with good balance using appropriate assistive device and prevent falls.

## 2023-02-10 NOTE — H&P ADULT - ASSESSMENT
89F PMH HTN, HLD, CHF, cervical ca in 2011, hemorrhoids p/w back pain.and urinary symptoms. UA + for UTI. US - for pyelonephritis or hydo.

## 2023-02-10 NOTE — PHYSICAL THERAPY INITIAL EVALUATION ADULT - PERTINENT HX OF CURRENT PROBLEM, REHAB EVAL
89F PMH HTN, HLD, CHF, cervical ca in 2011, hemorrhoids recent d/c 10 days ago for CHF here p/w back pain. 89F PMH HTN, HLD, CHF, cervical ca in 2011, hemorrhoids recent d/c 10 days ago for CHF here p/w back pain related to UTI.

## 2023-02-11 LAB
ANION GAP SERPL CALC-SCNC: 10 MMOL/L — SIGNIFICANT CHANGE UP (ref 5–17)
BUN SERPL-MCNC: 50 MG/DL — HIGH (ref 7–23)
CALCIUM SERPL-MCNC: 9.1 MG/DL — SIGNIFICANT CHANGE UP (ref 8.5–10.1)
CHLORIDE SERPL-SCNC: 108 MMOL/L — SIGNIFICANT CHANGE UP (ref 96–108)
CO2 SERPL-SCNC: 25 MMOL/L — SIGNIFICANT CHANGE UP (ref 22–31)
CREAT SERPL-MCNC: 1.35 MG/DL — HIGH (ref 0.5–1.3)
EGFR: 37 ML/MIN/1.73M2 — LOW
GLUCOSE SERPL-MCNC: 104 MG/DL — HIGH (ref 70–99)
HCT VFR BLD CALC: 41.2 % — SIGNIFICANT CHANGE UP (ref 34.5–45)
HGB BLD-MCNC: 13.3 G/DL — SIGNIFICANT CHANGE UP (ref 11.5–15.5)
MCHC RBC-ENTMCNC: 30.9 PG — SIGNIFICANT CHANGE UP (ref 27–34)
MCHC RBC-ENTMCNC: 32.3 G/DL — SIGNIFICANT CHANGE UP (ref 32–36)
MCV RBC AUTO: 95.8 FL — SIGNIFICANT CHANGE UP (ref 80–100)
NRBC # BLD: 0 /100 WBCS — SIGNIFICANT CHANGE UP (ref 0–0)
PLATELET # BLD AUTO: 265 K/UL — SIGNIFICANT CHANGE UP (ref 150–400)
POTASSIUM SERPL-MCNC: 4.1 MMOL/L — SIGNIFICANT CHANGE UP (ref 3.5–5.3)
POTASSIUM SERPL-SCNC: 4.1 MMOL/L — SIGNIFICANT CHANGE UP (ref 3.5–5.3)
RBC # BLD: 4.3 M/UL — SIGNIFICANT CHANGE UP (ref 3.8–5.2)
RBC # FLD: 13.4 % — SIGNIFICANT CHANGE UP (ref 10.3–14.5)
SODIUM SERPL-SCNC: 143 MMOL/L — SIGNIFICANT CHANGE UP (ref 135–145)
WBC # BLD: 6.42 K/UL — SIGNIFICANT CHANGE UP (ref 3.8–10.5)
WBC # FLD AUTO: 6.42 K/UL — SIGNIFICANT CHANGE UP (ref 3.8–10.5)

## 2023-02-11 PROCEDURE — 99232 SBSQ HOSP IP/OBS MODERATE 35: CPT

## 2023-02-11 RX ORDER — CEFTRIAXONE 500 MG/1
1000 INJECTION, POWDER, FOR SOLUTION INTRAMUSCULAR; INTRAVENOUS EVERY 24 HOURS
Refills: 0 | Status: COMPLETED | OUTPATIENT
Start: 2023-02-12 | End: 2023-02-13

## 2023-02-11 RX ORDER — SODIUM CHLORIDE 9 MG/ML
1000 INJECTION INTRAMUSCULAR; INTRAVENOUS; SUBCUTANEOUS
Refills: 0 | Status: DISCONTINUED | OUTPATIENT
Start: 2023-02-11 | End: 2023-02-13

## 2023-02-11 RX ADMIN — CEFTRIAXONE 100 MILLIGRAM(S): 500 INJECTION, POWDER, FOR SOLUTION INTRAMUSCULAR; INTRAVENOUS at 16:15

## 2023-02-11 RX ADMIN — LIDOCAINE 1 PATCH: 4 CREAM TOPICAL at 06:14

## 2023-02-11 RX ADMIN — Medication 25 MILLIGRAM(S): at 06:14

## 2023-02-11 RX ADMIN — POLYETHYLENE GLYCOL 3350 17 GRAM(S): 17 POWDER, FOR SOLUTION ORAL at 11:13

## 2023-02-11 RX ADMIN — SODIUM CHLORIDE 50 MILLILITER(S): 9 INJECTION INTRAMUSCULAR; INTRAVENOUS; SUBCUTANEOUS at 19:49

## 2023-02-11 RX ADMIN — LOSARTAN POTASSIUM 50 MILLIGRAM(S): 100 TABLET, FILM COATED ORAL at 06:14

## 2023-02-11 RX ADMIN — CLOPIDOGREL BISULFATE 75 MILLIGRAM(S): 75 TABLET, FILM COATED ORAL at 11:12

## 2023-02-11 RX ADMIN — LIDOCAINE 1 PATCH: 4 CREAM TOPICAL at 21:31

## 2023-02-11 RX ADMIN — ENOXAPARIN SODIUM 30 MILLIGRAM(S): 100 INJECTION SUBCUTANEOUS at 06:14

## 2023-02-11 NOTE — PROGRESS NOTE ADULT - SUBJECTIVE AND OBJECTIVE BOX
CHIEF COMPLAINT/INTERVAL HISTORY:    Patient is a 89y old  Female who presents with a chief complaint of back pain , UTI (10 Feb 2023 10:50)      HPI:   89F PMH HTN, HLD, CHF, cervical ca in 2011, hemorrhoids recent d/c 10 days ago for CHF here p/w back pain. 2 days ago, pt had severe back pain which improved yest after pt was given tylenol and had a large BM. Pt was also having urinary frequency 2 days ago and had hematuria last week; denies dysuria. Today this AM, pt had severe back pain again; had a more difficult time moving but was still able to walk around. Went to PCP's office where she had midline pain although here pt has L flank pain. Last took tylenol 1000mg at 1:30PM. Sent to ED by pcp Dr. Nelson. No f/c, n/v, numbness/tingling, saddle anesthesia, bowel/bladder incontinence, leg swelling (10 Feb 2023 01:02)      SUBJECTIVE & OBJECTIVE: Pt seen and examined at bedside.   denies any more back pain  denies CP/ sob      Vital Signs Last 24 Hrs  T(C): 36.7 (11 Feb 2023 10:22), Max: 36.7 (11 Feb 2023 00:28)  T(F): 98.1 (11 Feb 2023 10:22), Max: 98.1 (11 Feb 2023 00:28)  HR: 89 (11 Feb 2023 10:22) (88 - 95)  BP: 148/72 (11 Feb 2023 10:22) (144/72 - 157/80)  BP(mean): --  ABP: --  ABP(mean): --  RR: 18 (11 Feb 2023 10:22) (18 - 18)  SpO2: 96% (11 Feb 2023 10:22) (93% - 96%)    O2 Parameters below as of 11 Feb 2023 10:22  Patient On (Oxygen Delivery Method): room air              MEDICATIONS  (STANDING):  acetaminophen     Tablet .. 650 milliGRAM(s) Oral once  brinzolamide/brimonidine ophth 1%/0.2% 1 Drop(s) 1 Drop(s) Both EYES two times a day  clopidogrel Tablet 75 milliGRAM(s) Oral daily  enoxaparin Injectable 30 milliGRAM(s) SubCutaneous every 24 hours  lidocaine   4% Patch 1 Patch Transdermal every 24 hours  losartan 50 milliGRAM(s) Oral daily  metoprolol succinate ER 25 milliGRAM(s) Oral daily  polyethylene glycol 3350 17 Gram(s) Oral daily  senna 2 Tablet(s) Oral at bedtime  sodium chloride 0.9%. 1000 milliLiter(s) (50 mL/Hr) IV Continuous <Continuous>    MEDICATIONS  (PRN):  acetaminophen     Tablet .. 650 milliGRAM(s) Oral every 6 hours PRN Temp greater or equal to 38C (100.4F), Mild Pain (1 - 3)  aluminum hydroxide/magnesium hydroxide/simethicone Suspension 30 milliLiter(s) Oral every 4 hours PRN Dyspepsia  melatonin 3 milliGRAM(s) Oral at bedtime PRN Insomnia  ondansetron Injectable 4 milliGRAM(s) IV Push every 8 hours PRN Nausea and/or Vomiting        PHYSICAL EXAM:    GENERAL: NAD, well-developed  HEAD:  Atraumatic, Normocephalic  EYES: EOMI, PERRLA, conjunctiva and sclera clear  ENMT: Moist mucous membranes  NECK: Supple  CHEST/LUNG: Clear to auscultation bilaterally; No rales, rhonchi, wheezing, or rubs  HEART: S1, S2  ABDOMEN: Soft, Nontender, Bowel sounds present  EXTREMITIES:no cyanosis, or edema    LABS:                        13.3   6.42  )-----------( 265      ( 11 Feb 2023 06:25 )             41.2     02-11    143  |  108  |  50<H>  ----------------------------<  104<H>  4.1   |  25  |  1.35<H>    Ca    9.1      11 Feb 2023 06:25         CHIEF COMPLAINT/INTERVAL HISTORY:    Patient is a 89y old  Female who presents with a chief complaint of back pain , UTI (10 Feb 2023 10:50)      HPI:   89F PMH HTN, HLD, CHF, cervical ca in 2011, hemorrhoids recent d/c 10 days ago for CHF here p/w back pain. 2 days ago, pt had severe back pain which improved yest after pt was given tylenol and had a large BM. Pt was also having urinary frequency 2 days ago and had hematuria last week; denies dysuria. Today this AM, pt had severe back pain again; had a more difficult time moving but was still able to walk around. Went to PCP's office where she had midline pain although here pt has L flank pain. Last took tylenol 1000mg at 1:30PM. Sent to ED by pcp Dr. Nelson. No f/c, n/v, numbness/tingling, saddle anesthesia, bowel/bladder incontinence, leg swelling (10 Feb 2023 01:02)      SUBJECTIVE & OBJECTIVE: Pt seen and examined at bedside.   denies any more back pain  denies CP/ sob   c/o poor appetite- does not feel like eating      Vital Signs Last 24 Hrs  T(C): 36.7 (11 Feb 2023 10:22), Max: 36.7 (11 Feb 2023 00:28)  T(F): 98.1 (11 Feb 2023 10:22), Max: 98.1 (11 Feb 2023 00:28)  HR: 89 (11 Feb 2023 10:22) (88 - 95)  BP: 148/72 (11 Feb 2023 10:22) (144/72 - 157/80)  BP(mean): --  ABP: --  ABP(mean): --  RR: 18 (11 Feb 2023 10:22) (18 - 18)  SpO2: 96% (11 Feb 2023 10:22) (93% - 96%)    O2 Parameters below as of 11 Feb 2023 10:22  Patient On (Oxygen Delivery Method): room air              MEDICATIONS  (STANDING):  acetaminophen     Tablet .. 650 milliGRAM(s) Oral once  brinzolamide/brimonidine ophth 1%/0.2% 1 Drop(s) 1 Drop(s) Both EYES two times a day  clopidogrel Tablet 75 milliGRAM(s) Oral daily  enoxaparin Injectable 30 milliGRAM(s) SubCutaneous every 24 hours  lidocaine   4% Patch 1 Patch Transdermal every 24 hours  losartan 50 milliGRAM(s) Oral daily  metoprolol succinate ER 25 milliGRAM(s) Oral daily  polyethylene glycol 3350 17 Gram(s) Oral daily  senna 2 Tablet(s) Oral at bedtime  sodium chloride 0.9%. 1000 milliLiter(s) (50 mL/Hr) IV Continuous <Continuous>    MEDICATIONS  (PRN):  acetaminophen     Tablet .. 650 milliGRAM(s) Oral every 6 hours PRN Temp greater or equal to 38C (100.4F), Mild Pain (1 - 3)  aluminum hydroxide/magnesium hydroxide/simethicone Suspension 30 milliLiter(s) Oral every 4 hours PRN Dyspepsia  melatonin 3 milliGRAM(s) Oral at bedtime PRN Insomnia  ondansetron Injectable 4 milliGRAM(s) IV Push every 8 hours PRN Nausea and/or Vomiting        PHYSICAL EXAM:    GENERAL: NAD, well-developed  HEAD:  Atraumatic, Normocephalic  EYES: EOMI, PERRLA, conjunctiva and sclera clear  ENMT: Moist mucous membranes  NECK: Supple  CHEST/LUNG: Clear to auscultation bilaterally; No rales, rhonchi, wheezing, or rubs  HEART: S1, S2  ABDOMEN: Soft, Nontender, Bowel sounds present  EXTREMITIES:no cyanosis, or edema    LABS:                        13.3   6.42  )-----------( 265      ( 11 Feb 2023 06:25 )             41.2     02-11    143  |  108  |  50<H>  ----------------------------<  104<H>  4.1   |  25  |  1.35<H>    Ca    9.1      11 Feb 2023 06:25

## 2023-02-11 NOTE — PROGRESS NOTE ADULT - PROBLEM SELECTOR PLAN 4
Holding Lasix 20 mg PO as patient appears very hypovolemic  monitor creat trend- improving Holding Lasix 20 mg PO as patient appears very hypovolemic  monitor creat trend- improving  gentle IV hydration as pt w/ poor appetite

## 2023-02-11 NOTE — PROGRESS NOTE ADULT - ASSESSMENT
89F PMH HTN, HLD, CHF, cervical ca in 2011, hemorrhoids p/w back pain.and urinary symptoms. UA + for UTI. 89F PMH HTN, HLD, CHF, cervical ca in 2011, hemorrhoids, depression  p/w back pain.and urinary symptoms. UA + for UTI.

## 2023-02-12 LAB
-  AMIKACIN: SIGNIFICANT CHANGE UP
-  AMOXICILLIN/CLAVULANIC ACID: SIGNIFICANT CHANGE UP
-  AMPICILLIN/SULBACTAM: SIGNIFICANT CHANGE UP
-  AMPICILLIN: SIGNIFICANT CHANGE UP
-  AZTREONAM: SIGNIFICANT CHANGE UP
-  CEFAZOLIN: SIGNIFICANT CHANGE UP
-  CEFEPIME: SIGNIFICANT CHANGE UP
-  CEFOXITIN: SIGNIFICANT CHANGE UP
-  CEFTRIAXONE: SIGNIFICANT CHANGE UP
-  CEFUROXIME: SIGNIFICANT CHANGE UP
-  CIPROFLOXACIN: SIGNIFICANT CHANGE UP
-  ERTAPENEM: SIGNIFICANT CHANGE UP
-  GENTAMICIN: SIGNIFICANT CHANGE UP
-  IMIPENEM: SIGNIFICANT CHANGE UP
-  LEVOFLOXACIN: SIGNIFICANT CHANGE UP
-  MEROPENEM: SIGNIFICANT CHANGE UP
-  NITROFURANTOIN: SIGNIFICANT CHANGE UP
-  PIPERACILLIN/TAZOBACTAM: SIGNIFICANT CHANGE UP
-  TOBRAMYCIN: SIGNIFICANT CHANGE UP
-  TRIMETHOPRIM/SULFAMETHOXAZOLE: SIGNIFICANT CHANGE UP
ANION GAP SERPL CALC-SCNC: 8 MMOL/L — SIGNIFICANT CHANGE UP (ref 5–17)
BUN SERPL-MCNC: 37 MG/DL — HIGH (ref 7–23)
CALCIUM SERPL-MCNC: 8.7 MG/DL — SIGNIFICANT CHANGE UP (ref 8.5–10.1)
CHLORIDE SERPL-SCNC: 114 MMOL/L — HIGH (ref 96–108)
CO2 SERPL-SCNC: 23 MMOL/L — SIGNIFICANT CHANGE UP (ref 22–31)
CREAT SERPL-MCNC: 1.06 MG/DL — SIGNIFICANT CHANGE UP (ref 0.5–1.3)
CULTURE RESULTS: SIGNIFICANT CHANGE UP
EGFR: 49 ML/MIN/1.73M2 — LOW
GLUCOSE SERPL-MCNC: 107 MG/DL — HIGH (ref 70–99)
HCT VFR BLD CALC: 39.1 % — SIGNIFICANT CHANGE UP (ref 34.5–45)
HGB BLD-MCNC: 12.4 G/DL — SIGNIFICANT CHANGE UP (ref 11.5–15.5)
MCHC RBC-ENTMCNC: 31 PG — SIGNIFICANT CHANGE UP (ref 27–34)
MCHC RBC-ENTMCNC: 31.7 G/DL — LOW (ref 32–36)
MCV RBC AUTO: 97.8 FL — SIGNIFICANT CHANGE UP (ref 80–100)
METHOD TYPE: SIGNIFICANT CHANGE UP
NRBC # BLD: 0 /100 WBCS — SIGNIFICANT CHANGE UP (ref 0–0)
ORGANISM # SPEC MICROSCOPIC CNT: SIGNIFICANT CHANGE UP
ORGANISM # SPEC MICROSCOPIC CNT: SIGNIFICANT CHANGE UP
PLATELET # BLD AUTO: 238 K/UL — SIGNIFICANT CHANGE UP (ref 150–400)
POTASSIUM SERPL-MCNC: 3.9 MMOL/L — SIGNIFICANT CHANGE UP (ref 3.5–5.3)
POTASSIUM SERPL-SCNC: 3.9 MMOL/L — SIGNIFICANT CHANGE UP (ref 3.5–5.3)
RBC # BLD: 4 M/UL — SIGNIFICANT CHANGE UP (ref 3.8–5.2)
RBC # FLD: 13.4 % — SIGNIFICANT CHANGE UP (ref 10.3–14.5)
SODIUM SERPL-SCNC: 145 MMOL/L — SIGNIFICANT CHANGE UP (ref 135–145)
SPECIMEN SOURCE: SIGNIFICANT CHANGE UP
WBC # BLD: 4.88 K/UL — SIGNIFICANT CHANGE UP (ref 3.8–10.5)
WBC # FLD AUTO: 4.88 K/UL — SIGNIFICANT CHANGE UP (ref 3.8–10.5)

## 2023-02-12 PROCEDURE — 99232 SBSQ HOSP IP/OBS MODERATE 35: CPT

## 2023-02-12 RX ADMIN — ENOXAPARIN SODIUM 30 MILLIGRAM(S): 100 INJECTION SUBCUTANEOUS at 05:36

## 2023-02-12 RX ADMIN — LOSARTAN POTASSIUM 50 MILLIGRAM(S): 100 TABLET, FILM COATED ORAL at 05:37

## 2023-02-12 RX ADMIN — POLYETHYLENE GLYCOL 3350 17 GRAM(S): 17 POWDER, FOR SOLUTION ORAL at 13:21

## 2023-02-12 RX ADMIN — LIDOCAINE 1 PATCH: 4 CREAM TOPICAL at 05:37

## 2023-02-12 RX ADMIN — Medication 25 MILLIGRAM(S): at 05:37

## 2023-02-12 RX ADMIN — CLOPIDOGREL BISULFATE 75 MILLIGRAM(S): 75 TABLET, FILM COATED ORAL at 13:21

## 2023-02-12 RX ADMIN — SENNA PLUS 2 TABLET(S): 8.6 TABLET ORAL at 21:46

## 2023-02-12 RX ADMIN — LIDOCAINE 1 PATCH: 4 CREAM TOPICAL at 06:54

## 2023-02-12 RX ADMIN — CEFTRIAXONE 100 MILLIGRAM(S): 500 INJECTION, POWDER, FOR SOLUTION INTRAMUSCULAR; INTRAVENOUS at 15:43

## 2023-02-12 NOTE — PROGRESS NOTE ADULT - PROBLEM SELECTOR PLAN 4
Holding Lasix 20 mg PO as patient appears very hypovolemic  monitor creat trend- improving now to baseline   gentle IV hydration as pt w/ poor appetite- may taper off once eating  and drinking well

## 2023-02-12 NOTE — PROGRESS NOTE ADULT - PROBLEM SELECTOR PLAN 2
- tx with IV CTX x 3 days  urine cx growing GNR- Ximena   will c/w IV CTX for now till urine cx finalized
- tx with IV CTX x 3 days  urine cx growing GNR  will c/w IV CTX for now till urine cx finalized
-Ceftriaxone for UTI (treat for 3 days for cystitis)  -Follow up urine culture

## 2023-02-12 NOTE — PROGRESS NOTE ADULT - PROBLEM SELECTOR PLAN 1
- exam with scoliosis and paraspinal tenderness left sided.   - Tylenol PRN  - lidocaine patch

## 2023-02-12 NOTE — PROGRESS NOTE ADULT - ASSESSMENT
89F PMH HTN, HLD, CHF, cervical ca in 2011, hemorrhoids, depression  p/w back pain.and urinary symptoms. UA + for UTI.

## 2023-02-12 NOTE — PROGRESS NOTE ADULT - SUBJECTIVE AND OBJECTIVE BOX
CHIEF COMPLAINT/INTERVAL HISTORY:    Patient is a 89y old  Female who presents with a chief complaint of back pain , UTI (11 Feb 2023 17:24)      HPI:   89F PMH HTN, HLD, CHF, cervical ca in 2011, hemorrhoids recent d/c 10 days ago for CHF here p/w back pain. 2 days ago, pt had severe back pain which improved yest after pt was given tylenol and had a large BM. Pt was also having urinary frequency 2 days ago and had hematuria last week; denies dysuria. Today this AM, pt had severe back pain again; had a more difficult time moving but was still able to walk around. Went to PCP's office where she had midline pain although here pt has L flank pain. Last took tylenol 1000mg at 1:30PM. Sent to ED by pcp Dr. Nelson. No f/c, n/v, numbness/tingling, saddle anesthesia, bowel/bladder incontinence, leg swelling (10 Feb 2023 01:02)    Overnight issues  Patient sitting up in be having breakfast  Feeling ok  No fever Chills over night          SUBJECTIVE & OBJECTIVE: Pt seen and examined at bedside.   ROS: all other negative   ICU Vital Signs Last 24 Hrs  T(C): 36.6 (12 Feb 2023 05:33), Max: 36.9 (11 Feb 2023 17:46)  T(F): 97.9 (12 Feb 2023 05:33), Max: 98.5 (11 Feb 2023 17:46)  HR: 77 (12 Feb 2023 05:33) (69 - 89)  BP: 160/70 (12 Feb 2023 05:33) (135/61 - 160/70)  BP(mean): --  ABP: --  ABP(mean): --  RR: 20 (12 Feb 2023 05:33) (18 - 20)  SpO2: 95% (12 Feb 2023 05:33) (91% - 96%)    O2 Parameters below as of 12 Feb 2023 05:33  Patient On (Oxygen Delivery Method): room air              MEDICATIONS  (STANDING):  acetaminophen     Tablet .. 650 milliGRAM(s) Oral once  brinzolamide/brimonidine ophth 1%/0.2% 1 Drop(s) 1 Drop(s) Both EYES two times a day  cefTRIAXone   IVPB 1000 milliGRAM(s) IV Intermittent every 24 hours  clopidogrel Tablet 75 milliGRAM(s) Oral daily  enoxaparin Injectable 30 milliGRAM(s) SubCutaneous every 24 hours  lidocaine   4% Patch 1 Patch Transdermal every 24 hours  losartan 50 milliGRAM(s) Oral daily  metoprolol succinate ER 25 milliGRAM(s) Oral daily  polyethylene glycol 3350 17 Gram(s) Oral daily  senna 2 Tablet(s) Oral at bedtime  sodium chloride 0.9%. 1000 milliLiter(s) (50 mL/Hr) IV Continuous <Continuous>    MEDICATIONS  (PRN):  acetaminophen     Tablet .. 650 milliGRAM(s) Oral every 6 hours PRN Temp greater or equal to 38C (100.4F), Mild Pain (1 - 3)  aluminum hydroxide/magnesium hydroxide/simethicone Suspension 30 milliLiter(s) Oral every 4 hours PRN Dyspepsia  melatonin 3 milliGRAM(s) Oral at bedtime PRN Insomnia  ondansetron Injectable 4 milliGRAM(s) IV Push every 8 hours PRN Nausea and/or Vomiting        PHYSICAL EXAM:    GENERAL: NAD, well-groomed, well-developed  HEAD:  Atraumatic, Normocephalic  EYES: EOMI, PERRLA, conjunctiva and sclera clear  ENMT: Moist mucous membranes  NECK: Supple, No JVD  NERVOUS SYSTEM:  Awake , moving all 4 limbs   CHEST/LUNG: Bilaterally good air entry   HEART: S1 S2 heard no gallop  ABDOMEN: Soft, Nontender, Nondistended; Bowel sounds present  EXTREMITIES:  No edema    LABS:                        12.4   4.88  )-----------( 238      ( 12 Feb 2023 07:15 )             39.1     02-12    145  |  114<H>  |  37<H>  ----------------------------<  107<H>  3.9   |  23  |  1.06    Ca    8.7      12 Feb 2023 07:15            CAPILLARY BLOOD GLUCOSE          RECENT CULTURES:      RADIOLOGY & ADDITIONAL TESTS:  Imaging Personally Reviewed:  [ ] YES      Consultant(s) Notes Reviewed:  [ ] YES     Care Discussed with [ ] Consultants [X ] Patient [ ] Family  []    [ ]  Other; RN  HEALTH ISSUES - PROBLEM Dx:  Back pain    Acute UTI    HTN (hypertension)    Chronic CHF          DVT/GI ppx  Discussed with pt @ bedside

## 2023-02-12 NOTE — PROGRESS NOTE ADULT - NSPROGADDITIONALINFOA_GEN_ALL_CORE
prognosis guarded    sq lovenox for vte ppx
PT recommend YANN. Will discuss with daughter
prognosis guarded    sq lovenox for vte ppx

## 2023-02-13 LAB
ANION GAP SERPL CALC-SCNC: 9 MMOL/L — SIGNIFICANT CHANGE UP (ref 5–17)
BUN SERPL-MCNC: 27 MG/DL — HIGH (ref 7–23)
CALCIUM SERPL-MCNC: 8.4 MG/DL — LOW (ref 8.5–10.1)
CHLORIDE SERPL-SCNC: 117 MMOL/L — HIGH (ref 96–108)
CO2 SERPL-SCNC: 20 MMOL/L — LOW (ref 22–31)
CREAT SERPL-MCNC: 0.93 MG/DL — SIGNIFICANT CHANGE UP (ref 0.5–1.3)
EGFR: 57 ML/MIN/1.73M2 — LOW
GLUCOSE SERPL-MCNC: 108 MG/DL — HIGH (ref 70–99)
HCT VFR BLD CALC: 39.6 % — SIGNIFICANT CHANGE UP (ref 34.5–45)
HGB BLD-MCNC: 12.4 G/DL — SIGNIFICANT CHANGE UP (ref 11.5–15.5)
MCHC RBC-ENTMCNC: 30.9 PG — SIGNIFICANT CHANGE UP (ref 27–34)
MCHC RBC-ENTMCNC: 31.3 G/DL — LOW (ref 32–36)
MCV RBC AUTO: 98.8 FL — SIGNIFICANT CHANGE UP (ref 80–100)
NRBC # BLD: 0 /100 WBCS — SIGNIFICANT CHANGE UP (ref 0–0)
PLATELET # BLD AUTO: 238 K/UL — SIGNIFICANT CHANGE UP (ref 150–400)
POTASSIUM SERPL-MCNC: 4.1 MMOL/L — SIGNIFICANT CHANGE UP (ref 3.5–5.3)
POTASSIUM SERPL-SCNC: 4.1 MMOL/L — SIGNIFICANT CHANGE UP (ref 3.5–5.3)
RBC # BLD: 4.01 M/UL — SIGNIFICANT CHANGE UP (ref 3.8–5.2)
RBC # FLD: 13.3 % — SIGNIFICANT CHANGE UP (ref 10.3–14.5)
SODIUM SERPL-SCNC: 146 MMOL/L — HIGH (ref 135–145)
WBC # BLD: 4.93 K/UL — SIGNIFICANT CHANGE UP (ref 3.8–10.5)
WBC # FLD AUTO: 4.93 K/UL — SIGNIFICANT CHANGE UP (ref 3.8–10.5)

## 2023-02-13 PROCEDURE — 99233 SBSQ HOSP IP/OBS HIGH 50: CPT

## 2023-02-13 RX ADMIN — Medication 650 MILLIGRAM(S): at 22:26

## 2023-02-13 RX ADMIN — CEFTRIAXONE 100 MILLIGRAM(S): 500 INJECTION, POWDER, FOR SOLUTION INTRAMUSCULAR; INTRAVENOUS at 18:24

## 2023-02-13 RX ADMIN — ENOXAPARIN SODIUM 30 MILLIGRAM(S): 100 INJECTION SUBCUTANEOUS at 05:28

## 2023-02-13 RX ADMIN — CLOPIDOGREL BISULFATE 75 MILLIGRAM(S): 75 TABLET, FILM COATED ORAL at 11:19

## 2023-02-13 RX ADMIN — Medication 25 MILLIGRAM(S): at 05:29

## 2023-02-13 RX ADMIN — LIDOCAINE 1 PATCH: 4 CREAM TOPICAL at 05:27

## 2023-02-13 RX ADMIN — POLYETHYLENE GLYCOL 3350 17 GRAM(S): 17 POWDER, FOR SOLUTION ORAL at 11:20

## 2023-02-13 RX ADMIN — LOSARTAN POTASSIUM 50 MILLIGRAM(S): 100 TABLET, FILM COATED ORAL at 05:29

## 2023-02-13 RX ADMIN — Medication 3 MILLIGRAM(S): at 22:26

## 2023-02-13 NOTE — PHARMACOTHERAPY INTERVENTION NOTE - COMMENTS
Updated penicillin allergy to state that patient tolerated ceftriaxone on this admission with no documented reaction.

## 2023-02-13 NOTE — PROGRESS NOTE ADULT - ASSESSMENT
89F PMH HTN, HLD, CHF, cervical ca in 2011, hemorrhoids, depression  p/w back pain.and urinary symptoms. UA + for UTI improved.    Problem/Plan - 1:  ·  Problem: Back pain.   ·  Plan: - exam with scoliosis and paraspinal tenderness left sided.   - Tylenol PRN  - lidocaine patch.    Problem/Plan - 2:  ·  Problem: Acute UTI.   ·  Plan: - tx with IV CTX x 3 days  urine cx growing GNR- Klebsiella pan sensitive. Last dose today.    Problem/Plan - 3:  ·  Problem: HTN (hypertension).   ·  Plan: losartan  metoprolol.    Problem/Plan - 4:  ·  Problem: Chronic CHF.   ·  Plan: Holding Lasix 20 mg PO as patient appears very hypovolemic  monitor creat trend- improving now to baseline   gentle IV hydration as pt w/ poor appetite- may taper off once eating and drinking well.

## 2023-02-13 NOTE — PROGRESS NOTE ADULT - SUBJECTIVE AND OBJECTIVE BOX
Patient is a 89y old  Female who presents with a chief complaint of back pain , UTI (12 Feb 2023 09:34)    INTERVAL HPI/OVERNIGHT EVENTS: Patients seen and examined at bedside this morning. No acute events overnight. Pt tolerating more PO intake.    MEDICATIONS  (STANDING):  acetaminophen     Tablet .. 650 milliGRAM(s) Oral once  brinzolamide/brimonidine ophth 1%/0.2% 1 Drop(s) 1 Drop(s) Both EYES two times a day  cefTRIAXone   IVPB 1000 milliGRAM(s) IV Intermittent every 24 hours  clopidogrel Tablet 75 milliGRAM(s) Oral daily  enoxaparin Injectable 30 milliGRAM(s) SubCutaneous every 24 hours  lidocaine   4% Patch 1 Patch Transdermal every 24 hours  losartan 50 milliGRAM(s) Oral daily  metoprolol succinate ER 25 milliGRAM(s) Oral daily  polyethylene glycol 3350 17 Gram(s) Oral daily  senna 2 Tablet(s) Oral at bedtime    MEDICATIONS  (PRN):  acetaminophen     Tablet .. 650 milliGRAM(s) Oral every 6 hours PRN Temp greater or equal to 38C (100.4F), Mild Pain (1 - 3)  aluminum hydroxide/magnesium hydroxide/simethicone Suspension 30 milliLiter(s) Oral every 4 hours PRN Dyspepsia  melatonin 3 milliGRAM(s) Oral at bedtime PRN Insomnia  ondansetron Injectable 4 milliGRAM(s) IV Push every 8 hours PRN Nausea and/or Vomiting    Allergies    penicillin (Hives)    Intolerances      REVIEW OF SYSTEMS:  All other systems reviewed and are negative    Vital Signs Last 24 Hrs  T(C): 36.4 (13 Feb 2023 11:00), Max: 36.9 (12 Feb 2023 23:15)  T(F): 97.6 (13 Feb 2023 11:00), Max: 98.5 (12 Feb 2023 23:15)  HR: 84 (13 Feb 2023 11:00) (82 - 95)  BP: 126/78 (13 Feb 2023 11:00) (126/78 - 169/74)  BP(mean): --  RR: 18 (13 Feb 2023 11:00) (18 - 18)  SpO2: 95% (13 Feb 2023 11:00) (92% - 97%)    Parameters below as of 13 Feb 2023 11:00  Patient On (Oxygen Delivery Method): room air      Daily     Daily   I&O's Summary    12 Feb 2023 07:01  -  13 Feb 2023 07:00  --------------------------------------------------------  IN: 0 mL / OUT: 1 mL / NET: -1 mL      CAPILLARY BLOOD GLUCOSE        PHYSICAL EXAM:  GENERAL: NAD, well-groomed, well-developed  HEAD:  Atraumatic, Normocephalic  EYES: EOMI, PERRLA, conjunctiva and sclera clear  ENMT: Moist mucous membranes  NECK: Supple, No JVD  NERVOUS SYSTEM:  Awake , moving all 4 limbs   CHEST/LUNG: Bilaterally good air entry   HEART: S1 S2 heard no gallop  ABDOMEN: Soft, Nontender, Nondistended; Bowel sounds present  EXTREMITIES:  No edema      Labs                          12.4   4.93  )-----------( 238      ( 13 Feb 2023 06:45 )             39.6     02-13    146<H>  |  117<H>  |  27<H>  ----------------------------<  108<H>  4.1   |  20<L>  |  0.93    Ca    8.4<L>      13 Feb 2023 06:45                              Radiology and Imaging reviewed.

## 2023-02-14 ENCOUNTER — TRANSCRIPTION ENCOUNTER (OUTPATIENT)
Age: 88
End: 2023-02-14

## 2023-02-14 VITALS
HEART RATE: 65 BPM | RESPIRATION RATE: 18 BRPM | OXYGEN SATURATION: 97 % | TEMPERATURE: 98 F | SYSTOLIC BLOOD PRESSURE: 153 MMHG | DIASTOLIC BLOOD PRESSURE: 74 MMHG

## 2023-02-14 LAB
FLUAV AG NPH QL: SIGNIFICANT CHANGE UP
FLUBV AG NPH QL: SIGNIFICANT CHANGE UP
SARS-COV-2 RNA SPEC QL NAA+PROBE: SIGNIFICANT CHANGE UP

## 2023-02-14 PROCEDURE — 99239 HOSP IP/OBS DSCHRG MGMT >30: CPT

## 2023-02-14 RX ORDER — POLYETHYLENE GLYCOL 3350 17 G/17G
17 POWDER, FOR SOLUTION ORAL
Qty: 0 | Refills: 0 | DISCHARGE
Start: 2023-02-14

## 2023-02-14 RX ORDER — METOPROLOL TARTRATE 50 MG
1 TABLET ORAL
Qty: 0 | Refills: 0 | DISCHARGE
Start: 2023-02-14

## 2023-02-14 RX ORDER — PREGABALIN 225 MG/1
1 CAPSULE ORAL
Qty: 0 | Refills: 0 | DISCHARGE

## 2023-02-14 RX ORDER — METOPROLOL TARTRATE 50 MG
1 TABLET ORAL
Qty: 0 | Refills: 0 | DISCHARGE

## 2023-02-14 RX ORDER — ACETAMINOPHEN 500 MG
2 TABLET ORAL
Qty: 0 | Refills: 0 | DISCHARGE
Start: 2023-02-14

## 2023-02-14 RX ORDER — LOSARTAN POTASSIUM 100 MG/1
1 TABLET, FILM COATED ORAL
Qty: 0 | Refills: 0 | DISCHARGE
Start: 2023-02-14

## 2023-02-14 RX ORDER — SENNA PLUS 8.6 MG/1
2 TABLET ORAL
Qty: 0 | Refills: 0 | DISCHARGE
Start: 2023-02-14

## 2023-02-14 RX ORDER — CLOPIDOGREL BISULFATE 75 MG/1
1 TABLET, FILM COATED ORAL
Qty: 0 | Refills: 0 | DISCHARGE
Start: 2023-02-14

## 2023-02-14 RX ORDER — PANTOPRAZOLE SODIUM 20 MG/1
1 TABLET, DELAYED RELEASE ORAL
Qty: 0 | Refills: 0 | DISCHARGE

## 2023-02-14 RX ORDER — LIDOCAINE 4 G/100G
1 CREAM TOPICAL
Qty: 0 | Refills: 0 | DISCHARGE
Start: 2023-02-14

## 2023-02-14 RX ORDER — CHOLECALCIFEROL (VITAMIN D3) 125 MCG
1 CAPSULE ORAL
Qty: 0 | Refills: 0 | DISCHARGE

## 2023-02-14 RX ORDER — LANOLIN ALCOHOL/MO/W.PET/CERES
1 CREAM (GRAM) TOPICAL
Qty: 0 | Refills: 0 | DISCHARGE
Start: 2023-02-14

## 2023-02-14 RX ADMIN — LIDOCAINE 1 PATCH: 4 CREAM TOPICAL at 07:18

## 2023-02-14 RX ADMIN — Medication 650 MILLIGRAM(S): at 03:30

## 2023-02-14 RX ADMIN — CLOPIDOGREL BISULFATE 75 MILLIGRAM(S): 75 TABLET, FILM COATED ORAL at 12:25

## 2023-02-14 RX ADMIN — ENOXAPARIN SODIUM 30 MILLIGRAM(S): 100 INJECTION SUBCUTANEOUS at 05:32

## 2023-02-14 RX ADMIN — LOSARTAN POTASSIUM 50 MILLIGRAM(S): 100 TABLET, FILM COATED ORAL at 05:34

## 2023-02-14 RX ADMIN — LIDOCAINE 1 PATCH: 4 CREAM TOPICAL at 05:31

## 2023-02-14 RX ADMIN — Medication 25 MILLIGRAM(S): at 05:34

## 2023-02-14 RX ADMIN — POLYETHYLENE GLYCOL 3350 17 GRAM(S): 17 POWDER, FOR SOLUTION ORAL at 12:26

## 2023-02-14 NOTE — DISCHARGE NOTE PROVIDER - NSDCCPCAREPLAN_GEN_ALL_CORE_FT
PRINCIPAL DISCHARGE DIAGNOSIS  Diagnosis: Pyelonephritis  Assessment and Plan of Treatment:       SECONDARY DISCHARGE DIAGNOSES  Diagnosis: Difficulty walking  Assessment and Plan of Treatment:

## 2023-02-14 NOTE — DISCHARGE NOTE PROVIDER - NSDCMRMEDTOKEN_GEN_ALL_CORE_FT
acetaminophen 325 mg oral tablet: 2 tab(s) orally once  acetaminophen 325 mg oral tablet: 2 tab(s) orally every 6 hours, As needed, Temp greater or equal to 38C (100.4F), Mild Pain (1 - 3)  aluminum hydroxide-magnesium hydroxide 200 mg-200 mg/5 mL oral suspension: 30 milliliter(s) orally every 4 hours, As needed, Dyspepsia  Centrum: 1  orally once a day  clopidogrel 75 mg oral tablet: 1 tab(s) orally once a day  freetext medication     -: 1 drop(s) to each affected eye 2 times a day  lidocaine 4% topical film: Apply topically to affected area once a day  losartan 50 mg oral tablet: 1 tab(s) orally once a day  melatonin 3 mg oral tablet: 1 tab(s) orally once a day (at bedtime), As needed, Insomnia  metoprolol succinate 25 mg oral tablet, extended release: 1 tab(s) orally once a day  polyethylene glycol 3350 oral powder for reconstitution: 17 gram(s) orally once a day  senna leaf extract oral tablet: 2 tab(s) orally once a day (at bedtime)  Simbrinza 1%- 0.2% ophthalmic suspension: 1 drop(s) to each affected eye 2 times a day  tolterodine 4 mg oral capsule, extended release: 1 cap(s) orally once a day  VESIcare 10 mg oral tablet: 1 tab(s) orally once a day

## 2023-02-14 NOTE — DISCHARGE NOTE PROVIDER - ATTENDING DISCHARGE PHYSICAL EXAMINATION:
GENERAL: NAD, well-groomed, well-developed  HEAD:  Atraumatic, Normocephalic  EYES: EOMI, PERRLA, conjunctiva and sclera clear  ENMT: Moist mucous membranes  NECK: Supple, No JVD  NERVOUS SYSTEM:  Awake , moving all 4 limbs   CHEST/LUNG: Bilaterally good air entry   HEART: S1 S2 heard no gallop  ABDOMEN: Soft, Nontender, Nondistended; Bowel sounds present  EXTREMITIES:  No edema

## 2023-02-14 NOTE — DISCHARGE NOTE NURSING/CASE MANAGEMENT/SOCIAL WORK - PATIENT PORTAL LINK FT
You can access the FollowMyHealth Patient Portal offered by [facility] by registering at the following website: [email or web address]. By joining [facility]’s FollowMyHealth portal, you will also be able to view your health information using other applications (apps) compatible with our system.

## 2023-02-14 NOTE — DISCHARGE NOTE NURSING/CASE MANAGEMENT/SOCIAL WORK - NSDCPEFALRISK_GEN_ALL_CORE
For information on Fall & Injury Prevention, visit: [email or web address] OR  [email or web address] OR  https://www.cdc.gov/steadi/patient.html

## 2023-02-14 NOTE — DISCHARGE NOTE PROVIDER - HOSPITAL COURSE
89F PMH HTN, HLD, CHF, cervical ca in 2011, hemorrhoids, depression  p/w back pain.and urinary symptoms. UA + for UTI improved clinically stable for discharge to rehab.     Problem/Plan - 1:  ·  Problem: Back pain.   ·  Plan: - exam with scoliosis and paraspinal tenderness left sided.   - Tylenol PRN  - lidocaine patch.    Problem/Plan - 2:  ·  Problem: Acute UTI.   ·  Plan: - tx with IV CTX x 3 days  urine cx growing GNR- Klebsiella pan sensitive. Last dose today.    Problem/Plan - 3:  ·  Problem: HTN (hypertension).   ·  Plan: losartan  metoprolol.    Problem/Plan - 4:  ·  Problem: Chronic CHF.   ·  Plan: Holding Lasix 20 mg PO as patient appears very hypovolemic  monitor creat trend- improving now to baseline   gentle IV hydration as pt w/ poor appetite- may taper off once eating and drinking well.

## 2023-02-15 ENCOUNTER — TRANSCRIPTION ENCOUNTER (OUTPATIENT)
Age: 88
End: 2023-02-15

## 2023-02-17 DIAGNOSIS — E86.1 HYPOVOLEMIA: ICD-10-CM

## 2023-02-17 DIAGNOSIS — I11.0 HYPERTENSIVE HEART DISEASE WITH HEART FAILURE: ICD-10-CM

## 2023-02-17 DIAGNOSIS — Z85.41 PERSONAL HISTORY OF MALIGNANT NEOPLASM OF CERVIX UTERI: ICD-10-CM

## 2023-02-17 DIAGNOSIS — I50.9 HEART FAILURE, UNSPECIFIED: ICD-10-CM

## 2023-02-17 DIAGNOSIS — N12 TUBULO-INTERSTITIAL NEPHRITIS, NOT SPECIFIED AS ACUTE OR CHRONIC: ICD-10-CM

## 2023-02-17 DIAGNOSIS — Z88.0 ALLERGY STATUS TO PENICILLIN: ICD-10-CM

## 2023-02-23 ENCOUNTER — TRANSCRIPTION ENCOUNTER (OUTPATIENT)
Age: 88
End: 2023-02-23

## 2023-02-28 ENCOUNTER — APPOINTMENT (OUTPATIENT)
Dept: RADIOLOGY | Facility: HOSPITAL | Age: 88
End: 2023-02-28

## 2023-02-28 ENCOUNTER — OUTPATIENT (OUTPATIENT)
Dept: OUTPATIENT SERVICES | Facility: HOSPITAL | Age: 88
LOS: 1 days | Discharge: HOME | End: 2023-02-28
Payer: MEDICARE

## 2023-02-28 DIAGNOSIS — R13.10 DYSPHAGIA, UNSPECIFIED: ICD-10-CM

## 2023-02-28 DIAGNOSIS — Z98.89 OTHER SPECIFIED POSTPROCEDURAL STATES: Chronic | ICD-10-CM

## 2023-02-28 PROCEDURE — 70371 SPEECH EVALUATION COMPLEX: CPT | Mod: 26

## 2023-03-01 ENCOUNTER — TRANSCRIPTION ENCOUNTER (OUTPATIENT)
Age: 88
End: 2023-03-01

## 2023-03-10 ENCOUNTER — INPATIENT (INPATIENT)
Facility: HOSPITAL | Age: 88
LOS: 4 days | Discharge: NURSING, REHAB OR LONG-TERM CARE | End: 2023-03-15
Attending: INTERNAL MEDICINE | Admitting: INTERNAL MEDICINE
Payer: MEDICARE

## 2023-03-10 VITALS
OXYGEN SATURATION: 96 % | RESPIRATION RATE: 19 BRPM | DIASTOLIC BLOOD PRESSURE: 81 MMHG | SYSTOLIC BLOOD PRESSURE: 180 MMHG | HEART RATE: 75 BPM | HEIGHT: 60 IN | WEIGHT: 110.01 LBS

## 2023-03-10 DIAGNOSIS — G93.41 METABOLIC ENCEPHALOPATHY: ICD-10-CM

## 2023-03-10 DIAGNOSIS — Z86.73 PERSONAL HISTORY OF TRANSIENT ISCHEMIC ATTACK (TIA), AND CEREBRAL INFARCTION WITHOUT RESIDUAL DEFICITS: ICD-10-CM

## 2023-03-10 DIAGNOSIS — I50.23 ACUTE ON CHRONIC SYSTOLIC (CONGESTIVE) HEART FAILURE: ICD-10-CM

## 2023-03-10 DIAGNOSIS — Z98.89 OTHER SPECIFIED POSTPROCEDURAL STATES: Chronic | ICD-10-CM

## 2023-03-10 DIAGNOSIS — J96.01 ACUTE RESPIRATORY FAILURE WITH HYPOXIA: ICD-10-CM

## 2023-03-10 DIAGNOSIS — I10 ESSENTIAL (PRIMARY) HYPERTENSION: ICD-10-CM

## 2023-03-10 LAB
ALBUMIN SERPL ELPH-MCNC: 3 G/DL — LOW (ref 3.3–5)
ALP SERPL-CCNC: 80 U/L — SIGNIFICANT CHANGE UP (ref 40–120)
ALT FLD-CCNC: 22 U/L — SIGNIFICANT CHANGE UP (ref 12–78)
ANION GAP SERPL CALC-SCNC: 6 MMOL/L — SIGNIFICANT CHANGE UP (ref 5–17)
APPEARANCE UR: CLEAR — SIGNIFICANT CHANGE UP
APTT BLD: 32.4 SEC — SIGNIFICANT CHANGE UP (ref 27.5–35.5)
AST SERPL-CCNC: 16 U/L — SIGNIFICANT CHANGE UP (ref 15–37)
BACTERIA # UR AUTO: NEGATIVE — SIGNIFICANT CHANGE UP
BASOPHILS # BLD AUTO: 0.04 K/UL — SIGNIFICANT CHANGE UP (ref 0–0.2)
BASOPHILS NFR BLD AUTO: 0.8 % — SIGNIFICANT CHANGE UP (ref 0–2)
BILIRUB SERPL-MCNC: 0.3 MG/DL — SIGNIFICANT CHANGE UP (ref 0.2–1.2)
BILIRUB UR-MCNC: NEGATIVE — SIGNIFICANT CHANGE UP
BUN SERPL-MCNC: 28 MG/DL — HIGH (ref 7–23)
CALCIUM SERPL-MCNC: 9.2 MG/DL — SIGNIFICANT CHANGE UP (ref 8.5–10.1)
CHLORIDE SERPL-SCNC: 109 MMOL/L — HIGH (ref 96–108)
CO2 SERPL-SCNC: 25 MMOL/L — SIGNIFICANT CHANGE UP (ref 22–31)
COLOR SPEC: YELLOW — SIGNIFICANT CHANGE UP
CREAT SERPL-MCNC: 1.05 MG/DL — SIGNIFICANT CHANGE UP (ref 0.5–1.3)
D DIMER BLD IA.RAPID-MCNC: 616 NG/ML DDU — HIGH
DIFF PNL FLD: NEGATIVE — SIGNIFICANT CHANGE UP
EGFR: 50 ML/MIN/1.73M2 — LOW
EOSINOPHIL # BLD AUTO: 0.1 K/UL — SIGNIFICANT CHANGE UP (ref 0–0.5)
EOSINOPHIL NFR BLD AUTO: 2.1 % — SIGNIFICANT CHANGE UP (ref 0–6)
EPI CELLS # UR: NEGATIVE — SIGNIFICANT CHANGE UP
FLUAV AG NPH QL: SIGNIFICANT CHANGE UP
FLUBV AG NPH QL: SIGNIFICANT CHANGE UP
GLUCOSE SERPL-MCNC: 98 MG/DL — SIGNIFICANT CHANGE UP (ref 70–99)
GLUCOSE UR QL: NEGATIVE MG/DL — SIGNIFICANT CHANGE UP
HCT VFR BLD CALC: 41.4 % — SIGNIFICANT CHANGE UP (ref 34.5–45)
HGB BLD-MCNC: 13.1 G/DL — SIGNIFICANT CHANGE UP (ref 11.5–15.5)
IMM GRANULOCYTES NFR BLD AUTO: 0.2 % — SIGNIFICANT CHANGE UP (ref 0–0.9)
INR BLD: 0.97 RATIO — SIGNIFICANT CHANGE UP (ref 0.88–1.16)
KETONES UR-MCNC: NEGATIVE — SIGNIFICANT CHANGE UP
LEUKOCYTE ESTERASE UR-ACNC: NEGATIVE — SIGNIFICANT CHANGE UP
LYMPHOCYTES # BLD AUTO: 0.7 K/UL — LOW (ref 1–3.3)
LYMPHOCYTES # BLD AUTO: 14.8 % — SIGNIFICANT CHANGE UP (ref 13–44)
MCHC RBC-ENTMCNC: 30.8 PG — SIGNIFICANT CHANGE UP (ref 27–34)
MCHC RBC-ENTMCNC: 31.6 G/DL — LOW (ref 32–36)
MCV RBC AUTO: 97.4 FL — SIGNIFICANT CHANGE UP (ref 80–100)
MONOCYTES # BLD AUTO: 0.45 K/UL — SIGNIFICANT CHANGE UP (ref 0–0.9)
MONOCYTES NFR BLD AUTO: 9.5 % — SIGNIFICANT CHANGE UP (ref 2–14)
NEUTROPHILS # BLD AUTO: 3.43 K/UL — SIGNIFICANT CHANGE UP (ref 1.8–7.4)
NEUTROPHILS NFR BLD AUTO: 72.6 % — SIGNIFICANT CHANGE UP (ref 43–77)
NITRITE UR-MCNC: NEGATIVE — SIGNIFICANT CHANGE UP
NRBC # BLD: 0 /100 WBCS — SIGNIFICANT CHANGE UP (ref 0–0)
NT-PROBNP SERPL-SCNC: HIGH PG/ML (ref 0–450)
PH UR: 6.5 — SIGNIFICANT CHANGE UP (ref 5–8)
PLATELET # BLD AUTO: 212 K/UL — SIGNIFICANT CHANGE UP (ref 150–400)
POTASSIUM SERPL-MCNC: 4.6 MMOL/L — SIGNIFICANT CHANGE UP (ref 3.5–5.3)
POTASSIUM SERPL-SCNC: 4.6 MMOL/L — SIGNIFICANT CHANGE UP (ref 3.5–5.3)
PROT SERPL-MCNC: 6.1 GM/DL — SIGNIFICANT CHANGE UP (ref 6–8.3)
PROT UR-MCNC: 15 MG/DL
PROTHROM AB SERPL-ACNC: 11.5 SEC — SIGNIFICANT CHANGE UP (ref 10.5–13.4)
RBC # BLD: 4.25 M/UL — SIGNIFICANT CHANGE UP (ref 3.8–5.2)
RBC # FLD: 14.4 % — SIGNIFICANT CHANGE UP (ref 10.3–14.5)
RBC CASTS # UR COMP ASSIST: NEGATIVE /HPF — SIGNIFICANT CHANGE UP (ref 0–4)
SARS-COV-2 RNA SPEC QL NAA+PROBE: SIGNIFICANT CHANGE UP
SODIUM SERPL-SCNC: 140 MMOL/L — SIGNIFICANT CHANGE UP (ref 135–145)
SP GR SPEC: 1.01 — SIGNIFICANT CHANGE UP (ref 1.01–1.02)
TROPONIN I, HIGH SENSITIVITY RESULT: 52.1 NG/L — SIGNIFICANT CHANGE UP
UROBILINOGEN FLD QL: NEGATIVE MG/DL — SIGNIFICANT CHANGE UP
WBC # BLD: 4.73 K/UL — SIGNIFICANT CHANGE UP (ref 3.8–10.5)
WBC # FLD AUTO: 4.73 K/UL — SIGNIFICANT CHANGE UP (ref 3.8–10.5)
WBC UR QL: NEGATIVE — SIGNIFICANT CHANGE UP

## 2023-03-10 PROCEDURE — 99285 EMERGENCY DEPT VISIT HI MDM: CPT

## 2023-03-10 PROCEDURE — 93010 ELECTROCARDIOGRAM REPORT: CPT

## 2023-03-10 PROCEDURE — 99223 1ST HOSP IP/OBS HIGH 75: CPT

## 2023-03-10 PROCEDURE — 71045 X-RAY EXAM CHEST 1 VIEW: CPT | Mod: 26

## 2023-03-10 RX ORDER — POLYETHYLENE GLYCOL 3350 17 G/17G
17 POWDER, FOR SOLUTION ORAL DAILY
Refills: 0 | Status: DISCONTINUED | OUTPATIENT
Start: 2023-03-10 | End: 2023-03-15

## 2023-03-10 RX ORDER — FUROSEMIDE 40 MG
40 TABLET ORAL ONCE
Refills: 0 | Status: COMPLETED | OUTPATIENT
Start: 2023-03-10 | End: 2023-03-10

## 2023-03-10 RX ORDER — OXYBUTYNIN CHLORIDE 5 MG
10 TABLET ORAL DAILY
Refills: 0 | Status: DISCONTINUED | OUTPATIENT
Start: 2023-03-10 | End: 2023-03-15

## 2023-03-10 RX ORDER — LOSARTAN POTASSIUM 100 MG/1
50 TABLET, FILM COATED ORAL DAILY
Refills: 0 | Status: DISCONTINUED | OUTPATIENT
Start: 2023-03-10 | End: 2023-03-12

## 2023-03-10 RX ORDER — METOPROLOL TARTRATE 50 MG
25 TABLET ORAL DAILY
Refills: 0 | Status: DISCONTINUED | OUTPATIENT
Start: 2023-03-10 | End: 2023-03-15

## 2023-03-10 RX ORDER — ACETAMINOPHEN 500 MG
650 TABLET ORAL EVERY 6 HOURS
Refills: 0 | Status: DISCONTINUED | OUTPATIENT
Start: 2023-03-10 | End: 2023-03-15

## 2023-03-10 RX ORDER — LANOLIN ALCOHOL/MO/W.PET/CERES
3 CREAM (GRAM) TOPICAL AT BEDTIME
Refills: 0 | Status: DISCONTINUED | OUTPATIENT
Start: 2023-03-10 | End: 2023-03-15

## 2023-03-10 RX ORDER — ONDANSETRON 8 MG/1
4 TABLET, FILM COATED ORAL EVERY 8 HOURS
Refills: 0 | Status: DISCONTINUED | OUTPATIENT
Start: 2023-03-10 | End: 2023-03-15

## 2023-03-10 RX ORDER — FUROSEMIDE 40 MG
40 TABLET ORAL DAILY
Refills: 0 | Status: DISCONTINUED | OUTPATIENT
Start: 2023-03-11 | End: 2023-03-11

## 2023-03-10 RX ORDER — SENNA PLUS 8.6 MG/1
2 TABLET ORAL AT BEDTIME
Refills: 0 | Status: DISCONTINUED | OUTPATIENT
Start: 2023-03-10 | End: 2023-03-15

## 2023-03-10 RX ORDER — MULTIVIT-MIN/FERROUS GLUCONATE 9 MG/15 ML
15 LIQUID (ML) ORAL DAILY
Refills: 0 | Status: DISCONTINUED | OUTPATIENT
Start: 2023-03-10 | End: 2023-03-15

## 2023-03-10 RX ORDER — CLOPIDOGREL BISULFATE 75 MG/1
75 TABLET, FILM COATED ORAL DAILY
Refills: 0 | Status: DISCONTINUED | OUTPATIENT
Start: 2023-03-10 | End: 2023-03-15

## 2023-03-10 RX ADMIN — Medication 0.1 MILLIGRAM(S): at 20:56

## 2023-03-10 RX ADMIN — Medication 40 MILLIGRAM(S): at 21:06

## 2023-03-10 RX ADMIN — SENNA PLUS 2 TABLET(S): 8.6 TABLET ORAL at 21:48

## 2023-03-10 NOTE — ED ADULT NURSE NOTE - NSIMPLEMENTINTERV_GEN_ALL_ED
Implemented All Fall Risk Interventions:  [place] to call system. Call bell, personal items and telephone within reach. Instruct patient to call for assistance. Room bathroom lighting operational. Non-slip footwear when patient is off stretcher. Physically safe environment: no spills, clutter or unnecessary equipment. Stretcher in lowest position, wheels locked, appropriate side rails in place. Provide visual cue, wrist band, yellow gown, etc. Monitor gait and stability. Monitor for mental status changes and reorient to person, place, and time. Review medications for side effects contributing to fall risk. Reinforce activity limits and safety measures with patient and family.

## 2023-03-10 NOTE — H&P ADULT - HISTORY OF PRESENT ILLNESS
This is a 89 year old female with PMH of HFrEF, CVA with left sided residual weakness, depression, HTN presenting from [facility] due to lethargy and sob for 1 day. Patient is still aao x 3 and denies cp, palpitations, syncope, orthopnea, pnd, cough, f/c, abd pain, n/v/d/c dysuria. no recent uri symptoms or sick contacts. On presentation with /81, )2 sat 90% ra. labs significant only for pBNP 20K. CXR congested, ekg nonischemic. treated in ed with clonidine .1 and lasix 40 IV. TTE 1/29/23 EF 40-45% G2DD with WMA and mod

## 2023-03-10 NOTE — H&P ADULT - NSHPPHYSICALEXAM_GEN_ALL_CORE
constitutional: NAD AAOx3  HEENT: PERRLA EOMI  CV: RRR S1S2  Pulm: bibasilar crackles   GI: soft nontender nondistended + BS   Neuro: CN II-XII grossly intact   Musculoskeletal: trace pedal edema

## 2023-03-10 NOTE — H&P ADULT - ASSESSMENT
This is a 89 year old female with PMH of HFrEF, CVA with left sided residual weakness, depression, HTN presenting from [facility] due to lethargy and sob for 1 day. Patient is still aao x 3 and denies cp, palpitations, syncope, orthopnea, pnd, cough, f/c, abd pain, n/v/d/c dysuria. no recent uri symptoms or sick contacts. On presentation with /81, )2 sat 90% ra. labs significant only for pBNP 20K. CXR congested, ekg nonischemic. treated in ed with clonidine .1 and lasix 40 IV. TTE 1/29/23 EF 40-45% G2DD with WMA and mod MR.     acute on chronic HFrEF exacerbation   mild metabolic encephalopathy   acute hypoxic respiratory failure   hx cva   HTN  -admit to telemetry  -clinically volume overloaded, creat 1. cxr congested. diurese with lasix 40 iv bid   -supplemental o2 as needed for sat >92%  -monitor k/phos/mag and replete as needed   -resume home meds including toprol and losartan   -TTE 2 mo ago reviewed  -tsh, lipid panel, a1c   -low na diet   -scds  -pt

## 2023-03-10 NOTE — PATIENT PROFILE ADULT - FALL HARM RISK - HARM RISK INTERVENTIONS

## 2023-03-10 NOTE — PATIENT PROFILE ADULT - NSTRANSFERBELONGINGSRESP_GEN_A_NUR
[FreeTextEntry1] : 2/15/23\par Plan - skin sub. applied/veil over, change above veil, xtrasorb/dynaflex\par discussed doing repeat vascular testing, last ultrasound 2 yrs ago, maybe changes in leg that is contributing to this wound not closing\par follow up 2 weeks\par nurse orders given, nurse to send in picture after 1 week to see if product still over wound bed
yes

## 2023-03-10 NOTE — ED PROVIDER NOTE - CLINICAL SUMMARY MEDICAL DECISION MAKING FREE TEXT BOX
CHF exacerbation otherwise will give lasix in ED - new O2 requirement noted at 90% on RA, no distress after nasal cannula placed and O2 sat to 97%. Will admit for further care.

## 2023-03-10 NOTE — ED PROVIDER NOTE - OBJECTIVE STATEMENT
89 year old female with PMH of CHF, CVA with left sided residual weakness, depression, HTN otherwise presenting to ED from [facility] due to decreased responsiveness, and shortness of breath noted at [facility]. No recent cough or URI symptoms or known sick contact. No fever/chills.

## 2023-03-10 NOTE — ED ADULT NURSE NOTE - CHIEF COMPLAINT QUOTE
patient brought in from [facility] for difficulty breathing and hypertension , patient A&Ox3 denied any pain or disc at this time

## 2023-03-11 DIAGNOSIS — H40.9 UNSPECIFIED GLAUCOMA: ICD-10-CM

## 2023-03-11 DIAGNOSIS — I50.33 ACUTE ON CHRONIC DIASTOLIC (CONGESTIVE) HEART FAILURE: ICD-10-CM

## 2023-03-11 LAB
A1C WITH ESTIMATED AVERAGE GLUCOSE RESULT: 6.3 % — HIGH (ref 4–5.6)
ANION GAP SERPL CALC-SCNC: 5 MMOL/L — SIGNIFICANT CHANGE UP (ref 5–17)
BUN SERPL-MCNC: 28 MG/DL — HIGH (ref 7–23)
CALCIUM SERPL-MCNC: 9.1 MG/DL — SIGNIFICANT CHANGE UP (ref 8.5–10.1)
CHLORIDE SERPL-SCNC: 111 MMOL/L — HIGH (ref 96–108)
CHOLEST SERPL-MCNC: 166 MG/DL — SIGNIFICANT CHANGE UP
CO2 SERPL-SCNC: 26 MMOL/L — SIGNIFICANT CHANGE UP (ref 22–31)
CREAT SERPL-MCNC: 1.07 MG/DL — SIGNIFICANT CHANGE UP (ref 0.5–1.3)
CULTURE RESULTS: NO GROWTH — SIGNIFICANT CHANGE UP
EGFR: 48 ML/MIN/1.73M2 — LOW
ESTIMATED AVERAGE GLUCOSE: 134 MG/DL — HIGH (ref 68–114)
GLUCOSE SERPL-MCNC: 98 MG/DL — SIGNIFICANT CHANGE UP (ref 70–99)
HCT VFR BLD CALC: 39.7 % — SIGNIFICANT CHANGE UP (ref 34.5–45)
HDLC SERPL-MCNC: 49 MG/DL — LOW
HGB BLD-MCNC: 12.9 G/DL — SIGNIFICANT CHANGE UP (ref 11.5–15.5)
LIPID PNL WITH DIRECT LDL SERPL: 96 MG/DL — SIGNIFICANT CHANGE UP
MAGNESIUM SERPL-MCNC: 2.4 MG/DL — SIGNIFICANT CHANGE UP (ref 1.6–2.6)
MCHC RBC-ENTMCNC: 30.9 PG — SIGNIFICANT CHANGE UP (ref 27–34)
MCHC RBC-ENTMCNC: 32.5 G/DL — SIGNIFICANT CHANGE UP (ref 32–36)
MCV RBC AUTO: 95 FL — SIGNIFICANT CHANGE UP (ref 80–100)
NON HDL CHOLESTEROL: 117 MG/DL — SIGNIFICANT CHANGE UP
NRBC # BLD: 0 /100 WBCS — SIGNIFICANT CHANGE UP (ref 0–0)
PHOSPHATE SERPL-MCNC: 3.7 MG/DL — SIGNIFICANT CHANGE UP (ref 2.5–4.5)
PLATELET # BLD AUTO: 211 K/UL — SIGNIFICANT CHANGE UP (ref 150–400)
POTASSIUM SERPL-MCNC: 4.1 MMOL/L — SIGNIFICANT CHANGE UP (ref 3.5–5.3)
POTASSIUM SERPL-SCNC: 4.1 MMOL/L — SIGNIFICANT CHANGE UP (ref 3.5–5.3)
RBC # BLD: 4.18 M/UL — SIGNIFICANT CHANGE UP (ref 3.8–5.2)
RBC # FLD: 14.3 % — SIGNIFICANT CHANGE UP (ref 10.3–14.5)
SODIUM SERPL-SCNC: 142 MMOL/L — SIGNIFICANT CHANGE UP (ref 135–145)
SPECIMEN SOURCE: SIGNIFICANT CHANGE UP
TRIGL SERPL-MCNC: 107 MG/DL — SIGNIFICANT CHANGE UP
TSH SERPL-MCNC: 3.84 UU/ML — HIGH (ref 0.36–3.74)
WBC # BLD: 3.78 K/UL — LOW (ref 3.8–10.5)
WBC # FLD AUTO: 3.78 K/UL — LOW (ref 3.8–10.5)

## 2023-03-11 PROCEDURE — 99233 SBSQ HOSP IP/OBS HIGH 50: CPT

## 2023-03-11 RX ORDER — FUROSEMIDE 40 MG
40 TABLET ORAL DAILY
Refills: 0 | Status: DISCONTINUED | OUTPATIENT
Start: 2023-03-11 | End: 2023-03-12

## 2023-03-11 RX ORDER — BRIMONIDINE TARTRATE 2 MG/MG
1 SOLUTION/ DROPS OPHTHALMIC
Refills: 0 | Status: DISCONTINUED | OUTPATIENT
Start: 2023-03-11 | End: 2023-03-15

## 2023-03-11 RX ORDER — DORZOLAMIDE HYDROCHLORIDE 20 MG/ML
1 SOLUTION/ DROPS OPHTHALMIC
Refills: 0 | Status: DISCONTINUED | OUTPATIENT
Start: 2023-03-11 | End: 2023-03-15

## 2023-03-11 RX ADMIN — LOSARTAN POTASSIUM 50 MILLIGRAM(S): 100 TABLET, FILM COATED ORAL at 05:54

## 2023-03-11 RX ADMIN — Medication 25 MILLIGRAM(S): at 05:54

## 2023-03-11 RX ADMIN — Medication 15 MILLILITER(S): at 11:35

## 2023-03-11 RX ADMIN — DORZOLAMIDE HYDROCHLORIDE 1 DROP(S): 20 SOLUTION/ DROPS OPHTHALMIC at 20:57

## 2023-03-11 RX ADMIN — Medication 40 MILLIGRAM(S): at 10:46

## 2023-03-11 RX ADMIN — POLYETHYLENE GLYCOL 3350 17 GRAM(S): 17 POWDER, FOR SOLUTION ORAL at 11:35

## 2023-03-11 RX ADMIN — Medication 40 MILLIGRAM(S): at 05:54

## 2023-03-11 RX ADMIN — BRIMONIDINE TARTRATE 1 DROP(S): 2 SOLUTION/ DROPS OPHTHALMIC at 17:09

## 2023-03-11 RX ADMIN — Medication 10 MILLIGRAM(S): at 11:36

## 2023-03-11 RX ADMIN — CLOPIDOGREL BISULFATE 75 MILLIGRAM(S): 75 TABLET, FILM COATED ORAL at 11:36

## 2023-03-11 RX ADMIN — SENNA PLUS 2 TABLET(S): 8.6 TABLET ORAL at 22:05

## 2023-03-11 NOTE — PHYSICAL THERAPY INITIAL EVALUATION ADULT - IMPAIRMENTS CONTRIBUTING IMPAIRED BED MOBILITY, REHAB EVAL
January 16, 2020      Yumiko Perez, DO  [address] [facility] [address]  [place] [address]           [place] [facility]  [address] [facility]  [place] [address]  Phone: [phone]  Fax: [phone]          Patient: Magdiel Arriaga   MR Number: [id number]   YOB: 1996   Date of Visit: 1/16/2020       Dear Dr. Yumiko Perez:    Thank you for referring Magdiel Arriaga to me for evaluation. Attached you will find relevant portions of my assessment and plan of care.    If you have questions, please do not hesitate to call me. I look forward to following Magdiel Arriaga along with you.    Sincerely,    Suhas Wolff, OD    Enclosure  CC:  No Recipients    If you would like to receive this communication electronically, please contact [email or web address] or [phone] to request more information on [facility] [employer] access.    For providers and/or their staff who would like to refer a patient to [facility], please contact us through our one-stop-shop provider referral line, [facility], at [phone].    If you feel you have received this communication in error or would no longer like to receive these types of communications, please e-mail [email or web address]          impaired balance/decreased flexibility/narrow base of support/decreased strength

## 2023-03-11 NOTE — PHYSICAL THERAPY INITIAL EVALUATION ADULT - GENERAL OBSERVATIONS, REHAB EVAL
Summary: Chart reviewed, Pt found in semireclining in bed ,NAD,pt vitals stable, + hep lock, +primafit,+2 L NC,

## 2023-03-11 NOTE — PHYSICAL THERAPY INITIAL EVALUATION ADULT - PERTINENT HX OF CURRENT PROBLEM, REHAB EVAL
As per ED provider notes A 89 year old female with PMH of CHF, CVA with left sided residual weakness, depression, HTN otherwise presenting to ED from [facility] due to decreased responsiveness, and shortness of breath noted at [facility]. No recent cough or URI symptoms or known sick contact. No fever/chills.

## 2023-03-11 NOTE — PROGRESS NOTE ADULT - ASSESSMENT
89F with PMHx of HFpEF (grade II DD), prior CVA, and HTN presenting from [facility] for hypertension and SOB. On prior admission Lasix was held due to dehydration, but never restarted on YANN. Per daughter was hypertensive prior to being transferred here. CXR with gross pulmonary edema. Likely flash pulmonary edema & acute-on-chronic HFpEF.

## 2023-03-11 NOTE — PHYSICAL THERAPY INITIAL EVALUATION ADULT - ADDITIONAL COMMENTS
As per ED provider notes: A Pt resides in a private home with her daughter, 5 steps to enter, chair lift to the second. Daughter states that Patient has a walker, rollator, cane, wheelchair, and shower seat.

## 2023-03-12 DIAGNOSIS — R79.89 OTHER SPECIFIED ABNORMAL FINDINGS OF BLOOD CHEMISTRY: ICD-10-CM

## 2023-03-12 LAB
ANION GAP SERPL CALC-SCNC: 4 MMOL/L — LOW (ref 5–17)
BUN SERPL-MCNC: 35 MG/DL — HIGH (ref 7–23)
CALCIUM SERPL-MCNC: 8.9 MG/DL — SIGNIFICANT CHANGE UP (ref 8.5–10.1)
CHLORIDE SERPL-SCNC: 109 MMOL/L — HIGH (ref 96–108)
CO2 SERPL-SCNC: 26 MMOL/L — SIGNIFICANT CHANGE UP (ref 22–31)
CREAT SERPL-MCNC: 1.34 MG/DL — HIGH (ref 0.5–1.3)
EGFR: 37 ML/MIN/1.73M2 — LOW
GLUCOSE SERPL-MCNC: 101 MG/DL — HIGH (ref 70–99)
HCT VFR BLD CALC: 42.7 % — SIGNIFICANT CHANGE UP (ref 34.5–45)
HGB BLD-MCNC: 13.7 G/DL — SIGNIFICANT CHANGE UP (ref 11.5–15.5)
MAGNESIUM SERPL-MCNC: 2.4 MG/DL — SIGNIFICANT CHANGE UP (ref 1.6–2.6)
MCHC RBC-ENTMCNC: 30.6 PG — SIGNIFICANT CHANGE UP (ref 27–34)
MCHC RBC-ENTMCNC: 32.1 G/DL — SIGNIFICANT CHANGE UP (ref 32–36)
MCV RBC AUTO: 95.5 FL — SIGNIFICANT CHANGE UP (ref 80–100)
NRBC # BLD: 0 /100 WBCS — SIGNIFICANT CHANGE UP (ref 0–0)
PHOSPHATE SERPL-MCNC: 3.6 MG/DL — SIGNIFICANT CHANGE UP (ref 2.5–4.5)
PLATELET # BLD AUTO: 224 K/UL — SIGNIFICANT CHANGE UP (ref 150–400)
POTASSIUM SERPL-MCNC: 3.9 MMOL/L — SIGNIFICANT CHANGE UP (ref 3.5–5.3)
POTASSIUM SERPL-SCNC: 3.9 MMOL/L — SIGNIFICANT CHANGE UP (ref 3.5–5.3)
RBC # BLD: 4.47 M/UL — SIGNIFICANT CHANGE UP (ref 3.8–5.2)
RBC # FLD: 14.2 % — SIGNIFICANT CHANGE UP (ref 10.3–14.5)
SODIUM SERPL-SCNC: 139 MMOL/L — SIGNIFICANT CHANGE UP (ref 135–145)
WBC # BLD: 5.51 K/UL — SIGNIFICANT CHANGE UP (ref 3.8–10.5)
WBC # FLD AUTO: 5.51 K/UL — SIGNIFICANT CHANGE UP (ref 3.8–10.5)

## 2023-03-12 PROCEDURE — 99233 SBSQ HOSP IP/OBS HIGH 50: CPT

## 2023-03-12 RX ADMIN — Medication 15 MILLILITER(S): at 11:04

## 2023-03-12 RX ADMIN — Medication 3 MILLIGRAM(S): at 20:09

## 2023-03-12 RX ADMIN — DORZOLAMIDE HYDROCHLORIDE 1 DROP(S): 20 SOLUTION/ DROPS OPHTHALMIC at 20:10

## 2023-03-12 RX ADMIN — CLOPIDOGREL BISULFATE 75 MILLIGRAM(S): 75 TABLET, FILM COATED ORAL at 11:04

## 2023-03-12 RX ADMIN — LOSARTAN POTASSIUM 50 MILLIGRAM(S): 100 TABLET, FILM COATED ORAL at 06:32

## 2023-03-12 RX ADMIN — Medication 40 MILLIGRAM(S): at 06:32

## 2023-03-12 RX ADMIN — BRIMONIDINE TARTRATE 1 DROP(S): 2 SOLUTION/ DROPS OPHTHALMIC at 06:32

## 2023-03-12 RX ADMIN — DORZOLAMIDE HYDROCHLORIDE 1 DROP(S): 20 SOLUTION/ DROPS OPHTHALMIC at 07:39

## 2023-03-12 RX ADMIN — POLYETHYLENE GLYCOL 3350 17 GRAM(S): 17 POWDER, FOR SOLUTION ORAL at 11:04

## 2023-03-12 RX ADMIN — BRIMONIDINE TARTRATE 1 DROP(S): 2 SOLUTION/ DROPS OPHTHALMIC at 17:38

## 2023-03-12 RX ADMIN — Medication 25 MILLIGRAM(S): at 06:32

## 2023-03-12 RX ADMIN — Medication 10 MILLIGRAM(S): at 11:04

## 2023-03-12 NOTE — PROGRESS NOTE ADULT - ASSESSMENT
89F with PMHx of HFpEF (grade II DD), prior CVA, and HTN presenting from [facility] for hypertension and SOB. On prior admission Lasix was held due to dehydration, but never restarted on YANN. Per daughter was hypertensive prior to being transferred here. CXR with gross pulmonary edema. Likely flash pulmonary edema & acute-on-chronic HFpEF.

## 2023-03-12 NOTE — PROGRESS NOTE ADULT - PROBLEM SELECTOR PLAN 3
- Continue with Losartan
- On 3/12 patient with noted bump in Cr  - Lasix and Losartan held  - Monitor Cr 3/13

## 2023-03-12 NOTE — DIETITIAN INITIAL EVALUATION ADULT - PERTINENT LABORATORY DATA
03-12    139  |  109<H>  |  35<H>  ----------------------------<  101<H>  3.9   |  26  |  1.34<H>    Ca    8.9      12 Mar 2023 05:45  Phos  3.6     03-12  Mg     2.4     03-12    TPro  6.1  /  Alb  3.0<L>  /  TBili  0.3  /  DBili  x   /  AST  16  /  ALT  22  /  AlkPhos  80  03-10  A1C with Estimated Average Glucose Result: 6.3 % (03-11-23 @ 05:00)

## 2023-03-12 NOTE — DIETITIAN INITIAL EVALUATION ADULT - OTHER INFO
Pt admitted from.  Pt reports overall decrease in appetite & po intake x months PTA. Current fair appetite; consuming 51-75% of documented meals as per flow sheet. Pt currently on soft & bite sized consistency with no issues reported, managing/tolerating consistency well. Amenable to Ensure Plus High Protein nutrition supplement for additional kcal & protein; chocolate flavor only, kitchen aware. No c/o N/V/D/C.  Pt reports  lbs. x 1 year ago & aware she has lost wt; wt loss noted below. Pt admitted for SOB, lethargy; form [place] where she was on Minced & Moist, DASH/TLC diet + Vital Cuisine x 2/day (1040 kcal & 44 g protein), aspiration precautions.  Pt reports overall decrease in appetite & po intake x months PTA. Currently with fair appetite; consuming 51-75% of documented meals as per flow sheet. Pt currently on soft & bite sized consistency with no issues reported, managing/tolerating consistency well. Amenable to Ensure Plus High Protein nutrition supplement for additional kcal & protein; chocolate flavor only, kitchen aware. No c/o N/V/D/C.  Pt reports  lbs. x 1 year ago & aware she has lost wt; wt loss noted below.

## 2023-03-12 NOTE — PROGRESS NOTE ADULT - PROBLEM SELECTOR PLAN 2
- Improving with diuresis  - Weaned off O2  - Low concern for pneumonia
- Improving with diuresis  - Weaned off O2  - Low concern for pneumonia

## 2023-03-12 NOTE — PROGRESS NOTE ADULT - PROBLEM SELECTOR PLAN 1
- Diurese with Lasix 40 mg IV daily --> monitor I&Os and daily weights  - Likely will restart maintenance diuretics when off IV  - Defer TTE given recent one
- Diuresed with Lasix 40 mg IV daily for two days  - Euvolemic now  - Defer TTE given recent one  - Likely discharge on maintenance diuretics

## 2023-03-12 NOTE — DIETITIAN NUTRITION RISK NOTIFICATION - TREATMENT: THE FOLLOWING DIET HAS BEEN RECOMMENDED
Diet, Soft and Bite Sized:   Low Sodium  Supplement Feeding Modality:  Oral  Ensure Plus High Protein Cans or Servings Per Day:  1       Frequency:  Two Times a day (03-12-23 @ 09:59) [Pending Verification By Attending]  Diet, Soft and Bite Sized (03-11-23 @ 12:19) [Active]

## 2023-03-12 NOTE — DIETITIAN INITIAL EVALUATION ADULT - PHYSICAL ASSESSMENT CLAVICLES
Hpi Title: Evaluation of Skin Lesions
How Severe Are Your Spot(S)?: mild
Have Your Spot(S) Been Treated In The Past?: has not been treated
Additional History: History of BCC on Lt side of nose 15 years ago.
moderate

## 2023-03-12 NOTE — PROGRESS NOTE ADULT - PROBLEM SELECTOR PLAN 5
- Continue with interocular pressure lowering eye drops
- Continue with Plavix  - Doesn't seem to be on statin

## 2023-03-12 NOTE — DIETITIAN INITIAL EVALUATION ADULT - PERTINENT MEDS FT
MEDICATIONS  (STANDING):  brimonidine 0.2% Ophthalmic Solution 1 Drop(s) Both EYES two times a day  clopidogrel Tablet 75 milliGRAM(s) Oral daily  dorzolamide 2% Ophthalmic Solution 1 Drop(s) Both EYES <User Schedule>  metoprolol succinate ER 25 milliGRAM(s) Oral daily  multivitamin/minerals/iron Oral Solution (CENTRUM) 15 milliLiter(s) Oral daily  oxybutynin 10 milliGRAM(s) Oral daily  polyethylene glycol 3350 17 Gram(s) Oral daily  senna 2 Tablet(s) Oral at bedtime    MEDICATIONS  (PRN):  acetaminophen     Tablet .. 650 milliGRAM(s) Oral every 6 hours PRN Temp greater or equal to 38C (100.4F), Mild Pain (1 - 3)  melatonin 3 milliGRAM(s) Oral at bedtime PRN Insomnia  ondansetron Injectable 4 milliGRAM(s) IV Push every 8 hours PRN Nausea and/or Vomiting

## 2023-03-13 LAB
ANION GAP SERPL CALC-SCNC: 3 MMOL/L — LOW (ref 5–17)
BUN SERPL-MCNC: 46 MG/DL — HIGH (ref 7–23)
CALCIUM SERPL-MCNC: 8.7 MG/DL — SIGNIFICANT CHANGE UP (ref 8.5–10.1)
CHLORIDE SERPL-SCNC: 108 MMOL/L — SIGNIFICANT CHANGE UP (ref 96–108)
CO2 SERPL-SCNC: 28 MMOL/L — SIGNIFICANT CHANGE UP (ref 22–31)
CREAT SERPL-MCNC: 1.31 MG/DL — HIGH (ref 0.5–1.3)
EGFR: 38 ML/MIN/1.73M2 — LOW
GLUCOSE SERPL-MCNC: 126 MG/DL — HIGH (ref 70–99)
MAGNESIUM SERPL-MCNC: 2.3 MG/DL — SIGNIFICANT CHANGE UP (ref 1.6–2.6)
PHOSPHATE SERPL-MCNC: 3.3 MG/DL — SIGNIFICANT CHANGE UP (ref 2.5–4.5)
POTASSIUM SERPL-MCNC: 4 MMOL/L — SIGNIFICANT CHANGE UP (ref 3.5–5.3)
POTASSIUM SERPL-SCNC: 4 MMOL/L — SIGNIFICANT CHANGE UP (ref 3.5–5.3)
SODIUM SERPL-SCNC: 139 MMOL/L — SIGNIFICANT CHANGE UP (ref 135–145)

## 2023-03-13 PROCEDURE — 99233 SBSQ HOSP IP/OBS HIGH 50: CPT

## 2023-03-13 RX ADMIN — Medication 10 MILLIGRAM(S): at 11:26

## 2023-03-13 RX ADMIN — Medication 25 MILLIGRAM(S): at 05:41

## 2023-03-13 RX ADMIN — DORZOLAMIDE HYDROCHLORIDE 1 DROP(S): 20 SOLUTION/ DROPS OPHTHALMIC at 11:01

## 2023-03-13 RX ADMIN — BRIMONIDINE TARTRATE 1 DROP(S): 2 SOLUTION/ DROPS OPHTHALMIC at 05:42

## 2023-03-13 RX ADMIN — CLOPIDOGREL BISULFATE 75 MILLIGRAM(S): 75 TABLET, FILM COATED ORAL at 11:25

## 2023-03-13 RX ADMIN — DORZOLAMIDE HYDROCHLORIDE 1 DROP(S): 20 SOLUTION/ DROPS OPHTHALMIC at 20:59

## 2023-03-13 RX ADMIN — SENNA PLUS 2 TABLET(S): 8.6 TABLET ORAL at 21:01

## 2023-03-13 RX ADMIN — Medication 15 MILLILITER(S): at 11:25

## 2023-03-13 RX ADMIN — POLYETHYLENE GLYCOL 3350 17 GRAM(S): 17 POWDER, FOR SOLUTION ORAL at 11:25

## 2023-03-13 RX ADMIN — BRIMONIDINE TARTRATE 1 DROP(S): 2 SOLUTION/ DROPS OPHTHALMIC at 17:08

## 2023-03-13 NOTE — PROGRESS NOTE ADULT - ASSESSMENT
89F with PMHx of HFpEF (grade II DD), prior CVA, and HTN presenting from [facility] for hypertension and SOB. On prior admission Lasix was held due to dehydration, but never restarted on YANN. Per daughter was hypertensive prior to being transferred here. CXR with gross pulmonary edema. Likely flash pulmonary edema & acute-on-chronic HFpEF.      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  - 2D Echo on 01/29/2023: LVEF 40-45% with Gr II diastolic dysfunction.  - Diuresis with Lasix 40 mg IV daily for two days  - Euvolemic now.  - Likely discharge on maintenance diuretics.    # CKD stage III.  - Cr 1.31, at baseline.  - Continue to monitor.    # Benign essential HTN.   - Hold Losartan due to elevated Cr.  - Continue her Metoprolol.    # History of CVA in adulthood.   - Continue with Plavix  - Doesn't seem to be on statin.    # H/o Glaucoma.   - Continue with interocular pressure lowering eye drops.    # DVT prophylaxis: SCDs.

## 2023-03-14 LAB
ANION GAP SERPL CALC-SCNC: 3 MMOL/L — LOW (ref 5–17)
BUN SERPL-MCNC: 50 MG/DL — HIGH (ref 7–23)
CALCIUM SERPL-MCNC: 8.8 MG/DL — SIGNIFICANT CHANGE UP (ref 8.5–10.1)
CHLORIDE SERPL-SCNC: 108 MMOL/L — SIGNIFICANT CHANGE UP (ref 96–108)
CO2 SERPL-SCNC: 27 MMOL/L — SIGNIFICANT CHANGE UP (ref 22–31)
CREAT SERPL-MCNC: 1.2 MG/DL — SIGNIFICANT CHANGE UP (ref 0.5–1.3)
EGFR: 42 ML/MIN/1.73M2 — LOW
GLUCOSE SERPL-MCNC: 104 MG/DL — HIGH (ref 70–99)
POTASSIUM SERPL-MCNC: 3.9 MMOL/L — SIGNIFICANT CHANGE UP (ref 3.5–5.3)
POTASSIUM SERPL-SCNC: 3.9 MMOL/L — SIGNIFICANT CHANGE UP (ref 3.5–5.3)
RAPID RVP RESULT: SIGNIFICANT CHANGE UP
SARS-COV-2 RNA SPEC QL NAA+PROBE: SIGNIFICANT CHANGE UP
SODIUM SERPL-SCNC: 138 MMOL/L — SIGNIFICANT CHANGE UP (ref 135–145)

## 2023-03-14 PROCEDURE — 99233 SBSQ HOSP IP/OBS HIGH 50: CPT

## 2023-03-14 RX ORDER — FUROSEMIDE 40 MG
20 TABLET ORAL DAILY
Refills: 0 | Status: DISCONTINUED | OUTPATIENT
Start: 2023-03-15 | End: 2023-03-15

## 2023-03-14 RX ADMIN — BRIMONIDINE TARTRATE 1 DROP(S): 2 SOLUTION/ DROPS OPHTHALMIC at 17:11

## 2023-03-14 RX ADMIN — Medication 10 MILLIGRAM(S): at 11:17

## 2023-03-14 RX ADMIN — Medication 25 MILLIGRAM(S): at 05:01

## 2023-03-14 RX ADMIN — POLYETHYLENE GLYCOL 3350 17 GRAM(S): 17 POWDER, FOR SOLUTION ORAL at 11:18

## 2023-03-14 RX ADMIN — BRIMONIDINE TARTRATE 1 DROP(S): 2 SOLUTION/ DROPS OPHTHALMIC at 05:03

## 2023-03-14 RX ADMIN — DORZOLAMIDE HYDROCHLORIDE 1 DROP(S): 20 SOLUTION/ DROPS OPHTHALMIC at 21:18

## 2023-03-14 RX ADMIN — Medication 15 MILLILITER(S): at 11:17

## 2023-03-14 RX ADMIN — DORZOLAMIDE HYDROCHLORIDE 1 DROP(S): 20 SOLUTION/ DROPS OPHTHALMIC at 07:39

## 2023-03-14 RX ADMIN — CLOPIDOGREL BISULFATE 75 MILLIGRAM(S): 75 TABLET, FILM COATED ORAL at 11:17

## 2023-03-14 RX ADMIN — SENNA PLUS 2 TABLET(S): 8.6 TABLET ORAL at 21:17

## 2023-03-14 NOTE — PROGRESS NOTE ADULT - SUBJECTIVE AND OBJECTIVE BOX
Patient is a 89y old  Female who presents with a chief complaint of     SUBJECTIVE / OVERNIGHT EVENTS: Patient seen and examined at bedside. No acute events overnight. Breathing improved and now off oxygen. Daughter at bedside and all questions addressed.    MEDICATIONS  (STANDING):  brimonidine 0.2% Ophthalmic Solution 1 Drop(s) Both EYES two times a day  clopidogrel Tablet 75 milliGRAM(s) Oral daily  dorzolamide 2% Ophthalmic Solution 1 Drop(s) Both EYES <User Schedule>  furosemide   Injectable 40 milliGRAM(s) IV Push daily  losartan 50 milliGRAM(s) Oral daily  metoprolol succinate ER 25 milliGRAM(s) Oral daily  multivitamin/minerals/iron Oral Solution (CENTRUM) 15 milliLiter(s) Oral daily  oxybutynin 10 milliGRAM(s) Oral daily  polyethylene glycol 3350 17 Gram(s) Oral daily  senna 2 Tablet(s) Oral at bedtime    MEDICATIONS  (PRN):  acetaminophen     Tablet .. 650 milliGRAM(s) Oral every 6 hours PRN Temp greater or equal to 38C (100.4F), Mild Pain (1 - 3)  melatonin 3 milliGRAM(s) Oral at bedtime PRN Insomnia  ondansetron Injectable 4 milliGRAM(s) IV Push every 8 hours PRN Nausea and/or Vomiting      CAPILLARY BLOOD GLUCOSE        I&O's Summary    11 Mar 2023 06:01  -  11 Mar 2023 15:37  --------------------------------------------------------  IN: 360 mL / OUT: 0 mL / NET: 360 mL        PHYSICAL EXAM:  Vital Signs Last 24 Hrs  T(C): 36.2 (11 Mar 2023 10:44), Max: 36.8 (11 Mar 2023 00:00)  T(F): 97.2 (11 Mar 2023 10:44), Max: 98.2 (11 Mar 2023 00:00)  HR: 98 (11 Mar 2023 14:15) (54 - 98)  BP: 141/70 (11 Mar 2023 14:15) (111/44 - 189/82)  BP(mean): 87 (11 Mar 2023 04:07) (87 - 87)  RR: 20 (11 Mar 2023 10:44) (18 - 24)  SpO2: 95% (11 Mar 2023 14:15) (94% - 98%)    Parameters below as of 11 Mar 2023 14:15  Patient On (Oxygen Delivery Method): room air  O2 Flow (L/min): 2      GEN: female in NAD, appears comfortable, no diaphoresis  EYES: No scleral injection, PERRL, EOMI  ENTM: neck supple & symmetric without tracheal deviation, moist membranes, no gross hearing impairment, thyroid gland not enlarged  CV: +S1/S2, no m/r/g, no abdominal bruit, no LE edema  RESP: breathing comfortably, no respiratory accessory muscle use, crackles at bases  GI: normoactive BS, soft, NTND, no rebounding/guarding, no palpable masses    LABS:                        12.9   3.78  )-----------( 211      ( 11 Mar 2023 05:00 )             39.7     03-11    142  |  111<H>  |  28<H>  ----------------------------<  98  4.1   |  26  |  1.07    Ca    9.1      11 Mar 2023 05:00  Phos  3.7     03-11  Mg     2.4     03-11    TPro  6.1  /  Alb  3.0<L>  /  TBili  0.3  /  DBili  x   /  AST  16  /  ALT  22  /  AlkPhos  80  03-10    PT/INR - ( 10 Mar 2023 19:30 )   PT: 11.5 sec;   INR: 0.97 ratio         PTT - ( 10 Mar 2023 19:30 )  PTT:32.4 sec      Urinalysis Basic - ( 10 Mar 2023 20:30 )    Color: Yellow / Appearance: Clear / S.010 / pH: x  Gluc: x / Ketone: Negative  / Bili: Negative / Urobili: Negative mg/dL   Blood: x / Protein: 15 mg/dL / Nitrite: Negative   Leuk Esterase: Negative / RBC: Negative /HPF / WBC Negative   Sq Epi: x / Non Sq Epi: Negative / Bacteria: Negative          RADIOLOGY & ADDITIONAL TESTS:  Results Reviewed:   Imaging Personally Reviewed:  Electrocardiogram Personally Reviewed:    COORDINATION OF CARE:  Care Discussed with Consultants/Other Providers [Y/N]:  Prior or Outpatient Records Reviewed [Y/N]:  
Laying comfortably in her bed.  Playing with attendant at bedside.  Denies any complaints.      PHYSICAL EXAM:  GENERAL: NAD, lying in bed comfortably  CHEST/LUNG: Clear to auscultation bilaterally; No rales, rhonchi, wheezing, or rubs. Unlabored respirations  HEART: Regular rate and rhythm; No murmurs, rubs, or gallops  ABDOMEN: Bowel sounds present; Soft, Nontender, Nondistended. No hepatomegaly  EXTREMITIES:  2+ Peripheral Pulses, brisk capillary refill. No clubbing, cyanosis, or edema  NERVOUS SYSTEM:  Alert & Oriented X2, speech clear. No deficits   MSK: FROM all 4 extremities, full and equal strength  SKIN: No rashes or lesions.        LABS:    03-14    138  |  108  |  50<H>  ----------------------------<  104<H>  3.9   |  27  |  1.20    Ca    8.8      14 Mar 2023 05:29  Phos  3.3     03-13  Mg     2.3     03-13                MEDICATIONS  (STANDING):  brimonidine 0.2% Ophthalmic Solution 1 Drop(s) Both EYES two times a day  clopidogrel Tablet 75 milliGRAM(s) Oral daily  dorzolamide 2% Ophthalmic Solution 1 Drop(s) Both EYES <User Schedule>  metoprolol succinate ER 25 milliGRAM(s) Oral daily  multivitamin/minerals/iron Oral Solution (CENTRUM) 15 milliLiter(s) Oral daily  oxybutynin 10 milliGRAM(s) Oral daily  polyethylene glycol 3350 17 Gram(s) Oral daily  senna 2 Tablet(s) Oral at bedtime      Home Medications:  acetaminophen 325 mg oral tablet: 2 tab(s) orally every 6 hours, As needed, Temp greater or equal to 38C (100.4F), Mild Pain (1 - 3) (14 Feb 2023 13:42)  Centrum: 1  orally once a day (10 Feb 2023 13:30)  clopidogrel 75 mg oral tablet: 1 tab(s) orally once a day (14 Feb 2023 13:42)  lidocaine 4% topical film: Apply topically to affected area once a day (14 Feb 2023 13:42)  losartan 50 mg oral tablet: 1 tab(s) orally once a day (14 Feb 2023 13:42)  melatonin 3 mg oral tablet: 1 tab(s) orally once a day (at bedtime), As needed, Insomnia (14 Feb 2023 13:42)  metoprolol succinate 25 mg oral tablet, extended release: 1 tab(s) orally once a day (14 Feb 2023 13:42)  polyethylene glycol 3350 oral powder for reconstitution: 17 gram(s) orally once a day (14 Feb 2023 13:42)  senna leaf extract oral tablet: 2 tab(s) orally once a day (at bedtime) (14 Feb 2023 13:42)  Simbrinza 1%- 0.2% ophthalmic suspension: 1 drop(s) to each affected eye 2 times a day (10 Feb 2023 13:30)  tolterodine 4 mg oral capsule, extended release: 1 cap(s) orally once a day (10 Feb 2023 13:30)  VESIcare 10 mg oral tablet: 1 tab(s) orally once a day (10 Feb 2023 13:30)                    
Sitting on her chair at bedside and watching TV.  Denies any complaints.      PHYSICAL EXAM:  GENERAL: NAD, lying in bed comfortably  CHEST/LUNG: Clear to auscultation bilaterally; No rales, rhonchi, wheezing, or rubs. Unlabored respirations  HEART: Regular rate and rhythm; No murmurs, rubs, or gallops  ABDOMEN: Bowel sounds present; Soft, Nontender, Nondistended. No hepatomegally  EXTREMITIES:  2+ Peripheral Pulses, brisk capillary refill. No clubbing, cyanosis, or edema  NERVOUS SYSTEM:  Alert & Oriented X2, speech clear. No deficits   MSK: FROM all 4 extremities, full and equal strength  SKIN: No rashes or lesions.      LABS:                        13.7   5.51  )-----------( 224      ( 12 Mar 2023 05:45 )             42.7     03-13    139  |  108  |  46<H>  ----------------------------<  126<H>  4.0   |  28  |  1.31<H>    Ca    8.7      13 Mar 2023 06:30  Phos  3.3     03-13  Mg     2.3     03-13        MEDICATIONS  (STANDING):  brimonidine 0.2% Ophthalmic Solution 1 Drop(s) Both EYES two times a day  clopidogrel Tablet 75 milliGRAM(s) Oral daily  dorzolamide 2% Ophthalmic Solution 1 Drop(s) Both EYES <User Schedule>  metoprolol succinate ER 25 milliGRAM(s) Oral daily  multivitamin/minerals/iron Oral Solution (CENTRUM) 15 milliLiter(s) Oral daily  oxybutynin 10 milliGRAM(s) Oral daily  polyethylene glycol 3350 17 Gram(s) Oral daily  senna 2 Tablet(s) Oral at bedtime      Home Medications:  acetaminophen 325 mg oral tablet: 2 tab(s) orally every 6 hours, As needed, Temp greater or equal to 38C (100.4F), Mild Pain (1 - 3) (14 Feb 2023 13:42)  Centrum: 1  orally once a day (10 Feb 2023 13:30)  clopidogrel 75 mg oral tablet: 1 tab(s) orally once a day (14 Feb 2023 13:42)  lidocaine 4% topical film: Apply topically to affected area once a day (14 Feb 2023 13:42)  losartan 50 mg oral tablet: 1 tab(s) orally once a day (14 Feb 2023 13:42)  melatonin 3 mg oral tablet: 1 tab(s) orally once a day (at bedtime), As needed, Insomnia (14 Feb 2023 13:42)  metoprolol succinate 25 mg oral tablet, extended release: 1 tab(s) orally once a day (14 Feb 2023 13:42)  polyethylene glycol 3350 oral powder for reconstitution: 17 gram(s) orally once a day (14 Feb 2023 13:42)  senna leaf extract oral tablet: 2 tab(s) orally once a day (at bedtime) (14 Feb 2023 13:42)  Simbrinza 1%- 0.2% ophthalmic suspension: 1 drop(s) to each affected eye 2 times a day (10 Feb 2023 13:30)  tolterodine 4 mg oral capsule, extended release: 1 cap(s) orally once a day (10 Feb 2023 13:30)  VESIcare 10 mg oral tablet: 1 tab(s) orally once a day (10 Feb 2023 13:30)            
Patient is a 89y old  Female who presents with a chief complaint of LETHARGY CHF HYPOXIA     (12 Mar 2023 09:14)      SUBJECTIVE / OVERNIGHT EVENTS: Patient seen and examined at bedside. No acute events overnight. No SOB and off oxygen. No complaints. Recommended for YANN.    MEDICATIONS  (STANDING):  brimonidine 0.2% Ophthalmic Solution 1 Drop(s) Both EYES two times a day  clopidogrel Tablet 75 milliGRAM(s) Oral daily  dorzolamide 2% Ophthalmic Solution 1 Drop(s) Both EYES <User Schedule>  metoprolol succinate ER 25 milliGRAM(s) Oral daily  multivitamin/minerals/iron Oral Solution (CENTRUM) 15 milliLiter(s) Oral daily  oxybutynin 10 milliGRAM(s) Oral daily  polyethylene glycol 3350 17 Gram(s) Oral daily  senna 2 Tablet(s) Oral at bedtime    MEDICATIONS  (PRN):  acetaminophen     Tablet .. 650 milliGRAM(s) Oral every 6 hours PRN Temp greater or equal to 38C (100.4F), Mild Pain (1 - 3)  melatonin 3 milliGRAM(s) Oral at bedtime PRN Insomnia  ondansetron Injectable 4 milliGRAM(s) IV Push every 8 hours PRN Nausea and/or Vomiting      CAPILLARY BLOOD GLUCOSE        I&O's Summary    11 Mar 2023 06:01  -  12 Mar 2023 07:00  --------------------------------------------------------  IN: 360 mL / OUT: 700 mL / NET: -340 mL    12 Mar 2023 07:01  -  12 Mar 2023 13:49  --------------------------------------------------------  IN: 0 mL / OUT: 500 mL / NET: -500 mL        PHYSICAL EXAM:  Vital Signs Last 24 Hrs  T(C): 36.3 (12 Mar 2023 10:33), Max: 36.9 (12 Mar 2023 05:23)  T(F): 97.4 (12 Mar 2023 10:33), Max: 98.4 (12 Mar 2023 05:23)  HR: 95 (12 Mar 2023 10:33) (68 - 98)  BP: 136/64 (12 Mar 2023 10:33) (134/69 - 149/70)  BP(mean): --  RR: 18 (12 Mar 2023 10:33) (18 - 19)  SpO2: 99% (12 Mar 2023 10:33) (94% - 99%)    Parameters below as of 12 Mar 2023 10:33  Patient On (Oxygen Delivery Method): nasal cannula        GEN: female in NAD, appears comfortable, no diaphoresis  EYES: No scleral injection, PERRL, EOMI  ENTM: neck supple & symmetric without tracheal deviation, moist membranes, no gross hearing impairment, thyroid gland not enlarged  CV: +S1/S2, no m/r/g, no abdominal bruit, no LE edema  RESP: breathing comfortably, no respiratory accessory muscle use, CTAB, no w/r/r  GI: normoactive BS, soft, NTND, no rebounding/guarding, no palpable masses    LABS:                        13.7   5.51  )-----------( 224      ( 12 Mar 2023 05:45 )             42.7     03-12    139  |  109<H>  |  35<H>  ----------------------------<  101<H>  3.9   |  26  |  1.34<H>    Ca    8.9      12 Mar 2023 05:45  Phos  3.6     03-12  Mg     2.4     03-12    TPro  6.1  /  Alb  3.0<L>  /  TBili  0.3  /  DBili  x   /  AST  16  /  ALT  22  /  AlkPhos  80  03-10    PT/INR - ( 10 Mar 2023 19:30 )   PT: 11.5 sec;   INR: 0.97 ratio         PTT - ( 10 Mar 2023 19:30 )  PTT:32.4 sec      Urinalysis Basic - ( 10 Mar 2023 20:30 )    Color: Yellow / Appearance: Clear / S.010 / pH: x  Gluc: x / Ketone: Negative  / Bili: Negative / Urobili: Negative mg/dL   Blood: x / Protein: 15 mg/dL / Nitrite: Negative   Leuk Esterase: Negative / RBC: Negative /HPF / WBC Negative   Sq Epi: x / Non Sq Epi: Negative / Bacteria: Negative        Culture - Urine (collected 10 Mar 2023 20:30)  Source: Clean Catch Clean Catch (Midstream)  Final Report (11 Mar 2023 23:25):    No growth        RADIOLOGY & ADDITIONAL TESTS:  Results Reviewed:   Imaging Personally Reviewed:  Electrocardiogram Personally Reviewed:    COORDINATION OF CARE:  Care Discussed with Consultants/Other Providers [Y/N]:  Prior or Outpatient Records Reviewed [Y/N]:

## 2023-03-14 NOTE — PROGRESS NOTE ADULT - NUTRITIONAL ASSESSMENT
This patient has been assessed with a concern for Malnutrition and has been determined to have a diagnosis/diagnoses of Moderate protein-calorie malnutrition.    This patient is being managed with:   Diet Pureed-  Low Sodium  Entered: Mar 12 2023  5:58PM    
This patient has been assessed with a concern for Malnutrition and has been determined to have a diagnosis/diagnoses of Moderate protein-calorie malnutrition.    This patient is being managed with:   Diet Pureed-  Low Sodium  Entered: Mar 12 2023  5:58PM

## 2023-03-14 NOTE — PROGRESS NOTE ADULT - ASSESSMENT
89F with PMHx of HFpEF (grade II DD), prior CVA, and HTN presenting from [facility] for hypertension and SOB. On prior admission Lasix was held due to dehydration, but never restarted on YANN. Per daughter was hypertensive prior to being transferred here. CXR with gross pulmonary edema. Likely flash pulmonary edema & acute-on-chronic HFpEF.      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  - 2D Echo on 01/29/2023: LVEF 40-45% with Gr II diastolic dysfunction.  - Diuresis with Lasix 40 mg IV daily for two days.  -Continue Lasix 20 mg po daily.  - Likely discharge on maintenance diuretics.  -D/c plan pending authorization.  -D/w CM/SW on rounds.    # CKD stage III.  - Cr 1.31->1.20, at baseline.  - Continue to monitor.    # Benign essential HTN.   - Hold Losartan due to elevated Cr.  - Continue her Metoprolol.    # History of CVA.  - Continue with Plavix  - Doesn't seem to be on statin.    # H/o Overactive bladder.  -Will continue her Tolterodine.    # H/o Glaucoma.   - Continue with interocular pressure lowering eye drops.    # DVT prophylaxis: SCDs. 89F with PMHx of HFpEF (grade II DD), prior CVA, and HTN presenting from [facility] for hypertension and SOB. On prior admission Lasix was held due to dehydration, but never restarted on YANN. Per daughter was hypertensive prior to being transferred here. CXR with gross pulmonary edema. Likely flash pulmonary edema & acute-on-chronic HFpEF.      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  - 2D Echo on 01/29/2023: LVEF 40-45% with Gr II diastolic dysfunction.  - Diuresis with Lasix 40 mg IV daily for two days.  -Continue Lasix 20 mg po daily.  - Likely discharge on maintenance diuretics.  -D/c plan pending authorization.  -D/w CM/SW on rounds.    # CKD stage III.  - Cr 1.31->1.20, at baseline.  - Continue to monitor.    # Benign essential HTN.   - Hold Losartan due to elevated Cr.  - Continue her Metoprolol.    # History of CVA.  - Continue with Plavix  - Doesn't seem to be on statin.    # H/o Overactive bladder.  -Will continue her Tolterodine, has been exchanged with Oxybutynin.    # H/o Glaucoma.   - Continue with interocular pressure lowering eye drops.    # DVT prophylaxis: SCDs.

## 2023-03-15 ENCOUNTER — TRANSCRIPTION ENCOUNTER (OUTPATIENT)
Age: 88
End: 2023-03-15

## 2023-03-15 VITALS
TEMPERATURE: 98 F | HEART RATE: 84 BPM | SYSTOLIC BLOOD PRESSURE: 149 MMHG | RESPIRATION RATE: 17 BRPM | OXYGEN SATURATION: 96 % | DIASTOLIC BLOOD PRESSURE: 68 MMHG

## 2023-03-15 PROCEDURE — 99239 HOSP IP/OBS DSCHRG MGMT >30: CPT

## 2023-03-15 RX ORDER — FUROSEMIDE 40 MG
1 TABLET ORAL
Qty: 0 | Refills: 0 | DISCHARGE
Start: 2023-03-15

## 2023-03-15 RX ADMIN — BRIMONIDINE TARTRATE 1 DROP(S): 2 SOLUTION/ DROPS OPHTHALMIC at 05:42

## 2023-03-15 RX ADMIN — CLOPIDOGREL BISULFATE 75 MILLIGRAM(S): 75 TABLET, FILM COATED ORAL at 11:23

## 2023-03-15 RX ADMIN — Medication 15 MILLILITER(S): at 11:23

## 2023-03-15 RX ADMIN — Medication 10 MILLIGRAM(S): at 11:23

## 2023-03-15 RX ADMIN — DORZOLAMIDE HYDROCHLORIDE 1 DROP(S): 20 SOLUTION/ DROPS OPHTHALMIC at 09:44

## 2023-03-15 RX ADMIN — BRIMONIDINE TARTRATE 1 DROP(S): 2 SOLUTION/ DROPS OPHTHALMIC at 17:06

## 2023-03-15 RX ADMIN — Medication 20 MILLIGRAM(S): at 05:42

## 2023-03-15 RX ADMIN — Medication 25 MILLIGRAM(S): at 05:41

## 2023-03-15 RX ADMIN — POLYETHYLENE GLYCOL 3350 17 GRAM(S): 17 POWDER, FOR SOLUTION ORAL at 11:23

## 2023-03-15 NOTE — DISCHARGE NOTE NURSING/CASE MANAGEMENT/SOCIAL WORK - PATIENT PORTAL LINK FT
You can access the FollowMyHealth Patient Portal offered by [facility] by registering at the following website: [email or web address]. By joining [facility]’s FollowMyHealth portal, you will also be able to view your health information using other applications (apps) compatible with our system.

## 2023-03-15 NOTE — DISCHARGE NOTE PROVIDER - HOSPITAL COURSE
89F with PMHx of HFpEF (grade II DD), prior CVA, and HTN presenting from [facility] for hypertension and SOB. On prior admission Lasix was held due to dehydration, but never restarted on YANN. Per daughter was hypertensive prior to being transferred here. CXR with gross pulmonary edema. Likely flash pulmonary edema & acute-on-chronic HFpEF.      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  - 2D Echo on 01/29/2023: LVEF 40-45% with Gr II diastolic dysfunction.  -Continue Lasix 20 mg po daily.  -D/c today to [facility].    # CKD stage III.  - Improved. Cr 1.31->1.20, at baseline.  - Continue to monitor.    # Benign essential HTN.   - D/c Losartan due to elevated Cr.  - Continue her Metoprolol.    # History of CVA.  - Continue with Plavix  - Doesn't seem to be on statin.    # H/o Overactive bladder.  -Will continue her Tolterodine and Vesicare.    # H/o Glaucoma.   - Continue with her eye drops.    # Constipation.  -Continue her laxatives.

## 2023-03-15 NOTE — DISCHARGE NOTE PROVIDER - DETAILS OF MALNUTRITION DIAGNOSIS/DIAGNOSES
This patient has been assessed with a concern for Malnutrition and was treated during this hospitalization for the following Nutrition diagnosis/diagnoses:     -  03/12/2023: Moderate protein-calorie malnutrition

## 2023-03-15 NOTE — DISCHARGE NOTE PROVIDER - NSDCMRMEDTOKEN_GEN_ALL_CORE_FT
acetaminophen 325 mg oral tablet: 2 tab(s) orally every 6 hours, As needed, Temp greater or equal to 38C (100.4F), Mild Pain (1 - 3)  Centrum: 1  orally once a day  clopidogrel 75 mg oral tablet: 1 tab(s) orally once a day  furosemide 20 mg oral tablet: 1 tab(s) orally once a day  lidocaine 4% topical film: Apply topically to affected area once a day  melatonin 3 mg oral tablet: 1 tab(s) orally once a day (at bedtime), As needed, Insomnia  metoprolol succinate 25 mg oral tablet, extended release: 1 tab(s) orally once a day  polyethylene glycol 3350 oral powder for reconstitution: 17 gram(s) orally once a day  senna leaf extract oral tablet: 2 tab(s) orally once a day (at bedtime)  Simbrinza 1%- 0.2% ophthalmic suspension: 1 drop(s) to each affected eye 2 times a day  tolterodine 4 mg oral capsule, extended release: 1 cap(s) orally once a day  VESIcare 10 mg oral tablet: 1 tab(s) orally once a day

## 2023-03-15 NOTE — DISCHARGE NOTE PROVIDER - NSDCCPCAREPLAN_GEN_ALL_CORE_FT
PRINCIPAL DISCHARGE DIAGNOSIS  Diagnosis: Acute on chronic combined systolic and diastolic congestive heart failure  Assessment and Plan of Treatment:       SECONDARY DISCHARGE DIAGNOSES  Diagnosis: Acute respiratory failure with hypoxia  Assessment and Plan of Treatment:     Diagnosis: Acute CHF  Assessment and Plan of Treatment:     Diagnosis: Benign essential HTN  Assessment and Plan of Treatment:     Diagnosis: History of CVA in adulthood  Assessment and Plan of Treatment:     Diagnosis: Glaucoma  Assessment and Plan of Treatment:     Diagnosis: Hypoxia  Assessment and Plan of Treatment:     Diagnosis: OAB (overactive bladder)  Assessment and Plan of Treatment:     Diagnosis: Constipation  Assessment and Plan of Treatment:

## 2023-03-19 DIAGNOSIS — J96.01 ACUTE RESPIRATORY FAILURE WITH HYPOXIA: ICD-10-CM

## 2023-03-19 DIAGNOSIS — Z79.02 LONG TERM (CURRENT) USE OF ANTITHROMBOTICS/ANTIPLATELETS: ICD-10-CM

## 2023-03-19 DIAGNOSIS — F32.A DEPRESSION, UNSPECIFIED: ICD-10-CM

## 2023-03-19 DIAGNOSIS — N18.30 CHRONIC KIDNEY DISEASE, STAGE 3 UNSPECIFIED: ICD-10-CM

## 2023-03-19 DIAGNOSIS — I13.0 HYPERTENSIVE HEART AND CHRONIC KIDNEY DISEASE WITH HEART FAILURE AND STAGE 1 THROUGH STAGE 4 CHRONIC KIDNEY DISEASE, OR UNSPECIFIED CHRONIC KIDNEY DISEASE: ICD-10-CM

## 2023-03-19 DIAGNOSIS — R79.89 OTHER SPECIFIED ABNORMAL FINDINGS OF BLOOD CHEMISTRY: ICD-10-CM

## 2023-03-19 DIAGNOSIS — H40.9 UNSPECIFIED GLAUCOMA: ICD-10-CM

## 2023-03-19 DIAGNOSIS — I69.954 HEMIPLEGIA AND HEMIPARESIS FOLLOWING UNSPECIFIED CEREBROVASCULAR DISEASE AFFECTING LEFT NON-DOMINANT SIDE: ICD-10-CM

## 2023-03-19 DIAGNOSIS — E44.0 MODERATE PROTEIN-CALORIE MALNUTRITION: ICD-10-CM

## 2023-03-19 DIAGNOSIS — I50.43 ACUTE ON CHRONIC COMBINED SYSTOLIC (CONGESTIVE) AND DIASTOLIC (CONGESTIVE) HEART FAILURE: ICD-10-CM

## 2023-03-19 DIAGNOSIS — G93.41 METABOLIC ENCEPHALOPATHY: ICD-10-CM

## 2023-03-19 DIAGNOSIS — Z88.0 ALLERGY STATUS TO PENICILLIN: ICD-10-CM

## 2023-03-22 NOTE — ED PROVIDER NOTE - NSCAREINITIATED _GEN_ER
Maggie Hubbard(Attending) Closure 2 Information: This tab is for additional flaps and grafts, including complex repair and grafts and complex repair and flaps. You can also specify a different location for the additional defect, if the location is the same you do not need to select a new one. We will insert the automated text for the repair you select below just as we do for solitary flaps and grafts. Please note that at this time if you select a location with a different insurance zone you will need to override the ICD10 and CPT if appropriate.

## 2023-04-10 ENCOUNTER — TRANSCRIPTION ENCOUNTER (OUTPATIENT)
Age: 88
End: 2023-04-10

## 2023-07-04 NOTE — DIETITIAN NUTRITION RISK NOTIFICATION - ETIOLOGY-BASIS
reviewed. Constitutional:       General: She is active. She is not in acute distress. Appearance: She is well-developed. She is not ill-appearing or toxic-appearing. HENT:      Head: Normocephalic and atraumatic. Right Ear: Tympanic membrane and ear canal normal.      Left Ear: Tympanic membrane and ear canal normal.      Mouth/Throat:      Mouth: Mucous membranes are moist.      Pharynx: No pharyngeal swelling or oropharyngeal exudate. Cardiovascular:      Rate and Rhythm: Normal rate and regular rhythm. Pulmonary:      Effort: Pulmonary effort is normal.      Breath sounds: Normal breath sounds. Abdominal:      General: Abdomen is flat. There is no distension. Palpations: Abdomen is soft. Tenderness: There is no abdominal tenderness. There is no guarding or rebound. Negative signs include Rovsing's sign and psoas sign. Musculoskeletal:         General: Normal range of motion. Cervical back: Normal range of motion and neck supple. No rigidity. Lymphadenopathy:      Cervical: No cervical adenopathy. Skin:     General: Skin is warm. Neurological:      General: No focal deficit present. Mental Status: She is alert and oriented for age. Diagnostic Study Results     Labs -  Recent Results (from the past 12 hour(s))   Rapid Strep Screen    Collection Time: 07/03/23 11:03 PM    Specimen: Throat   Result Value Ref Range    Strep A Ag Negative         Radiologic Studies -   No orders to display         Medical Decision Making   I am the first provider for this patient. I reviewed the vital signs, available nursing notes, past medical history, past surgical history, family history and social history. Vital Signs-Reviewed the patient's vital signs. Records Reviewed: Nursing Notes and Old Medical Records (Time of Review: 1:54 AM)    ED Course: Progress Notes, Reevaluation, and Consults:  11:44 PM: Reviewed results and plan with mother and patient.  Discussed need Chronic illness

## 2023-08-10 NOTE — PATIENT PROFILE ADULT - PATIENT'S PREFERRED PRONOUN
Her/She Pain Refusal Text: I offered to prescribe pain medication but the patient refused to take this medication.

## 2023-08-16 ENCOUNTER — LABORATORY RESULT (OUTPATIENT)
Age: 88
End: 2023-08-16

## 2023-08-16 ENCOUNTER — APPOINTMENT (OUTPATIENT)
Dept: CARDIOLOGY | Facility: CLINIC | Age: 88
End: 2023-08-16
Payer: MEDICARE

## 2023-08-16 ENCOUNTER — NON-APPOINTMENT (OUTPATIENT)
Age: 88
End: 2023-08-16

## 2023-08-16 VITALS
BODY MASS INDEX: 17.42 KG/M2 | HEIGHT: 64 IN | OXYGEN SATURATION: 97 % | SYSTOLIC BLOOD PRESSURE: 154 MMHG | DIASTOLIC BLOOD PRESSURE: 64 MMHG | HEART RATE: 88 BPM | WEIGHT: 102 LBS

## 2023-08-16 VITALS — SYSTOLIC BLOOD PRESSURE: 134 MMHG | DIASTOLIC BLOOD PRESSURE: 66 MMHG

## 2023-08-16 DIAGNOSIS — I50.43 ACUTE ON CHRONIC COMBINED SYSTOLIC (CONGESTIVE) AND DIASTOLIC (CONGESTIVE) HEART FAILURE: ICD-10-CM

## 2023-08-16 DIAGNOSIS — R60.0 LOCALIZED EDEMA: ICD-10-CM

## 2023-08-16 PROCEDURE — 99214 OFFICE O/P EST MOD 30 MIN: CPT

## 2023-08-16 PROCEDURE — 93000 ELECTROCARDIOGRAM COMPLETE: CPT

## 2023-08-17 LAB
ALBUMIN SERPL ELPH-MCNC: 4.3 G/DL
ALP BLD-CCNC: 93 U/L
ALT SERPL-CCNC: 18 U/L
ANION GAP SERPL CALC-SCNC: 15 MMOL/L
AST SERPL-CCNC: 24 U/L
BILIRUB SERPL-MCNC: 0.4 MG/DL
BUN SERPL-MCNC: 32 MG/DL
CALCIUM SERPL-MCNC: 9.9 MG/DL
CHLORIDE SERPL-SCNC: 105 MMOL/L
CHOLEST SERPL-MCNC: 221 MG/DL
CO2 SERPL-SCNC: 23 MMOL/L
CREAT SERPL-MCNC: 1.24 MG/DL
EGFR: 40 ML/MIN/1.73M2
GLUCOSE SERPL-MCNC: 110 MG/DL
HDLC SERPL-MCNC: 58 MG/DL
LDLC SERPL CALC-MCNC: 140 MG/DL
NONHDLC SERPL-MCNC: 163 MG/DL
POTASSIUM SERPL-SCNC: 4.5 MMOL/L
PROT SERPL-MCNC: 6.6 G/DL
SODIUM SERPL-SCNC: 143 MMOL/L
TRIGL SERPL-MCNC: 129 MG/DL
TSH SERPL-ACNC: 4.49 UIU/ML

## 2023-08-20 NOTE — PHYSICAL EXAM
[Well Nourished] : well nourished [No Acute Distress] : no acute distress [Frail] : frail [Normal Conjunctiva] : normal conjunctiva [Normal Venous Pressure] : normal venous pressure [No Carotid Bruit] : no carotid bruit [Normal S1, S2] : normal S1, S2 [No Rub] : no rub [No Gallop] : no gallop [Clear Lung Fields] : clear lung fields [Good Air Entry] : good air entry [No Respiratory Distress] : no respiratory distress  [Soft] : abdomen soft [Non Tender] : non-tender [No Masses/organomegaly] : no masses/organomegaly [Normal Bowel Sounds] : normal bowel sounds [Normal Gait] : normal gait [No Edema] : no edema [No Cyanosis] : no cyanosis [No Clubbing] : no clubbing [No Varicosities] : no varicosities [No Rash] : no rash [No Skin Lesions] : no skin lesions [Moves all extremities] : moves all extremities [No Focal Deficits] : no focal deficits [Normal Speech] : normal speech [Alert and Oriented] : alert and oriented [Normal memory] : normal memory [de-identified] : 2/6 systolic ejection murmur at LLSB and apex

## 2023-08-20 NOTE — HISTORY OF PRESENT ILLNESS
[FreeTextEntry1] : 89-year-old female with H/O chronic systolic/diastolic heart failure, known ejection fraction of 45%, moderate mitral insufficiency and moderate aortic insufficiency. h/o CVA with residual weakness.  She presented to [facility] on 11/28/21 with c/o dyspnea  x1 week.  She was treated for acute diastolic heart failure exacerbation with diuretics. Patient was seen by cardiology and advised to reinitiate low dose diuretic.  Since discharge no further complaints of dyspnea noted. Hosp in 3/23 for pulmonary edema, (diuretics had been held by [facility]).  She is back at home and appears to be doing fairly well. On pulse diuretics if needed. No recent labs.

## 2023-08-20 NOTE — CARDIOLOGY SUMMARY
[de-identified] : 8/16/23, Sinus Rhythm  -Left atrial enlargement. Voltage criteria for LVH (R(I)+S(III) exceeds 2.50 mV) -Voltage criteria w/o ST/T abnormality may be normal. -Poor R-wave progression -nonspecific -consider old anterior infarct. -Nonspecific ST depression  +  T-abnormality -Nondiagnostic -Possible Lateral  ischemia. 12/8/21, Sinus Tachycardia -Left atrial enlargement. Voltage criteria for LVH  -Poor R-wave progression -nonspecific -consider old anterior infarct. - Nonspecific T-abnormality.  6/9/21, Sinus  Rhythm -Left atrial enlargement.  -Poor R-wave progression -nonspecific -consider old anterior infarct.   -  Nonspecific T-abnormality.   [de-identified] : 2015 - WNL [de-identified] : 1/29/23-EF 40-45%, grade II DD. 6/9/21, Normal left ventricular size, ejection fraction 45 to 50%. Moderate AR, mild MR, mild TR, mild IL. Mildly dilated aortic root. No significant interval changes as compared to 3/26/2018.

## 2023-08-20 NOTE — DISCUSSION/SUMMARY
[Hypertrophic Cardiomyopathy] : hypertrophic cardiomyopathy [Hypertension] : hypertension [Stable] : stable [Patient] : the patient [Guardian] : the guardian [___ Month(s)] : in [unfilled] month(s) [FreeTextEntry1] : 89-year-old female with chronic systolic/diastolic heart failure now appearing euvolemic.    Had lengthy discussion with patient's daughter regarding need for constant surveillance for signs of dyspnea, weight gain or lower extremity edema.  In this event, patient advised to increase her Lasix to daily for several days until she diuresis again. Continue mandated diuresis to maintain euvolemia.   Blood pressures adequte today, will continue monitoring.  Will need follow-up labs to reassess her renal function.   [EKG obtained to assist in diagnosis and management of assessed problem(s)] : EKG obtained to assist in diagnosis and management of assessed problem(s)

## 2023-08-21 RX ORDER — FUROSEMIDE 20 MG/1
20 TABLET ORAL DAILY
Qty: 45 | Refills: 3 | Status: ACTIVE | COMMUNITY

## 2023-10-06 ENCOUNTER — EMERGENCY (EMERGENCY)
Facility: HOSPITAL | Age: 88
LOS: 0 days | Discharge: HOME | End: 2023-10-06
Attending: STUDENT IN AN ORGANIZED HEALTH CARE EDUCATION/TRAINING PROGRAM
Payer: MEDICARE

## 2023-10-06 VITALS
HEART RATE: 78 BPM | HEIGHT: 60 IN | OXYGEN SATURATION: 98 % | SYSTOLIC BLOOD PRESSURE: 169 MMHG | WEIGHT: 119.93 LBS | TEMPERATURE: 98 F | DIASTOLIC BLOOD PRESSURE: 87 MMHG | RESPIRATION RATE: 18 BRPM

## 2023-10-06 VITALS
TEMPERATURE: 99 F | HEART RATE: 78 BPM | RESPIRATION RATE: 17 BRPM | DIASTOLIC BLOOD PRESSURE: 82 MMHG | OXYGEN SATURATION: 99 % | SYSTOLIC BLOOD PRESSURE: 176 MMHG

## 2023-10-06 DIAGNOSIS — Z88.0 ALLERGY STATUS TO PENICILLIN: ICD-10-CM
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
## 2023-10-06 DIAGNOSIS — Z79.02 LONG TERM (CURRENT) USE OF ANTITHROMBOTICS/ANTIPLATELETS: ICD-10-CM
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
## 2023-10-06 DIAGNOSIS — M25.561 PAIN IN RIGHT KNEE: ICD-10-CM

## 2023-10-06 DIAGNOSIS — Z98.89 OTHER SPECIFIED POSTPROCEDURAL STATES: Chronic | ICD-10-CM

## 2023-10-06 LAB
ALBUMIN SERPL ELPH-MCNC: 3.1 G/DL — LOW (ref 3.3–5)
ALP SERPL-CCNC: 402 U/L — HIGH (ref 40–120)
ALT FLD-CCNC: 66 U/L — SIGNIFICANT CHANGE UP (ref 12–78)
ANION GAP SERPL CALC-SCNC: 4 MMOL/L — LOW (ref 5–17)
AST SERPL-CCNC: 37 U/L — SIGNIFICANT CHANGE UP (ref 15–37)
BASOPHILS # BLD AUTO: 0.07 K/UL — SIGNIFICANT CHANGE UP (ref 0–0.2)
BASOPHILS NFR BLD AUTO: 1.1 % — SIGNIFICANT CHANGE UP (ref 0–2)
BILIRUB SERPL-MCNC: 0.4 MG/DL — SIGNIFICANT CHANGE UP (ref 0.2–1.2)
BUN SERPL-MCNC: 26 MG/DL — HIGH (ref 7–23)
CALCIUM SERPL-MCNC: 8.8 MG/DL — SIGNIFICANT CHANGE UP (ref 8.5–10.1)
CHLORIDE SERPL-SCNC: 110 MMOL/L — HIGH (ref 96–108)
CO2 SERPL-SCNC: 28 MMOL/L — SIGNIFICANT CHANGE UP (ref 22–31)
CREAT SERPL-MCNC: 1.02 MG/DL — SIGNIFICANT CHANGE UP (ref 0.5–1.3)
EGFR: 51 ML/MIN/1.73M2 — LOW
EOSINOPHIL # BLD AUTO: 0.59 K/UL — HIGH (ref 0–0.5)
EOSINOPHIL NFR BLD AUTO: 9.1 % — HIGH (ref 0–6)
GLUCOSE SERPL-MCNC: 110 MG/DL — HIGH (ref 70–99)
HCT VFR BLD CALC: 40.5 % — SIGNIFICANT CHANGE UP (ref 34.5–45)
HGB BLD-MCNC: 13.1 G/DL — SIGNIFICANT CHANGE UP (ref 11.5–15.5)
IMM GRANULOCYTES NFR BLD AUTO: 0.2 % — SIGNIFICANT CHANGE UP (ref 0–0.9)
LYMPHOCYTES # BLD AUTO: 0.62 K/UL — LOW (ref 1–3.3)
LYMPHOCYTES # BLD AUTO: 9.6 % — LOW (ref 13–44)
MCHC RBC-ENTMCNC: 31.2 PG — SIGNIFICANT CHANGE UP (ref 27–34)
MCHC RBC-ENTMCNC: 32.3 G/DL — SIGNIFICANT CHANGE UP (ref 32–36)
MCV RBC AUTO: 96.4 FL — SIGNIFICANT CHANGE UP (ref 80–100)
MONOCYTES # BLD AUTO: 0.63 K/UL — SIGNIFICANT CHANGE UP (ref 0–0.9)
MONOCYTES NFR BLD AUTO: 9.7 % — SIGNIFICANT CHANGE UP (ref 2–14)
NEUTROPHILS # BLD AUTO: 4.57 K/UL — SIGNIFICANT CHANGE UP (ref 1.8–7.4)
NEUTROPHILS NFR BLD AUTO: 70.3 % — SIGNIFICANT CHANGE UP (ref 43–77)
NRBC # BLD: 0 /100 WBCS — SIGNIFICANT CHANGE UP (ref 0–0)
PLATELET # BLD AUTO: 223 K/UL — SIGNIFICANT CHANGE UP (ref 150–400)
POTASSIUM SERPL-MCNC: 4.2 MMOL/L — SIGNIFICANT CHANGE UP (ref 3.5–5.3)
POTASSIUM SERPL-SCNC: 4.2 MMOL/L — SIGNIFICANT CHANGE UP (ref 3.5–5.3)
PROT SERPL-MCNC: 6.2 GM/DL — SIGNIFICANT CHANGE UP (ref 6–8.3)
RBC # BLD: 4.2 M/UL — SIGNIFICANT CHANGE UP (ref 3.8–5.2)
RBC # FLD: 14.4 % — SIGNIFICANT CHANGE UP (ref 10.3–14.5)
SODIUM SERPL-SCNC: 142 MMOL/L — SIGNIFICANT CHANGE UP (ref 135–145)
WBC # BLD: 6.49 K/UL — SIGNIFICANT CHANGE UP (ref 3.8–10.5)
WBC # FLD AUTO: 6.49 K/UL — SIGNIFICANT CHANGE UP (ref 3.8–10.5)

## 2023-10-06 PROCEDURE — 73562 X-RAY EXAM OF KNEE 3: CPT | Mod: 26,50

## 2023-10-06 PROCEDURE — 99284 EMERGENCY DEPT VISIT MOD MDM: CPT

## 2023-10-06 PROCEDURE — 72170 X-RAY EXAM OF PELVIS: CPT | Mod: 26

## 2023-10-06 PROCEDURE — 71045 X-RAY EXAM CHEST 1 VIEW: CPT | Mod: 26

## 2023-10-06 PROCEDURE — 70450 CT HEAD/BRAIN W/O DYE: CPT | Mod: 26,MA

## 2023-10-06 PROCEDURE — 72125 CT NECK SPINE W/O DYE: CPT | Mod: 26,MA

## 2023-10-06 RX ORDER — ACETAMINOPHEN 500 MG
650 TABLET ORAL ONCE
Refills: 0 | Status: COMPLETED | OUTPATIENT
Start: 2023-10-06 | End: 2023-10-06

## 2023-10-06 RX ORDER — MULTIVIT-MIN/FERROUS GLUCONATE 9 MG/15 ML
1 LIQUID (ML) ORAL
Qty: 0 | Refills: 0 | DISCHARGE

## 2023-10-06 RX ORDER — SODIUM CHLORIDE 9 MG/ML
500 INJECTION INTRAMUSCULAR; INTRAVENOUS; SUBCUTANEOUS ONCE
Refills: 0 | Status: COMPLETED | OUTPATIENT
Start: 2023-10-06 | End: 2023-10-06

## 2023-10-06 RX ADMIN — SODIUM CHLORIDE 1000 MILLILITER(S): 9 INJECTION INTRAMUSCULAR; INTRAVENOUS; SUBCUTANEOUS at 03:02

## 2023-10-06 NOTE — ED PROVIDER NOTE - PHYSICAL EXAMINATION
VITAL SIGNS: I have reviewed nursing notes and confirm.  CONSTITUTIONAL: well-appearing, non-toxic, NAD  SKIN: Warm dry, normal skin turgor  HEAD: NCAT  CARD: RRR, no murmurs, rubs or gallops  RESP: clear to ausculation b/l.  No rales, rhonchi, or wheezing.  ABD: soft, + BS, non-tender, non-distended, no rebound or guarding. No CVA tenderness  EXT: Full ROM, no bony tenderness, no pedal edema, no calf tenderness  NEURO: normal motor. normal sensory. CN II-XII intact. Cerebellar testing normal.   PSYCH: Cooperative, appropriate.

## 2023-10-06 NOTE — ED PROVIDER NOTE - OBJECTIVE STATEMENT
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  Denies any hip pain denies any chest pain shortness of breath abdominal pain denies any headache or neck pain.

## 2023-10-06 NOTE — ED ADULT NURSE NOTE - OBJECTIVE STATEMENT
Pt brought in by EMS s/p witnessed mechanical fall at 23:30 .Per daughter Daniel present at bedside pt was attempting to siting in chair lift when "her knees gave out" Daughter denies head trauma or  LOC. Pt was c/o  slight pain to L knee.  Pt has hx of falls. Pt uses walker to get around at home. Currently taking plavix, no active bleeding noted. Hx of CHF, HTN, HDL. No acute distress noted, breathing unlabored.

## 2023-10-06 NOTE — ED PROVIDER NOTE - PATIENT'S PREFERRED PRONOUN
"Please see \"Imaging\" tab under \"Chart Review\" for details of today's visit.    Sridevi Tabor    " Her/She

## 2023-10-06 NOTE — ED PROVIDER NOTE - CLINICAL SUMMARY MEDICAL DECISION MAKING FREE TEXT BOX
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  Denies any hip pain denies any chest pain shortness of breath abdominal pain denies any headache or neck pain.    x-ray w/o noted fractures  ct head/cervical spine w/o noted traumatic pathology  With difficulty ambulating, offered admission for physical therapy and rehab discussed with daughter and patient at bedside patient and daughter declined admission at this time states have home health aide services would like to be discharged home and will return if any concerns, verbalized understanding agreeable with discharge plan.  Return precautions discussed.

## 2023-10-06 NOTE — ED PROVIDER NOTE - CONDITION AT DISCHARGE:
X Size Of Lesion In Cm (Optional): 0
Medical Necessity Clause: This procedure was medically necessary because the lesions that were treated were:
Administered By (Optional): Best Goff D.O., FAAD
Detail Level: Detailed
Validate Note Data When Using Inventory: Yes
Treatment Number (Optional): 8
Ndc# For Kenalog Only: 8200-4865-59
Kenalog Preparation: Kenalog with normal saline
Lot # (Optional): [id number]
How Many Mls Were Removed From The 10 Mg/Ml (5ml) Vial When Preparing The Injectable Solution?: 0.5
Which Kenalog Vial Was Used?: Kenalog 10 mg/ml (5 ml vial)
Consent: The risks of atrophy were reviewed with the patient.
Concentration Of Solution Injected (Mg/Ml): 2.5
Include Z78.9 (Other Specified Conditions Influencing Health Status) As An Associated Diagnosis?: No
Expiration Date (Optional): 10/2025
Total Volume Injected (Ccs- Only Use Numbers And Decimals): 1.2
Kenalog Type Of Vial: Multiple Dose
Improved

## 2023-10-06 NOTE — ED ADULT NURSE NOTE - CADM POA PRESS ULCER
[FreeTextEntry1] : discussed w pt \par \par discussed L LE recurrent of calf swelling in setting of acute DVT 8 months ago. he has restarted anticoagulation. will need repeat duplex US. may need indefinite anticoagulation\par \par influenza vaccine discussed and administered today \par \par check routine labs as below \par \par routine diet, exercise advised\par \par will review results of US and forward to Dr Melo hem/onc, unclear etiology of possible recurrent DVT  No

## 2023-10-06 NOTE — ED ADULT NURSE NOTE - CAS EDN DISCHARGE ASSESSMENT
Pt AOx2-3, forgetful at times, according to daughter, pt mental status is at baseline/Patient baseline mental status

## 2023-10-06 NOTE — ED PROVIDER NOTE - PATIENT PORTAL LINK FT
You can access the FollowMyHealth Patient Portal offered by [facility] by registering at the following website: [email or web address]. By joining [facility]’s FollowMyHealth portal, you will also be able to view your health information using other applications (apps) compatible with our system.

## 2023-10-06 NOTE — ED ADULT NURSE NOTE - NSFALLHARMRISKINTERV_ED_ALL_ED

## 2023-10-06 NOTE — ED ADULT TRIAGE NOTE - CHIEF COMPLAINT QUOTE
PMH of HTN,HLD, CVA, diverticulitis  BIBA, C/o fall ~2330 witnessed by daughter, as per ems, pt's "leg gave up", fall on carpet floor, no head strike, LOC. Reports right knee pain. On Plavix, no distress noted.

## 2023-12-12 NOTE — ED PROVIDER NOTE - PSYCHIATRIC, MLM
Creatine stable for now. BMP reviewed- noted Estimated Creatinine Clearance: 58.6 mL/min (A) (based on SCr of 1.8 mg/dL (H)). according to latest data. Based on current GFR, CKD stage is stage 3 - GFR 30-59.  Monitor UOP and serial BMP and adjust therapy as needed. Renally dose meds. Avoid nephrotoxic medications and procedures.       Alert and oriented to person, place, time/situation. normal mood and affect. no apparent risk to self or others.

## 2024-02-21 ENCOUNTER — APPOINTMENT (OUTPATIENT)
Dept: CARDIOLOGY | Facility: CLINIC | Age: 89
End: 2024-02-21
Payer: MEDICARE

## 2024-02-21 ENCOUNTER — NON-APPOINTMENT (OUTPATIENT)
Age: 89
End: 2024-02-21

## 2024-02-21 VITALS
BODY MASS INDEX: 16.22 KG/M2 | WEIGHT: 95 LBS | OXYGEN SATURATION: 87 % | SYSTOLIC BLOOD PRESSURE: 118 MMHG | HEART RATE: 86 BPM | HEIGHT: 64 IN | DIASTOLIC BLOOD PRESSURE: 62 MMHG

## 2024-02-21 DIAGNOSIS — E44.1 MILD PROTEIN-CALORIE MALNUTRITION: ICD-10-CM

## 2024-02-21 PROCEDURE — 93000 ELECTROCARDIOGRAM COMPLETE: CPT

## 2024-02-21 PROCEDURE — 99214 OFFICE O/P EST MOD 30 MIN: CPT

## 2024-02-21 RX ORDER — PANTOPRAZOLE 40 MG/1
40 TABLET, DELAYED RELEASE ORAL
Refills: 0 | Status: DISCONTINUED | COMMUNITY
End: 2024-02-21

## 2024-02-22 ENCOUNTER — RX RENEWAL (OUTPATIENT)
Age: 89
End: 2024-02-22

## 2024-02-22 LAB
ALBUMIN SERPL ELPH-MCNC: 3.9 G/DL
ALP BLD-CCNC: 110 U/L
ALT SERPL-CCNC: 17 U/L
ANION GAP SERPL CALC-SCNC: 11 MMOL/L
AST SERPL-CCNC: 19 U/L
BILIRUB SERPL-MCNC: 0.3 MG/DL
BUN SERPL-MCNC: 32 MG/DL
CALCIUM SERPL-MCNC: 9.2 MG/DL
CHLORIDE SERPL-SCNC: 105 MMOL/L
CHOLEST SERPL-MCNC: 111 MG/DL
CO2 SERPL-SCNC: 25 MMOL/L
CREAT SERPL-MCNC: 1.06 MG/DL
EGFR: 49 ML/MIN/1.73M2
ESTIMATED AVERAGE GLUCOSE: 120 MG/DL
GLUCOSE SERPL-MCNC: 120 MG/DL
HBA1C MFR BLD HPLC: 5.8 %
HCT VFR BLD CALC: 41.8 %
HDLC SERPL-MCNC: 54 MG/DL
HGB BLD-MCNC: 13.5 G/DL
LDLC SERPL CALC-MCNC: 36 MG/DL
MAGNESIUM SERPL-MCNC: 2.3 MG/DL
MCHC RBC-ENTMCNC: 31 PG
MCHC RBC-ENTMCNC: 32.3 GM/DL
MCV RBC AUTO: 95.9 FL
NONHDLC SERPL-MCNC: 57 MG/DL
PLATELET # BLD AUTO: 223 K/UL
POTASSIUM SERPL-SCNC: 4.6 MMOL/L
PROT SERPL-MCNC: 6 G/DL
RBC # BLD: 4.36 M/UL
RBC # FLD: 14.6 %
SODIUM SERPL-SCNC: 141 MMOL/L
TRIGL SERPL-MCNC: 117 MG/DL
TSH SERPL-ACNC: 4.97 UIU/ML
WBC # FLD AUTO: 4.75 K/UL

## 2024-02-26 ENCOUNTER — RX RENEWAL (OUTPATIENT)
Age: 89
End: 2024-02-26

## 2024-02-26 RX ORDER — ATORVASTATIN CALCIUM 10 MG/1
10 TABLET, FILM COATED ORAL
Qty: 90 | Refills: 3 | Status: ACTIVE | COMMUNITY
Start: 2024-02-26 | End: 1900-01-01

## 2024-03-01 NOTE — HISTORY OF PRESENT ILLNESS
[FreeTextEntry1] : 89-year-old female with H/O chronic systolic/diastolic heart failure, known ejection fraction of 45%, moderate mitral insufficiency and moderate aortic insufficiency. h/o CVA with residual weakness.  She presented to [facility] [place] on 11/28/21 with c/o dyspnea  x1 week.  She was treated for acute diastolic heart failure exacerbation with diuretics. Patient was seen by cardiology and advised to reinitiate low dose diuretic.  Since discharge no further complaints of dyspnea noted. Hosp in 3/23 for pulmonary edema, (diuretics had been held by [facility]).  She is back at home and appears to be doing fairly well. On pulse diuretics if needed. No recent labs.

## 2024-03-01 NOTE — PHYSICAL EXAM
[Well Nourished] : well nourished [No Acute Distress] : no acute distress [Frail] : frail [Normal Conjunctiva] : normal conjunctiva [Normal Venous Pressure] : normal venous pressure [No Carotid Bruit] : no carotid bruit [Normal S1, S2] : normal S1, S2 [No Rub] : no rub [No Gallop] : no gallop [Clear Lung Fields] : clear lung fields [Good Air Entry] : good air entry [No Respiratory Distress] : no respiratory distress  [Soft] : abdomen soft [Non Tender] : non-tender [No Masses/organomegaly] : no masses/organomegaly [Normal Bowel Sounds] : normal bowel sounds [Normal Gait] : normal gait [No Edema] : no edema [No Cyanosis] : no cyanosis [No Clubbing] : no clubbing [No Varicosities] : no varicosities [No Rash] : no rash [No Skin Lesions] : no skin lesions [Moves all extremities] : moves all extremities [No Focal Deficits] : no focal deficits [Normal Speech] : normal speech [Alert and Oriented] : alert and oriented [Normal memory] : normal memory [de-identified] : 2/6 systolic ejection murmur at LLSB and apex

## 2024-03-01 NOTE — CARDIOLOGY SUMMARY
[de-identified] : 8/16/23, Sinus Rhythm  -Left atrial enlargement. Voltage criteria for LVH (R(I)+S(III) exceeds 2.50 mV) -Voltage criteria w/o ST/T abnormality may be normal. -Poor R-wave progression -nonspecific -consider old anterior infarct. -Nonspecific ST depression  +  T-abnormality -Nondiagnostic -Possible Lateral  ischemia. 12/8/21, Sinus Tachycardia -Left atrial enlargement. Voltage criteria for LVH  -Poor R-wave progression -nonspecific -consider old anterior infarct. - Nonspecific T-abnormality.  6/9/21, Sinus  Rhythm -Left atrial enlargement.  -Poor R-wave progression -nonspecific -consider old anterior infarct.   -  Nonspecific T-abnormality.   [de-identified] : 2015 - WNL [de-identified] : 1/29/23-EF 40-45%, grade II DD. 6/9/21, Normal left ventricular size, ejection fraction 45 to 50%. Moderate AR, mild MR, mild TR, mild GA. Mildly dilated aortic root. No significant interval changes as compared to 3/26/2018.

## 2024-03-01 NOTE — DISCUSSION/SUMMARY
[Hypertrophic Cardiomyopathy] : hypertrophic cardiomyopathy [Hypertension] : hypertension [Stable] : stable [Patient] : the patient [Guardian] : the guardian [___ Month(s)] : in [unfilled] month(s) [EKG obtained to assist in diagnosis and management of assessed problem(s)] : EKG obtained to assist in diagnosis and management of assessed problem(s) [FreeTextEntry1] : 89-year-old female with chronic systolic/diastolic heart failure now appearing euvolemic.    Had lengthy discussion with patient's daughter regarding need for constant surveillance for signs of dyspnea, weight gain or lower extremity edema.  In this event, patient advised to increase her Lasix to daily for several days until she diuresis again. Continue mandated diuresis to maintain euvolemia.   Blood pressures adequte today, will continue monitoring.  Will need follow-up labs to reassess her renal function.

## 2024-05-14 NOTE — ED PROVIDER NOTE - NSICDXPASTMEDICALHX_GEN_ALL_CORE_FT
FAMILY HISTORY:  Father  Still living? Unknown  Family history of hypercholesterolemia, Age at diagnosis: Age Unknown  Family history of hypertension, Age at diagnosis: Age Unknown    
PAST MEDICAL HISTORY:  Abdominal mass     Cervical cancer last treatment May 25, 2011. s/p external and internal radiation    CHF (congestive heart failure)     CVA (cerebral infarction)     Depression     Depression (emotion)     Diverticulitis     GI (gastrointestinal bleed)     Hemorrhoid     Hypertension

## 2024-05-22 ENCOUNTER — APPOINTMENT (OUTPATIENT)
Dept: CARDIOLOGY | Facility: CLINIC | Age: 89
End: 2024-05-22
Payer: MEDICARE

## 2024-05-22 ENCOUNTER — NON-APPOINTMENT (OUTPATIENT)
Age: 89
End: 2024-05-22

## 2024-05-22 VITALS
DIASTOLIC BLOOD PRESSURE: 52 MMHG | HEIGHT: 64 IN | OXYGEN SATURATION: 96 % | HEART RATE: 82 BPM | SYSTOLIC BLOOD PRESSURE: 180 MMHG

## 2024-05-22 DIAGNOSIS — I42.0 DILATED CARDIOMYOPATHY: ICD-10-CM

## 2024-05-22 DIAGNOSIS — E78.5 HYPERLIPIDEMIA, UNSPECIFIED: ICD-10-CM

## 2024-05-22 PROCEDURE — G2211 COMPLEX E/M VISIT ADD ON: CPT

## 2024-05-22 PROCEDURE — 93000 ELECTROCARDIOGRAM COMPLETE: CPT

## 2024-05-22 PROCEDURE — 99214 OFFICE O/P EST MOD 30 MIN: CPT

## 2024-05-22 NOTE — CARDIOLOGY SUMMARY
[de-identified] : 8/16/23, Sinus Rhythm  -Left atrial enlargement. Voltage criteria for LVH (R(I)+S(III) exceeds 2.50 mV) -Voltage criteria w/o ST/T abnormality may be normal. -Poor R-wave progression -nonspecific -consider old anterior infarct. -Nonspecific ST depression  +  T-abnormality -Nondiagnostic -Possible Lateral  ischemia. 12/8/21, Sinus Tachycardia -Left atrial enlargement. Voltage criteria for LVH  -Poor R-wave progression -nonspecific -consider old anterior infarct. - Nonspecific T-abnormality.  6/9/21, Sinus  Rhythm -Left atrial enlargement.  -Poor R-wave progression -nonspecific -consider old anterior infarct.   -  Nonspecific T-abnormality.   [de-identified] : 2015 - WNL [de-identified] : 1/29/23-EF 40-45%, grade II DD. 6/9/21, Normal left ventricular size, ejection fraction 45 to 50%. Moderate AR, mild MR, mild TR, mild LA. Mildly dilated aortic root. No significant interval changes as compared to 3/26/2018.

## 2024-05-22 NOTE — HISTORY OF PRESENT ILLNESS
[FreeTextEntry1] : 89-year-old female with H/O chronic systolic/diastolic heart failure, known ejection fraction of 45%, moderate mitral insufficiency and moderate aortic insufficiency. h/o CVA with residual weakness.  She presented to [facility] on 11/28/21 with c/o dyspnea  x1 week.  She was treated for acute diastolic heart failure exacerbation with diuretics. Patient was seen by cardiology and advised to reinitiate low dose diuretic.  Since discharge no further complaints of dyspnea noted. Hosp in 3/23 for pulmonary edema, (diuretics had been held by [facility]).  She is back at home and appears to be doing fairly well. On pulse diuretics if needed. No recent labs.  05/22/2024 Daughter in room providing hx  As per daughter, pt has been more fatigue, noted to take naps during the day, has been experiencing disassociation Drinks ensure daily, decreased appetite, daughter noted that pt keeps on groaning, but reports no pain  Pt is on a wheelchair, reads, completes crosswords puzzles.  labs today and echo  Provided molst form

## 2024-05-22 NOTE — DISCUSSION/SUMMARY
[Hypertrophic Cardiomyopathy] : hypertrophic cardiomyopathy [Hypertension] : hypertension [Stable] : stable [Patient] : the patient [Guardian] : the guardian [___ Month(s)] : in [unfilled] month(s) [FreeTextEntry1] : 89-year-old female with chronic systolic/diastolic heart failure now appearing euvolemic.    Had lengthy discussion with patient's daughter regarding need for constant surveillance for signs of dyspnea, weight gain or lower extremity edema.  In this event, patient advised to increase her Lasix to daily for several days until she diuresis again 1. Echo ordered and labs today 2. Molst form provided [EKG obtained to assist in diagnosis and management of assessed problem(s)] : EKG obtained to assist in diagnosis and management of assessed problem(s)

## 2024-05-22 NOTE — PHYSICAL EXAM
[Well Nourished] : well nourished [No Acute Distress] : no acute distress [Frail] : frail [No Carotid Bruit] : no carotid bruit [Normal S1, S2] : normal S1, S2 [No Rub] : no rub [Clear Lung Fields] : clear lung fields [Good Air Entry] : good air entry [No Respiratory Distress] : no respiratory distress  [No Edema] : no edema [No Cyanosis] : no cyanosis [No Rash] : no rash [No Skin Lesions] : no skin lesions [Normal Speech] : normal speech [de-identified] : 2/6 systolic ejection murmur at LLSB and apex [de-identified] : wheelchair

## 2024-05-29 LAB
ALBUMIN SERPL ELPH-MCNC: 3.9 G/DL
ALP BLD-CCNC: 105 U/L
ALT SERPL-CCNC: 17 U/L
ANION GAP SERPL CALC-SCNC: 11 MMOL/L
AST SERPL-CCNC: 20 U/L
BILIRUB SERPL-MCNC: 0.3 MG/DL
BUN SERPL-MCNC: 35 MG/DL
CALCIUM SERPL-MCNC: 9.3 MG/DL
CHLORIDE SERPL-SCNC: 105 MMOL/L
CHOLEST SERPL-MCNC: 127 MG/DL
CO2 SERPL-SCNC: 26 MMOL/L
CREAT SERPL-MCNC: 1.23 MG/DL
EGFR: 41 ML/MIN/1.73M2
GLUCOSE SERPL-MCNC: 110 MG/DL
HDLC SERPL-MCNC: 50 MG/DL
LDLC SERPL CALC-MCNC: 48 MG/DL
NONHDLC SERPL-MCNC: 77 MG/DL
POTASSIUM SERPL-SCNC: 4.7 MMOL/L
PROT SERPL-MCNC: 6.2 G/DL
SODIUM SERPL-SCNC: 141 MMOL/L
TRIGL SERPL-MCNC: 178 MG/DL

## 2024-06-19 ENCOUNTER — APPOINTMENT (OUTPATIENT)
Dept: CARDIOLOGY | Facility: CLINIC | Age: 89
End: 2024-06-19
Payer: MEDICARE

## 2024-06-19 PROCEDURE — 93306 TTE W/DOPPLER COMPLETE: CPT

## 2024-07-26 ENCOUNTER — EMERGENCY (EMERGENCY)
Facility: HOSPITAL | Age: 89
LOS: 0 days | Discharge: HOME | End: 2024-07-27
Attending: STUDENT IN AN ORGANIZED HEALTH CARE EDUCATION/TRAINING PROGRAM
Payer: MEDICARE

## 2024-07-26 VITALS
HEIGHT: 64 IN | WEIGHT: 139.99 LBS | HEART RATE: 86 BPM | OXYGEN SATURATION: 99 % | SYSTOLIC BLOOD PRESSURE: 145 MMHG | RESPIRATION RATE: 97 BRPM | DIASTOLIC BLOOD PRESSURE: 80 MMHG | TEMPERATURE: 98 F

## 2024-07-26 DIAGNOSIS — Z88.0 ALLERGY STATUS TO PENICILLIN: ICD-10-CM

## 2024-07-26 DIAGNOSIS — Z86.73 PERSONAL HISTORY OF TRANSIENT ISCHEMIC ATTACK (TIA), AND CEREBRAL INFARCTION WITHOUT RESIDUAL DEFICITS: ICD-10-CM

## 2024-07-26 DIAGNOSIS — Z79.02 LONG TERM (CURRENT) USE OF ANTITHROMBOTICS/ANTIPLATELETS: ICD-10-CM

## 2024-07-26 DIAGNOSIS — R04.0 EPISTAXIS: ICD-10-CM

## 2024-07-26 DIAGNOSIS — I11.0 HYPERTENSIVE HEART DISEASE WITH HEART FAILURE: ICD-10-CM

## 2024-07-26 DIAGNOSIS — Z98.89 UNDEFINED: Chronic | ICD-10-CM

## 2024-07-26 DIAGNOSIS — I50.9 HEART FAILURE, UNSPECIFIED: ICD-10-CM

## 2024-07-26 PROCEDURE — 99283 EMERGENCY DEPT VISIT LOW MDM: CPT

## 2024-07-26 NOTE — ED ADULT TRIAGE NOTE - CHIEF COMPLAINT QUOTE
per family PT had  2 episode of of epitaxies 2nd episode  lasted 45 min. Pt has hx of L epistaxis last episode 1 year ago pt has polyp present. EMS gave 2 sprays of Afrin which has resolved  on Plavix.

## 2024-07-27 VITALS
TEMPERATURE: 98 F | DIASTOLIC BLOOD PRESSURE: 77 MMHG | OXYGEN SATURATION: 99 % | SYSTOLIC BLOOD PRESSURE: 138 MMHG | HEART RATE: 81 BPM | RESPIRATION RATE: 18 BRPM

## 2024-07-27 RX ORDER — OXYMETAZOLINE HYDROCHLORIDE 0.05 G/100ML
2 SPRAY NASAL ONCE
Refills: 0 | Status: COMPLETED | OUTPATIENT
Start: 2024-07-27 | End: 2024-07-27

## 2024-07-27 RX ADMIN — OXYMETAZOLINE HYDROCHLORIDE 2 SPRAY(S): 0.05 SPRAY NASAL at 01:13

## 2024-07-27 NOTE — ED PROVIDER NOTE - NSICDXPASTSURGICALHX_GEN_ALL_CORE_FT
Pre-Surgery Instructions:   Medication Instructions    albuterol (2 5 mg/3 mL) 0 083 % nebulizer solution Instructed patient per Anesthesia Guidelines   albuterol (VENTOLIN HFA) 90 mcg/act inhaler Instructed patient per Anesthesia Guidelines   amiodarone 200 mg tablet Instructed patient per Anesthesia Guidelines   aspirin 81 mg chewable tablet Instructed patient per Anesthesia Guidelines   atorvastatin (LIPITOR) 40 mg tablet Instructed patient per Anesthesia Guidelines   folic acid (FOLVITE) 1 mg tablet Instructed patient per Anesthesia Guidelines   gabapentin (NEURONTIN) 300 mg capsule Instructed patient per Anesthesia Guidelines   lisinopril (ZESTRIL) 2 5 mg tablet Instructed patient per Anesthesia Guidelines   mesalamine (PENTASA) 500 mg CR capsule Instructed patient per Anesthesia Guidelines   nystatin (MYCOSTATIN) ointment Instructed patient per Anesthesia Guidelines   thiamine (VITAMIN B1) 100 mg tablet Instructed patient per Anesthesia Guidelines   thiamine 100 MG tablet Instructed patient per Anesthesia Guidelines   Tiotropium Bromide Monohydrate (SPIRIVA RESPIMAT) 2 5 MCG/ACT AERS Instructed patient per Anesthesia Guidelines   traMADol (ULTRAM) 50 mg tablet Instructed patient per Anesthesia Guidelines   VENTOLIN  (90 Base) MCG/ACT inhaler Instructed patient per Anesthesia Guidelines   vitamin A 10,000 units capsule Instructed patient per Anesthesia Guidelines  REVIEWED  PRINTED SURGICAL INSTRUCTIONS WITH PATIENT , PATIENT VERBALIZED UNDERSTANDING  MEDICATIONS REVIEWED  ACE/ARB Med Class     Do not take this medication the day before and the morning of the day of surgery/procedure  Antiepileptic Med Class     Continue to take this medication on your normal schedule  If this is an oral medication and you take it in the morning, then you may take this medicine with a sip of water    ASA Med Class: Aspirin     Should be discontinued at least one week prior to planned operation, unless specifically stated otherwise by surgical service  Your Surgeon may have patient stop taking aspirin up to a week before surgery if having intracranial, middle ear, posterior eye, spine surgery or prostate surgery  [Patients taking aspirin for coronary stents should be reviewed by an anesthesiologist in the optimization clinic  Please do not discontinue aspirin in patients with coronary stents unless given specific permission to do so by the cardiologist who prescribed medication ]   If your surgeon approves please continue to take this medication on your normal schedule  You may take this medication on the morning of your surgery with a sip of water  Inhalational Med Class     Continue to take these inhaler medications on your normal schedule up to and including the day of surgery  Opioid Med Class     Continue to take this medication on your normal schedule  If this is an oral medication and you take it in the morning, then you may take this medicine with a sip of water  Statin Med Class     Continue to take this medication on your normal schedule  If this is an oral medication and you take it in the morning, then you may take this medicine with a sip of water  Sulfasalazine/Azathioprine Med Class     Stop taking this medication 7 days prior to surgery/procedure  Gayathri Taylor PAST SURGICAL HISTORY:  S/P abdominal surgery, follow-up exam     S/P lumpectomy of breast

## 2024-07-27 NOTE — ED ADULT NURSE NOTE - OBJECTIVE STATEMENT
Pt is a 89yo Female AAOx2 NDKA pmh cva pw epistaxis starting tonight. Pt respirations equal and unlabored bilaterally. Pt denies any pain at this time. Pt denies any cp, sob, n/v/d at this time. Pt on plavix. Pt updated on plan of care.

## 2024-07-27 NOTE — ED ADULT NURSE NOTE - NSFALLHARMRISKINTERV_ED_ALL_ED

## 2024-07-27 NOTE — ED PROVIDER NOTE - PHYSICAL EXAMINATION
General: No acute distress, mentation at baseline,  well nourished, well developed  HEENT: NCAT, Neck supple without meningismus, PERRL, no conjunctival injection. no active epistaxis in L nostril, dried old blood  Lungs: CTAB, No wheeze or crackles, No retractions, No increased work of breathing  Heart: S1S2 RRR, No M/R/G, Pules equal Bilaterally in upper and lower extremities distally  Abd: soft, NT/ND, No guarding, No rebound.  No hernias, no palpable masses.  Extrem: FROM in all joints, no gross deformities appreciated, no significant edema noted, No ulcers. Cap refil < 2sec.  Skin: No rash noted, warm dry.  Neuro:  Grossly normal.  No difficulty ambulating. No focal deficits.  Psychiatric: Appropriate mood and affect.

## 2024-07-27 NOTE — ED PROVIDER NOTE - CLINICAL SUMMARY MEDICAL DECISION MAKING FREE TEXT BOX
89-year-old female with past medical history of CHF CVA hypertension on Plavix nasal polyp with epistaxis in the past presenting with epistaxis x 2 today.  Patient denies any preceding trauma, denies any nasal pain.  Consistent with prior episodes.  Second episode lasted 45 minutes.  Received Afrin by EMS with immediate relief, has not had any episodes here during observation.  Exam findings above.  Clinical presentation likely secondary to polyp, patient has ENT which she will follow-up with, will give Afrin to go home with and given instructions to patient and daughter on epistaxis care.  Strict return precautions given

## 2024-07-27 NOTE — ED PROVIDER NOTE - NSFOLLOWUPCLINICS_GEN_ALL_ED_FT
[facility] - ENT  Otolaryngology (ENT)  [address] [place] [address]  Phone: [phone]  Fax:

## 2024-07-27 NOTE — ED PROVIDER NOTE - PATIENT PORTAL LINK FT
You can access the FollowMyHealth Patient Portal offered by [facility] by registering at the following website: [email or web address]. By joining [facility]’s FollowMyHealth portal, you will also be able to view your health information using other applications (apps) compatible with our system.

## 2024-07-27 NOTE — ED PROVIDER NOTE - CARE PROVIDER_API CALL
Markus Leyva  Otolaryngology  [address], [place] [address]  Phone: [phone]  Fax: [phone]  Follow Up Time: Urgent

## 2024-08-21 ENCOUNTER — NON-APPOINTMENT (OUTPATIENT)
Age: 89
End: 2024-08-21

## 2024-08-21 ENCOUNTER — APPOINTMENT (OUTPATIENT)
Dept: CARDIOLOGY | Facility: CLINIC | Age: 89
End: 2024-08-21
Payer: MEDICARE

## 2024-08-21 VITALS
WEIGHT: 112 LBS | BODY MASS INDEX: 19.12 KG/M2 | OXYGEN SATURATION: 96 % | DIASTOLIC BLOOD PRESSURE: 70 MMHG | HEART RATE: 71 BPM | SYSTOLIC BLOOD PRESSURE: 120 MMHG | HEIGHT: 64 IN

## 2024-08-21 DIAGNOSIS — E78.5 HYPERLIPIDEMIA, UNSPECIFIED: ICD-10-CM

## 2024-08-21 DIAGNOSIS — I42.0 DILATED CARDIOMYOPATHY: ICD-10-CM

## 2024-08-21 PROCEDURE — 93000 ELECTROCARDIOGRAM COMPLETE: CPT

## 2024-08-21 PROCEDURE — 99214 OFFICE O/P EST MOD 30 MIN: CPT

## 2024-08-21 PROCEDURE — G2211 COMPLEX E/M VISIT ADD ON: CPT

## 2024-08-21 NOTE — CARDIOLOGY SUMMARY
[de-identified] : 8/21/24, Sinus Rhythm  -Left atrial enlargement. Voltage criteria for LVH (R(I)+S(III) exceeds 2.50 mV)  -Poor R-wave progression -nonspecific -consider old anterior infarct. -Nonspecific ST depression  +  T-abnormality -Nondiagnostic -Possible Lateral  ischemia. 12/8/21, Sinus Tachycardia -Left atrial enlargement. Voltage criteria for LVH  -Poor R-wave progression -nonspecific -consider old anterior infarct. - Nonspecific T-abnormality.8/16/23, Sinus Rhythm  -Left atrial enlargement. Voltage criteria for LVH (R(I)+S(III) exceeds 2.50 mV) -Voltage criteria w/o ST/T abnormality may be normal. -Poor R-wave progression -nonspecific -consider old anterior infarct. -Nonspecific ST depression  +  T-abnormality -Nondiagnostic -Possible Lateral  ischemia. 12/8/21, Sinus Tachycardia -Left atrial enlargement. Voltage criteria for LVH  -Poor R-wave progression -nonspecific -consider old anterior infarct. - Nonspecific T-abnormality.  6/9/21, Sinus  Rhythm -Left atrial enlargement.  -Poor R-wave progression -nonspecific -consider old anterior infarct.   -  Nonspecific T-abnormality.   [de-identified] : 2015 - WNL [de-identified] : 6/19/24, 1. Left ventricular cavity is normal in size. Left ventricular wall thickness is normal. Left ventricular systolic function is normal with an ejection fraction of 51 % by Ramey's method of disks. There are no regional wall motion abnormalities seen. 2. Normal left ventricular diastolic function, with normal filling pressure. 3. Normal right ventricular cavity size, with normal wall thickness, and normal systolic function. 4. Trace mitral regurgitation. 5. No pericardial effusion seen. 6. Trace tricuspid regurgitation. 1/29/23-EF 40-45%, grade II DD. 6/9/21, Normal left ventricular size, ejection fraction 45 to 50%. Moderate AR, mild MR, mild TR, mild MI. Mildly dilated aortic root. No significant interval changes as compared to 3/26/2018.

## 2024-08-21 NOTE — HISTORY OF PRESENT ILLNESS
[FreeTextEntry1] : 89-year-old female with H/O chronic systolic/diastolic heart failure, known ejection fraction of 45%, moderate mitral insufficiency and moderate aortic insufficiency. h/o CVA with residual weakness.  She presented to [facility] on 11/28/21 with c/o dyspnea  x1 week.  She was treated for acute diastolic heart failure exacerbation with diuretics. Patient was seen by cardiology and advised to reinitiate low dose diuretic.  Since discharge no further complaints of dyspnea noted. Hosp in 3/23 for pulmonary edema, (diuretics had been held by [facility]). Increasing fatigue, sleeping more.  Yesterday significant hemorrhoidal bleed, now stopped.

## 2024-08-21 NOTE — DISCUSSION/SUMMARY
[Hypertrophic Cardiomyopathy] : hypertrophic cardiomyopathy [Hypertension] : hypertension [Stable] : stable [Patient] : the patient [Guardian] : the guardian [___ Month(s)] : in [unfilled] month(s) [EKG obtained to assist in diagnosis and management of assessed problem(s)] : EKG obtained to assist in diagnosis and management of assessed problem(s) [FreeTextEntry1] : 89-year-old female with chronic systolic/diastolic heart failure now appearing euvolemic.    Had lengthy discussion with patient's daughter regarding need for constant surveillance for signs of dyspnea, weight gain or lower extremity edema.  In this event, patient advised to increase her Lasix to daily for several days until she diuresis again. Continue mandated diuresis to maintain euvolemia.   Blood pressures adequate today, will continue monitoring.   Labs from 5/24 reviewed with family. f/u with Dr. Huerta for hemorrhoidal bleed.

## 2024-08-21 NOTE — PHYSICAL EXAM
[Well Nourished] : well nourished [No Acute Distress] : no acute distress [Frail] : frail [Normal Conjunctiva] : normal conjunctiva [Normal Venous Pressure] : normal venous pressure [No Carotid Bruit] : no carotid bruit [Normal S1, S2] : normal S1, S2 [No Rub] : no rub [No Gallop] : no gallop [Clear Lung Fields] : clear lung fields [Good Air Entry] : good air entry [No Respiratory Distress] : no respiratory distress  [Soft] : abdomen soft [Non Tender] : non-tender [No Masses/organomegaly] : no masses/organomegaly [Normal Bowel Sounds] : normal bowel sounds [Normal Gait] : normal gait [No Edema] : no edema [No Cyanosis] : no cyanosis [No Clubbing] : no clubbing [No Varicosities] : no varicosities [No Rash] : no rash [No Skin Lesions] : no skin lesions [Moves all extremities] : moves all extremities [No Focal Deficits] : no focal deficits [Normal Speech] : normal speech [Alert and Oriented] : alert and oriented [Normal memory] : normal memory [de-identified] : 2/6 systolic ejection murmur at LLSB and apex

## 2024-11-13 ENCOUNTER — EMERGENCY (EMERGENCY)
Facility: HOSPITAL | Age: 89
LOS: 0 days | Discharge: HOME | End: 2024-11-14
Attending: STUDENT IN AN ORGANIZED HEALTH CARE EDUCATION/TRAINING PROGRAM | Admitting: HOSPITALIST
Payer: MEDICARE

## 2024-11-13 VITALS
HEART RATE: 75 BPM | SYSTOLIC BLOOD PRESSURE: 117 MMHG | RESPIRATION RATE: 15 BRPM | HEIGHT: 60 IN | DIASTOLIC BLOOD PRESSURE: 62 MMHG | WEIGHT: 110.01 LBS | TEMPERATURE: 98 F | OXYGEN SATURATION: 98 %

## 2024-11-13 DIAGNOSIS — I11.0 HYPERTENSIVE HEART DISEASE WITH HEART FAILURE: ICD-10-CM

## 2024-11-13 DIAGNOSIS — Z79.02 LONG TERM (CURRENT) USE OF ANTITHROMBOTICS/ANTIPLATELETS: ICD-10-CM

## 2024-11-13 DIAGNOSIS — Z85.44 PERSONAL HISTORY OF MALIGNANT NEOPLASM OF OTHER FEMALE GENITAL ORGANS: ICD-10-CM

## 2024-11-13 DIAGNOSIS — Z98.89 OTHER SPECIFIED POSTPROCEDURAL STATES: Chronic | ICD-10-CM

## 2024-11-13 DIAGNOSIS — I69.998 OTHER SEQUELAE FOLLOWING UNSPECIFIED CEREBROVASCULAR DISEASE: ICD-10-CM

## 2024-11-13 DIAGNOSIS — R53.1 WEAKNESS: ICD-10-CM

## 2024-11-13 DIAGNOSIS — R06.02 SHORTNESS OF BREATH: ICD-10-CM

## 2024-11-13 DIAGNOSIS — H40.9 UNSPECIFIED GLAUCOMA: ICD-10-CM

## 2024-11-13 DIAGNOSIS — Z88.0 ALLERGY STATUS TO PENICILLIN: ICD-10-CM

## 2024-11-13 DIAGNOSIS — I50.32 CHRONIC DIASTOLIC (CONGESTIVE) HEART FAILURE: ICD-10-CM

## 2024-11-13 LAB
ALBUMIN SERPL ELPH-MCNC: 3 G/DL — LOW (ref 3.3–5)
ALP SERPL-CCNC: 98 U/L — SIGNIFICANT CHANGE UP (ref 40–120)
ALT FLD-CCNC: 23 U/L — SIGNIFICANT CHANGE UP (ref 12–78)
ANION GAP SERPL CALC-SCNC: 6 MMOL/L — SIGNIFICANT CHANGE UP (ref 5–17)
AST SERPL-CCNC: 19 U/L — SIGNIFICANT CHANGE UP (ref 15–37)
BASE EXCESS BLDV CALC-SCNC: 1.7 MMOL/L — SIGNIFICANT CHANGE UP (ref -2–3)
BASOPHILS # BLD AUTO: 0.02 K/UL — SIGNIFICANT CHANGE UP (ref 0–0.2)
BASOPHILS NFR BLD AUTO: 0.4 % — SIGNIFICANT CHANGE UP (ref 0–2)
BILIRUB SERPL-MCNC: 0.4 MG/DL — SIGNIFICANT CHANGE UP (ref 0.2–1.2)
BLOOD GAS COMMENTS, VENOUS: SIGNIFICANT CHANGE UP
BUN SERPL-MCNC: 31 MG/DL — HIGH (ref 7–23)
CALCIUM SERPL-MCNC: 8.8 MG/DL — SIGNIFICANT CHANGE UP (ref 8.5–10.1)
CHLORIDE SERPL-SCNC: 111 MMOL/L — HIGH (ref 96–108)
CO2 BLDV-SCNC: 29 MMOL/L — HIGH (ref 22–26)
CO2 SERPL-SCNC: 26 MMOL/L — SIGNIFICANT CHANGE UP (ref 22–31)
CREAT SERPL-MCNC: 1 MG/DL — SIGNIFICANT CHANGE UP (ref 0.5–1.3)
EGFR: 52 ML/MIN/1.73M2 — LOW
EOSINOPHIL # BLD AUTO: 0.13 K/UL — SIGNIFICANT CHANGE UP (ref 0–0.5)
EOSINOPHIL NFR BLD AUTO: 2.6 % — SIGNIFICANT CHANGE UP (ref 0–6)
FLUAV AG NPH QL: SIGNIFICANT CHANGE UP
FLUBV AG NPH QL: SIGNIFICANT CHANGE UP
GAS PNL BLDV: SIGNIFICANT CHANGE UP
GLUCOSE SERPL-MCNC: 119 MG/DL — HIGH (ref 70–99)
HCO3 BLDV-SCNC: 27 MMOL/L — SIGNIFICANT CHANGE UP (ref 22–28)
HCT VFR BLD CALC: 41.9 % — SIGNIFICANT CHANGE UP (ref 34.5–45)
HGB BLD-MCNC: 13.7 G/DL — SIGNIFICANT CHANGE UP (ref 11.5–15.5)
HOROWITZ INDEX BLDV+IHG-RTO: 21 — SIGNIFICANT CHANGE UP
IMM GRANULOCYTES NFR BLD AUTO: 0.4 % — SIGNIFICANT CHANGE UP (ref 0–0.9)
LYMPHOCYTES # BLD AUTO: 0.59 K/UL — LOW (ref 1–3.3)
LYMPHOCYTES # BLD AUTO: 11.9 % — LOW (ref 13–44)
MCHC RBC-ENTMCNC: 31.5 PG — SIGNIFICANT CHANGE UP (ref 27–34)
MCHC RBC-ENTMCNC: 32.7 G/DL — SIGNIFICANT CHANGE UP (ref 32–36)
MCV RBC AUTO: 96.3 FL — SIGNIFICANT CHANGE UP (ref 80–100)
MONOCYTES # BLD AUTO: 0.46 K/UL — SIGNIFICANT CHANGE UP (ref 0–0.9)
MONOCYTES NFR BLD AUTO: 9.3 % — SIGNIFICANT CHANGE UP (ref 2–14)
NEUTROPHILS # BLD AUTO: 3.75 K/UL — SIGNIFICANT CHANGE UP (ref 1.8–7.4)
NEUTROPHILS NFR BLD AUTO: 75.4 % — SIGNIFICANT CHANGE UP (ref 43–77)
NRBC # BLD: 0 /100 WBCS — SIGNIFICANT CHANGE UP (ref 0–0)
NT-PROBNP SERPL-SCNC: 3179 PG/ML — HIGH (ref 0–450)
PCO2 BLDV: 45 MMHG — SIGNIFICANT CHANGE UP (ref 42–55)
PH BLDV: 7.39 — SIGNIFICANT CHANGE UP (ref 7.32–7.43)
PLATELET # BLD AUTO: 210 K/UL — SIGNIFICANT CHANGE UP (ref 150–400)
PO2 BLDV: 39 MMHG — SIGNIFICANT CHANGE UP (ref 25–45)
POTASSIUM SERPL-MCNC: 4.2 MMOL/L — SIGNIFICANT CHANGE UP (ref 3.5–5.3)
POTASSIUM SERPL-SCNC: 4.2 MMOL/L — SIGNIFICANT CHANGE UP (ref 3.5–5.3)
PROT SERPL-MCNC: 6.2 GM/DL — SIGNIFICANT CHANGE UP (ref 6–8.3)
RBC # BLD: 4.35 M/UL — SIGNIFICANT CHANGE UP (ref 3.8–5.2)
RBC # FLD: 13.9 % — SIGNIFICANT CHANGE UP (ref 10.3–14.5)
RSV RNA NPH QL NAA+NON-PROBE: SIGNIFICANT CHANGE UP
SAO2 % BLDV: 69.9 % — LOW (ref 94–98)
SARS-COV-2 RNA SPEC QL NAA+PROBE: SIGNIFICANT CHANGE UP
SODIUM SERPL-SCNC: 143 MMOL/L — SIGNIFICANT CHANGE UP (ref 135–145)
TROPONIN I, HIGH SENSITIVITY RESULT: 76.6 NG/L — HIGH
TROPONIN I, HIGH SENSITIVITY RESULT: 77 NG/L — HIGH
WBC # BLD: 4.96 K/UL — SIGNIFICANT CHANGE UP (ref 3.8–10.5)
WBC # FLD AUTO: 4.96 K/UL — SIGNIFICANT CHANGE UP (ref 3.8–10.5)

## 2024-11-13 PROCEDURE — 99222 1ST HOSP IP/OBS MODERATE 55: CPT

## 2024-11-13 PROCEDURE — 93010 ELECTROCARDIOGRAM REPORT: CPT

## 2024-11-13 PROCEDURE — 71046 X-RAY EXAM CHEST 2 VIEWS: CPT | Mod: 26

## 2024-11-13 PROCEDURE — 99285 EMERGENCY DEPT VISIT HI MDM: CPT

## 2024-11-13 RX ORDER — ACETAMINOPHEN 500 MG
650 TABLET ORAL EVERY 6 HOURS
Refills: 0 | Status: DISCONTINUED | OUTPATIENT
Start: 2024-11-13 | End: 2024-11-14

## 2024-11-13 RX ORDER — MELATONIN 5 MG
3 TABLET ORAL AT BEDTIME
Refills: 0 | Status: DISCONTINUED | OUTPATIENT
Start: 2024-11-13 | End: 2024-11-14

## 2024-11-13 RX ORDER — ONDANSETRON HYDROCHLORIDE 2 MG/ML
4 INJECTION, SOLUTION INTRAMUSCULAR; INTRAVENOUS EVERY 8 HOURS
Refills: 0 | Status: DISCONTINUED | OUTPATIENT
Start: 2024-11-13 | End: 2024-11-14

## 2024-11-13 RX ORDER — MAGNESIUM, ALUMINUM HYDROXIDE 200-200 MG
30 TABLET,CHEWABLE ORAL EVERY 4 HOURS
Refills: 0 | Status: DISCONTINUED | OUTPATIENT
Start: 2024-11-13 | End: 2024-11-14

## 2024-11-13 NOTE — ED ADULT NURSE NOTE - OBJECTIVE STATEMENT
89yF presenting to ED for sob, s/p drinking water today. pt reports she started having throat pain and increased work of breathing/sob following drinking water today. pt has known difficulty swallowing from previous stroke.  PMH CVA, HTN, HLD

## 2024-11-13 NOTE — H&P ADULT - ASSESSMENT
Mary Cortes is a 89 year old female with PMHx of HTN, chronic HFpEF (LV EF 51% and normal diastolic function on echo 6/19/24, previously LV EF 40-45% and grade II diastolic dysfunction on echo 1/29/23), hx of CVA (occurred 17 years ago, with residual L. 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    Shortness of breath, r/o ACS  Complaints of trouble breathing while sitting on couch this evening  EKG with T waves inversions, appears unchanged from prior  CXR personally reviewed; ?cardiomegaly, no obvious infiltrate  Initial troponin 76.6 then 77, pro-BNP 3179 on admission, VBG 7.39 / 45 / 39 / 27, COVID/influenza/RSV negative  Recent echo (on 6/19/24) with LV EF 51%, no WMA, normal LV diastolic function, trace MR, trace TR  F/u serial troponins  F/u TSH, lipid panel, A1c for risk stratification  Telemetry    Hypertensive urgency vs. emergency  BP as elevated as 214/80 and troponin 76.6 on admission, pro-BNP 3179 (in pt with hx of known CHF, previously 20,027 (on 3/10/23))  Recent echo results as above  PTA losartan 50 mg, metoprolol succinate 25 mg, furosemide 10 mg  Goal is to reduce MAP by no more than 25-30% over first 2-4 hours, short term goal < 160 / < 100  Telemetry    Dysphagia, chronic  Daughter reports patient choked on multivitamin earlier this AM  States patient has had chronic dysphagia since CVA 17 years ago and is supposed to be on pureed diet  Still chokes regardless on which diet so regular diet is implemented at home  Offered SLP prabhu but daughter declined  Daughter not interested in palliative care discussion  Aspiration precautions      Chronic medical conditions:  Chronic HFpEF (LV EF 51% and normal diastolic function on echo 6/19/24, previously LV EF 40-45% and grade II diastolic dysfunction on echo 1/29/23): clinically euvolemic, PTA furosemide 10 mg  Hx of CVA (occurred 17 years ago, with residual L. sided deficits and dysphagia): PTA clopidogrel 75 mg, atorvastatin 10 mg  Urinary and bowel incontinence (secondary to XRT): PTA tolterodine ER 4 mg reordered as oxybutynin 5 mg BID given tolterodine not on formulary  Glaucoma: PTA Simbrinza 1 gtt BID reordered as dorzolamide / brimonidine given Simbrinza not on formulary    Medication reconciliation completed using med list provided by daughter at bedside.    Plan of care discussed with daughter at bedside. Please call Daniel saldana ([phone]) when patient is optimized for discharge. Mary Cortes is a 89 year old female with PMHx of HTN, chronic HFpEF (LV EF 51% and normal diastolic function on echo 6/19/24, previously LV EF 40-45% and grade II diastolic dysfunction on echo 1/29/23), hx of CVA (occurred 17 years ago, with residual L. 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    Shortness of breath, r/o ACS  Complaints of trouble breathing while sitting on couch this evening  EKG with T waves inversions, appears unchanged from prior  CXR personally reviewed; ?cardiomegaly, no obvious infiltrate  Initial troponin 76.6 then 77, pro-BNP 3179 on admission, VBG 7.39 / 45 / 39 / 27, COVID/influenza/RSV negative  Recent echo (on 6/19/24) with LV EF 51%, no WMA, normal LV diastolic function, trace MR, trace TR  F/u serial troponins  F/u TSH, lipid panel, A1c for risk stratification  Telemetry    Hypertensive urgency vs. emergency  BP as elevated as 214/80 and troponin 76.6 on admission, pro-BNP 3179 (in pt with hx of known CHF, previously 20,027 (on 3/10/23))  Recent echo results as above  PTA losartan 50 mg, metoprolol succinate 25 mg, furosemide 10 mg  Goal is to reduce MAP by no more than 25-30% over first 2-4 hours, short term goal < 160 / < 100  Telemetry    Dysphagia, chronic  Daughter reports patient choked on multivitamin earlier this AM  States patient has had chronic dysphagia since CVA 17 years ago and is supposed to be on pureed diet  Still chokes regardless of which diet so regular diet is implemented at home  Offered SLP prabhu but daughter declined  Daughter not interested in palliative care discussion  Aspiration precautions      Chronic medical conditions:  Chronic HFpEF (LV EF 51% and normal diastolic function on echo 6/19/24, previously LV EF 40-45% and grade II diastolic dysfunction on echo 1/29/23): clinically euvolemic, PTA furosemide 10 mg  Hx of CVA (occurred 17 years ago, with residual L. sided deficits and dysphagia): PTA clopidogrel 75 mg, atorvastatin 10 mg  Urinary and bowel incontinence (secondary to XRT): PTA tolterodine ER 4 mg reordered as oxybutynin 5 mg BID given tolterodine not on formulary  Glaucoma: PTA Simbrinza 1 gtt BID reordered as dorzolamide / brimonidine given Simbrinza not on formulary    Medication reconciliation completed using med list provided by daughter at bedside.    Plan of care discussed with daughter at bedside. Please call Daniel saldana ([phone]) when patient is optimized for discharge.

## 2024-11-13 NOTE — H&P ADULT - HISTORY OF PRESENT ILLNESS
Mary Cortes is a 89 year old female with PMHx of HTN, chronic HFpEF (LV EF 51% and normal diastolic function on echo 6/19/24, previously LV EF 40-45% and grade II diastolic dysfunction on echo 1/29/23), and hx of CVA (with residual *** deficits), and glaucoma who presented to the ED on 11/13/24 for complaints of chest pain.         In the ED, VSS except BP as elevated as 214/80. CBC and CMP grossly unremarkable. Troponin 76.6. then 77. Pro-BNP 3179. VBG 7.39 / 45 / 39 / 27. COVID/influenza/RSV negative. EKG with T wave inversions, not new. Did not receive any medications.  Mary Cortes is a 89 year old female with PMHx of HTN, chronic HFpEF (LV EF 51% and normal diastolic function on echo 6/19/24, previously LV EF 40-45% and grade II diastolic dysfunction on echo 1/29/23), hx of CVA (occurred 17 years ago, with residual L. sided deficits and dysphagia), hx of cervical CA (s/p XRT), urinary and bowel incontinence (secondary to XRT), and glaucoma who presented to the ED on 11/13/24 for complaints of shortness of breath.    History obtained from daughter at bedside. As per daughter, patient was sitting on the couch around 5:30 PM this evening and complained of trouble breathing. Daughter states patient looked fine otherwise. However, her aide informed her patient choked on her multivitamin earlier in the morning and subsequently, her voice sounded raspy. Of note, daughter states baseline systolic blood pressure is around 150 and requires manual blood pressure checks as automatic tends to be inaccurate. Baseline functional status is ambulates with walker but is still unsteady. Able to feed and shower self but requires assistance with dressing. Eat pureed diet. Has an aide 8 hours per day on weekdays. Lives at home with daughter.    In the ED, VSS except BP as elevated as 214/80. CBC and CMP grossly unremarkable. Troponin 76.6 then 77. Pro-BNP 3179. VBG 7.39 / 45 / 39 / 27. COVID/influenza/RSV negative. EKG with T wave inversions, not new. Did not receive any medications.

## 2024-11-13 NOTE — ED ADULT NURSE NOTE - NSFALLHARMRISKINTERV_ED_ALL_ED
Assistance OOB with selected safe patient handling equipment if applicable/Assistance with ambulation/Communicate risk of Fall with Harm to all staff, patient, and family/Monitor gait and stability/Provide visual cue: red socks, yellow wristband, yellow gown, etc/Reinforce activity limits and safety measures with patient and family/Bed in lowest position, wheels locked, appropriate side rails in place/Call bell, personal items and telephone in reach/Instruct patient to call for assistance before getting out of bed/chair/stretcher/Non-slip footwear applied when patient is off stretcher/[place] to call system/Physically safe environment - no spills, clutter or unnecessary equipment/Purposeful Proactive Rounding/Room/bathroom lighting operational, light cord in reach

## 2024-11-13 NOTE — H&P ADULT - TIME BILLING
coordination of care with ER physician and ER RN, obtaining history, performing a physical examination, reviewing and interpreting labs and imaging, ordering further studies and tests, explaining the diagnosis and treatment plan to patient and daughter at bedside, completing medication reconciliation, and documentation as above.

## 2024-11-13 NOTE — ED ADULT TRIAGE NOTE - CHIEF COMPLAINT QUOTE
BIBA for shortness of breath, throat pain started morning after having trouble drinking water today. Known difficulty swallowing from previous stroke. Not complaining of any pain or shortness of breath at this time. PMH CVA, HTN, HLD

## 2024-11-13 NOTE — H&P ADULT - NSHPPHYSICALEXAM_GEN_ALL_CORE
T(C): 37.1 (11-13-24 @ 23:51), Max: 37.1 (11-13-24 @ 23:51)  HR: 73 (11-13-24 @ 23:51) (73 - 75)  BP: 201/66 (11-13-24 @ 23:51) (117/62 - 214/80)  RR: 19 (11-13-24 @ 23:51) (15 - 19)  SpO2: 100% (11-13-24 @ 23:51) (98% - 100%)    CONSTITUTIONAL: Well groomed, no apparent distress, pleasant  EYES: PERRLA and symmetric, EOMI  ENMT: hard of hearing, upper dentures in place  RESP: No respiratory distress, no use of accessory muscles; CTA b/l  CV: RRR  GI: Soft, NT, ND  SKIN: b/l LE without edema

## 2024-11-13 NOTE — ED PROVIDER NOTE - OBJECTIVE STATEMENT
89F PMH HTN, HLD, prior CVA w/ mild residual weakness, on Plavix and Lasix, BIBEMS accompanied by daughter d/t SOB that onset this evening. Per daughter, pt typically resistant to being brought to hospital, but requested to come tonight. Daughter states pt w/ choking episode on Centrum vitamin this AM, pt w/ some swallowing difficulties s/p CVA. Daughter reports pt w/ slight rasp in voice s/p choking episode. Pt received COVID booster 3 days ago. Daughter states pt was seated on couch this evening, and repeatedly told daughter she felt like she could not breath and asked her to call to 911. Pt w/o fever, chills, h/a, CP, cough, abd pain, back pain, N/V/D/C, UTI sx, unilateral LE edema. Denies recent travel or known sick contacts.     PMH as above, PSH noncontributory, Allergy PCN, Meds as listed.

## 2024-11-13 NOTE — ED PROVIDER NOTE - CLINICAL SUMMARY MEDICAL DECISION MAKING FREE TEXT BOX
89F PMH HTN, HLD, prior CVA w/ mild residual weakness, on Plavix and Lasix, BIBEMS accompanied by daughter d/t SOB that onset this evening. Afebrile, VSS. Well appearing, in NAD. Plan for CBC, CMP, trop, BNP, VBG, Flu/COVID, CXR, ECG. Re-eval. Unable to PERC out d/t age, but low clinical suspicion for PE. 89F PMH HTN, HLD, prior CVA w/ mild residual weakness, on Plavix and Lasix, BIBEMS accompanied by daughter d/t SOB that onset this evening. Afebrile, VSS. Well appearing, in NAD. Plan for CBC, CMP, trop, BNP, VBG, Flu/COVID, CXR, ECG. Re-eval. Unable to PERC out d/t age, but low clinical suspicion for PE.  W/u significant for: Trop 76 -> 77  (prior values ~50) 89F PMH HTN, HLD, prior CVA w/ mild residual weakness, on Plavix and Lasix, BIBEMS accompanied by daughter d/t SOB that onset this evening. Afebrile, VSS. Well appearing, in NAD. Plan for CBC, CMP, trop, BNP, VBG, Flu/COVID, CXR, ECG. Re-eval. Unable to PERC out d/t age, but low clinical suspicion for PE.  W/u significant for: ECG NSR w/ TWI I, AVL (unchanged from prior), trop 77 x 2  (prior values ~50), BNP 3200. HEART score 5, moderate risk. Will admit to Obs Tele (d/w Dr Welch). Pt, daughter updated to results, admission. They understand / agree w/ this plan.

## 2024-11-13 NOTE — H&P ADULT - NSICDXPASTMEDICALHX_GEN_ALL_CORE_FT
PAST MEDICAL HISTORY:  Chronic heart failure with preserved ejection fraction (HFpEF)     History of cerebrovascular accident (CVA) with residual deficit     History of cervical cancer     Hypertension     Urinary and bowel incontinence

## 2024-11-13 NOTE — H&P ADULT - NSHPLABSRESULTS_GEN_ALL_CORE
13.7   4.96  )-----------( 210      ( 13 Nov 2024 19:55 )             41.9     143  |  111[H]  |  31[H]  ----------------------------<  119[H]     11-13  4.2   |  26  |  1.00    Ca    8.8      13 Nov 2024 19:55    TPro  6.2  /  Alb  3.0[L]  /  TBili  0.4  /  DBili  x   /  AST  19  /  ALT  23  /  AlkPhos  98  11-13    20:02 - VBG - pH: 7.39  | pCO2: 45    | pO2: 39    | Lactate:          Pro-BNP: 3179 (11-13-24 @ 19:55)    Chest x-ray 11/13/24

## 2024-11-14 ENCOUNTER — TRANSCRIPTION ENCOUNTER (OUTPATIENT)
Age: 89
End: 2024-11-14

## 2024-11-14 VITALS
HEART RATE: 98 BPM | DIASTOLIC BLOOD PRESSURE: 75 MMHG | RESPIRATION RATE: 20 BRPM | SYSTOLIC BLOOD PRESSURE: 157 MMHG | OXYGEN SATURATION: 100 %

## 2024-11-14 DIAGNOSIS — R32 UNSPECIFIED URINARY INCONTINENCE: ICD-10-CM

## 2024-11-14 DIAGNOSIS — I50.32 CHRONIC DIASTOLIC (CONGESTIVE) HEART FAILURE: ICD-10-CM

## 2024-11-14 DIAGNOSIS — R79.89 OTHER SPECIFIED ABNORMAL FINDINGS OF BLOOD CHEMISTRY: ICD-10-CM

## 2024-11-14 DIAGNOSIS — H40.9 UNSPECIFIED GLAUCOMA: ICD-10-CM

## 2024-11-14 DIAGNOSIS — I16.0 HYPERTENSIVE URGENCY: ICD-10-CM

## 2024-11-14 DIAGNOSIS — I10 ESSENTIAL (PRIMARY) HYPERTENSION: ICD-10-CM

## 2024-11-14 DIAGNOSIS — I69.30 UNSPECIFIED SEQUELAE OF CEREBRAL INFARCTION: ICD-10-CM

## 2024-11-14 PROBLEM — I50.9 HEART FAILURE, UNSPECIFIED: Chronic | Status: INACTIVE | Noted: 2023-01-27 | Resolved: 2024-11-14

## 2024-11-14 LAB
A1C WITH ESTIMATED AVERAGE GLUCOSE RESULT: 5.9 % — HIGH (ref 4–5.6)
CHOLEST SERPL-MCNC: 103 MG/DL — SIGNIFICANT CHANGE UP
ESTIMATED AVERAGE GLUCOSE: 123 MG/DL — HIGH (ref 68–114)
HDLC SERPL-MCNC: 48 MG/DL — LOW
LIPID PNL WITH DIRECT LDL SERPL: 38 MG/DL — SIGNIFICANT CHANGE UP
NON HDL CHOLESTEROL: 55 MG/DL — SIGNIFICANT CHANGE UP
TRIGL SERPL-MCNC: 89 MG/DL — SIGNIFICANT CHANGE UP
TROPONIN I, HIGH SENSITIVITY RESULT: 77.6 NG/L — HIGH
TSH SERPL-MCNC: 4.49 UIU/ML — HIGH (ref 0.36–3.74)

## 2024-11-14 PROCEDURE — 99239 HOSP IP/OBS DSCHRG MGMT >30: CPT

## 2024-11-14 RX ORDER — BRIMONIDINE TARTRATE 0.2 %
1 DROPS OPHTHALMIC (EYE)
Refills: 0 | Status: DISCONTINUED | OUTPATIENT
Start: 2024-11-14 | End: 2024-11-14

## 2024-11-14 RX ORDER — METOPROLOL TARTRATE 50 MG
25 TABLET ORAL ONCE
Refills: 0 | Status: COMPLETED | OUTPATIENT
Start: 2024-11-14 | End: 2024-11-14

## 2024-11-14 RX ORDER — FUROSEMIDE 40 MG
0.5 TABLET ORAL
Refills: 0 | DISCHARGE

## 2024-11-14 RX ORDER — FUROSEMIDE 40 MG
10 TABLET ORAL DAILY
Refills: 0 | Status: DISCONTINUED | OUTPATIENT
Start: 2024-11-14 | End: 2024-11-14

## 2024-11-14 RX ORDER — LOSARTAN POTASSIUM 25 MG/1
50 TABLET ORAL DAILY
Refills: 0 | Status: DISCONTINUED | OUTPATIENT
Start: 2024-11-14 | End: 2024-11-14

## 2024-11-14 RX ORDER — HYDRALAZINE HYDROCHLORIDE 50 MG/1
10 TABLET, FILM COATED ORAL ONCE
Refills: 0 | Status: COMPLETED | OUTPATIENT
Start: 2024-11-14 | End: 2024-11-14

## 2024-11-14 RX ORDER — OXYBUTYNIN CHLORIDE 5 MG/1
5 TABLET ORAL
Refills: 0 | Status: DISCONTINUED | OUTPATIENT
Start: 2024-11-14 | End: 2024-11-14

## 2024-11-14 RX ORDER — CHOLESTEROL/SOYBEAN OIL/C/E 60-200-80
1 POWDER (GRAM) ORAL DAILY
Refills: 0 | Status: DISCONTINUED | OUTPATIENT
Start: 2024-11-14 | End: 2024-11-14

## 2024-11-14 RX ORDER — LOSARTAN POTASSIUM 25 MG/1
50 TABLET ORAL ONCE
Refills: 0 | Status: COMPLETED | OUTPATIENT
Start: 2024-11-14 | End: 2024-11-14

## 2024-11-14 RX ORDER — CLOPIDOGREL 75 MG/1
75 TABLET ORAL DAILY
Refills: 0 | Status: DISCONTINUED | OUTPATIENT
Start: 2024-11-14 | End: 2024-11-14

## 2024-11-14 RX ORDER — METOPROLOL TARTRATE 50 MG
25 TABLET ORAL DAILY
Refills: 0 | Status: DISCONTINUED | OUTPATIENT
Start: 2024-11-14 | End: 2024-11-14

## 2024-11-14 RX ORDER — LOSARTAN POTASSIUM 25 MG/1
1 TABLET ORAL
Refills: 0 | DISCHARGE

## 2024-11-14 RX ORDER — DORZOLAMIDE HYDROCHLORIDE 20 MG/ML
1 SOLUTION OPHTHALMIC
Refills: 0 | Status: DISCONTINUED | OUTPATIENT
Start: 2024-11-14 | End: 2024-11-14

## 2024-11-14 RX ADMIN — OXYBUTYNIN CHLORIDE 5 MILLIGRAM(S): 5 TABLET ORAL at 05:53

## 2024-11-14 RX ADMIN — OXYBUTYNIN CHLORIDE 5 MILLIGRAM(S): 5 TABLET ORAL at 17:05

## 2024-11-14 RX ADMIN — Medication 1 TABLET(S): at 11:02

## 2024-11-14 RX ADMIN — Medication 1 DROP(S): at 17:06

## 2024-11-14 RX ADMIN — HYDRALAZINE HYDROCHLORIDE 10 MILLIGRAM(S): 50 TABLET, FILM COATED ORAL at 17:06

## 2024-11-14 RX ADMIN — Medication 10 MILLIGRAM(S): at 05:53

## 2024-11-14 RX ADMIN — LOSARTAN POTASSIUM 50 MILLIGRAM(S): 25 TABLET ORAL at 05:53

## 2024-11-14 RX ADMIN — Medication 25 MILLIGRAM(S): at 01:23

## 2024-11-14 RX ADMIN — Medication 25 MILLIGRAM(S): at 05:53

## 2024-11-14 RX ADMIN — CLOPIDOGREL 75 MILLIGRAM(S): 75 TABLET ORAL at 11:03

## 2024-11-14 RX ADMIN — LOSARTAN POTASSIUM 50 MILLIGRAM(S): 25 TABLET ORAL at 01:23

## 2024-11-14 RX ADMIN — Medication 1 DROP(S): at 06:05

## 2024-11-14 RX ADMIN — DORZOLAMIDE HYDROCHLORIDE 1 DROP(S): 20 SOLUTION OPHTHALMIC at 10:05

## 2024-11-14 NOTE — DISCHARGE NOTE PROVIDER - ATTENDING DISCHARGE PHYSICAL EXAMINATION:
Vital Signs Last 24 Hrs  T(C): 36.6 (14 Nov 2024 16:14), Max: 37.1 (13 Nov 2024 23:51)  T(F): 97.8 (14 Nov 2024 16:14), Max: 98.7 (13 Nov 2024 23:51)  HR: 96 (14 Nov 2024 16:14) (52 - 96)  BP: 192/71 (14 Nov 2024 16:14) (117/62 - 214/80)  BP(mean): 91 (14 Nov 2024 11:08) (91 - 91)  RR: 22 (14 Nov 2024 16:14) (15 - 23)  SpO2: 96% (14 Nov 2024 16:14) (96% - 100%)    Parameters below as of 14 Nov 2024 16:14  Patient On (Oxygen Delivery Method): room air    GENERAL: NAD, well-groomed, well-developed  HEAD:  Atraumatic, Normocephalic  EYES: EOMI, sclera non-icteric +glasses  ENMT:  Moist mucous membranes, Good dentition,  NERVOUS SYSTEM:  Alert & Oriented to name and situation  CHEST/LUNG: Clear to percussion bilaterally; No rales, rhonchi, wheezing, or rubs  HEART: Regular rate and rhythm; No murmurs, rubs, or gallops  ABDOMEN: Soft, Nontender, Nondistended; Bowel sounds present  EXTREMITIES:  2+ Peripheral Pulses, No edema   SKIN: No rashes or lesions

## 2024-11-14 NOTE — DISCHARGE NOTE PROVIDER - CARE PROVIDER_API CALL
Your PCP,   Please see your primary care provider in the next week for a post hospital follow up appointment  Phone: (   )    -  Fax: (   )    -  Follow Up Time:

## 2024-11-14 NOTE — DISCHARGE NOTE PROVIDER - NSDCFUADDINST_GEN_ALL_CORE_FT
Please check your blood pressure at home at least 1 hour after taking your blood pressure medications. For your age, it is okay if the top number is 140-150. Please see your primary care provider for further management of your high blood pressure.

## 2024-11-14 NOTE — PATIENT PROFILE ADULT - FALL HARM RISK - HARM RISK INTERVENTIONS

## 2024-11-14 NOTE — DISCHARGE NOTE NURSING/CASE MANAGEMENT/SOCIAL WORK - PATIENT PORTAL LINK FT
You can access the FollowMyHealth Patient Portal offered by [facility] by registering at the following website: [email or web address]. By joining [facility]’s FollowMyHealth portal, you will also be able to view your health information using other applications (apps) compatible with our system.

## 2024-11-14 NOTE — DISCHARGE NOTE PROVIDER - NSDCCPCAREPLAN_GEN_ALL_CORE_FT
PRINCIPAL DISCHARGE DIAGNOSIS  Diagnosis: High blood pressure  Assessment and Plan of Treatment: You came to the hospital due to shortness of breath. You had a chest xray that did not show any signs of pneumonia. Your blood pressure was high which likely contributed to your shortness of breath. Your symptoms resolved prior to discharge. Please follow up with your primary care doctor in the next few days for management of your blood pressure.

## 2024-11-14 NOTE — DISCHARGE NOTE PROVIDER - HOSPITAL COURSE
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 sided deficits and dysphagia), hx of cervical CA (s/p XRT), urinary and bowel incontinence (secondary to XRT), and glaucoma who presented to the ED on 11/13/24 for complaints of shortness of breath.    History obtained from daughter at bedside. As per daughter, patient was sitting on the couch around 5:30 PM this evening and complained of trouble breathing. Daughter states patient looked fine otherwise. However, her aide informed her patient choked on her multivitamin earlier in the morning and subsequently, her voice sounded raspy. Of note, daughter states baseline systolic blood pressure is around 150 and requires manual blood pressure checks as automatic tends to be inaccurate. Baseline functional status is ambulates with walker but is still unsteady. Able to feed and shower self but requires assistance with dressing. Eat pureed diet. Has an aide 8 hours per day on weekdays. Lives at home with daughter.    In the ED, VSS except BP as elevated as 214/80. CBC and CMP grossly unremarkable. Troponin 76.6 then 77. Pro-BNP 3179. VBG 7.39 / 45 / 39 / 27. COVID/influenza/RSV negative. EKG with T wave inversions, not new. Did not receive any medications.    Patient's troponin peaked at 77. Patient denied chest pain or shortness of breath prior to discharge. Spoke with daughter Bulmaro at bedside. I told her I suspect symptoms are due to elevated BP. Daughter states that patient can become anxious which affects her blood pressure. States that the patient's blood pressure if often elevated when the patient is anxious. States that patient is often anxious in the hospital and she would like to avoid having the patient stay the night.     I spoke with Bulmaro (daughter) at bedside with the other daughter available via telephone. Discussed that asymptomatic hypertension does not require inpatient treatment. I advised treating the patient with 10mg PO hydralazine given the BP had increased from 145/68 to 192/71 and that the troponin on admission labs suggest that the heart was under stress with the elevated BP. Daughters amenable to trying hydralazine but ultimately want to take the patient home. Discussed with the daughters that if the patient remains asymptomatic she can be discharged home with close PCP follow up. Daughters do endorse a hx of elevated BP in the clinical setting, so there may be a component of white coat hypertension. Patient and family amenable to plan. Return precautions were provided.     Shortness of breath, r/o ACS  Complaints of trouble breathing while sitting on couch this evening  EKG with T waves inversions, appears unchanged from prior  CXR personally reviewed; ?cardiomegaly, no obvious infiltrate  Initial troponin 76.6 then 77, pro-BNP 3179 on admission, VBG 7.39 / 45 / 39 / 27, COVID/influenza/RSV negative  Recent echo (on 6/19/24) with LV EF 51%, no WMA, normal LV diastolic function, trace MR, trace TR  F/u serial troponin-->76-->77-->77  F/u TSH, lipid panel, A1c for risk stratification  Telemetry  Daughter states that patient has episodes of SOB in the past associated with anxiety which might be playing a role. Elevated troponin reflects type 2 NSTEMI given increased demand with elevated BP. Patient without SOB or CP prior to discharge.     Hypertensive urgency vs. emergency  Type 2 NSTEMI  BP as elevated as 214/80 and troponin 76.6 on admission, pro-BNP 3179 (in pt with hx of known CHF, previously 20,027 (on 3/10/23))  Recent echo results as above  PTA losartan 50 mg, metoprolol succinate 25 mg, furosemide 10 mg  Goal is to reduce MAP by no more than 25-30% over first 2-4 hours, short term goal < 160 / < 100  Telemetry  -gave 10mg PO hydralazine prior to discharge advised close PCP follow up  -patient was asymptomatic prior to discharge    Dysphagia, chronic  Daughter reports patient choked on multivitamin earlier this AM  States patient has had chronic dysphagia since CVA 17 years ago and is supposed to be on pureed diet  Still chokes regardless of which diet so regular diet is implemented at home  Offered SLP prabhu but daughter declined  Daughter not interested in palliative care discussion  Aspiration precautions    Chronic medical conditions:  Chronic HFpEF (LV EF 51% and normal diastolic function on echo 6/19/24, previously LV EF 40-45% and grade II diastolic dysfunction on echo 1/29/23): clinically euvolemic, PTA furosemide 10 mg  Hx of CVA (occurred 17 years ago, with residual L. sided deficits and dysphagia): PTA clopidogrel 75 mg, atorvastatin 10 mg  Urinary and bowel incontinence (secondary to XRT): PTA tolterodine ER 4 mg reordered as oxybutynin 5 mg BID given tolterodine not on formulary  Glaucoma: PTA Simbrinza 1 gtt BID reordered as dorzolamide / brimonidine given Simbrinza not on formulary   HPI:  Mary Cortes is a 89 year old female with PMHx of HTN, chronic HFpEF (LV EF 51% and normal diastolic function on echo 6/19/24, previously LV EF 40-45% and grade II diastolic dysfunction on echo 1/29/23), hx of CVA (occurred 17 years ago, with residual L. sided deficits and dysphagia), hx of cervical CA (s/p XRT), urinary and bowel incontinence (secondary to XRT), and glaucoma who presented to the ED on 11/13/24 for complaints of shortness of breath.    History obtained from daughter at bedside. As per daughter, patient was sitting on the couch around 5:30 PM this evening and complained of trouble breathing. Daughter states patient looked fine otherwise. However, her aide informed her patient choked on her multivitamin earlier in the morning and subsequently, her voice sounded raspy. Of note, daughter states baseline systolic blood pressure is around 150 and requires manual blood pressure checks as automatic tends to be inaccurate. Baseline functional status is ambulates with walker but is still unsteady. Able to feed and shower self but requires assistance with dressing. Eat pureed diet. Has an aide 8 hours per day on weekdays. Lives at home with daughter.    In the ED, VSS except BP as elevated as 214/80. CBC and CMP grossly unremarkable. Troponin 76.6 then 77. Pro-BNP 3179. VBG 7.39 / 45 / 39 / 27. COVID/influenza/RSV negative. EKG with T wave inversions, not new. Did not receive any medications.    Patient's troponin peaked at 77. Patient denied chest pain or shortness of breath prior to discharge. Spoke with daughter Bulmaro at bedside. I told her I suspect symptoms are due to elevated BP. Daughter states that patient can become anxious which affects her blood pressure. States that the patient's blood pressure if often elevated when the patient is anxious. States that patient is often anxious in the hospital and she would like to avoid having the patient stay the night.     I spoke with Bulmaro (daughter) at bedside with the other daughter available via telephone. Discussed that asymptomatic hypertension does not require inpatient treatment. I advised treating the patient with 10mg PO hydralazine given the BP had increased from 145/68 to 192/71 and that the troponin on admission labs suggest that the heart was under stress with the elevated BP. Daughters amenable to trying hydralazine but ultimately want to take the patient home. Discussed with the daughters that if the patient remains asymptomatic she can be discharged home with close PCP follow up. Daughters do endorse a hx of elevated BP in the clinical setting, so there may be a component of white coat hypertension. Patient and family amenable to plan. Return precautions were provided. /75 at time of discharge.     Shortness of breath, r/o ACS  Complaints of trouble breathing while sitting on couch this evening  EKG with T waves inversions, appears unchanged from prior  CXR personally reviewed; ?cardiomegaly, no obvious infiltrate  Initial troponin 76.6 then 77, pro-BNP 3179 on admission, VBG 7.39 / 45 / 39 / 27, COVID/influenza/RSV negative  Recent echo (on 6/19/24) with LV EF 51%, no WMA, normal LV diastolic function, trace MR, trace TR  F/u serial troponin-->76-->77-->77  F/u TSH, lipid panel, A1c for risk stratification  Telemetry  Daughter states that patient has episodes of SOB in the past associated with anxiety which might be playing a role. Elevated troponin reflects type 2 NSTEMI given increased demand with elevated BP. Patient without SOB or CP prior to discharge.     Hypertensive urgency vs. emergency  Type 2 NSTEMI  BP as elevated as 214/80 and troponin 76.6 on admission, pro-BNP 3179 (in pt with hx of known CHF, previously 20,027 (on 3/10/23))  Recent echo results as above  PTA losartan 50 mg, metoprolol succinate 25 mg, furosemide 10 mg  Goal is to reduce MAP by no more than 25-30% over first 2-4 hours, short term goal < 160 / < 100  Telemetry  -gave 10mg PO hydralazine prior to discharge advised close PCP follow up  -patient was asymptomatic prior to discharge    Dysphagia, chronic  Daughter reports patient choked on multivitamin earlier this AM  States patient has had chronic dysphagia since CVA 17 years ago and is supposed to be on pureed diet  Still chokes regardless of which diet so regular diet is implemented at home  Offered SLP eval but daughter declined  Daughter not interested in palliative care discussion  Aspiration precautions    Chronic medical conditions:  Chronic HFpEF (LV EF 51% and normal diastolic function on echo 6/19/24, previously LV EF 40-45% and grade II diastolic dysfunction on echo 1/29/23): clinically euvolemic, PTA furosemide 10 mg  Hx of CVA (occurred 17 years ago, with residual L. sided deficits and dysphagia): PTA clopidogrel 75 mg, atorvastatin 10 mg  Urinary and bowel incontinence (secondary to XRT): PTA tolterodine ER 4 mg reordered as oxybutynin 5 mg BID given tolterodine not on formulary  Glaucoma: PTA Simbrinza 1 gtt BID reordered as dorzolamide / brimonidine given Simbrinza not on formulary

## 2024-11-14 NOTE — DISCHARGE NOTE NURSING/CASE MANAGEMENT/SOCIAL WORK - FINANCIAL ASSISTANCE
[facility] provides services at a reduced cost to those who are determined to be eligible through [facility]’s financial assistance program. Information regarding [facility]’s financial assistance program can be found by going to [email or web address] or by calling [phone].

## 2024-11-14 NOTE — DISCHARGE NOTE PROVIDER - PROVIDER TOKENS
FREE:[LAST:[Your PCP],PHONE:[(   )    -],FAX:[(   )    -],ADDRESS:[Please see your primary care provider in the next week for a post hospital follow up appointment]]

## 2024-11-14 NOTE — DISCHARGE NOTE PROVIDER - NSDCMRMEDTOKEN_GEN_ALL_CORE_FT
atorvastatin 10 mg oral tablet: 1 tab(s) orally once a day  Centrum: 1  orally once a day  clopidogrel 75 mg oral tablet: 1 tab(s) orally once a day  furosemide 20 mg oral tablet: 0.5 tab(s) orally once a day  losartan 50 mg oral tablet: 1 tab(s) orally once a day  metoprolol succinate 25 mg oral tablet, extended release: 1 tab(s) orally once a day  Simbrinza 1%- 0.2% ophthalmic suspension: 1 drop(s) to each affected eye 2 times a day  tolterodine 4 mg oral capsule, extended release: 1 cap(s) orally once a day

## 2024-11-18 PROBLEM — R32 UNSPECIFIED URINARY INCONTINENCE: Chronic | Status: ACTIVE | Noted: 2024-11-14

## 2024-11-18 PROBLEM — I69.30 UNSPECIFIED SEQUELAE OF CEREBRAL INFARCTION: Chronic | Status: ACTIVE | Noted: 2024-11-14

## 2024-11-18 PROBLEM — I50.32 CHRONIC DIASTOLIC (CONGESTIVE) HEART FAILURE: Chronic | Status: ACTIVE | Noted: 2024-11-14

## 2024-11-18 PROBLEM — Z85.41 PERSONAL HISTORY OF MALIGNANT NEOPLASM OF CERVIX UTERI: Chronic | Status: ACTIVE | Noted: 2024-11-14

## 2024-11-20 ENCOUNTER — APPOINTMENT (OUTPATIENT)
Dept: CARDIOLOGY | Facility: CLINIC | Age: 89
End: 2024-11-20
Payer: MEDICARE

## 2024-11-20 ENCOUNTER — NON-APPOINTMENT (OUTPATIENT)
Age: 89
End: 2024-11-20

## 2024-11-20 VITALS
HEIGHT: 62 IN | BODY MASS INDEX: 18.4 KG/M2 | OXYGEN SATURATION: 98 % | DIASTOLIC BLOOD PRESSURE: 70 MMHG | RESPIRATION RATE: 14 BRPM | WEIGHT: 100 LBS | SYSTOLIC BLOOD PRESSURE: 140 MMHG | HEART RATE: 105 BPM

## 2024-11-20 DIAGNOSIS — I42.0 DILATED CARDIOMYOPATHY: ICD-10-CM

## 2024-11-20 DIAGNOSIS — E78.5 HYPERLIPIDEMIA, UNSPECIFIED: ICD-10-CM

## 2024-11-20 DIAGNOSIS — R60.0 LOCALIZED EDEMA: ICD-10-CM

## 2024-11-20 PROCEDURE — 93000 ELECTROCARDIOGRAM COMPLETE: CPT

## 2024-11-20 PROCEDURE — G2211 COMPLEX E/M VISIT ADD ON: CPT

## 2024-11-20 PROCEDURE — 99214 OFFICE O/P EST MOD 30 MIN: CPT

## 2025-01-06 ENCOUNTER — RX RENEWAL (OUTPATIENT)
Age: 89
End: 2025-01-06

## 2025-01-21 ENCOUNTER — RX RENEWAL (OUTPATIENT)
Age: 89
End: 2025-01-21

## 2025-02-19 ENCOUNTER — APPOINTMENT (OUTPATIENT)
Dept: CARDIOLOGY | Facility: CLINIC | Age: 89
End: 2025-02-19
Payer: MEDICARE

## 2025-02-19 ENCOUNTER — NON-APPOINTMENT (OUTPATIENT)
Age: 89
End: 2025-02-19

## 2025-02-19 VITALS
SYSTOLIC BLOOD PRESSURE: 122 MMHG | BODY MASS INDEX: 18.58 KG/M2 | HEIGHT: 62 IN | HEART RATE: 86 BPM | OXYGEN SATURATION: 98 % | WEIGHT: 101 LBS | DIASTOLIC BLOOD PRESSURE: 60 MMHG

## 2025-02-19 DIAGNOSIS — E78.5 HYPERLIPIDEMIA, UNSPECIFIED: ICD-10-CM

## 2025-02-19 DIAGNOSIS — R00.1 BRADYCARDIA, UNSPECIFIED: ICD-10-CM

## 2025-02-19 DIAGNOSIS — I42.0 DILATED CARDIOMYOPATHY: ICD-10-CM

## 2025-02-19 DIAGNOSIS — T50.905A BRADYCARDIA, UNSPECIFIED: ICD-10-CM

## 2025-02-19 PROCEDURE — 93000 ELECTROCARDIOGRAM COMPLETE: CPT

## 2025-02-19 PROCEDURE — 99214 OFFICE O/P EST MOD 30 MIN: CPT

## 2025-02-19 PROCEDURE — G2211 COMPLEX E/M VISIT ADD ON: CPT

## 2025-04-01 ENCOUNTER — INPATIENT (INPATIENT)
Facility: HOSPITAL | Age: 89
LOS: 1 days | Discharge: HOME | End: 2025-04-03
Attending: INTERNAL MEDICINE | Admitting: INTERNAL MEDICINE
Payer: MEDICARE

## 2025-04-01 VITALS
DIASTOLIC BLOOD PRESSURE: 73 MMHG | HEART RATE: 104 BPM | WEIGHT: 95.02 LBS | OXYGEN SATURATION: 100 % | RESPIRATION RATE: 25 BRPM | SYSTOLIC BLOOD PRESSURE: 223 MMHG

## 2025-04-01 DIAGNOSIS — Z98.89 OTHER SPECIFIED POSTPROCEDURAL STATES: Chronic | ICD-10-CM

## 2025-04-01 LAB
ALBUMIN SERPL ELPH-MCNC: 3.4 G/DL — SIGNIFICANT CHANGE UP (ref 3.3–5)
ALP SERPL-CCNC: 122 U/L — HIGH (ref 40–120)
ALT FLD-CCNC: 25 U/L — SIGNIFICANT CHANGE UP (ref 12–78)
ANION GAP SERPL CALC-SCNC: 1 MMOL/L — LOW (ref 5–17)
AST SERPL-CCNC: 21 U/L — SIGNIFICANT CHANGE UP (ref 15–37)
BASE EXCESS BLDV CALC-SCNC: 5.6 MMOL/L — HIGH (ref -2–3)
BASOPHILS # BLD AUTO: 0.06 K/UL — SIGNIFICANT CHANGE UP (ref 0–0.2)
BASOPHILS NFR BLD AUTO: 1.2 % — SIGNIFICANT CHANGE UP (ref 0–2)
BILIRUB SERPL-MCNC: 0.4 MG/DL — SIGNIFICANT CHANGE UP (ref 0.2–1.2)
BLOOD GAS COMMENTS, VENOUS: SIGNIFICANT CHANGE UP
BUN SERPL-MCNC: 32 MG/DL — HIGH (ref 7–23)
CALCIUM SERPL-MCNC: 9.5 MG/DL — SIGNIFICANT CHANGE UP (ref 8.5–10.1)
CHLORIDE SERPL-SCNC: 109 MMOL/L — HIGH (ref 96–108)
CO2 BLDV-SCNC: 35 MMOL/L — HIGH (ref 22–26)
CO2 SERPL-SCNC: 30 MMOL/L — SIGNIFICANT CHANGE UP (ref 22–31)
CREAT SERPL-MCNC: 1.09 MG/DL — SIGNIFICANT CHANGE UP (ref 0.5–1.3)
EGFR: 47 ML/MIN/1.73M2 — LOW
EGFR: 47 ML/MIN/1.73M2 — LOW
EOSINOPHIL # BLD AUTO: 0.14 K/UL — SIGNIFICANT CHANGE UP (ref 0–0.5)
EOSINOPHIL NFR BLD AUTO: 2.7 % — SIGNIFICANT CHANGE UP (ref 0–6)
GAS PNL BLDV: SIGNIFICANT CHANGE UP
GLUCOSE SERPL-MCNC: 112 MG/DL — HIGH (ref 70–99)
HCO3 BLDV-SCNC: 33 MMOL/L — HIGH (ref 22–28)
HCT VFR BLD CALC: 44.1 % — SIGNIFICANT CHANGE UP (ref 34.5–45)
HGB BLD-MCNC: 14.8 G/DL — SIGNIFICANT CHANGE UP (ref 11.5–15.5)
HOROWITZ INDEX BLDV+IHG-RTO: 21 — SIGNIFICANT CHANGE UP
IMM GRANULOCYTES NFR BLD AUTO: 0.2 % — SIGNIFICANT CHANGE UP (ref 0–0.9)
LACTATE SERPL-SCNC: 1.2 MMOL/L — SIGNIFICANT CHANGE UP (ref 0.7–2)
LYMPHOCYTES # BLD AUTO: 0.7 K/UL — LOW (ref 1–3.3)
LYMPHOCYTES # BLD AUTO: 13.5 % — SIGNIFICANT CHANGE UP (ref 13–44)
MCHC RBC-ENTMCNC: 31.8 PG — SIGNIFICANT CHANGE UP (ref 27–34)
MCHC RBC-ENTMCNC: 33.6 G/DL — SIGNIFICANT CHANGE UP (ref 32–36)
MCV RBC AUTO: 94.8 FL — SIGNIFICANT CHANGE UP (ref 80–100)
MONOCYTES # BLD AUTO: 0.61 K/UL — SIGNIFICANT CHANGE UP (ref 0–0.9)
MONOCYTES NFR BLD AUTO: 11.8 % — SIGNIFICANT CHANGE UP (ref 2–14)
NEUTROPHILS # BLD AUTO: 3.65 K/UL — SIGNIFICANT CHANGE UP (ref 1.8–7.4)
NEUTROPHILS NFR BLD AUTO: 70.6 % — SIGNIFICANT CHANGE UP (ref 43–77)
NRBC BLD AUTO-RTO: 0 /100 WBCS — SIGNIFICANT CHANGE UP (ref 0–0)
NT-PROBNP SERPL-SCNC: 4141 PG/ML — HIGH (ref 0–450)
PCO2 BLDV: 58 MMHG — HIGH (ref 42–55)
PH BLDV: 7.36 — SIGNIFICANT CHANGE UP (ref 7.32–7.43)
PLATELET # BLD AUTO: 257 K/UL — SIGNIFICANT CHANGE UP (ref 150–400)
PO2 BLDV: 22 MMHG — LOW (ref 25–45)
POTASSIUM SERPL-MCNC: 4.4 MMOL/L — SIGNIFICANT CHANGE UP (ref 3.5–5.3)
POTASSIUM SERPL-SCNC: 4.4 MMOL/L — SIGNIFICANT CHANGE UP (ref 3.5–5.3)
PROT SERPL-MCNC: 6.9 GM/DL — SIGNIFICANT CHANGE UP (ref 6–8.3)
RBC # BLD: 4.65 M/UL — SIGNIFICANT CHANGE UP (ref 3.8–5.2)
RBC # FLD: 14.3 % — SIGNIFICANT CHANGE UP (ref 10.3–14.5)
SAO2 % BLDV: 25.2 % — LOW (ref 94–98)
SODIUM SERPL-SCNC: 140 MMOL/L — SIGNIFICANT CHANGE UP (ref 135–145)
TROPONIN I, HIGH SENSITIVITY RESULT: 42.6 NG/L — SIGNIFICANT CHANGE UP
WBC # BLD: 5.17 K/UL — SIGNIFICANT CHANGE UP (ref 3.8–10.5)
WBC # FLD AUTO: 5.17 K/UL — SIGNIFICANT CHANGE UP (ref 3.8–10.5)

## 2025-04-01 PROCEDURE — 71045 X-RAY EXAM CHEST 1 VIEW: CPT | Mod: 26

## 2025-04-01 PROCEDURE — 93010 ELECTROCARDIOGRAM REPORT: CPT

## 2025-04-01 PROCEDURE — 99285 EMERGENCY DEPT VISIT HI MDM: CPT

## 2025-04-01 RX ORDER — FUROSEMIDE 10 MG/ML
40 INJECTION INTRAMUSCULAR; INTRAVENOUS ONCE
Refills: 0 | Status: COMPLETED | OUTPATIENT
Start: 2025-04-01 | End: 2025-04-01

## 2025-04-01 RX ADMIN — FUROSEMIDE 40 MILLIGRAM(S): 10 INJECTION INTRAMUSCULAR; INTRAVENOUS at 23:23

## 2025-04-01 NOTE — ED ADULT NURSE NOTE - NS ED NURSE TRANSPORT WITH
[facility] [employer]  calling. Looking to see if Sherri went to [facility] urgent care to seek medical attention.  Only notes in chart are from 3 years ago.  Damaris Veloz RN, [facility]    Additional Information    General information question, no triage required and triager able to answer question    Protocols used: INFORMATION ONLY CALL-A-AH      
oxygen/bipap

## 2025-04-01 NOTE — ED PROVIDER NOTE - OBJECTIVE STATEMENT
89F PMH CHF BIBEMS d/t SOB x45min PTA. SPO2 81% on RA, pt placed on CPAP. Full Code. Daughter en route to ED.     PMH as above, PSH noncontributory, Allergy PCN, Meds as listed.

## 2025-04-01 NOTE — ED ADULT NURSE NOTE - NSFALLRISKINTERV_ED_ALL_ED
Universal Safety Interventions Assistance OOB with selected safe patient handling equipment if applicable/Assistance with ambulation/Communicate fall risk and risk factors to all staff, patient, and family/Monitor gait and stability/Provide visual cue: yellow wristband, yellow gown, etc/Reinforce activity limits and safety measures with patient and family/Call bell, personal items and telephone in reach/Instruct patient to call for assistance before getting out of bed/chair/stretcher/Non-slip footwear applied when patient is off stretcher/[place] to call system/Physically safe environment - no spills, clutter or unnecessary equipment/Purposeful Proactive Rounding/Room/bathroom lighting operational, light cord in reach

## 2025-04-01 NOTE — ED ADULT NURSE NOTE - OBJECTIVE STATEMENT
AAOx3 pt presents to ED for difficulty breathing starting approx 1 hour PTA. Pt on CPAP with EMS, placed on Bipap in ED. PMH: CHF, CVA (left side residual weakness)

## 2025-04-01 NOTE — ED PROVIDER NOTE - PROGRESS NOTE DETAILS
Daughter now at bedside, states pt w/ hx similar episodes in the past. Pt w/ recent UTI, started on abx (Levaquin) yesterday.

## 2025-04-01 NOTE — ED PROVIDER NOTE - CLINICAL SUMMARY MEDICAL DECISION MAKING FREE TEXT BOX
89F PMH CHF BIBEMS d/t SOB x45min PTA. Afebrile, VSS. Plan for STAT BiPAP, CBC, CMP, VBG, lactate, trop, BNP, viral swab, CXR. Anticipate admission. 89F PMH CHF BIBEMS d/t SOB x45min PTA. Afebrile, VSS. Plan for STAT BiPAP, CBC, CMP, VBG, lactate, trop, BNP, viral swab, CXR. Anticipate admission.  W/u significant for: WBC WNL, BNP 4141 (c/w prior values), trop negative, CXR w/o acute pathology. On re-eval, pt w/ improvement in symptoms / BP s/p BiPAP, Lasix. Will admit to Tele (d/w Dr Tran). Pt, daughter updated to results, admission. They understand / agree w/ this plan.

## 2025-04-02 ENCOUNTER — RESULT REVIEW (OUTPATIENT)
Age: 89
End: 2025-04-02

## 2025-04-02 DIAGNOSIS — Z85.41 PERSONAL HISTORY OF MALIGNANT NEOPLASM OF CERVIX UTERI: ICD-10-CM

## 2025-04-02 DIAGNOSIS — I16.0 HYPERTENSIVE URGENCY: ICD-10-CM

## 2025-04-02 DIAGNOSIS — Z29.9 ENCOUNTER FOR PROPHYLACTIC MEASURES, UNSPECIFIED: ICD-10-CM

## 2025-04-02 DIAGNOSIS — J96.01 ACUTE RESPIRATORY FAILURE WITH HYPOXIA: ICD-10-CM

## 2025-04-02 DIAGNOSIS — Z86.73 PERSONAL HISTORY OF TRANSIENT ISCHEMIC ATTACK (TIA), AND CEREBRAL INFARCTION WITHOUT RESIDUAL DEFICITS: ICD-10-CM

## 2025-04-02 DIAGNOSIS — I50.9 HEART FAILURE, UNSPECIFIED: ICD-10-CM

## 2025-04-02 LAB
ALBUMIN SERPL ELPH-MCNC: 3.3 G/DL — SIGNIFICANT CHANGE UP (ref 3.3–5)
ALP SERPL-CCNC: 106 U/L — SIGNIFICANT CHANGE UP (ref 40–120)
ALT FLD-CCNC: 22 U/L — SIGNIFICANT CHANGE UP (ref 12–78)
ANION GAP SERPL CALC-SCNC: 5 MMOL/L — SIGNIFICANT CHANGE UP (ref 5–17)
AST SERPL-CCNC: 20 U/L — SIGNIFICANT CHANGE UP (ref 15–37)
BASOPHILS # BLD AUTO: 0.06 K/UL — SIGNIFICANT CHANGE UP (ref 0–0.2)
BASOPHILS NFR BLD AUTO: 0.9 % — SIGNIFICANT CHANGE UP (ref 0–2)
BILIRUB SERPL-MCNC: 0.6 MG/DL — SIGNIFICANT CHANGE UP (ref 0.2–1.2)
BUN SERPL-MCNC: 32 MG/DL — HIGH (ref 7–23)
CALCIUM SERPL-MCNC: 9.5 MG/DL — SIGNIFICANT CHANGE UP (ref 8.5–10.1)
CHLORIDE SERPL-SCNC: 110 MMOL/L — HIGH (ref 96–108)
CO2 SERPL-SCNC: 26 MMOL/L — SIGNIFICANT CHANGE UP (ref 22–31)
CREAT SERPL-MCNC: 1.05 MG/DL — SIGNIFICANT CHANGE UP (ref 0.5–1.3)
EGFR: 49 ML/MIN/1.73M2 — LOW
EGFR: 49 ML/MIN/1.73M2 — LOW
EOSINOPHIL # BLD AUTO: 0.14 K/UL — SIGNIFICANT CHANGE UP (ref 0–0.5)
EOSINOPHIL NFR BLD AUTO: 2.2 % — SIGNIFICANT CHANGE UP (ref 0–6)
GLUCOSE SERPL-MCNC: 104 MG/DL — HIGH (ref 70–99)
HCT VFR BLD CALC: 43.2 % — SIGNIFICANT CHANGE UP (ref 34.5–45)
HGB BLD-MCNC: 14.4 G/DL — SIGNIFICANT CHANGE UP (ref 11.5–15.5)
IMM GRANULOCYTES NFR BLD AUTO: 0.3 % — SIGNIFICANT CHANGE UP (ref 0–0.9)
LYMPHOCYTES # BLD AUTO: 0.68 K/UL — LOW (ref 1–3.3)
LYMPHOCYTES # BLD AUTO: 10.5 % — LOW (ref 13–44)
MCHC RBC-ENTMCNC: 31.8 PG — SIGNIFICANT CHANGE UP (ref 27–34)
MCHC RBC-ENTMCNC: 33.3 G/DL — SIGNIFICANT CHANGE UP (ref 32–36)
MCV RBC AUTO: 95.4 FL — SIGNIFICANT CHANGE UP (ref 80–100)
MONOCYTES # BLD AUTO: 0.62 K/UL — SIGNIFICANT CHANGE UP (ref 0–0.9)
MONOCYTES NFR BLD AUTO: 9.5 % — SIGNIFICANT CHANGE UP (ref 2–14)
NEUTROPHILS # BLD AUTO: 4.98 K/UL — SIGNIFICANT CHANGE UP (ref 1.8–7.4)
NEUTROPHILS NFR BLD AUTO: 76.6 % — SIGNIFICANT CHANGE UP (ref 43–77)
NRBC BLD AUTO-RTO: 0 /100 WBCS — SIGNIFICANT CHANGE UP (ref 0–0)
PLATELET # BLD AUTO: 238 K/UL — SIGNIFICANT CHANGE UP (ref 150–400)
POTASSIUM SERPL-MCNC: 3.7 MMOL/L — SIGNIFICANT CHANGE UP (ref 3.5–5.3)
POTASSIUM SERPL-SCNC: 3.7 MMOL/L — SIGNIFICANT CHANGE UP (ref 3.5–5.3)
PROT SERPL-MCNC: 6.5 GM/DL — SIGNIFICANT CHANGE UP (ref 6–8.3)
RBC # BLD: 4.53 M/UL — SIGNIFICANT CHANGE UP (ref 3.8–5.2)
RBC # FLD: 14.2 % — SIGNIFICANT CHANGE UP (ref 10.3–14.5)
SODIUM SERPL-SCNC: 141 MMOL/L — SIGNIFICANT CHANGE UP (ref 135–145)
WBC # BLD: 6.5 K/UL — SIGNIFICANT CHANGE UP (ref 3.8–10.5)
WBC # FLD AUTO: 6.5 K/UL — SIGNIFICANT CHANGE UP (ref 3.8–10.5)

## 2025-04-02 PROCEDURE — 99222 1ST HOSP IP/OBS MODERATE 55: CPT

## 2025-04-02 PROCEDURE — 93306 TTE W/DOPPLER COMPLETE: CPT | Mod: 26

## 2025-04-02 RX ORDER — ATORVASTATIN CALCIUM 80 MG/1
10 TABLET, FILM COATED ORAL AT BEDTIME
Refills: 0 | Status: DISCONTINUED | OUTPATIENT
Start: 2025-04-02 | End: 2025-04-03

## 2025-04-02 RX ORDER — CLOPIDOGREL BISULFATE 75 MG/1
75 TABLET, FILM COATED ORAL DAILY
Refills: 0 | Status: DISCONTINUED | OUTPATIENT
Start: 2025-04-02 | End: 2025-04-03

## 2025-04-02 RX ORDER — LOSARTAN POTASSIUM 100 MG/1
50 TABLET, FILM COATED ORAL DAILY
Refills: 0 | Status: DISCONTINUED | OUTPATIENT
Start: 2025-04-02 | End: 2025-04-03

## 2025-04-02 RX ORDER — ACETAMINOPHEN 500 MG/5ML
650 LIQUID (ML) ORAL EVERY 6 HOURS
Refills: 0 | Status: DISCONTINUED | OUTPATIENT
Start: 2025-04-02 | End: 2025-04-03

## 2025-04-02 RX ORDER — NYSTATIN 100000 [USP'U]/G
1 CREAM TOPICAL
Refills: 0 | Status: DISCONTINUED | OUTPATIENT
Start: 2025-04-02 | End: 2025-04-03

## 2025-04-02 RX ORDER — ENOXAPARIN SODIUM 100 MG/ML
30 INJECTION SUBCUTANEOUS EVERY 24 HOURS
Refills: 0 | Status: DISCONTINUED | OUTPATIENT
Start: 2025-04-02 | End: 2025-04-03

## 2025-04-02 RX ORDER — METOPROLOL SUCCINATE 50 MG/1
25 TABLET, EXTENDED RELEASE ORAL DAILY
Refills: 0 | Status: DISCONTINUED | OUTPATIENT
Start: 2025-04-02 | End: 2025-04-03

## 2025-04-02 RX ORDER — MELATONIN 5 MG
3 TABLET ORAL AT BEDTIME
Refills: 0 | Status: DISCONTINUED | OUTPATIENT
Start: 2025-04-02 | End: 2025-04-03

## 2025-04-02 RX ORDER — FUROSEMIDE 10 MG/ML
40 INJECTION INTRAMUSCULAR; INTRAVENOUS EVERY 12 HOURS
Refills: 0 | Status: DISCONTINUED | OUTPATIENT
Start: 2025-04-02 | End: 2025-04-03

## 2025-04-02 RX ORDER — MAGNESIUM, ALUMINUM HYDROXIDE 200-200 MG
30 TABLET,CHEWABLE ORAL EVERY 4 HOURS
Refills: 0 | Status: DISCONTINUED | OUTPATIENT
Start: 2025-04-02 | End: 2025-04-03

## 2025-04-02 RX ADMIN — CLOPIDOGREL BISULFATE 75 MILLIGRAM(S): 75 TABLET, FILM COATED ORAL at 12:22

## 2025-04-02 RX ADMIN — LOSARTAN POTASSIUM 50 MILLIGRAM(S): 100 TABLET, FILM COATED ORAL at 05:46

## 2025-04-02 RX ADMIN — ENOXAPARIN SODIUM 30 MILLIGRAM(S): 100 INJECTION SUBCUTANEOUS at 05:46

## 2025-04-02 RX ADMIN — METOPROLOL SUCCINATE 25 MILLIGRAM(S): 50 TABLET, EXTENDED RELEASE ORAL at 05:46

## 2025-04-02 RX ADMIN — ATORVASTATIN CALCIUM 10 MILLIGRAM(S): 80 TABLET, FILM COATED ORAL at 23:04

## 2025-04-02 RX ADMIN — FUROSEMIDE 40 MILLIGRAM(S): 10 INJECTION INTRAMUSCULAR; INTRAVENOUS at 17:42

## 2025-04-02 NOTE — PHYSICAL THERAPY INITIAL EVALUATION ADULT - GAIT TRAINING, PT EVAL
Pt will be able to ambulate independently using assistive device up to 30 ft or more,  safely observing proper gait, posture and prevent falls.

## 2025-04-02 NOTE — H&P ADULT - NSHPPHYSICALEXAM_GEN_ALL_CORE
GENERAL: NAD  HEAD:  Atraumatic, normocephalic  EYES: EOMI, PERRL  NECK: Supple, trachea midline  HEART: Regular rate and rhythm  LUNGS: Unlabored respirations. bibasilar crackles  ABDOMEN: Soft, nontender, nondistended, +BS  EXTREMITIES: 2+ peripheral pulses bilaterally. No clubbing, cyanosis, or edema  NERVOUS SYSTEM:  A&Ox2-3, moving all extremities, L sided weakness (chronic) GENERAL: NAD  HEAD:  Atraumatic, normocephalic  EYES: EOMI, PERRL  NECK: Supple, trachea midline  HEART: Regular rate and rhythm  LUNGS: Unlabored respirations. bibasilar crackles, + BIPAP  ABDOMEN: Soft, nontender, nondistended, +BS  EXTREMITIES: 2+ peripheral pulses bilaterally. No clubbing, cyanosis, or edema  NERVOUS SYSTEM:  A&Ox2-3, moving all extremities, L sided weakness (chronic)

## 2025-04-02 NOTE — PHYSICAL THERAPY INITIAL EVALUATION ADULT - ACTIVE RANGE OF MOTION EXAMINATION, REHAB EVAL
RENE c hypertonicity/Right UE Active ROM was WFL (within functional limits)/bilateral  lower extremity Active ROM was WFL (within functional limits)

## 2025-04-02 NOTE — PHYSICAL THERAPY INITIAL EVALUATION ADULT - ADDITIONAL COMMENTS
Pt resides in a private home with her daughter, 5 steps to enterc b/l far part HRs, Bedroom is on 2nd fl accessible by chair lift.  +HHA 8hr x 5 days/week. Pt uses diapers 24hr/day 2* bladder/bowel incontinence   Daughter states that Patient has a walker, rollator, cane, wheelchair, and shower seat.  Dtr asked therapist to use diaper for ambulation to prevent leakage on floor

## 2025-04-02 NOTE — PHYSICAL THERAPY INITIAL EVALUATION ADULT - MANUAL MUSCLE TESTING RESULTS, REHAB EVAL
RUE 4/5 right lower limb 3/5 left lower limb 3-/5  L sh /elbow 2/5  L hand 2+/5 O-Z Flap Text: The defect edges were debeveled with a #15 scalpel blade.  Given the location of the defect, shape of the defect and the proximity to free margins an O-Z flap was deemed most appropriate.  Using a sterile surgical marker, an appropriate transposition flap was drawn incorporating the defect and placing the expected incisions within the relaxed skin tension lines where possible. The area thus outlined was incised deep to adipose tissue with a #15 scalpel blade.  The skin margins were undermined to an appropriate distance in all directions utilizing iris scissors.

## 2025-04-02 NOTE — H&P ADULT - PROBLEM SELECTOR PLAN 1
- likely due to CHF exacerbation and hypertensive urgency  - Received iv lasix in ED  - c/w lasix 40 mg iv BID  - Supplemental O2 to keep SpO2 >92%  - Resume home BP meds  - Bronchodilators as needed  - f/u CXR - likely due to CHF exacerbation and hypertensive urgency  - hypoxic on presentation  - Received iv lasix in ED  - c/w lasix 40 mg iv BID  - on BIPAP,  Supplemental O2 to keep SpO2 >92%  - Resume home BP meds  - Bronchodilators as needed  - f/u CXR

## 2025-04-02 NOTE — PHYSICAL THERAPY INITIAL EVALUATION ADULT - BED MOBILITY TRAINING, PT EVAL
Alex in bed mobility- supine<>sit, rolling side<>side observing proper body mechanics, proper positioning, body alignment and precautions.

## 2025-04-02 NOTE — H&P ADULT - HISTORY OF PRESENT ILLNESS
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 O2 sat was found to be 81% on room air. History obtained mainly from daughter at bedside. Patient has been in usual state of health until this incident. She denies any fevers, chills, coughing, chest pain, heart palpitations, GI/ symptoms, or any other acute complaints.     On presentation, BP ranged from 210 -220s/60s to 70s, placed on BiPAP with O2 saturation 97%. Labs notable for BNP 4141. Chest x-ray shows cardiomegaly, pulmonary edema. The patient received Lasix and ED. 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 O2 sat was found to be 81% on room air. History obtained mainly from daughter at bedside. Patient has been in usual state of health until this incident. She denies any fevers, chills, coughing, chest pain, heart palpitations, GI/ symptoms, or any other acute complaints. Compliant with medications.    On presentation, BP ranged from 210 -220s/60s to 70s, placed on BiPAP with O2 saturation 97%. Labs notable for BNP 4141. Chest x-ray shows cardiomegaly, pulmonary edema. The patient received Lasix and ED.

## 2025-04-02 NOTE — CHART NOTE - NSCHARTNOTEFT_GEN_A_CORE
patient seen and examined  improving clinically, will have cardiology evaluate patient    discussed GOC with daughter at bedside  Daughter not sure about current wishes, states her sister might have filled out a molst in the past. She will speak to her and update the medical team.      Vital Signs Last 24 Hrs  T(C): 36.6 (02 Apr 2025 10:38), Max: 36.7 (02 Apr 2025 01:30)  T(F): 97.9 (02 Apr 2025 10:38), Max: 98 (02 Apr 2025 01:30)  HR: 96 (02 Apr 2025 10:38) (61 - 104)  BP: 155/75 (02 Apr 2025 10:38) (125/93 - 223/73)  BP(mean): --  RR: 20 (02 Apr 2025 10:38) (15 - 25)  SpO2: 99% (02 Apr 2025 10:38) (94% - 100%)    Parameters below as of 02 Apr 2025 05:46  Patient On (Oxygen Delivery Method): BiPAP/CPAP                          14.4   6.50  )-----------( 238      ( 02 Apr 2025 07:55 )             43.2     04-02    141  |  110[H]  |  32[H]  ----------------------------<  104[H]  3.7   |  26  |  1.05    Ca    9.5      02 Apr 2025 07:55    TPro  6.5  /  Alb  3.3  /  TBili  0.6  /  DBili  x   /  AST  20  /  ALT  22  /  AlkPhos  106  04-02      Urinalysis Basic - ( 02 Apr 2025 07:55 )    Color: x / Appearance: x / SG: x / pH: x  Gluc: 104 mg/dL / Ketone: x  / Bili: x / Urobili: x   Blood: x / Protein: x / Nitrite: x   Leuk Esterase: x / RBC: x / WBC x   Sq Epi: x / Non Sq Epi: x / Bacteria: x patient seen and examined  improving clinically, will have cardiology evaluate patient    discussed GOC with daughter at bedside  Daughter not sure about current wishes, states her sister might have filled out a molst in the past.  She wants to stick to the wishes of the previous molst  I printed the molst from her last visit. Gave copy to sister, she told me she has the hard copy at home. DNR DNI     Vital Signs Last 24 Hrs  T(C): 36.6 (02 Apr 2025 10:38), Max: 36.7 (02 Apr 2025 01:30)  T(F): 97.9 (02 Apr 2025 10:38), Max: 98 (02 Apr 2025 01:30)  HR: 96 (02 Apr 2025 10:38) (61 - 104)  BP: 155/75 (02 Apr 2025 10:38) (125/93 - 223/73)  BP(mean): --  RR: 20 (02 Apr 2025 10:38) (15 - 25)  SpO2: 99% (02 Apr 2025 10:38) (94% - 100%)    Parameters below as of 02 Apr 2025 05:46  Patient On (Oxygen Delivery Method): BiPAP/CPAP                          14.4   6.50  )-----------( 238      ( 02 Apr 2025 07:55 )             43.2     04-02    141  |  110[H]  |  32[H]  ----------------------------<  104[H]  3.7   |  26  |  1.05    Ca    9.5      02 Apr 2025 07:55    TPro  6.5  /  Alb  3.3  /  TBili  0.6  /  DBili  x   /  AST  20  /  ALT  22  /  AlkPhos  106  04-02      Urinalysis Basic - ( 02 Apr 2025 07:55 )    Color: x / Appearance: x / SG: x / pH: x  Gluc: 104 mg/dL / Ketone: x  / Bili: x / Urobili: x   Blood: x / Protein: x / Nitrite: x   Leuk Esterase: x / RBC: x / WBC x   Sq Epi: x / Non Sq Epi: x / Bacteria: x

## 2025-04-02 NOTE — PHYSICAL THERAPY INITIAL EVALUATION ADULT - PRECAUTIONS/LIMITATIONS, REHAB EVAL
wears specail eye glassess with prism sticker in R glass lens for  vision correction after CVA approx 18 years ago./fall precautions/hearing precautions/vision precautions

## 2025-04-02 NOTE — H&P ADULT - NSHPLABSRESULTS_GEN_ALL_CORE
LABS:  cret                        14.8   5.17  )-----------( 257      ( 01 Apr 2025 21:04 )             44.1     04-01    140  |  109[H]  |  32[H]  ----------------------------<  112[H]  4.4   |  30  |  1.09    Ca    9.5      01 Apr 2025 21:04    TPro  6.9  /  Alb  3.4  /  TBili  0.4  /  DBili  x   /  AST  21  /  ALT  25  /  AlkPhos  122[H]  04-01

## 2025-04-02 NOTE — PHYSICAL THERAPY INITIAL EVALUATION ADULT - PERTINENT HX OF CURRENT PROBLEM, REHAB EVAL
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 O2 sat was found to be 81% on room air. History obtained mainly from daughter at bedside. Patient has been in usual state of health until this incident.  Found to be in acute hypoxic respiratory failure likely in the setting of CHF and hypertensive urgency.

## 2025-04-02 NOTE — H&P ADULT - ASSESSMENT
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 O2 sat was found to be 81% on room air. Found to be in acute hypoxic respiratory failure likely in the setting of CHF and hypertensive urgency.

## 2025-04-02 NOTE — PHYSICAL THERAPY INITIAL EVALUATION ADULT - MD/RN NOTIFIED
Subjective:       Patient ID: Graham Caceres is a 57 y.o. male.    Chief Complaint: Chest Pain (left side)    HPI:  58 yo male that presents to clinic for intermittent left sided chest pain x 2 weeks.    States that the pain occurs only at night time but that it doesn't occur every night.  Last episode was 5 days ago.  Pain is described as dull ache pain that is confined to left side of chest with no radiation to arm or back.  States that pain usually subsides in a couple minutes with rubbing the chest.    Denies, any SOB, dizziness, arm pain or n/v when pain occurs.    Patient is not currently experiencing any chest pain in office.    Review of Systems   Constitutional: Negative for appetite change, chills, fatigue and fever.   HENT: Negative for congestion, ear pain, postnasal drip, rhinorrhea, sinus pain, sinus pressure and sore throat.    Eyes: Negative for photophobia and visual disturbance.   Respiratory: Negative for apnea, cough, shortness of breath and wheezing.    Cardiovascular: Positive for chest pain (intermittent). Negative for palpitations and leg swelling.   Gastrointestinal: Negative for abdominal pain, constipation, diarrhea, nausea and vomiting.   Musculoskeletal: Negative for arthralgias, back pain, joint swelling, myalgias, neck pain and neck stiffness.   Skin: Negative for color change, rash and wound.   Neurological: Negative for dizziness, light-headedness, numbness and headaches.   Psychiatric/Behavioral: Negative for behavioral problems.       Objective:      Physical Exam   Constitutional: He is oriented to person, place, and time. He appears well-developed and well-nourished. No distress.   HENT:   Head: Normocephalic and atraumatic.   Neck: Normal range of motion. Neck supple. No thyromegaly present.   Cardiovascular: Normal rate, regular rhythm, normal heart sounds and intact distal pulses.    No murmur heard.  Pulmonary/Chest: Effort normal and breath sounds normal. No respiratory  distress. He has no wheezes. He has no rales.   Abdominal: Soft. Bowel sounds are normal. He exhibits no distension and no mass. There is no tenderness.   Musculoskeletal: Normal range of motion. He exhibits no edema or tenderness.   Lymphadenopathy:     He has no cervical adenopathy.   Neurological: He is alert and oriented to person, place, and time. No sensory deficit.   Skin: Skin is warm and dry. No erythema.   Psychiatric: His behavior is normal.       Assessment:       1. Other chest pain    2. Hypertension, essential    3. Controlled type 2 diabetes mellitus without complication, without long-term current use of insulin        Plan:         1. Other chest pain   -EKG is normal in clinic.  -Will check cbc, bmp and troponin in clinic today.  -Will try a course of daily prilosec to see if the chest pain is GI related given occurrence at night.      2. Hypertension, essential   -Blood pressure is elevated in clinic today with manual recheck of 134/96.  -Will start patient on low dose HCTZ 12.5mg po daily for better blood pressure control.  -Will need to follow up with pcp.  -Encouraged to monitor salt intake in diet.  -Increase physical activity/exercise.      3. Controlled type 2 diabetes mellitus without complication, without long-term current use of insulin   -Continue to take januvia as directed.  -Will refer to podiatry for foot examination.   yes

## 2025-04-02 NOTE — PHYSICAL THERAPY INITIAL EVALUATION ADULT - GENERAL OBSERVATIONS, REHAB EVAL
Pt recd in andino's position nad. Dtr Daniel at bedside. Pt is [place] but understands when spoken in loud voice.  Dtr informed that pt is  slightly better than when she came to ER yesterday.  Pt noted to keep L elbow in flex 2* hypertonicity from  CVA . Pt able toactively extend elbow to approx 10* for holding rolling walker in standing

## 2025-04-02 NOTE — PHYSICAL THERAPY INITIAL EVALUATION ADULT - AMBULATION SKILLS, REHAB EVAL
Able t0 walk 25-30ft with rolling walker and assist from HHA/family. Needs 2 person assist on stairs  using handrail./needs device and assist

## 2025-04-02 NOTE — PHYSICAL THERAPY INITIAL EVALUATION ADULT - MODALITIES TREATMENT COMMENTS
Dtr Antoinette present throughout session- Provided history and participated in d/c planning. Her questions answered and d/c process explained-  Due to extensive assistance at this time, Rehab is recommended. Dtr wants to observe  her progress in mobility with medical management  while in [facility] and then if she continues to need extensive assistance then she would consider [facility], she would prefer Home PT

## 2025-04-02 NOTE — PHYSICAL THERAPY INITIAL EVALUATION ADULT - TRANSFER TRAINING, PT EVAL
Alex in  transfer ability bed to chair and vice versa using appropriate assistive device  and prevent falls.

## 2025-04-03 ENCOUNTER — TRANSCRIPTION ENCOUNTER (OUTPATIENT)
Age: 89
End: 2025-04-03

## 2025-04-03 VITALS
DIASTOLIC BLOOD PRESSURE: 76 MMHG | OXYGEN SATURATION: 94 % | TEMPERATURE: 98 F | RESPIRATION RATE: 18 BRPM | SYSTOLIC BLOOD PRESSURE: 131 MMHG | HEART RATE: 100 BPM

## 2025-04-03 PROCEDURE — 99233 SBSQ HOSP IP/OBS HIGH 50: CPT

## 2025-04-03 PROCEDURE — 99239 HOSP IP/OBS DSCHRG MGMT >30: CPT

## 2025-04-03 RX ORDER — FUROSEMIDE 10 MG/ML
1 INJECTION INTRAMUSCULAR; INTRAVENOUS
Qty: 30 | Refills: 0
Start: 2025-04-03 | End: 2025-05-02

## 2025-04-03 RX ADMIN — ENOXAPARIN SODIUM 30 MILLIGRAM(S): 100 INJECTION SUBCUTANEOUS at 05:48

## 2025-04-03 RX ADMIN — LOSARTAN POTASSIUM 50 MILLIGRAM(S): 100 TABLET, FILM COATED ORAL at 05:48

## 2025-04-03 RX ADMIN — CLOPIDOGREL BISULFATE 75 MILLIGRAM(S): 75 TABLET, FILM COATED ORAL at 11:07

## 2025-04-03 RX ADMIN — METOPROLOL SUCCINATE 25 MILLIGRAM(S): 50 TABLET, EXTENDED RELEASE ORAL at 05:48

## 2025-04-03 RX ADMIN — NYSTATIN 1 APPLICATION(S): 100000 CREAM TOPICAL at 05:48

## 2025-04-03 RX ADMIN — FUROSEMIDE 40 MILLIGRAM(S): 10 INJECTION INTRAMUSCULAR; INTRAVENOUS at 05:48

## 2025-04-03 NOTE — CONSULT NOTE ADULT - SUBJECTIVE AND OBJECTIVE BOX
Date of Admission: 2025    CHIEF COMPLAINT: Shortness of breath    HISTORY OF PRESENT ILLNESS:  Briefly, Mrs. Mary Warren is a pleasant 89 y.o. woman with history of combined systolic and diastolic heart failure, s/p remote CVA, hyperlipidemia, hearing loss, presented with acute onset of dyspnea, markedly elevated BP, found to be hypoxic to 81%.  Improved with BIPAP therapy and IV furosemide.  In addition, found to have UTI.   Currently, she reports she feels better.  Denies any chest pain, dizziness, palpitations, LE swelling, PND, orthopnea, fevers, chills, cough.  Daughter at bedside reports patient had episodes of dyspnea over last few weeks, would usually respond to extra Furosemide tablet.   Previously, patient was maintained on Furosemide 20 mg qOD, changed to 10 mg BID due to polyuria.         Allergies    penicillin (Hives)    Intolerances    	    MEDICATIONS:  clopidogrel Tablet 75 milliGRAM(s) Oral daily  enoxaparin Injectable 30 milliGRAM(s) SubCutaneous every 24 hours  furosemide   Injectable 40 milliGRAM(s) IV Push every 12 hours  losartan 50 milliGRAM(s) Oral daily  metoprolol succinate ER 25 milliGRAM(s) Oral daily  acetaminophen     Tablet .. 650 milliGRAM(s) Oral every 6 hours PRN  melatonin 3 milliGRAM(s) Oral at bedtime PRN  aluminum hydroxide/magnesium hydroxide/simethicone Suspension 30 milliLiter(s) Oral every 4 hours PRN  atorvastatin 10 milliGRAM(s) Oral at bedtime  nystatin Powder 1 Application(s) Topical two times a day      PAST MEDICAL & SURGICAL HISTORY:  Hypertension  History of cervical cancer  History of cerebrovascular accident (CVA) with residual deficit  Chronic heart failure with preserved ejection fraction (HFpEF)  Urinary and bowel incontinence  S/P lumpectomy of breast          FAMILY HISTORY:  Non-contributory      SOCIAL HISTORY:    [x ] Non-smoker  Lives with daughter         REVIEW OF SYSTEMS:  General: easily fatiged  Skin: no rashes.  ENT: no sore throat, rhinorrhea, sinus congestion.  Respiratory: no  cough or wheeze.  Gastrointestinal:  no N/V/D, no melena/hematemesis/hematochezia.  Genitourinary: no dysuria/hesitancy or hematuria.  Musculoskeletal: no myalgias or arthralgias.  Neurological: no changes in vision or hearing, no lightheadedness/dizziness, no syncope/near syncope	  Psychiatric: no unusual stress/anxiety.   Hematology/Lymphatics: no unusual bleeding, bruising and no lymphadenopathy.  Endocrine: no unusual thirst.   All others negative except as stated above and in HPI.    PHYSICAL EXAM:  T(C): 36.6 (25 @ 10:31), Max: 36.9 (25 @ 04:46)  HR: 100 (25 @ 10:31) (76 - 114)  BP: 131/76 (25 @ 10:31) (131/76 - 181/76)  RR: 18 (25 @ 10:31) (18 - 30)  SpO2: 94% (25 @ 10:31) (93% - 100%)  Wt(kg): --  I&O's Summary    2025 07:  -  2025 07:00  --------------------------------------------------------  IN: 0 mL / OUT: 900 mL / NET: -900 mL    2025 07:  -  2025 11:07  --------------------------------------------------------  IN: 360 mL / OUT: 0 mL / NET: 360 mL        Appearance: Petite elderly White female, in bed, comfortable.   HEENT:   Normal oral mucosa, PERRL, EOMI	  Lymphatic: No lymphadenopathy  Cardiovascular:  RRR Normal S1 S2, No JVD, 3/6 SM at LUSB, 2/6 early diastolic M LLSB,  No edema  Respiratory: Lungs clear to auscultation, + significant kyphosis  Psychiatry: A & O x 3, Mood & affect appropriate  Gastrointestinal:  Soft, Non-tender, + BS	  Skin: No rashes, No ecchymoses, No cyanosis	  Neurologic: Non-focal  Extremities: No clubbing, cyanosis or edema  Vascular: Peripheral pulses palpable 2+ bilaterally        LABS:	 	    CBC Full  -  ( 2025 07:55 )  WBC Count : 6.50 K/uL  Hemoglobin : 14.4 g/dL  Hematocrit : 43.2 %  Platelet Count - Automated : 238 K/uL  Mean Cell Volume : 95.4 fl  Mean Cell Hemoglobin : 31.8 pg  Mean Cell Hemoglobin Concentration : 33.3 g/dL  Auto Neutrophil # : x  Auto Lymphocyte # : x  Auto Monocyte # : x  Auto Eosinophil # : x  Auto Basophil # : x  Auto Neutrophil % : x  Auto Lymphocyte % : x  Auto Monocyte % : x  Auto Eosinophil % : x  Auto Basophil % : x    04    141  |  110[H]  |  32[H]  ----------------------------<  104[H]  3.7   |  26  |  1.05  04-    140  |  109[H]  |  32[H]  ----------------------------<  112[H]  4.4   |  30  |  1.09    Ca    9.5      2025 07:55  Ca    9.5      2025 21:04    TPro  6.5  /  Alb  3.3  /  TBili  0.6  /  DBili  x   /  AST  20  /  ALT  22  /  AlkPhos  106  04-02  TPro  6.9  /  Alb  3.4  /  TBili  0.4  /  DBili  x   /  AST  21  /  ALT  25  /  AlkPhos  122[H]  04-      proBNP: 4141         CARDIAC MARKERS:  Troponin 42.6            TELEMETRY:  SR, ST  bpm    EC2025 SR, LAD, LVH with STTW changes related to hypertrophy  RADIOLOGY: CXR 2025  IMPRESSION:  No active pulmonary disease.    OTHER:   Echo 2025   1. Left ventricular cavity is normal in size. Left ventricular wall   thickness is severely increased. Severe left ventricular hypertrophy.   Left ventricular systolic function is mildly to moderately decreased with   an ejection fraction visually estimated at 40 to 45 %. Regional wall   motion abnormalities present.   2. There is moderate (grade 2) left ventricular diastolic dysfunction.   3. Normal right ventricular cavity size and reduced right ventricular   systolic function.   4. Left atrium is mildly dilated.   5. There is severe calcification of the mitral valve annulus, Moderate   mitral valve leaflet calcification. Mild mitral valve stenosis. Mild   mitral regurgitation.   6. 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   There is moderate calcification of the aortic valve leaflets. Moderate   aortic regurgitation. Mild aortic stenosis.    7. No pericardial effusion seen.   8. The inferior vena cava is normal in size (normal <2.1cm) with normal   inspiratory collapse (normal >50%) consistent with normal right atrial   pressure (  3, range 0-5mmHg).   9. Pulmonary artery systolic pressure could not be estimated.  	    PREVIOUS DIAGNOSTIC TESTING:    Stress Test:  - [facility]    Echo: 24, 1. Left ventricular cavity is normal in size. Left ventricular wall thickness is normal. Left ventricular systolic function is normal with an ejection fraction of 51 % by Ramey's method of disks. There are no regional wall motion abnormalities seen.  2. Normal left ventricular diastolic function, with normal filling pressure.  3. Normal right ventricular cavity size, with normal wall thickness, and normal systolic function.  4. Trace mitral regurgitation.  5. No pericardial effusion seen.  6. Trace tricuspid regurgitation.    23 Echo   1. Left ventricular ejection fraction, by visual estimation, is 40 to   45%.   2. Technically limited study.   3. Moderately decreased global left ventricular systolic function.   4. Entire anterior wall, basal inferolateral segment, basal   anterolateral segment, and basal inferior segment are abnormal as   described above.   5. Spectral Doppler shows pseudonormal pattern of left ventricular   myocardial filling (Grade II diastolic dysfunction).   6. There is mild concentric left ventricular hypertrophy.   7. Moderately enlarged left atrium.   8. Normal right atrial size.   9. Mild mitral annular calcification.  10. Mild thickening and calcification of the anterior and posterior   mitral valve leaflets.  11. Moderate mitral valve regurgitation.  12. Degenerative tricuspid valve.  13. Mild-moderate tricuspid regurgitation.  14. Mild aortic regurgitation.  15. Mild pulmonic valve regurgitation.  16. There is mild aortic root calcification.  21, Normal left ventricular size, ejection fraction 45 to 50%. Moderate AR, mild MR, mild TR, mild WA.  Mildly dilated aortic root. No significant interval changes as compared to 3/26/2018. 	    CT chest 2023 - Small hiatal hernia, mild emphysema

## 2025-04-03 NOTE — DISCHARGE NOTE PROVIDER - NSDCFUADDAPPT_GEN_ALL_CORE_FT
APPTS ARE READY TO BE MADE: [X] YES    Best Family or Patient Contact (if needed):    Additional Information about above appointments (if needed):    1:  cardio/pulm  2:   3:     Other comments or requests:    APPTS ARE READY TO BE MADE: [X] YES    Best Family or Patient Contact (if needed):    Additional Information about above appointments (if needed):    1:  cardio/pulm  2:   3:     Other comments or requests:   Prior to outreaching the patient, it was visible that the patient has secured a follow up appointment which was not scheduled by our team.  04/17 2:30p with Dr. Cerna; Cardiology  04/24 11:00a with Dr. Dawkins; Pulmonary

## 2025-04-03 NOTE — DISCHARGE NOTE PROVIDER - CARE PROVIDER_API CALL
Frankie Reynolds  Cardiovascular Disease  [address] [place] [address]  Phone: [phone]  Fax: [phone]  Follow Up Time:

## 2025-04-03 NOTE — DISCHARGE NOTE NURSING/CASE MANAGEMENT/SOCIAL WORK - PATIENT PORTAL LINK FT
You can access the FollowMyHealth Patient Portal offered by [facility] by registering at the following website: [email or web address]. By joining [facility]’s FollowMyHealth portal, you will also be able to view your health information using other applications (apps) compatible with our system.

## 2025-04-03 NOTE — DISCHARGE NOTE NURSING/CASE MANAGEMENT/SOCIAL WORK - FINANCIAL ASSISTANCE
[facility] provides services at a reduced cost to those who are determined to be eligible through [facility]’s financial assistance program. Information regarding [facility]’s financial assistance program can be found by going to [email or web address] or by calling [phone].

## 2025-04-03 NOTE — DISCHARGE NOTE PROVIDER - CARE PROVIDERS DIRECT ADDRESSES
[email or web address]

## 2025-04-03 NOTE — DISCHARGE NOTE PROVIDER - NSDCFUSCHEDAPPT_GEN_ALL_CORE_FT
[facility] Physician [facility]  CARDIOLOGY [address]  Scheduled Appointment: 04/17/2025    Cristina Dawkins  [facility] Physician [facility]  PULMMED [address] [facility]  Scheduled Appointment: 04/24/2025

## 2025-04-03 NOTE — DISCHARGE NOTE PROVIDER - NSDCMRMEDTOKEN_GEN_ALL_CORE_FT
atorvastatin 10 mg oral tablet: 1 tab(s) orally once a day  Centrum: 1  orally once a day  clopidogrel 75 mg oral tablet: 1 tab(s) orally once a day  furosemide 20 mg oral tablet: 1 tab(s) orally once a day  losartan 50 mg oral tablet: 1 tab(s) orally once a day  metoprolol succinate 25 mg oral tablet, extended release: 1 tab(s) orally once a day  Simbrinza 1%- 0.2% ophthalmic suspension: 1 drop(s) to each affected eye 2 times a day  tolterodine 4 mg oral capsule, extended release: 1 cap(s) orally once a day

## 2025-04-03 NOTE — DISCHARGE NOTE PROVIDER - NSDCCPCAREPLAN_GEN_ALL_CORE_FT
PRINCIPAL DISCHARGE DIAGNOSIS  Diagnosis: Shortness of breath  Assessment and Plan of Treatment: You came to the hospital because you were experiencing sudden shortness of breath and your oxygen levels were low (81% when checked). This was found to be caused by your heart not pumping efficiently (a heart failure flare-up) and very high blood pressure (hypertensive urgency).  Special Instructions:  Daily Weights: Weigh yourself every morning after urinating, before eating, wearing similar clothing, on the same scale. Keep a log. Call your doctor if you gain more than 3 pounds in one day or 5 pounds in one week.  Blood Pressure Monitoring: [Specify if home BP monitoring is recommended/needed and frequency]. Keep a log of readings.  Monitor for CHF Symptoms: Watch for increased shortness of breath (especially when lying down), increased swelling in legs/ankles/abdomen, worsening cough, needing more pillows to sleep, or sudden weight gain.  Follow-Up Appointments: Keeping these appointments is very important.  Cardiologist (Dr. Reynolds): You need to follow up with Dr. Reynolds.   When to Seek Immediate Medical Attention (Call 911 or Go to the Nearest Emergency Room):  Severe shortness of breath or difficulty breathing not relieved by rest.  Chest pain or pressure.  Significant weight gain (more than 3 lbs in a day or 5 lbs in a week).  Increased swelling in your legs, ankles, or abdomen.  Signs of Stroke: Sudden weakness or numbness (especially on one side), confusion, trouble speaking or understanding, vision changes, severe headache, dizziness or loss of balance.  Symptoms of very high or very low blood pressure: Severe headache, dizziness, fainting, blurred vision.  Signs of choking or severe difficulty swallowing.  Fainting or feeling like you might pass out.  Any other sudden or frightening symptoms

## 2025-04-03 NOTE — DISCHARGE NOTE PROVIDER - DISCHARGE SERVICE FOR PATIENT
Controlled with current regime
on the discharge service for the patient. I have reviewed and made amendments to the documentation where necessary.

## 2025-04-03 NOTE — CONSULT NOTE ADULT - ASSESSMENT
1. Acute on chronic combined systolic/diastolic HF   Improved volume status today  Fluctuating LVEF - likely due to increase in preload/afterload.   May transition to Furosemide 20 mg PO daily  Daily weights  Continue Metoprolol succinate 25 mg daily and Losartan 50 mg daily regimen  Follow-up with Dr. Reynolds in 2 weeks.     2. Valvular heart disease  Moderate eccentric AI, mild AS, mild MS on echocardiogram  Continue beta-blocker and diuretic regimen.       3. Significant hypoxia on presentation  May have a component of obstructive/restrictive lung disease, given CT chest findings above  Consider pulmonary evaluation outpatient.     4. S/p CVA  Continue Clopidogrel 75 mg daily    Thank you for the courtesy of this consult.

## 2025-04-03 NOTE — DISCHARGE NOTE NURSING/CASE MANAGEMENT/SOCIAL WORK - NSDCPEFALRISK_GEN_ALL_CORE
For information on Fall & Injury Prevention, visit: [email or web address] OR  [email or web address] OR  https://www.cdc.gov/steadi/patient.html

## 2025-04-03 NOTE — DISCHARGE NOTE PROVIDER - NSFOLLOWUPCLINICS_GEN_ALL_ED_FT
[facility] Pulmonolgy and Sleep Medicine  Pulmonology  [address] [place] [address], [place] [address]  Phone: [phone]  Fax:

## 2025-04-03 NOTE — DISCHARGE NOTE PROVIDER - HOSPITAL COURSE
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 O2 sat was found to be 81% on room air. Found to be in acute hypoxic respiratory failure likely in the setting of CHF and hypertensive urgency.        Problem/Plan - 1:  ·  Problem: Acute hypoxic respiratory failure.   ·  Plan: -  due to Acute on chronic systolic CHF exacerbation and hypertensive urgency  - improved with iv diuresis  seen by cardiology transitioned to oral diuretics  to follow up with Dr unger as outpatient  would benefit from pulmonary eval as outpatient as well , will set up appointment with service     Family declined YANN     Problem/Plan - 2:  ·  Problem: CHF exacerbation.   ·  Plan: as above.     Problem/Plan - 3:  ·  Problem: Hypertensive urgency.   ·  Plan: as above.     Problem/Plan - 4:  ·  Problem: History of CVA in adulthood.   ·  Plan: resume asa and statin.     Problem/Plan - 5:  ·  Problem: History of cervical cancer.   ·  Plan: - s/p XRT.        pt seen and examined 45 min spent on dc planning       VITAL SIGNS:  Vital Signs Last 24 Hrs  T(C): 36.6 (03 Apr 2025 10:31), Max: 36.9 (03 Apr 2025 04:46)  T(F): 97.9 (03 Apr 2025 10:31), Max: 98.4 (03 Apr 2025 04:46)  HR: 100 (03 Apr 2025 10:31) (76 - 114)  BP: 131/76 (03 Apr 2025 10:31) (131/76 - 181/76)  BP(mean): --  RR: 18 (03 Apr 2025 10:31) (18 - 30)  SpO2: 94% (03 Apr 2025 10:31) (93% - 100%)    Parameters below as of 03 Apr 2025 05:10  Patient On (Oxygen Delivery Method): room air        PHYSICAL EXAM:  Constitutional: resting comfortably; NAD  HEENT: NC/AT, PERRL, EOMI, anicteric sclera, MMM  Neck: supple; no JVD or thyromegaly; no LAD  Respiratory: CTA B/L; no W/R/R, no retractions or increased work of breathing  Cardiac: +S1/S2; RRR; no M/R/G  Gastrointestinal: soft, NT/ND; no rebound or guarding; +BS  Extremities: WWP, no clubbing or cyanosis; brisk capillary refill; no peripheral edema  Musculoskeletal: NROM x4; no joint swelling, tenderness or erythema  Vascular: 2+ radial, DP/PT pulses B/L  Dermatologic: skin warm, dry and intact; no rashes, wounds, or scars  Neurologic: AAOx3, CN II-XII intact, no focal deficits, strength 5/5 and equal in all extremities, sensation intact  Psychiatric: calm, cooperative, behaviors are appropriate, denies SI/HI    Lab test review, Radiology Review, Vitals review, Consultant review and discussion, Physical examination, IDR, Assessment and plan; Plan discussion with patient and family

## 2025-04-03 NOTE — DISCHARGE NOTE NURSING/CASE MANAGEMENT/SOCIAL WORK - NSDCFUADDAPPT_GEN_ALL_CORE_FT
APPTS ARE READY TO BE MADE: [X] YES    Best Family or Patient Contact (if needed):    Additional Information about above appointments (if needed):    1:  cardio/pulm  2:   3:     Other comments or requests:

## 2025-04-10 DIAGNOSIS — Z88.0 ALLERGY STATUS TO PENICILLIN: ICD-10-CM

## 2025-04-10 DIAGNOSIS — Z92.3 PERSONAL HISTORY OF IRRADIATION: ICD-10-CM

## 2025-04-10 DIAGNOSIS — H40.9 UNSPECIFIED GLAUCOMA: ICD-10-CM

## 2025-04-10 DIAGNOSIS — I69.354 HEMIPLEGIA AND HEMIPARESIS FOLLOWING CEREBRAL INFARCTION AFFECTING LEFT NON-DOMINANT SIDE: ICD-10-CM

## 2025-04-10 DIAGNOSIS — Z79.02 LONG TERM (CURRENT) USE OF ANTITHROMBOTICS/ANTIPLATELETS: ICD-10-CM

## 2025-04-10 DIAGNOSIS — J96.01 ACUTE RESPIRATORY FAILURE WITH HYPOXIA: ICD-10-CM

## 2025-04-10 DIAGNOSIS — I08.0 RHEUMATIC DISORDERS OF BOTH MITRAL AND AORTIC VALVES: ICD-10-CM

## 2025-04-10 DIAGNOSIS — Z66 DO NOT RESUSCITATE: ICD-10-CM

## 2025-04-10 DIAGNOSIS — E78.5 HYPERLIPIDEMIA, UNSPECIFIED: ICD-10-CM

## 2025-04-10 DIAGNOSIS — I69.391 DYSPHAGIA FOLLOWING CEREBRAL INFARCTION: ICD-10-CM

## 2025-04-10 DIAGNOSIS — Z85.41 PERSONAL HISTORY OF MALIGNANT NEOPLASM OF CERVIX UTERI: ICD-10-CM

## 2025-04-10 DIAGNOSIS — I50.43 ACUTE ON CHRONIC COMBINED SYSTOLIC (CONGESTIVE) AND DIASTOLIC (CONGESTIVE) HEART FAILURE: ICD-10-CM

## 2025-04-10 DIAGNOSIS — B96.20 UNSPECIFIED ESCHERICHIA COLI [E. COLI] AS THE CAUSE OF DISEASES CLASSIFIED ELSEWHERE: ICD-10-CM

## 2025-04-10 DIAGNOSIS — I16.0 HYPERTENSIVE URGENCY: ICD-10-CM

## 2025-04-10 DIAGNOSIS — R06.02 SHORTNESS OF BREATH: ICD-10-CM

## 2025-04-10 DIAGNOSIS — N39.0 URINARY TRACT INFECTION, SITE NOT SPECIFIED: ICD-10-CM

## 2025-04-10 DIAGNOSIS — I11.0 HYPERTENSIVE HEART DISEASE WITH HEART FAILURE: ICD-10-CM

## 2025-04-10 DIAGNOSIS — H91.90 UNSPECIFIED HEARING LOSS, UNSPECIFIED EAR: ICD-10-CM

## 2025-04-17 ENCOUNTER — NON-APPOINTMENT (OUTPATIENT)
Age: 89
End: 2025-04-17

## 2025-04-17 ENCOUNTER — APPOINTMENT (OUTPATIENT)
Dept: CARDIOLOGY | Facility: CLINIC | Age: 89
End: 2025-04-17
Payer: MEDICARE

## 2025-04-17 VITALS
SYSTOLIC BLOOD PRESSURE: 120 MMHG | HEART RATE: 87 BPM | DIASTOLIC BLOOD PRESSURE: 60 MMHG | RESPIRATION RATE: 16 BRPM | OXYGEN SATURATION: 97 %

## 2025-04-17 DIAGNOSIS — E78.5 HYPERLIPIDEMIA, UNSPECIFIED: ICD-10-CM

## 2025-04-17 DIAGNOSIS — I50.43 ACUTE ON CHRONIC COMBINED SYSTOLIC (CONGESTIVE) AND DIASTOLIC (CONGESTIVE) HEART FAILURE: ICD-10-CM

## 2025-04-17 DIAGNOSIS — I42.0 DILATED CARDIOMYOPATHY: ICD-10-CM

## 2025-04-17 PROCEDURE — 99495 TRANSJ CARE MGMT MOD F2F 14D: CPT

## 2025-04-17 PROCEDURE — 36415 COLL VENOUS BLD VENIPUNCTURE: CPT

## 2025-04-17 PROCEDURE — 93000 ELECTROCARDIOGRAM COMPLETE: CPT

## 2025-04-18 LAB
ALBUMIN SERPL ELPH-MCNC: 4 G/DL
ALP BLD-CCNC: 113 U/L
ALT SERPL-CCNC: 21 U/L
ANION GAP SERPL CALC-SCNC: 12 MMOL/L
AST SERPL-CCNC: 22 U/L
BILIRUB SERPL-MCNC: 0.3 MG/DL
BUN SERPL-MCNC: 41 MG/DL
CALCIUM SERPL-MCNC: 9.7 MG/DL
CHLORIDE SERPL-SCNC: 104 MMOL/L
CO2 SERPL-SCNC: 25 MMOL/L
CREAT SERPL-MCNC: 1.18 MG/DL
EGFRCR SERPLBLD CKD-EPI 2021: 43 ML/MIN/1.73M2
GLUCOSE SERPL-MCNC: 154 MG/DL
POTASSIUM SERPL-SCNC: 4.3 MMOL/L
PROT SERPL-MCNC: 6.3 G/DL
SODIUM SERPL-SCNC: 141 MMOL/L

## 2025-04-23 ENCOUNTER — INPATIENT (INPATIENT)
Facility: HOSPITAL | Age: 89
LOS: 5 days | Discharge: NURSING, REHAB OR LONG-TERM CARE | End: 2025-04-29
Attending: INTERNAL MEDICINE | Admitting: INTERNAL MEDICINE
Payer: MEDICARE

## 2025-04-23 VITALS
OXYGEN SATURATION: 95 % | TEMPERATURE: 98 F | SYSTOLIC BLOOD PRESSURE: 148 MMHG | DIASTOLIC BLOOD PRESSURE: 76 MMHG | HEART RATE: 104 BPM | RESPIRATION RATE: 18 BRPM | WEIGHT: 117.07 LBS | HEIGHT: 60 IN

## 2025-04-23 DIAGNOSIS — Z29.9 ENCOUNTER FOR PROPHYLACTIC MEASURES, UNSPECIFIED: ICD-10-CM

## 2025-04-23 DIAGNOSIS — Z98.89 OTHER SPECIFIED POSTPROCEDURAL STATES: Chronic | ICD-10-CM

## 2025-04-23 DIAGNOSIS — R53.1 WEAKNESS: ICD-10-CM

## 2025-04-23 DIAGNOSIS — I50.42 CHRONIC COMBINED SYSTOLIC (CONGESTIVE) AND DIASTOLIC (CONGESTIVE) HEART FAILURE: ICD-10-CM

## 2025-04-23 DIAGNOSIS — I10 ESSENTIAL (PRIMARY) HYPERTENSION: ICD-10-CM

## 2025-04-23 DIAGNOSIS — R13.10 DYSPHAGIA, UNSPECIFIED: ICD-10-CM

## 2025-04-23 DIAGNOSIS — R26.2 DIFFICULTY IN WALKING, NOT ELSEWHERE CLASSIFIED: ICD-10-CM

## 2025-04-23 DIAGNOSIS — R79.89 OTHER SPECIFIED ABNORMAL FINDINGS OF BLOOD CHEMISTRY: ICD-10-CM

## 2025-04-23 LAB
ALBUMIN SERPL ELPH-MCNC: 3.5 G/DL — SIGNIFICANT CHANGE UP (ref 3.3–5)
ALP SERPL-CCNC: 111 U/L — SIGNIFICANT CHANGE UP (ref 40–120)
ALT FLD-CCNC: 28 U/L — SIGNIFICANT CHANGE UP (ref 12–78)
ANION GAP SERPL CALC-SCNC: 8 MMOL/L — SIGNIFICANT CHANGE UP (ref 5–17)
APPEARANCE UR: CLEAR — SIGNIFICANT CHANGE UP
APTT BLD: 33.9 SEC — SIGNIFICANT CHANGE UP (ref 26.1–36.8)
AST SERPL-CCNC: 25 U/L — SIGNIFICANT CHANGE UP (ref 15–37)
BASOPHILS # BLD AUTO: 0.05 K/UL — SIGNIFICANT CHANGE UP (ref 0–0.2)
BASOPHILS NFR BLD AUTO: 0.6 % — SIGNIFICANT CHANGE UP (ref 0–2)
BILIRUB SERPL-MCNC: 0.5 MG/DL — SIGNIFICANT CHANGE UP (ref 0.2–1.2)
BILIRUB UR-MCNC: NEGATIVE — SIGNIFICANT CHANGE UP
BUN SERPL-MCNC: 35 MG/DL — HIGH (ref 7–23)
CALCIUM SERPL-MCNC: 9.7 MG/DL — SIGNIFICANT CHANGE UP (ref 8.5–10.1)
CHLORIDE SERPL-SCNC: 109 MMOL/L — HIGH (ref 96–108)
CK MB BLD-MCNC: 3.8 % — HIGH (ref 0–3.5)
CK MB CFR SERPL CALC: 3 NG/ML — SIGNIFICANT CHANGE UP (ref 0.5–3.6)
CK SERPL-CCNC: 79 U/L — SIGNIFICANT CHANGE UP (ref 26–192)
CO2 SERPL-SCNC: 24 MMOL/L — SIGNIFICANT CHANGE UP (ref 22–31)
COLOR SPEC: YELLOW — SIGNIFICANT CHANGE UP
CREAT SERPL-MCNC: 1.34 MG/DL — HIGH (ref 0.5–1.3)
DIFF PNL FLD: NEGATIVE — SIGNIFICANT CHANGE UP
EGFR: 37 ML/MIN/1.73M2 — LOW
EGFR: 37 ML/MIN/1.73M2 — LOW
EOSINOPHIL # BLD AUTO: 0.04 K/UL — SIGNIFICANT CHANGE UP (ref 0–0.5)
EOSINOPHIL NFR BLD AUTO: 0.5 % — SIGNIFICANT CHANGE UP (ref 0–6)
GLUCOSE SERPL-MCNC: 156 MG/DL — HIGH (ref 70–99)
GLUCOSE UR QL: NEGATIVE MG/DL — SIGNIFICANT CHANGE UP
HCT VFR BLD CALC: 48.9 % — HIGH (ref 34.5–45)
HGB BLD-MCNC: 15.4 G/DL — SIGNIFICANT CHANGE UP (ref 11.5–15.5)
IMM GRANULOCYTES NFR BLD AUTO: 0.2 % — SIGNIFICANT CHANGE UP (ref 0–0.9)
INR BLD: 0.94 RATIO — SIGNIFICANT CHANGE UP (ref 0.85–1.16)
KETONES UR-MCNC: NEGATIVE MG/DL — SIGNIFICANT CHANGE UP
LEUKOCYTE ESTERASE UR-ACNC: NEGATIVE — SIGNIFICANT CHANGE UP
LYMPHOCYTES # BLD AUTO: 0.56 K/UL — LOW (ref 1–3.3)
LYMPHOCYTES # BLD AUTO: 6.9 % — LOW (ref 13–44)
MCHC RBC-ENTMCNC: 30.6 PG — SIGNIFICANT CHANGE UP (ref 27–34)
MCHC RBC-ENTMCNC: 31.5 G/DL — LOW (ref 32–36)
MCV RBC AUTO: 97.2 FL — SIGNIFICANT CHANGE UP (ref 80–100)
MONOCYTES # BLD AUTO: 0.52 K/UL — SIGNIFICANT CHANGE UP (ref 0–0.9)
MONOCYTES NFR BLD AUTO: 6.4 % — SIGNIFICANT CHANGE UP (ref 2–14)
NEUTROPHILS # BLD AUTO: 6.96 K/UL — SIGNIFICANT CHANGE UP (ref 1.8–7.4)
NEUTROPHILS NFR BLD AUTO: 85.4 % — HIGH (ref 43–77)
NITRITE UR-MCNC: NEGATIVE — SIGNIFICANT CHANGE UP
NRBC BLD AUTO-RTO: 0 /100 WBCS — SIGNIFICANT CHANGE UP (ref 0–0)
PH UR: 7 — SIGNIFICANT CHANGE UP (ref 5–8)
PLATELET # BLD AUTO: 252 K/UL — SIGNIFICANT CHANGE UP (ref 150–400)
POTASSIUM SERPL-MCNC: 4.4 MMOL/L — SIGNIFICANT CHANGE UP (ref 3.5–5.3)
POTASSIUM SERPL-SCNC: 4.4 MMOL/L — SIGNIFICANT CHANGE UP (ref 3.5–5.3)
PROT SERPL-MCNC: 7.3 GM/DL — SIGNIFICANT CHANGE UP (ref 6–8.3)
PROT UR-MCNC: NEGATIVE MG/DL — SIGNIFICANT CHANGE UP
PROTHROM AB SERPL-ACNC: 10.9 SEC — SIGNIFICANT CHANGE UP (ref 9.9–13.4)
RBC # BLD: 5.03 M/UL — SIGNIFICANT CHANGE UP (ref 3.8–5.2)
RBC # FLD: 14.3 % — SIGNIFICANT CHANGE UP (ref 10.3–14.5)
SODIUM SERPL-SCNC: 141 MMOL/L — SIGNIFICANT CHANGE UP (ref 135–145)
SP GR SPEC: >1.03 — HIGH (ref 1–1.03)
TROPONIN I, HIGH SENSITIVITY RESULT: 69.2 NG/L — HIGH
TROPONIN I, HIGH SENSITIVITY RESULT: 74.3 NG/L — HIGH
UROBILINOGEN FLD QL: 0.2 MG/DL — SIGNIFICANT CHANGE UP (ref 0.2–1)
WBC # BLD: 8.15 K/UL — SIGNIFICANT CHANGE UP (ref 3.8–10.5)
WBC # FLD AUTO: 8.15 K/UL — SIGNIFICANT CHANGE UP (ref 3.8–10.5)

## 2025-04-23 PROCEDURE — 70496 CT ANGIOGRAPHY HEAD: CPT | Mod: 26

## 2025-04-23 PROCEDURE — 70450 CT HEAD/BRAIN W/O DYE: CPT | Mod: 26,XU

## 2025-04-23 PROCEDURE — 93010 ELECTROCARDIOGRAM REPORT: CPT

## 2025-04-23 PROCEDURE — 70498 CT ANGIOGRAPHY NECK: CPT | Mod: 26

## 2025-04-23 PROCEDURE — 99285 EMERGENCY DEPT VISIT HI MDM: CPT

## 2025-04-23 PROCEDURE — 0042T: CPT

## 2025-04-23 PROCEDURE — 99223 1ST HOSP IP/OBS HIGH 75: CPT

## 2025-04-23 RX ORDER — ASPIRIN 325 MG
81 TABLET ORAL DAILY
Refills: 0 | Status: DISCONTINUED | OUTPATIENT
Start: 2025-04-23 | End: 2025-04-29

## 2025-04-23 RX ORDER — FUROSEMIDE 10 MG/ML
20 INJECTION INTRAMUSCULAR; INTRAVENOUS DAILY
Refills: 0 | Status: DISCONTINUED | OUTPATIENT
Start: 2025-04-23 | End: 2025-04-29

## 2025-04-23 RX ORDER — OXYBUTYNIN CHLORIDE 5 MG/1
10 TABLET, FILM COATED, EXTENDED RELEASE ORAL
Refills: 0 | Status: DISCONTINUED | OUTPATIENT
Start: 2025-04-23 | End: 2025-04-29

## 2025-04-23 RX ORDER — MELATONIN 5 MG
3 TABLET ORAL AT BEDTIME
Refills: 0 | Status: DISCONTINUED | OUTPATIENT
Start: 2025-04-23 | End: 2025-04-29

## 2025-04-23 RX ORDER — HEPARIN SODIUM 1000 [USP'U]/ML
5000 INJECTION INTRAVENOUS; SUBCUTANEOUS EVERY 12 HOURS
Refills: 0 | Status: DISCONTINUED | OUTPATIENT
Start: 2025-04-23 | End: 2025-04-29

## 2025-04-23 RX ORDER — MAGNESIUM, ALUMINUM HYDROXIDE 200-200 MG
30 TABLET,CHEWABLE ORAL EVERY 4 HOURS
Refills: 0 | Status: DISCONTINUED | OUTPATIENT
Start: 2025-04-23 | End: 2025-04-29

## 2025-04-23 RX ORDER — ACETAMINOPHEN 500 MG/5ML
650 LIQUID (ML) ORAL EVERY 6 HOURS
Refills: 0 | Status: DISCONTINUED | OUTPATIENT
Start: 2025-04-23 | End: 2025-04-29

## 2025-04-23 RX ORDER — OMEGA-3-ACID ETHYL ESTERS CAPSULES 1 G/1
0 CAPSULE, LIQUID FILLED ORAL
Refills: 0 | DISCHARGE

## 2025-04-23 RX ORDER — BRIMONIDINE TARTRATE 1.5 MG/ML
1 SOLUTION/ DROPS OPHTHALMIC
Refills: 0 | Status: DISCONTINUED | OUTPATIENT
Start: 2025-04-23 | End: 2025-04-29

## 2025-04-23 RX ORDER — ATORVASTATIN CALCIUM 80 MG/1
10 TABLET, FILM COATED ORAL AT BEDTIME
Refills: 0 | Status: DISCONTINUED | OUTPATIENT
Start: 2025-04-23 | End: 2025-04-28

## 2025-04-23 RX ORDER — CLOPIDOGREL BISULFATE 75 MG/1
75 TABLET, FILM COATED ORAL DAILY
Refills: 0 | Status: DISCONTINUED | OUTPATIENT
Start: 2025-04-23 | End: 2025-04-29

## 2025-04-23 RX ORDER — DORZOLAMIDE 20 MG/ML
1 SOLUTION/ DROPS OPHTHALMIC
Refills: 0 | Status: DISCONTINUED | OUTPATIENT
Start: 2025-04-23 | End: 2025-04-29

## 2025-04-23 RX ORDER — ASPIRIN 325 MG
324 TABLET ORAL ONCE
Refills: 0 | Status: COMPLETED | OUTPATIENT
Start: 2025-04-23 | End: 2025-04-23

## 2025-04-23 RX ADMIN — HEPARIN SODIUM 5000 UNIT(S): 1000 INJECTION INTRAVENOUS; SUBCUTANEOUS at 23:17

## 2025-04-23 RX ADMIN — Medication 81 MILLIGRAM(S): at 23:18

## 2025-04-23 RX ADMIN — Medication 324 MILLIGRAM(S): at 16:00

## 2025-04-23 NOTE — PATIENT PROFILE ADULT - FUNCTIONAL ASSESSMENT - BASIC MOBILITY 6.
1-calculated by average/Not able to assess (calculate score using [facility] averaging method)

## 2025-04-23 NOTE — PATIENT PROFILE ADULT - FALL HARM RISK - HARM RISK INTERVENTIONS
NULL Assistance with ambulation/Assistance OOB with selected safe patient handling equipment/Communicate Risk of Fall with Harm to all staff/Discuss with provider need for PT consult/Monitor gait and stability/Reinforce activity limits and safety measures with patient and family/Tailored Fall Risk Interventions/Visual Cue: Yellow wristband and red socks/Bed in lowest position, wheels locked, appropriate side rails in place/Call bell, personal items and telephone in reach/Instruct patient to call for assistance before getting out of bed or chair/Non-slip footwear when patient is out of bed/[place] to call system/Physically safe environment - no spills, clutter or unnecessary equipment/Purposeful Proactive Rounding/Room/bathroom lighting operational, light cord in reach

## 2025-04-23 NOTE — PATIENT PROFILE ADULT - NSTOBACCONEVERSMOKERY/N_GEN_A
RE: Plan of Care    Dear Dr. Andrea Allen MD    Thank you for referring Tonya Tolentino. The following information reflects my assessment and plan of care.           Plan of Care 11/19/19   Effective from: 11/19/2019  Effective to: 1/2/2020    Plan ID: [id number]           Participants     Name Type Comments Contact Info    Andrea Allen MD Provider  [phone]    Alice Espinosa PT PT        Evaluation     Author: Alice Espinosa PT Status: Signed Last edited: 11/19/2019  9:45 AM         Referred by: Andrea Allen MD; Medical Diagnosis (from order):    Diagnosis Information      Diagnosis    737.39 (ICD-9-CM) - M41.86 (ICD-10-CM) - Other forms of scoliosis, lumbar region                Physical Therapy -  Daily Treatment Note    Visit: 6    SUBJECTIVE                                                                                                             Mixed bag of s/s .    I was feeling better until I worked 2 long days on Fri/ sat.    Groin L , and R buttock SI is 4/10.   I do have paresthesia in the ant  r thigh in prolonged standing .... not feeling it currently though     Pain / Symptoms:  Pain rating (out of 10): Current: 4     OBJECTIVE                                                                                                                     Observed Gait:     Analysis: decreased radames/pace and wide base of support; left: decreased stride length and trendelenburg; right: antalgic  Range of Motion (ROM) (norms in parentheses, measurement in degrees unless noted):       Hip Flexion (100-120): Left: Active: 102 Passive: 124 pain  Hip Extension (20-30): Left: Active: -8 Passive: pain  Hip ABDuction (40): Left: Active: 17 Passive: 24 pain  Internal Rotation in sitting (45-50): Left: Active: 24 Right: Active: 38  External Rotation in sitting (45-50): Left: Active: 28 Right: Active: 24       Lower Extremity Functional Scale (LEFS): Score: 27  scored 0=extreme difficulty; 80=no difficulty  Oswestry:  Score:  21  0-20% = minimal disability; 20-40% = moderate disability; 40-60% = severe disability; 60-80% = crippled; % = bed bound    TREATMENT                                                                                                                  Therapeutic Exercise:  Sitting hip AB / add isometric x 10 *  Clams were too painful so hold for now   Nerve glides in sitting and supine*   Bridges x 10 1sets*  Bridge w/ AB  Not today  Side step using yellow band , tension for retro/ forward  NT   Stretches hip flex *   Trial of standing at stairs , 1/2 kneel , elizabeth stretches w/ CR .... found the 1/2 kneel on the chair most helpful  Lumbar flex stretch rounding / flexing into standing MECCA stretch position**   core stablity w/ march x 10  Incline stretch L 3  NT  Manual Therapy:  MWM for standing L hip  Lat glide                                       Lat rot x 10   \"         From supine and standing  Hip EXT   B MWM       Long axis traction manually x 3  At 30 seconds for the LLE  Not today    Resultant in more rom in ALLYSON position on 10-15-19     postlation in sitting  X 3  And standing *  NTMan therapy over the R piriformis and use of foam roll *  Shotgun technique for pube alignment w/ good result NT  Trial of  Sheet traction  No better NT  Contract relax for LLD  Fair result NT  C r for the L hip flex tightness  Therapeutic Activity:   Not today on "[employer] stabilty ex  Sitting bounce *                                  Hula*                                  Sit UE reaches *         4 pt ue and LE lifts*  Side step*  Standing rotation for hip L IR x 10    bilat rotation in stand to 10 and 2 oclocks  Step stance r foot on 2nd step / hip flex L stretches*  Stretches for hip flex , standing on chair , stairs or 1/2 kneel*  DYNAMIC WALK, MARCH, side , monstergoose   Hip ext , abd vs yellow loop x 10 not today  STEP up 4\"              Up and lat across x 10    Home Exercise Program: (*above  indicates provided as part of home exercise program)   To include /1/2kneel from chair stretches for the hip flex/ rectus.   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   Pt does appear to be benefiting  From the current PT including manual therapy for hip ROM / mobility and hip / core conditioning and PRE w/ HEP upgrades to tolerance . Would likely recommend cont PT on weekly to biweekly basis over the next 2-4 weeks     Pain/symptoms after session: 2  Patient Education:   Results of above outlined education: Verbalizes understanding and Demonstrates understanding    GOALS                                                                                                                       Long Term Goals: To be met by end of plan of care:      Home Exercise Program: Independent with progressed and modified home exercise program (HEP)      Status:  Progressing/ongoing    Improve score on the Oswestry to 31% or better     Status:  Progressing/ongoing; 21/50 on 11-19-19  Improve rom/ strength in the L hip to allow for less antalgia and reciprical gait on stairs       Status:  Progressing/ongoing  Improve the LEFS score from 27/80 to 36/80 or better       Status:  Progressing/ongoing      Procedures and total treatment time documented Time Entry flowsheet.           Updated Participants     Name Type Comments Contact Info    Andrea Allen MD Provider  [phone]    Signature pending    Alice Espinosa PT PT      Electronically signed by Alice Espinosa PT at 11/19/2019 1423 CST             Please complete the attached form to indicate your approval of the plan of care upon receipt.  Insurance compliance requires your approval be on file.  Should you have any questions, feel free to contact me.     [facility]  [address] [place] [id number]            RE: Plan of Care    I certify the need for these services, furnished under this plan of treatment and while under my care.  I agree with the plan of care as stated and request that therapy proceed.        __________________________________________________________________________________  MD Signature         Date   Time   No

## 2025-04-23 NOTE — H&P ADULT - PROBLEM SELECTOR PLAN 4
- TTE 04/2025 = EF40-45%, G2DD  - Not in fluid overload  - Holding Lasix tonight. Can resume tomorrow.

## 2025-04-23 NOTE — ED ADULT TRIAGE NOTE - CHIEF COMPLAINT QUOTE
BIBEMS, daughter reports pt fell this morning and found down by aide then remembered last night pt was unable to stand or sit self on toilet. Unknown last know well time. Daughter reports increased L sided deficits. 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 Unknown last known well time but daughter states might have been for 2 months but is unsure. Daughter reports increased L sided deficits. 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

## 2025-04-23 NOTE — H&P ADULT - PROBLEM SELECTOR PLAN 1
- P/w worsening L sided weakness, and now ambulatory dysfunction due to overall weakness  - CT head = No intracranial hemorrhage or mass lesion. Intracranial atherosclerosis is extensive. There is superimposed increased density in the basilar artery that may reflect embolus  - CTA head&neck = Diffuse Extensive intracranial atherosclerosis.  No large vessel occlusion.  - CTA perfusion = No acute infarction of the brain is convincingly demonstrated.  - EKG = NSR. Possible LA enlargement. LAD. incomplete RBBB. Cannot rule out Anterior infarct (cited on or before 01-APR-2025)  - C/w home Plavix, ASA  - C/w home Lipitor 40mg  - Admit to telemetry  - Fall precautions  - Neuro check q4hrs  - Permissive hypertension for today  - F/u A1c and lipid panel  - F/u PT evaluation  - F/u MR Head  - Neuro Dr. Mendiola messaged for consult - P/w worsening L sided weakness, and now ambulatory dysfunction due to overall weakness  - CT head = No intracranial hemorrhage or mass lesion. Intracranial atherosclerosis is extensive. There is superimposed increased density in the basilar artery that may reflect embolus  - CTA head&neck = Diffuse Extensive intracranial atherosclerosis.  No large vessel occlusion.  - CTA perfusion = No acute infarction of the brain is convincingly demonstrated.  - EKG = NSR. Possible LA enlargement. LAD. incomplete RBBB. Cannot rule out Anterior infarct (cited on or before 01-APR-2025)  - C/w home Plavix, ASA  - C/w home Lipitor 40mg  - Admit to telemetry  - Fall precautions  - Neuro check q4hrs  - Permissive hypertension for today  - F/u A1c and lipid panel  - F/u PT evaluation  - F/u MR Head  - Neuro Dr. De Anda messaged for consult - P/w worsening L sided weakness, and now ambulatory dysfunction due to overall weakness  - CT head = No intracranial hemorrhage or mass lesion. Intracranial atherosclerosis is extensive. There is superimposed increased density in the basilar artery that may reflect embolus  - CTA head&neck = Diffuse Extensive intracranial atherosclerosis.  No large vessel occlusion.  - CTA perfusion = No acute infarction of the brain is convincingly demonstrated.  - EKG = NSR. Possible LA enlargement. LAD. incomplete RBBB. Cannot rule out Anterior infarct (cited on or before 01-APR-2025)  - C/w home Plavix, ASA  - C/w home Lipitor 10mg  - Admit to telemetry  - Fall precautions  - Neuro check q4hrs  - Permissive hypertension for today  - F/u A1c and lipid panel  - F/u PT evaluation  - F/u MR Head  - Neuro Dr. De Anda messaged for consult

## 2025-04-23 NOTE — ED ADULT NURSE NOTE - CHIEF COMPLAINT QUOTE
BIBEMS, daughter reports pt fell this morning and found down by aide then remembered last night pt was unable to stand or sit self on toilet. Unknown last known well time but daughter states might have been for 2 months but is unsure. Daughter reports increased L sided deficits. 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

## 2025-04-23 NOTE — ED PROVIDER NOTE - NS ED MD DISPO SPECIAL CONSIDERATION2
Pt here from [facility]. Per [facility] staff pt was only responsive to sternal rub and had blood around lips.  Ems stated the nh staff informed that the last time she was like this her depakote levels were off, pt has hx of seizures.  
None

## 2025-04-23 NOTE — H&P ADULT - PROBLEM SELECTOR PLAN 6
- Hold home Losartan, metoprolol, Lasix for today for permissive HTN (acute worsening of symptoms last night)

## 2025-04-23 NOTE — H&P ADULT - PROBLEM SELECTOR PLAN 5
- Pt has chronic dysphagia and is on very soft diet, per daughter  - Daughter prefers pt to be on pureed diet while inpatient

## 2025-04-23 NOTE — ED ADULT NURSE NOTE - NSFALLRISKINTERV_ED_ALL_ED

## 2025-04-23 NOTE — ED PROVIDER NOTE - MDM ORDERS SUBMITTED SELECTION
The patient presents today as a 63-year-old male who recently underwent a colonoscopy for heme-positive stool.  Was found to have a sessile lesion in his transverse colon.  This was biopsied initially and showed dysplasia.  He then had subsequent resection by endoscopy which revealed infiltrating adenocarcinoma.  Now presents for resection.  Underwent a CT scan today for metastatic evaluation which was negative.  He does have multiple small nodules in his lungs bilaterally which appeared to be benign.  He is had no significant weight loss.  He is very active.    His past medical surgical history meds and allergies family history and social history were reviewed in the epic chart.      On physical exam he is alert no acute distress vital signs afebrile heart regular lungs clear abdomen soft.    Impression colorectal cancer.      Plan I discussed the findings with the patient in detail.  I told him at this point I would proceed with laparoscopic-assisted colon resection.  All risks indications and complications were discussed in detail.  He understands agrees to proceed  
Imaging Studies/Medications

## 2025-04-23 NOTE — PATIENT PROFILE ADULT - FALL HARM RISK - FACTORS NURSING JUDGEMENT
Recommended artificial tears to use: 1 drop 4x a day in both eyes as needed, Refresh Optive recommended brand. Yes

## 2025-04-23 NOTE — H&P ADULT - HISTORY OF PRESENT ILLNESS
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 It acutely worsened last night. Patient denies any complaints including headache, fever, chills, nausea, vomit, chest pain, shortness of breath, cough, lightheadedness, abdominal pain, diarrhea, constipation, dark/bloody stool, dysuria, hematuria.  Per daughter on the phone, patient slid off her bed last night, and was found by aid. No head strike. Patient has been having worsening L sided weakness and today, she has been so weak to the point where she could not walk.    Patient was admitted 4/1~4/3 for acute hypoxic respiratory failure likely in the setting of CHF and hypertensive emergency.

## 2025-04-23 NOTE — H&P ADULT - NSHPPHYSICALEXAM_GEN_ALL_CORE
GENERAL: NAD  HEAD: Atraumatic, Normocephalic  EYES: EOMI. Conjunctiva and sclera clear  NECK: Supple  CHEST/LUNG: Clear to auscultation bilaterally; No wheeze, rhonchi, rales  HEART: Regular rate and rhythm; No murmurs  ABDOMEN: Soft, Nontender, Nondistended; Bowel sounds present  EXTREMITIES: LUE with some contraction. No edema  NEURO: AAOx2

## 2025-04-23 NOTE — ED PROVIDER NOTE - PHYSICAL EXAMINATION
General: Appears well and nontoxic  Mentation: A&O x 3  psych: mood appropriate  HEENT: airway patent, conjunctivae clear bilaterally  Resp: symmetric chest rise, no resp distress, breath sounds CTA bilaterally  Cardio: RRR, no m/r/g  GI: soft/nondistended/nontender  : no CVA tenderness  Neuro: no facial asymmetry, left upper and lower weakness against gravity, 5/5 strength in right upper and lower extremity, no limb ataxia, extraocular movements intact, pupils equal reactive to light  Skin: no cyanosis, no jaundice  MSK: normal movement of all extremities

## 2025-04-23 NOTE — ED PROVIDER NOTE - CLINICAL SUMMARY MEDICAL DECISION MAKING FREE TEXT BOX
89-year-old female with a past medical history of hypertension, hyperlipidemia, diastolic CHF, CVA with residual left-sided weakness and dysphagia, cervical cancer s/p radiation, glaucoma presenting with left-sided weakness. History obtained by daughter at bedside. Daughter thought patient having worsening left-sided weakness for the last couple days but acutely worsened last night. Unclear last known well. Denies fevers, chest pain, difficulty breathing, nausea, vomiting, abdominal pain. Physical exam reveals well-appearing female, heart rate regular, clear to sound bilateral, soft nontender abdomen, no facial asymmetry, left upper and lower weakness against gravity, 5/5 strength in right upper and lower extremity, no limb ataxia, extraocular movements intact, pupils equal reactive to light. Possible recrudescence from old CVA. Will do a stroke evaluation.

## 2025-04-23 NOTE — PATIENT PROFILE ADULT - FUNCTIONAL ASSESSMENT - DAILY ACTIVITY SECTION LABEL
Advised as per provider, patient verbally understood, I stressed the importance of dental intervention and the higher chance of worsening without a dentist, patient stated that he did not want to wait for weeks to see the dentist to have them start pulling teeth out, he stated that if they pull one tooth his temporary tooth probably wont hold then he would have to have that pulled and then go for full dentures which medicaid does not cover, I advised patient to possibly look around and call to see for places that may offer payment plans or lesser cost, he stated that he did that already, and has not had a problem with his teeth in 20 years, informed patient that the medications were sent and stressed the importance of seeing the dentist, patient thanked us and will think about it if the medication does not work,
Antibiotic sent, please just advise him though it probably won't get better without dental intervention 
Called and left message with patient requesting a call back 
Called and spoke with patient  Denies any known antibiotic allergies; ok with Augmentin  Requesting Rx to be sent to [facility] in [place]  Advised patient of provider's recommendations in regards to contacting dentist  Patient reports since he has medicare all that dentist wants to do is "pull teeth"  Again stressed importance of contact a dentist; pt verbalized an understanding 
PDMP reviewed and medications refilled  Does he have any antibiotic allergies? I can send in Augmentin for him, but he should also see a dentist for definitve treatment 
Patient requesting refill(s) of:  Lamictal 100 mg tab    Last filled:  4/22/21  Last appt:   8/17/21  Next appt:   11/17/21  Pharm[facility]:  [facility]    Patient requesting refill(s) of:  Clonazepam 1 mg tab    Last filled:  7/26/21  Last appt:  8/17/21  Next appt:  11/17/21  Pharmacy:  [facility]         Patient is also requesting an antibiotic " for a few days for a dental issue" having toothache and discomfort 
.

## 2025-04-23 NOTE — ED ADULT NURSE NOTE - OBJECTIVE STATEMENT
89 year old female A/Ox4 BIBA c/o generalized weakness, daughter reports that pt had a fall today, daughter reports pt is unable to stand or sit by herself, patient last known well is unknown, daughter reports previous CVA in 2007 with left sided deficits, daughter reports she noted an increase in weakness in the left sided deficits

## 2025-04-23 NOTE — H&P ADULT - ASSESSMENT
Patient is a 89F, with PMHx of HTN,  Combined CHF(EF40-45%, G2DD 04/2025), CVA with residual left-sided weakness and dysphagia, cervical cancer status post radiation, and glaucoma, who comes in with worsening left-sided weakness for the last few days. Admitted for CVA r/o.

## 2025-04-23 NOTE — H&P ADULT - PROBLEM SELECTOR PLAN 3
- 69.1 -> 74.3  - Denies chest pain  - EKG = NSR. Possible LA enlargement. LAD. incomplete RBBB. Cannot rule out Anterior infarct (cited on or before 01-APR-2025)  - Admit to telemetry  - S/p ASA 324mg in ED  - Pt with hypertensive urgency today, and also had mildly elevated troponin in Nov 2024 with hypertensive urgency  - Trend trop - 69.1 -> 74.3 -> 97.1  - Denies chest pain  - EKG = NSR. Possible LA enlargement. LAD. incomplete RBBB. Cannot rule out Anterior infarct (cited on or before 01-APR-2025)  - Admit to telemetry  - S/p ASA 324mg in ED  - Pt with hypertensive urgency today, and also had mildly elevated troponin in Nov 2024 with hypertensive urgency  - Trend trop

## 2025-04-24 ENCOUNTER — APPOINTMENT (OUTPATIENT)
Dept: PULMONOLOGY | Facility: CLINIC | Age: 89
End: 2025-04-24

## 2025-04-24 LAB
A1C WITH ESTIMATED AVERAGE GLUCOSE RESULT: 6.2 % — HIGH (ref 4–5.6)
ALBUMIN SERPL ELPH-MCNC: 2.7 G/DL — LOW (ref 3.3–5)
ALP SERPL-CCNC: 97 U/L — SIGNIFICANT CHANGE UP (ref 40–120)
ALT FLD-CCNC: 32 U/L — SIGNIFICANT CHANGE UP (ref 12–78)
ANION GAP SERPL CALC-SCNC: 9 MMOL/L — SIGNIFICANT CHANGE UP (ref 5–17)
AST SERPL-CCNC: 33 U/L — SIGNIFICANT CHANGE UP (ref 15–37)
BILIRUB SERPL-MCNC: 0.6 MG/DL — SIGNIFICANT CHANGE UP (ref 0.2–1.2)
BUN SERPL-MCNC: 30 MG/DL — HIGH (ref 7–23)
CALCIUM SERPL-MCNC: 9.1 MG/DL — SIGNIFICANT CHANGE UP (ref 8.5–10.1)
CHLORIDE SERPL-SCNC: 111 MMOL/L — HIGH (ref 96–108)
CHOLEST SERPL-MCNC: 110 MG/DL — SIGNIFICANT CHANGE UP
CO2 SERPL-SCNC: 20 MMOL/L — LOW (ref 22–31)
CREAT SERPL-MCNC: 1.27 MG/DL — SIGNIFICANT CHANGE UP (ref 0.5–1.3)
EGFR: 39 ML/MIN/1.73M2 — LOW
EGFR: 39 ML/MIN/1.73M2 — LOW
ESTIMATED AVERAGE GLUCOSE: 131 MG/DL — HIGH (ref 68–114)
GLUCOSE SERPL-MCNC: 95 MG/DL — SIGNIFICANT CHANGE UP (ref 70–99)
HCT VFR BLD CALC: 43.7 % — SIGNIFICANT CHANGE UP (ref 34.5–45)
HDLC SERPL-MCNC: 48 MG/DL — LOW
HGB BLD-MCNC: 14.2 G/DL — SIGNIFICANT CHANGE UP (ref 11.5–15.5)
LDLC SERPL-MCNC: 48 MG/DL — SIGNIFICANT CHANGE UP
LIPID PNL WITH DIRECT LDL SERPL: 48 MG/DL — SIGNIFICANT CHANGE UP
MCHC RBC-ENTMCNC: 31.3 PG — SIGNIFICANT CHANGE UP (ref 27–34)
MCHC RBC-ENTMCNC: 32.5 G/DL — SIGNIFICANT CHANGE UP (ref 32–36)
MCV RBC AUTO: 96.5 FL — SIGNIFICANT CHANGE UP (ref 80–100)
NONHDLC SERPL-MCNC: 63 MG/DL — SIGNIFICANT CHANGE UP
NRBC BLD AUTO-RTO: 0 /100 WBCS — SIGNIFICANT CHANGE UP (ref 0–0)
PLATELET # BLD AUTO: 214 K/UL — SIGNIFICANT CHANGE UP (ref 150–400)
POTASSIUM SERPL-MCNC: 4.4 MMOL/L — SIGNIFICANT CHANGE UP (ref 3.5–5.3)
POTASSIUM SERPL-SCNC: 4.4 MMOL/L — SIGNIFICANT CHANGE UP (ref 3.5–5.3)
PROT SERPL-MCNC: 6.1 GM/DL — SIGNIFICANT CHANGE UP (ref 6–8.3)
RBC # BLD: 4.53 M/UL — SIGNIFICANT CHANGE UP (ref 3.8–5.2)
RBC # FLD: 14.2 % — SIGNIFICANT CHANGE UP (ref 10.3–14.5)
SODIUM SERPL-SCNC: 140 MMOL/L — SIGNIFICANT CHANGE UP (ref 135–145)
TRIGL SERPL-MCNC: 71 MG/DL — SIGNIFICANT CHANGE UP
TROPONIN I, HIGH SENSITIVITY RESULT: 95.3 NG/L — HIGH
TROPONIN I, HIGH SENSITIVITY RESULT: 97.1 NG/L — HIGH
WBC # BLD: 5.6 K/UL — SIGNIFICANT CHANGE UP (ref 3.8–10.5)
WBC # FLD AUTO: 5.6 K/UL — SIGNIFICANT CHANGE UP (ref 3.8–10.5)

## 2025-04-24 PROCEDURE — 99232 SBSQ HOSP IP/OBS MODERATE 35: CPT

## 2025-04-24 RX ORDER — LABETALOL HYDROCHLORIDE 200 MG/1
5 TABLET, FILM COATED ORAL ONCE
Refills: 0 | Status: DISCONTINUED | OUTPATIENT
Start: 2025-04-24 | End: 2025-04-29

## 2025-04-24 RX ADMIN — DORZOLAMIDE 1 DROP(S): 20 SOLUTION/ DROPS OPHTHALMIC at 05:10

## 2025-04-24 RX ADMIN — CLOPIDOGREL BISULFATE 75 MILLIGRAM(S): 75 TABLET, FILM COATED ORAL at 13:12

## 2025-04-24 RX ADMIN — Medication 81 MILLIGRAM(S): at 13:12

## 2025-04-24 RX ADMIN — ATORVASTATIN CALCIUM 10 MILLIGRAM(S): 80 TABLET, FILM COATED ORAL at 22:16

## 2025-04-24 RX ADMIN — FUROSEMIDE 20 MILLIGRAM(S): 10 INJECTION INTRAMUSCULAR; INTRAVENOUS at 05:09

## 2025-04-24 RX ADMIN — OXYBUTYNIN CHLORIDE 10 MILLIGRAM(S): 5 TABLET, FILM COATED, EXTENDED RELEASE ORAL at 05:10

## 2025-04-24 RX ADMIN — OXYBUTYNIN CHLORIDE 10 MILLIGRAM(S): 5 TABLET, FILM COATED, EXTENDED RELEASE ORAL at 17:31

## 2025-04-24 RX ADMIN — HEPARIN SODIUM 5000 UNIT(S): 1000 INJECTION INTRAVENOUS; SUBCUTANEOUS at 13:11

## 2025-04-24 RX ADMIN — HEPARIN SODIUM 5000 UNIT(S): 1000 INJECTION INTRAVENOUS; SUBCUTANEOUS at 17:30

## 2025-04-24 RX ADMIN — BRIMONIDINE TARTRATE 1 DROP(S): 1.5 SOLUTION/ DROPS OPHTHALMIC at 17:38

## 2025-04-24 RX ADMIN — DORZOLAMIDE 1 DROP(S): 20 SOLUTION/ DROPS OPHTHALMIC at 17:38

## 2025-04-24 RX ADMIN — BRIMONIDINE TARTRATE 1 DROP(S): 1.5 SOLUTION/ DROPS OPHTHALMIC at 05:10

## 2025-04-24 NOTE — PROGRESS NOTE ADULT - ASSESSMENT
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 Admitted for CVA r/o.    # Possible Acute on Chronic CVA   ·  Problem: Weakness of left side of body.   ·  Plan: - P/w worsening L sided weakness, and now ambulatory dysfunction due to overall weakness  - CT head = No intracranial hemorrhage or mass lesion. Intracranial atherosclerosis is extensive. There is superimposed increased density in the basilar artery that may reflect embolus  - CTA head&neck = Diffuse Extensive intracranial atherosclerosis.  No large vessel occlusion.  - CTA perfusion = No acute infarction of the brain is convincingly demonstrated.  - EKG = NSR. Possible LA enlargement. LAD. incomplete RBBB. Cannot rule out Anterior infarct (cited on or before 01-APR-2025)  - C/w home Plavix, ASA  - C/w home Lipitor 10mg  - Admit to telemetry  - Fall precautions  - Neuro check q4hrs  - Permissive hypertension for today  - F/u A1c and lipid panel  - F/u PT evaluation  - F/u MR Head  - Neuro Dr. De Anda consult appreciated.       Problem/Plan - 2:  ·  Problem: Ambulatory dysfunction.   ·  Plan: - As above.     Problem/Plan - 3:  ·  Problem: Elevated troponin.   ·  Plan: - 69.1 -> 74.3 -> 97.1  - Denies chest pain  - EKG = NSR. Possible LA enlargement. LAD. incomplete RBBB. Cannot rule out Anterior infarct (cited on or before 01-APR-2025)  - Admit to telemetry  - S/p ASA 324mg in ED  - Pt with hypertensive urgency today, and also had mildly elevated troponin in Nov 2024 with hypertensive urgency  - Trend trop.     Problem/Plan - 4:  ·  Problem: Chronic combined systolic and diastolic heart failure.   ·  Plan: - TTE 04/2025 = EF40-45%, G2DD  - Not in fluid overload  - Resume Lasix.     Problem/Plan - 5:  ·  Problem: Dysphagia.   ·  Plan: - Pt has chronic dysphagia and is on very soft diet, per daughter  - Daughter prefers pt to be on pureed diet while inpatient.     Problem/Plan - 6:  ·  Problem: HTN (hypertension).   ·  Plan: - Hold home Losartan, metoprolol, Lasix for today for permissive HTN (acute worsening of symptoms last night).     Problem/Plan - 7:  ·  Problem: Prophylactic measure.   ·  Plan: - Hep SC.      [facility] placement vs [facility] offered to daughter.  She wishes against [facility] placement.

## 2025-04-24 NOTE — CONSULT NOTE ADULT - SUBJECTIVE AND OBJECTIVE BOX
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 It acutely worsened last night. Patient denies any complaints including headache, fever, chills, nausea, vomit, chest pain, shortness of breath, cough, lightheadedness, abdominal pain, diarrhea, constipation, dark/bloody stool, dysuria, hematuria.  Per daughter on the phone, patient slid off her bed last night, and was found by aid. No head strike. Patient has been having worsening L sided weakness and today, she has been so weak to the point where she could not walk.    Patient was admitted 4/1~4/3 for acute hypoxic respiratory failure likely in the setting of CHF and hypertensive emergency. (23 Apr 2025 22:01)      no difficulty with language.  No vision loss or double vision.  No dizziness, vertigo or new hearing loss.  . No difficulty with swallowing.  No focal weakness.  No focal sensory changes.  No numbness or tingling in the bilateral lower extremities.  No difficulty with balance.  No difficulty with ambulation.    REVIEW OF SYSTEMS:    Constitutional: No fever, chills, fatigue, weakness  Eyes: no eye pain, visual disturbances, or discharge  ENT:  No difficulty hearing, tinnitus, vertigo; No sinus or throat pain  Neck: No pain or stiffness  Respiratory: No cough, dyspnea, wheezing   Cardiovascular: No chest pain, palpitations,   Gastrointestinal: No abdominal or epigastric pain. No nausea, vomiting  No diarrhea or constipation.   Genitourinary: No dysuria, frequency, hematuria or incontinence  Neurological: No headaches, lightheadedness, vertigo, numbness or tremors  Psychiatric: No depression, anxiety, mood swings or difficulty sleeping  Musculoskeletal: No joint pain or swelling; No muscle, back or extremity pain  Skin: No itching, burning, rashes or lesions   Lymph Nodes: No enlarged glands  Endocrine: No heat or cold intolerance; No hair loss, No h/o diabetes or thyroid dysfunction  Allergy and Immunologic: No hives or eczema    MEDICATIONS    acetaminophen     Tablet .. 650 milliGRAM(s) Oral every 6 hours PRN  aluminum hydroxide/magnesium hydroxide/simethicone Suspension 30 milliLiter(s) Oral every 4 hours PRN  aspirin  chewable 81 milliGRAM(s) Oral daily  atorvastatin 10 milliGRAM(s) Oral at bedtime  brimonidine 0.2% Ophthalmic Solution 1 Drop(s) Both EYES two times a day  clopidogrel Tablet 75 milliGRAM(s) Oral daily  dorzolamide 2% Ophthalmic Solution 1 Drop(s) Both EYES <User Schedule>  furosemide    Tablet 20 milliGRAM(s) Oral daily  heparin   Injectable 5000 Unit(s) SubCutaneous every 12 hours  labetalol Injectable 5 milliGRAM(s) IV Push once PRN  melatonin 3 milliGRAM(s) Oral at bedtime PRN  oxybutynin 10 milliGRAM(s) Oral two times a day      PMH: Hypertension    Cervical cancer    Abdominal mass    Depression (emotion)    GI (gastrointestinal bleed)    Hemorrhoid    CVA (cerebral infarction)    Depression    Diverticulitis    CHF (congestive heart failure)    History of cervical cancer    History of cerebrovascular accident (CVA) with residual deficit    Chronic heart failure with preserved ejection fraction (HFpEF)    Urinary and bowel incontinence         PSH: S/P abdominal surgery, follow-up exam    S/P lumpectomy of breast        Family history: No history of dementia, strokes, or seizures   FAMILY HISTORY:      SOCIAL HISTORY:  No history of tobacco or alcohol use     Allergies    penicillin (Hives)    Intolerances        Height (cm): 152.4 (04-23 @ 15:04)  Weight (kg): 53.1 (04-23 @ 15:04)  BMI (kg/m2): 22.9 (04-23 @ 15:04)    Vital Signs Last 24 Hrs  T(C): 36.9 (24 Apr 2025 04:39), Max: 36.9 (24 Apr 2025 04:39)  T(F): 98.4 (24 Apr 2025 04:39), Max: 98.4 (24 Apr 2025 04:39)  HR: 88 (24 Apr 2025 04:39) (73 - 104)  BP: 175/73 (24 Apr 2025 04:39) (148/76 - 201/66)  BP(mean): --  RR: 18 (24 Apr 2025 04:39) (16 - 18)  SpO2: 97% (24 Apr 2025 04:39) (95% - 97%)    Parameters below as of 23 Apr 2025 21:00  Patient On (Oxygen Delivery Method): room air          On Neurological Examination:    Neuro: AAOx2; knows age, sys its May, obeys commands, no dysarthria; calculations intact, fluent names, repeats     CN: PERRL, EOMI, VFF normal gaze preference, no facial palsy,     Motor: RUE no drift RLE drift, LUE some effort contratced LLE some effort    Sensory: Intact to LT and PP no extinction    Coordination: FTN intact b/l                                             GENERAL Exam:     Nontoxic , No Acute Distress   	  HEENT:  normocephalic, atraumatic  		  LUNGS:	Clear bilaterally  No Wheeze    	  HEART:	 Normal S1S2   No murmur RRR        	  GI/ ABDOMEN:  Soft  Non tender    EXTREMITIES:   No Edema  No Clubbing  No Cyanosis No Edema    MUSCULOSKELETAL: Normal Range of Motion  	   SKIN:      Normal   No Ecchymosis               LABS:  CBC Full  -  ( 24 Apr 2025 07:50 )  WBC Count : 5.60 K/uL  RBC Count : 4.53 M/uL  Hemoglobin : 14.2 g/dL  Hematocrit : 43.7 %  Platelet Count - Automated : 214 K/uL  Mean Cell Volume : 96.5 fl  Mean Cell Hemoglobin : 31.3 pg  Mean Cell Hemoglobin Concentration : 32.5 g/dL  Auto Neutrophil # : x  Auto Lymphocyte # : x  Auto Monocyte # : x  Auto Eosinophil # : x  Auto Basophil # : x  Auto Neutrophil % : x  Auto Lymphocyte % : x  Auto Monocyte % : x  Auto Eosinophil % : x  Auto Basophil % : x    Urinalysis Basic - ( 24 Apr 2025 07:50 )    Color: x / Appearance: x / SG: x / pH: x  Gluc: 95 mg/dL / Ketone: x  / Bili: x / Urobili: x   Blood: x / Protein: x / Nitrite: x   Leuk Esterase: x / RBC: x / WBC x   Sq Epi: x / Non Sq Epi: x / Bacteria: x      04-24    140  |  111[H]  |  30[H]  ----------------------------<  95  4.4   |  20[L]  |  1.27    Ca    9.1      24 Apr 2025 07:50    TPro  6.1  /  Alb  2.7[L]  /  TBili  0.6  /  DBili  x   /  AST  33  /  ALT  32  /  AlkPhos  97  04-24    LIVER FUNCTIONS - ( 24 Apr 2025 07:50 )  Alb: 2.7 g/dL / Pro: 6.1 gm/dL / ALK PHOS: 97 U/L / ALT: 32 U/L / AST: 33 U/L / GGT: x           Hemoglobin A1C:       PT/INR - ( 23 Apr 2025 15:33 )   PT: 10.9 sec;   INR: 0.94 ratio         PTT - ( 23 Apr 2025 15:33 )  PTT:33.9 sec      RADIOLOGY  < from: CT Angio Neck Stroke Protocol w/ IV Cont (04.23.25 @ 15:28) >  IMPRESSION:    1.   RIGHT CAROTID SYSTEM:    Atherosclerotic plaque carotid bulb without    hemodynamically significant stenosis. Internal carotid tortuosity and   fusiform dilatation, likely atherosclerotic    2.   LEFT CAROTID SYSTEM:     Atherosclerotic plaque carotid bulb without    hemodynamically significant stenosis.    3.   VERTEBRAL CIRCULATION:    Patent.    4.  ANTERIOR INTRACRANIAL CIRCULATION:     Extensive intracranial   atherosclerosis internal carotid arteries, at least moderate,and   anterior cerebral and middle cerebral arteries, moderate to severe.    5.  POSTERIOR INTRACRANIAL CIRCULATION:    Extensive intracranial   atherosclerosis vertebral arteries, at least moderate, basilar artery,   mild to moderate, and posterior cerebral arteries, severe on the right   and moderate on the left.    6.  No large vessel occlusion.    7.  BRAIN PERFUSION:   No acute infarction of the brain is convincingly   demonstrated.    --- End of Report ---    < end of copied text >

## 2025-04-24 NOTE — CONSULT NOTE ADULT - ASSESSMENT
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 O/E left sided weakness NIHSS 5  CTH no acute finding  CTA: extensive intracranial athero as above      Impression: left sided weakness, chronic CVA    Mr Brain  Aspirin for secondary stroke prevention at this time.   Plavix 75 mg for now, pending MRI will determine length of time  Atorvastatin, titrate the dose according to LDL.   TTEy and telemetry   DVT prophylaxis, Neurochecks   gradual normotension- avoid hypotension  HbA1C and LDL.   PT/OT/Speech and swallow/safety eval.

## 2025-04-25 ENCOUNTER — TRANSCRIPTION ENCOUNTER (OUTPATIENT)
Age: 89
End: 2025-04-25

## 2025-04-25 LAB
A1C WITH ESTIMATED AVERAGE GLUCOSE RESULT: 6.3 % — HIGH (ref 4–5.6)
ESTIMATED AVERAGE GLUCOSE: 134 MG/DL — HIGH (ref 68–114)

## 2025-04-25 PROCEDURE — 99232 SBSQ HOSP IP/OBS MODERATE 35: CPT

## 2025-04-25 PROCEDURE — 70551 MRI BRAIN STEM W/O DYE: CPT | Mod: 26

## 2025-04-25 RX ADMIN — OXYBUTYNIN CHLORIDE 10 MILLIGRAM(S): 5 TABLET, FILM COATED, EXTENDED RELEASE ORAL at 05:58

## 2025-04-25 RX ADMIN — Medication 81 MILLIGRAM(S): at 11:18

## 2025-04-25 RX ADMIN — FUROSEMIDE 20 MILLIGRAM(S): 10 INJECTION INTRAMUSCULAR; INTRAVENOUS at 05:58

## 2025-04-25 RX ADMIN — HEPARIN SODIUM 5000 UNIT(S): 1000 INJECTION INTRAVENOUS; SUBCUTANEOUS at 05:59

## 2025-04-25 RX ADMIN — OXYBUTYNIN CHLORIDE 10 MILLIGRAM(S): 5 TABLET, FILM COATED, EXTENDED RELEASE ORAL at 17:30

## 2025-04-25 RX ADMIN — DORZOLAMIDE 1 DROP(S): 20 SOLUTION/ DROPS OPHTHALMIC at 05:58

## 2025-04-25 RX ADMIN — DORZOLAMIDE 1 DROP(S): 20 SOLUTION/ DROPS OPHTHALMIC at 17:30

## 2025-04-25 RX ADMIN — CLOPIDOGREL BISULFATE 75 MILLIGRAM(S): 75 TABLET, FILM COATED ORAL at 11:18

## 2025-04-25 RX ADMIN — BRIMONIDINE TARTRATE 1 DROP(S): 1.5 SOLUTION/ DROPS OPHTHALMIC at 05:58

## 2025-04-25 RX ADMIN — HEPARIN SODIUM 5000 UNIT(S): 1000 INJECTION INTRAVENOUS; SUBCUTANEOUS at 17:29

## 2025-04-25 RX ADMIN — BRIMONIDINE TARTRATE 1 DROP(S): 1.5 SOLUTION/ DROPS OPHTHALMIC at 17:30

## 2025-04-25 RX ADMIN — ATORVASTATIN CALCIUM 10 MILLIGRAM(S): 80 TABLET, FILM COATED ORAL at 21:10

## 2025-04-25 NOTE — PHYSICAL THERAPY INITIAL EVALUATION ADULT - TRANSFER SAFETY CONCERNS NOTED: SIT/STAND, REHAB EVAL
flexed posture and buckled 3x and assist needed to regain balance/decreased balance during turns/losing balance/decreased sequencing ability/decreased step length/decreased weight-shifting ability

## 2025-04-25 NOTE — PROGRESS NOTE ADULT - ASSESSMENT
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 Admitted for CVA r/o.    # Possible Acute on Chronic CVA   ·  Problem: Weakness of left side of body.   ·  Plan: - P/w worsening L sided weakness, and now ambulatory dysfunction due to overall weakness  - CT head = No intracranial hemorrhage or mass lesion. Intracranial atherosclerosis is extensive. There is superimposed increased density in the basilar artery that may reflect embolus  - CTA head&neck = Diffuse Extensive intracranial atherosclerosis.  No large vessel occlusion.  - CTA perfusion = No acute infarction of the brain is convincingly demonstrated.  - C/w home Plavix, ASA, Lipitor  - MRI Head positive for acute CVA        Problem/Plan - 3:  ·  Problem: Elevated troponin.   ·  Plan: - 69.1 -> 74.3 -> 97.1  - not uptrending, not consistent with ACS     Problem/Plan - 4:  ·  Problem: Chronic combined systolic and diastolic heart failure.   ·  Plan: - TTE 04/2025 = EF40-45%, G2DD  - Not in fluid overload  - Resume Lasix.     Problem/Plan - 5:  ·  Problem: Dysphagia.   ·  Plan: - Pt has chronic dysphagia and is on very soft diet, per daughter  - Daughter prefers pt to be on pureed diet while inpatient.     Problem/Plan - 6:  ·  Problem: HTN (hypertension).   ·  Plan: - BP stable, titrate meds as needed.       Problem/Plan - 7:  ·  Problem: Prophylactic measure.   ·  Plan: - Hep SC.      D/w pt's daughter Pretty Sanchez present via phone.    PT evaluation reviewed with daughters - pt was only able to ambulate 3 steps with PT, baseline far better.  They are agreeable to YANN placement.  D/w CM

## 2025-04-25 NOTE — PHYSICAL THERAPY INITIAL EVALUATION ADULT - GENERAL OBSERVATIONS, REHAB EVAL
Pt found semisupine in bed, A&Ox2(self & place), cooperative and follows one step commands. [place]. L UE hypertonicity noted(hx of CVA) but able to extend elbow to 10 degrees. Verbal and tactile cues given and manual assist needed to guide L UE on RW handles in standing. Pt has specialized eye glasses at bedside. Pt found semisupine in bed +telemetry + primafit, A&Ox3 (self, place, year only), cooperative and follows one step commands. [place]. L UE hypertonicity noted(hx of CVA) but able to extend elbow to 10 degrees. Verbal and tactile cues given and manual assist needed to guide L UE on RW handles in standing. Pt has specialized eye glasses at bedside.

## 2025-04-25 NOTE — PHYSICAL THERAPY INITIAL EVALUATION ADULT - ADDITIONAL COMMENTS
Per previous PT eval ( 4/2/2025). Pt resides in a private home with her daughter, 5 steps to enterc b/l far part HRs, Bedroom is on 2nd fl accessible by chair lift.  +HHA 8hr x 5 days/week. Pt uses diapers 24hr/day 2* bladder/bowel incontinence. Daughter states that Patient has a walker, rollator, cane, wheelchair, and shower seat.  Able to walk 25-30ft with rolling walker and assist from HHA/family. Needs 2 person assist on stairs  using handrail.

## 2025-04-25 NOTE — DISCHARGE NOTE NURSING/CASE MANAGEMENT/SOCIAL WORK - PATIENT PORTAL LINK FT
You can access the FollowMyHealth Patient Portal offered by [facility] by registering at the following website: [email or web address]. By joining [facility]’s FollowMyHealth portal, you will also be able to view your health information using other applications (apps) compatible with our system.

## 2025-04-25 NOTE — PHYSICAL THERAPY INITIAL EVALUATION ADULT - PERTINENT HX OF CURRENT PROBLEM, REHAB EVAL
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  Admitted for CVA r/o.

## 2025-04-25 NOTE — PHYSICAL THERAPY INITIAL EVALUATION ADULT - BALANCE TRAINING, PT EVAL
CGAx1-Min Ax1 sitting, transfers, standing and ambulation with good balance using appropriate assistive device and prevent falls.

## 2025-04-25 NOTE — PROGRESS NOTE ADULT - ASSESSMENT
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 O/E left sided weakness NIHSS 5  CTH no acute finding  CTA: extensive intracranial athero as above      Impression: left sided weakness, chronic CVA    Mr Brain  Aspirin for secondary stroke prevention at this time.   Plavix 75 mg for now, pending MRI will determine length of time  Atorvastatin, titrate the dose according to LDL.   TTE and telemetry   DVT prophylaxis, Neurochecks   gradual normotension- avoid hypotension  HbA1C and LDL.   PT/OT/Speech and swallow/safety eval.

## 2025-04-25 NOTE — PHYSICAL THERAPY INITIAL EVALUATION ADULT - RANGE OF MOTION EXAMINATION, REHAB EVAL
RENE mejia hypertonicity/bilateral upper extremity ROM was WFL (within functional limits)/bilateral lower extremity ROM was WFL (within functional limits)

## 2025-04-25 NOTE — DISCHARGE NOTE NURSING/CASE MANAGEMENT/SOCIAL WORK - FINANCIAL ASSISTANCE
[facility] provides services at a reduced cost to those who are determined to be eligible through [facility]’s financial assistance program. Information regarding [facility]’s financial assistance program can be found by going to [email or web address] or by calling [phone].

## 2025-04-25 NOTE — PHYSICAL THERAPY INITIAL EVALUATION ADULT - GAIT DISTANCE, PT EVAL
3 steps sideways towards HOB, manual assist needed to weight shift. Unable to ambulate further due to buckling and fatigue/weakness.

## 2025-04-26 LAB
ANION GAP SERPL CALC-SCNC: 8 MMOL/L — SIGNIFICANT CHANGE UP (ref 5–17)
BUN SERPL-MCNC: 35 MG/DL — HIGH (ref 7–23)
CALCIUM SERPL-MCNC: 8.8 MG/DL — SIGNIFICANT CHANGE UP (ref 8.5–10.1)
CHLORIDE SERPL-SCNC: 111 MMOL/L — HIGH (ref 96–108)
CO2 SERPL-SCNC: 24 MMOL/L — SIGNIFICANT CHANGE UP (ref 22–31)
CREAT SERPL-MCNC: 1.41 MG/DL — HIGH (ref 0.5–1.3)
EGFR: 34 ML/MIN/1.73M2 — LOW
EGFR: 34 ML/MIN/1.73M2 — LOW
GLUCOSE SERPL-MCNC: 96 MG/DL — SIGNIFICANT CHANGE UP (ref 70–99)
HCT VFR BLD CALC: 41.3 % — SIGNIFICANT CHANGE UP (ref 34.5–45)
HGB BLD-MCNC: 13.2 G/DL — SIGNIFICANT CHANGE UP (ref 11.5–15.5)
MCHC RBC-ENTMCNC: 31.2 PG — SIGNIFICANT CHANGE UP (ref 27–34)
MCHC RBC-ENTMCNC: 32 G/DL — SIGNIFICANT CHANGE UP (ref 32–36)
MCV RBC AUTO: 97.6 FL — SIGNIFICANT CHANGE UP (ref 80–100)
NRBC BLD AUTO-RTO: 0 /100 WBCS — SIGNIFICANT CHANGE UP (ref 0–0)
PLATELET # BLD AUTO: 159 K/UL — SIGNIFICANT CHANGE UP (ref 150–400)
POTASSIUM SERPL-MCNC: 4.5 MMOL/L — SIGNIFICANT CHANGE UP (ref 3.5–5.3)
POTASSIUM SERPL-SCNC: 4.5 MMOL/L — SIGNIFICANT CHANGE UP (ref 3.5–5.3)
RBC # BLD: 4.23 M/UL — SIGNIFICANT CHANGE UP (ref 3.8–5.2)
RBC # FLD: 14.5 % — SIGNIFICANT CHANGE UP (ref 10.3–14.5)
SODIUM SERPL-SCNC: 143 MMOL/L — SIGNIFICANT CHANGE UP (ref 135–145)
WBC # BLD: 5.74 K/UL — SIGNIFICANT CHANGE UP (ref 3.8–10.5)
WBC # FLD AUTO: 5.74 K/UL — SIGNIFICANT CHANGE UP (ref 3.8–10.5)

## 2025-04-26 PROCEDURE — 99232 SBSQ HOSP IP/OBS MODERATE 35: CPT

## 2025-04-26 PROCEDURE — 99497 ADVNCD CARE PLAN 30 MIN: CPT

## 2025-04-26 RX ADMIN — DORZOLAMIDE 1 DROP(S): 20 SOLUTION/ DROPS OPHTHALMIC at 05:15

## 2025-04-26 RX ADMIN — HEPARIN SODIUM 5000 UNIT(S): 1000 INJECTION INTRAVENOUS; SUBCUTANEOUS at 05:15

## 2025-04-26 RX ADMIN — DORZOLAMIDE 1 DROP(S): 20 SOLUTION/ DROPS OPHTHALMIC at 17:34

## 2025-04-26 RX ADMIN — ATORVASTATIN CALCIUM 10 MILLIGRAM(S): 80 TABLET, FILM COATED ORAL at 21:14

## 2025-04-26 RX ADMIN — OXYBUTYNIN CHLORIDE 10 MILLIGRAM(S): 5 TABLET, FILM COATED, EXTENDED RELEASE ORAL at 17:34

## 2025-04-26 RX ADMIN — CLOPIDOGREL BISULFATE 75 MILLIGRAM(S): 75 TABLET, FILM COATED ORAL at 11:25

## 2025-04-26 RX ADMIN — BRIMONIDINE TARTRATE 1 DROP(S): 1.5 SOLUTION/ DROPS OPHTHALMIC at 05:16

## 2025-04-26 RX ADMIN — OXYBUTYNIN CHLORIDE 10 MILLIGRAM(S): 5 TABLET, FILM COATED, EXTENDED RELEASE ORAL at 05:15

## 2025-04-26 RX ADMIN — BRIMONIDINE TARTRATE 1 DROP(S): 1.5 SOLUTION/ DROPS OPHTHALMIC at 17:34

## 2025-04-26 RX ADMIN — HEPARIN SODIUM 5000 UNIT(S): 1000 INJECTION INTRAVENOUS; SUBCUTANEOUS at 17:34

## 2025-04-26 RX ADMIN — Medication 81 MILLIGRAM(S): at 11:22

## 2025-04-26 RX ADMIN — FUROSEMIDE 20 MILLIGRAM(S): 10 INJECTION INTRAMUSCULAR; INTRAVENOUS at 05:15

## 2025-04-26 NOTE — PROGRESS NOTE ADULT - ASSESSMENT
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 Admitted for CVA r/o.    < from: MR Head No Cont (04.25.25 @ 15:16) >  Acute infarct noted in the right anterior cerebral artery territory  measuring 1.5 x 0.7 cm. Adjacent smaller acute infarcts also noted.  < end of copied text >      # Possible Acute on Chronic CVA   ·  Problem: Weakness of left side of body.   ·  Plan: - P/w worsening L sided weakness, and now ambulatory dysfunction due to overall weakness  - CT head = No intracranial hemorrhage or mass lesion. Intracranial atherosclerosis is extensive. There is superimposed increased density in the basilar artery that may reflect embolus  - CTA head&neck = Diffuse Extensive intracranial atherosclerosis.  No large vessel occlusion.  - CTA perfusion = No acute infarction of the brain is convincingly demonstrated.  - C/w home Plavix, ASA, Lipitor  - MRI Head positive for acute CVA R KASSIE territory with adjacent smaller infarcts.  ? embolic, however, both daughters oppose any further evaluation ie BRIGETTE nor anticoagulation, acknowledging the risks, benefits and alternatives.  I d/w both daughters 4/26.         Problem/Plan - 3:  ·  Problem: Elevated troponin.   ·  Plan: - 69.1 -> 74.3 -> 97.1  - not uptrending, not consistent with ACS     Problem/Plan - 4:  ·  Problem: Chronic combined systolic and diastolic heart failure.   ·  Plan: - TTE 04/2025 = EF40-45%, G2DD  - Not in fluid overload  - Resume Lasix.     Problem/Plan - 5:  ·  Problem: Dysphagia.   ·  Plan: - Pt has chronic dysphagia and is on very soft diet, per daughter  - Daughter prefers pt to be on pureed diet while inpatient.     Problem/Plan - 6:  ·  Problem: HTN (hypertension).   ·  Plan: - BP stable, titrate meds as needed.       Problem/Plan - 7:  ·  Problem: Prophylactic measure.   ·  Plan: - Hep SC.      D/w pt's daughter Daniel, and Umu present via phone.    PT evaluation reviewed with daughters - pt was only able to ambulate 3 steps with PT, baseline far better.  They are agreeable to YANN placement.  Plan YANN AM 4/27.

## 2025-04-26 NOTE — PROGRESS NOTE ADULT - CONVERSATION DETAILS
Goals of care - I had a lengthy conversation with pt, daughters mUu and Daniel.    We discussed the comorbid conditions, hospital care, and prognosis.  We also discussed advanced directives - made aware of limited prognosis if patient were to be intubated or resuscitated, in consideration of age and comorbid conditions.  Previous MOLST discussed.    They wished to rescind any prior DNR / DNI orders, stating they wished for full cardiopulmonary resuscitation if needed.  Therefore, pt is "full code".      Total time spent on patient care discussion = 20 minutes.

## 2025-04-27 PROCEDURE — 99232 SBSQ HOSP IP/OBS MODERATE 35: CPT

## 2025-04-27 RX ADMIN — OXYBUTYNIN CHLORIDE 10 MILLIGRAM(S): 5 TABLET, FILM COATED, EXTENDED RELEASE ORAL at 05:49

## 2025-04-27 RX ADMIN — DORZOLAMIDE 1 DROP(S): 20 SOLUTION/ DROPS OPHTHALMIC at 18:15

## 2025-04-27 RX ADMIN — HEPARIN SODIUM 5000 UNIT(S): 1000 INJECTION INTRAVENOUS; SUBCUTANEOUS at 18:16

## 2025-04-27 RX ADMIN — ATORVASTATIN CALCIUM 10 MILLIGRAM(S): 80 TABLET, FILM COATED ORAL at 21:12

## 2025-04-27 RX ADMIN — BRIMONIDINE TARTRATE 1 DROP(S): 1.5 SOLUTION/ DROPS OPHTHALMIC at 18:15

## 2025-04-27 RX ADMIN — HEPARIN SODIUM 5000 UNIT(S): 1000 INJECTION INTRAVENOUS; SUBCUTANEOUS at 05:48

## 2025-04-27 RX ADMIN — CLOPIDOGREL BISULFATE 75 MILLIGRAM(S): 75 TABLET, FILM COATED ORAL at 11:23

## 2025-04-27 RX ADMIN — DORZOLAMIDE 1 DROP(S): 20 SOLUTION/ DROPS OPHTHALMIC at 05:49

## 2025-04-27 RX ADMIN — OXYBUTYNIN CHLORIDE 10 MILLIGRAM(S): 5 TABLET, FILM COATED, EXTENDED RELEASE ORAL at 18:17

## 2025-04-27 RX ADMIN — Medication 81 MILLIGRAM(S): at 11:23

## 2025-04-27 RX ADMIN — FUROSEMIDE 20 MILLIGRAM(S): 10 INJECTION INTRAMUSCULAR; INTRAVENOUS at 05:49

## 2025-04-27 RX ADMIN — BRIMONIDINE TARTRATE 1 DROP(S): 1.5 SOLUTION/ DROPS OPHTHALMIC at 05:49

## 2025-04-27 NOTE — PROGRESS NOTE ADULT - ASSESSMENT
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 Admitted for CVA r/o.    < from: MR Head No Cont (04.25.25 @ 15:16) >  Acute infarct noted in the right anterior cerebral artery territory  measuring 1.5 x 0.7 cm. Adjacent smaller acute infarcts also noted.  < end of copied text >      # Acute on Chronic CVA   ·  Problem: Weakness of left side of body.   ·  Plan: - P/w worsening L sided weakness, and now ambulatory dysfunction due to overall weakness  - CT head = No intracranial hemorrhage or mass lesion. Intracranial atherosclerosis is extensive. There is superimposed increased density in the basilar artery that may reflect embolus  - CTA head&neck = Diffuse Extensive intracranial atherosclerosis.  No large vessel occlusion.  - CTA perfusion = No acute infarction of the brain is convincingly demonstrated.  - C/w home ASA, Lipitor.  On Plavix  - MRI Head positive for acute CVA R KASSIE territory with adjacent smaller infarcts.  ? embolic, however, both daughters oppose any further evaluation ie BRIGETTE nor anticoagulation, acknowledging the risks, benefits and alternatives.  I d/w both daughters 4/26.         Problem/Plan - 3:  ·  Problem: Elevated troponin.   ·  Plan: - 69.1 -> 74.3 -> 97.1  - not uptrending, not consistent with ACS     Problem/Plan - 4:  ·  Problem: Chronic combined systolic and diastolic heart failure.   ·  Plan: - TTE 04/2025 = EF40-45%, G2DD  - Not in fluid overload  - Resume Lasix.     Problem/Plan - 5:  ·  Problem: Dysphagia.   ·  Plan: - Pt has chronic dysphagia and is on very soft diet, per daughter  - Daughter prefers pt to be on pureed diet while inpatient.     Problem/Plan - 6:  ·  Problem: HTN (hypertension).   ·  Plan: - BP stable, titrate meds as needed.       Problem/Plan - 7:  ·  Problem: Prophylactic measure.   ·  Plan: - Hep SC.      D/w pt's daughter Daniel, and Umu present via phone.    PT evaluation reviewed with daughters - pt was only able to ambulate 3 steps with PT, baseline far better.  They are agreeable to YANN placement.  Awaiting bed at [facility].

## 2025-04-28 LAB
ANION GAP SERPL CALC-SCNC: 8 MMOL/L — SIGNIFICANT CHANGE UP (ref 5–17)
BUN SERPL-MCNC: 41 MG/DL — HIGH (ref 7–23)
CALCIUM SERPL-MCNC: 8.9 MG/DL — SIGNIFICANT CHANGE UP (ref 8.5–10.1)
CHLORIDE SERPL-SCNC: 113 MMOL/L — HIGH (ref 96–108)
CO2 SERPL-SCNC: 25 MMOL/L — SIGNIFICANT CHANGE UP (ref 22–31)
CREAT SERPL-MCNC: 1.32 MG/DL — HIGH (ref 0.5–1.3)
EGFR: 37 ML/MIN/1.73M2 — LOW
EGFR: 37 ML/MIN/1.73M2 — LOW
GLUCOSE SERPL-MCNC: 146 MG/DL — HIGH (ref 70–99)
HCT VFR BLD CALC: 46 % — HIGH (ref 34.5–45)
HGB BLD-MCNC: 14.5 G/DL — SIGNIFICANT CHANGE UP (ref 11.5–15.5)
MCHC RBC-ENTMCNC: 31.1 PG — SIGNIFICANT CHANGE UP (ref 27–34)
MCHC RBC-ENTMCNC: 31.5 G/DL — LOW (ref 32–36)
MCV RBC AUTO: 98.7 FL — SIGNIFICANT CHANGE UP (ref 80–100)
NRBC BLD AUTO-RTO: 0 /100 WBCS — SIGNIFICANT CHANGE UP (ref 0–0)
PLATELET # BLD AUTO: 221 K/UL — SIGNIFICANT CHANGE UP (ref 150–400)
POTASSIUM SERPL-MCNC: 4 MMOL/L — SIGNIFICANT CHANGE UP (ref 3.5–5.3)
POTASSIUM SERPL-SCNC: 4 MMOL/L — SIGNIFICANT CHANGE UP (ref 3.5–5.3)
RBC # BLD: 4.66 M/UL — SIGNIFICANT CHANGE UP (ref 3.8–5.2)
RBC # FLD: 14.5 % — SIGNIFICANT CHANGE UP (ref 10.3–14.5)
SODIUM SERPL-SCNC: 146 MMOL/L — HIGH (ref 135–145)
WBC # BLD: 8.06 K/UL — SIGNIFICANT CHANGE UP (ref 3.8–10.5)
WBC # FLD AUTO: 8.06 K/UL — SIGNIFICANT CHANGE UP (ref 3.8–10.5)

## 2025-04-28 PROCEDURE — 99232 SBSQ HOSP IP/OBS MODERATE 35: CPT

## 2025-04-28 RX ORDER — ATORVASTATIN CALCIUM 80 MG/1
40 TABLET, FILM COATED ORAL AT BEDTIME
Refills: 0 | Status: DISCONTINUED | OUTPATIENT
Start: 2025-04-28 | End: 2025-04-29

## 2025-04-28 RX ADMIN — HEPARIN SODIUM 5000 UNIT(S): 1000 INJECTION INTRAVENOUS; SUBCUTANEOUS at 05:30

## 2025-04-28 RX ADMIN — DORZOLAMIDE 1 DROP(S): 20 SOLUTION/ DROPS OPHTHALMIC at 17:18

## 2025-04-28 RX ADMIN — HEPARIN SODIUM 5000 UNIT(S): 1000 INJECTION INTRAVENOUS; SUBCUTANEOUS at 17:17

## 2025-04-28 RX ADMIN — ATORVASTATIN CALCIUM 40 MILLIGRAM(S): 80 TABLET, FILM COATED ORAL at 21:40

## 2025-04-28 RX ADMIN — BRIMONIDINE TARTRATE 1 DROP(S): 1.5 SOLUTION/ DROPS OPHTHALMIC at 17:18

## 2025-04-28 RX ADMIN — DORZOLAMIDE 1 DROP(S): 20 SOLUTION/ DROPS OPHTHALMIC at 05:34

## 2025-04-28 RX ADMIN — CLOPIDOGREL BISULFATE 75 MILLIGRAM(S): 75 TABLET, FILM COATED ORAL at 11:13

## 2025-04-28 RX ADMIN — BRIMONIDINE TARTRATE 1 DROP(S): 1.5 SOLUTION/ DROPS OPHTHALMIC at 05:34

## 2025-04-28 RX ADMIN — Medication 81 MILLIGRAM(S): at 11:12

## 2025-04-28 RX ADMIN — Medication 75 MILLILITER(S): at 17:49

## 2025-04-28 RX ADMIN — FUROSEMIDE 20 MILLIGRAM(S): 10 INJECTION INTRAMUSCULAR; INTRAVENOUS at 05:30

## 2025-04-28 RX ADMIN — OXYBUTYNIN CHLORIDE 10 MILLIGRAM(S): 5 TABLET, FILM COATED, EXTENDED RELEASE ORAL at 05:30

## 2025-04-28 RX ADMIN — OXYBUTYNIN CHLORIDE 10 MILLIGRAM(S): 5 TABLET, FILM COATED, EXTENDED RELEASE ORAL at 17:17

## 2025-04-28 NOTE — PROGRESS NOTE ADULT - ASSESSMENT
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 Admitted for CVA r/o.    < from: MR Head No Cont (04.25.25 @ 15:16) >  Acute infarct noted in the right anterior cerebral artery territory  measuring 1.5 x 0.7 cm. Adjacent smaller acute infarcts also noted.  < end of copied text >    # Diarrhea - ? gastroenteritis.  No pain.  WBC wnl.  Observe off abx.  IVF - NS as pt with recent stroke.      # Acute on Chronic CVA   ·  Problem: Weakness of left side of body.   ·  Plan: - P/w worsening L sided weakness, and now ambulatory dysfunction due to overall weakness  - CT head = No intracranial hemorrhage or mass lesion. Intracranial atherosclerosis is extensive. There is superimposed increased density in the basilar artery that may reflect embolus  - CTA head&neck = Diffuse Extensive intracranial atherosclerosis.  No large vessel occlusion.  - CTA perfusion = No acute infarction of the brain is convincingly demonstrated.  - C/w home ASA, Lipitor.  On Plavix  - MRI Head positive for acute CVA R KASSIE territory with adjacent smaller infarcts.  ? embolic, however, both daughters oppose any further evaluation ie BRIGETTE nor anticoagulation, acknowledging the risks, benefits and alternatives.  I d/w both daughters 4/26.         Problem/Plan - 3:  ·  Problem: Elevated troponin.   ·  Plan: - 69.1 -> 74.3 -> 97.1  - not uptrending, not consistent with ACS     Problem/Plan - 4:  ·  Problem: Chronic combined systolic and diastolic heart failure.   ·  Plan: - TTE 04/2025 = EF40-45%, G2DD  - Not in fluid overload  - Resume Lasix.     Problem/Plan - 5:  ·  Problem: Dysphagia.   ·  Plan: - Pt has chronic dysphagia and is on very soft diet, per daughter  - Daughter prefers pt to be on pureed diet while inpatient.     Problem/Plan - 6:  ·  Problem: HTN (hypertension).   ·  Plan: - BP stable, titrate meds as needed.       Problem/Plan - 7:  ·  Problem: Prophylactic measure.   ·  Plan: - Hep SC.      D/w pt's daughter Daniel, and Umu present via phone.    PT evaluation reviewed with daughters - pt was only able to ambulate 3 steps with PT, baseline far better.  They are agreeable to YANN placement.  Awaiting bed at [facility].    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 Admitted for CVA r/o.    < from: MR Head No Cont (04.25.25 @ 15:16) >  Acute infarct noted in the right anterior cerebral artery territory  measuring 1.5 x 0.7 cm. Adjacent smaller acute infarcts also noted.  < end of copied text >    # Diarrhea - ? gastroenteritis.  No pain.  WBC wnl.  Observe off abx.  IVF - NS as pt with recent stroke.      # Acute on Chronic CVA   ·  Problem: Weakness of left side of body.   ·  Plan: - P/w worsening L sided weakness, and now ambulatory dysfunction due to overall weakness  - CT head = No intracranial hemorrhage or mass lesion. Intracranial atherosclerosis is extensive. There is superimposed increased density in the basilar artery that may reflect embolus  - CTA head&neck = Diffuse Extensive intracranial atherosclerosis.  No large vessel occlusion.  - CTA perfusion = No acute infarction of the brain is convincingly demonstrated.  - C/w home ASA, Lipitor.  On Plavix  - MRI Head positive for acute CVA R KASSIE territory with adjacent smaller infarcts.        Problem/Plan - 3:  ·  Problem: Elevated troponin.   ·  Plan: - 69.1 -> 74.3 -> 97.1  - not uptrending, not consistent with ACS     Problem/Plan - 4:  ·  Problem: Chronic combined systolic and diastolic heart failure.   ·  Plan: - TTE 04/2025 = EF40-45%, G2DD  - Not in fluid overload  - Resume Lasix.     Problem/Plan - 5:  ·  Problem: Dysphagia.   ·  Plan: - Pt has chronic dysphagia and is on very soft diet, per daughter  - Daughter prefers pt to be on pureed diet while inpatient.     Problem/Plan - 6:  ·  Problem: HTN (hypertension).   ·  Plan: - BP stable, titrate meds as needed.       Problem/Plan - 7:  ·  Problem: Prophylactic measure.   ·  Plan: - Hep SC.      D/w pt's daughter Daniel, and Umu present via phone.    PT evaluation reviewed with daughters - pt was only able to ambulate 3 steps with PT, baseline far better.  They are agreeable to YANN placement.  Awaiting bed at [facility].

## 2025-04-28 NOTE — PHARMACOTHERAPY INTERVENTION NOTE - COMMENTS
Performed medication reconciliation and home medication list updated in outpatient medication review. Medications verified with patient's daughter at bedside with medication list.      -------------------------------HOME MEDICATIONS------------------------------------    HOME MEDICATIONS/PRESCRIPTIONS:  ·	atorvastatin 10 mg oral tablet: 1 tab(s) orally once a day with dinner  ·	Centrum: 1 tab(s) orally once a day with breakfast  ·	cholecalciferol 25 mcg (1000 intl units) oral tablet: 1 tab(s) orally once a day with breakfast  ·	clopidogrel 75 mg oral tablet: 1 tab(s) orally once a day  ·	CoQ10 100 mg oral capsule: 1 cap(s) orally once a day with breakfast  ·	furosemide 20 mg oral tablet: 1 tab(s) orally once a day  ·	losartan 50 mg oral tablet: 1 tab(s) orally once a day  ·	metoprolol succinate 25 mg oral tablet, extended release: 1 tab(s) orally once a day  ·	Omega-3 1000 mg oral capsule: 1 cap(s) orally once a day with breakfast  ·	Simbrinza 1%- 0.2% ophthalmic suspension: 1 drop(s) to each affected eye 2 times a day at awakening and bedtime  ·	tolterodine 4 mg oral capsule, extended release: 1 cap(s) orally once a day
Recommended to start patient on high intensity statin (atorvastatin 40mg) as patient had an ischemic stroke.
Code Stroke called for patient complaining of left-sided weakness, last known normal unknown though daughter at bedside states that patient may have experienced some slurring of speech on Sunday 4/20/25. Pharmacy at bedside with ED team to assess patient.    Of note, patient has pmhx of previous CVA in 2007 with residual left-sided deficits.    Tenecteplase not indicated as patient's last known normal unknown and most likely out of the administration window (>4.5 hours). Risks of administering tenecteplase outweigh benefits.

## 2025-04-28 NOTE — PROGRESS NOTE ADULT - ASSESSMENT
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 O/E left sided weakness NIHSS 5  CTH no acute finding  CTA: extensive intracranial athero as above  MRI brain with acute R KASSIE infarct , chronic R basal ganglia infarct   a1c 6.2    Impression: R hemiparesis 2/2 R KASSIE infarct, suspected mechanism likely intracrnial atherosclerosis     Mr Brain as above   Aspirin for secondary stroke prevention at this time.   Plavix 75 mg x 3 months  Atorvastatin, home dose, LDL 48  TTE as above  DVT prophylaxis, Neurochecks   gradual normotension- avoid hypotension  PT/OT/Speech and swallow/safety eval.  awaiting YANN Douglass DO  Vascular Neurology  Office [phone]  Available via Microsoft Teams

## 2025-04-29 ENCOUNTER — OUTPATIENT (OUTPATIENT)
Dept: OUTPATIENT SERVICES | Facility: HOSPITAL | Age: 89
LOS: 1 days | End: 2025-04-29

## 2025-04-29 ENCOUNTER — TRANSCRIPTION ENCOUNTER (OUTPATIENT)
Age: 89
End: 2025-04-29

## 2025-04-29 ENCOUNTER — INPATIENT (INPATIENT)
Facility: HOSPITAL | Age: 89
LOS: 15 days | Discharge: NURSING, REHAB OR LONG-TERM CARE | End: 2025-05-15
Attending: STUDENT IN AN ORGANIZED HEALTH CARE EDUCATION/TRAINING PROGRAM | Admitting: STUDENT IN AN ORGANIZED HEALTH CARE EDUCATION/TRAINING PROGRAM
Payer: MEDICARE

## 2025-04-29 VITALS
OXYGEN SATURATION: 90 % | DIASTOLIC BLOOD PRESSURE: 82 MMHG | TEMPERATURE: 97 F | RESPIRATION RATE: 18 BRPM | SYSTOLIC BLOOD PRESSURE: 167 MMHG | HEART RATE: 123 BPM

## 2025-04-29 VITALS
TEMPERATURE: 98 F | SYSTOLIC BLOOD PRESSURE: 204 MMHG | HEIGHT: 60 IN | RESPIRATION RATE: 30 BRPM | HEART RATE: 112 BPM | WEIGHT: 132.28 LBS | DIASTOLIC BLOOD PRESSURE: 75 MMHG | OXYGEN SATURATION: 96 %

## 2025-04-29 DIAGNOSIS — N85.9 NONINFLAMMATORY DISORDER OF UTERUS, UNSPECIFIED: ICD-10-CM

## 2025-04-29 DIAGNOSIS — Z66 DO NOT RESUSCITATE: ICD-10-CM

## 2025-04-29 DIAGNOSIS — R15.9 FULL INCONTINENCE OF FECES: ICD-10-CM

## 2025-04-29 DIAGNOSIS — Z98.89 OTHER SPECIFIED POSTPROCEDURAL STATES: Chronic | ICD-10-CM

## 2025-04-29 DIAGNOSIS — K56.609 UNSPECIFIED INTESTINAL OBSTRUCTION, UNSPECIFIED AS TO PARTIAL VERSUS COMPLETE OBSTRUCTION: ICD-10-CM

## 2025-04-29 DIAGNOSIS — Z79.82 LONG TERM (CURRENT) USE OF ASPIRIN: ICD-10-CM

## 2025-04-29 DIAGNOSIS — Z88.0 ALLERGY STATUS TO PENICILLIN: ICD-10-CM

## 2025-04-29 DIAGNOSIS — Z11.52 ENCOUNTER FOR SCREENING FOR COVID-19: ICD-10-CM

## 2025-04-29 DIAGNOSIS — J96.01 ACUTE RESPIRATORY FAILURE WITH HYPOXIA: ICD-10-CM

## 2025-04-29 DIAGNOSIS — J69.0 PNEUMONITIS DUE TO INHALATION OF FOOD AND VOMIT: ICD-10-CM

## 2025-04-29 DIAGNOSIS — I87.9 DISORDER OF VEIN, UNSPECIFIED: ICD-10-CM

## 2025-04-29 DIAGNOSIS — R94.4 ABNORMAL RESULTS OF KIDNEY FUNCTION STUDIES: ICD-10-CM

## 2025-04-29 DIAGNOSIS — R32 UNSPECIFIED URINARY INCONTINENCE: ICD-10-CM

## 2025-04-29 DIAGNOSIS — K55.9 VASCULAR DISORDER OF INTESTINE, UNSPECIFIED: ICD-10-CM

## 2025-04-29 DIAGNOSIS — I50.42 CHRONIC COMBINED SYSTOLIC (CONGESTIVE) AND DIASTOLIC (CONGESTIVE) HEART FAILURE: ICD-10-CM

## 2025-04-29 DIAGNOSIS — Z86.73 PERSONAL HISTORY OF TRANSIENT ISCHEMIC ATTACK (TIA), AND CEREBRAL INFARCTION WITHOUT RESIDUAL DEFICITS: ICD-10-CM

## 2025-04-29 DIAGNOSIS — B37.0 CANDIDAL STOMATITIS: ICD-10-CM

## 2025-04-29 DIAGNOSIS — L08.9 LOCAL INFECTION OF THE SKIN AND SUBCUTANEOUS TISSUE, UNSPECIFIED: ICD-10-CM

## 2025-04-29 DIAGNOSIS — H40.9 UNSPECIFIED GLAUCOMA: ICD-10-CM

## 2025-04-29 DIAGNOSIS — Z85.41 PERSONAL HISTORY OF MALIGNANT NEOPLASM OF CERVIX UTERI: ICD-10-CM

## 2025-04-29 DIAGNOSIS — R13.10 DYSPHAGIA, UNSPECIFIED: ICD-10-CM

## 2025-04-29 DIAGNOSIS — Z92.3 PERSONAL HISTORY OF IRRADIATION: ICD-10-CM

## 2025-04-29 DIAGNOSIS — R79.89 OTHER SPECIFIED ABNORMAL FINDINGS OF BLOOD CHEMISTRY: ICD-10-CM

## 2025-04-29 DIAGNOSIS — R53.81 OTHER MALAISE: ICD-10-CM

## 2025-04-29 DIAGNOSIS — B97.4 RESPIRATORY SYNCYTIAL VIRUS AS THE CAUSE OF DISEASES CLASSIFIED ELSEWHERE: ICD-10-CM

## 2025-04-29 DIAGNOSIS — E87.6 HYPOKALEMIA: ICD-10-CM

## 2025-04-29 DIAGNOSIS — I11.0 HYPERTENSIVE HEART DISEASE WITH HEART FAILURE: ICD-10-CM

## 2025-04-29 LAB
ALBUMIN SERPL ELPH-MCNC: 2.4 G/DL — LOW (ref 3.3–5)
ALP SERPL-CCNC: 85 U/L — SIGNIFICANT CHANGE UP (ref 40–120)
ALT FLD-CCNC: 27 U/L — SIGNIFICANT CHANGE UP (ref 12–78)
ANION GAP SERPL CALC-SCNC: 5 MMOL/L — SIGNIFICANT CHANGE UP (ref 5–17)
ANION GAP SERPL CALC-SCNC: 8 MMOL/L — SIGNIFICANT CHANGE UP (ref 5–17)
APPEARANCE UR: CLEAR — SIGNIFICANT CHANGE UP
AST SERPL-CCNC: 42 U/L — HIGH (ref 15–37)
BASOPHILS # BLD AUTO: 0 K/UL — SIGNIFICANT CHANGE UP (ref 0–0.2)
BASOPHILS NFR BLD AUTO: 0 % — SIGNIFICANT CHANGE UP (ref 0–2)
BILIRUB SERPL-MCNC: 0.5 MG/DL — SIGNIFICANT CHANGE UP (ref 0.2–1.2)
BILIRUB UR-MCNC: NEGATIVE — SIGNIFICANT CHANGE UP
BUN SERPL-MCNC: 40 MG/DL — HIGH (ref 7–23)
BUN SERPL-MCNC: 45 MG/DL — HIGH (ref 7–23)
CALCIUM SERPL-MCNC: 8.5 MG/DL — SIGNIFICANT CHANGE UP (ref 8.5–10.1)
CALCIUM SERPL-MCNC: 8.8 MG/DL — SIGNIFICANT CHANGE UP (ref 8.5–10.1)
CHLORIDE SERPL-SCNC: 112 MMOL/L — HIGH (ref 96–108)
CHLORIDE SERPL-SCNC: 115 MMOL/L — HIGH (ref 96–108)
CO2 SERPL-SCNC: 20 MMOL/L — LOW (ref 22–31)
CO2 SERPL-SCNC: 24 MMOL/L — SIGNIFICANT CHANGE UP (ref 22–31)
COLOR SPEC: YELLOW — SIGNIFICANT CHANGE UP
CREAT SERPL-MCNC: 1.3 MG/DL — SIGNIFICANT CHANGE UP (ref 0.5–1.3)
CREAT SERPL-MCNC: 1.37 MG/DL — HIGH (ref 0.5–1.3)
DIFF PNL FLD: ABNORMAL
EGFR: 36 ML/MIN/1.73M2 — LOW
EGFR: 36 ML/MIN/1.73M2 — LOW
EGFR: 38 ML/MIN/1.73M2 — LOW
EGFR: 38 ML/MIN/1.73M2 — LOW
EOSINOPHIL # BLD AUTO: 0 K/UL — SIGNIFICANT CHANGE UP (ref 0–0.5)
EOSINOPHIL NFR BLD AUTO: 0 % — SIGNIFICANT CHANGE UP (ref 0–6)
GLUCOSE SERPL-MCNC: 154 MG/DL — HIGH (ref 70–99)
GLUCOSE SERPL-MCNC: 189 MG/DL — HIGH (ref 70–99)
GLUCOSE UR QL: NEGATIVE MG/DL — SIGNIFICANT CHANGE UP
HCT VFR BLD CALC: 43.3 % — SIGNIFICANT CHANGE UP (ref 34.5–45)
HCT VFR BLD CALC: 44.2 % — SIGNIFICANT CHANGE UP (ref 34.5–45)
HGB BLD-MCNC: 14.2 G/DL — SIGNIFICANT CHANGE UP (ref 11.5–15.5)
HGB BLD-MCNC: 14.2 G/DL — SIGNIFICANT CHANGE UP (ref 11.5–15.5)
KETONES UR-MCNC: NEGATIVE MG/DL — SIGNIFICANT CHANGE UP
LACTATE SERPL-SCNC: 2.1 MMOL/L — HIGH (ref 0.7–2)
LEUKOCYTE ESTERASE UR-ACNC: ABNORMAL
LIDOCAIN IGE QN: 9 U/L — LOW (ref 13–75)
LYMPHOCYTES # BLD AUTO: 0.2 K/UL — LOW (ref 1–3.3)
LYMPHOCYTES # BLD AUTO: 3 % — LOW (ref 13–44)
MCHC RBC-ENTMCNC: 31.1 PG — SIGNIFICANT CHANGE UP (ref 27–34)
MCHC RBC-ENTMCNC: 31.8 PG — SIGNIFICANT CHANGE UP (ref 27–34)
MCHC RBC-ENTMCNC: 32.1 G/DL — SIGNIFICANT CHANGE UP (ref 32–36)
MCHC RBC-ENTMCNC: 32.8 G/DL — SIGNIFICANT CHANGE UP (ref 32–36)
MCV RBC AUTO: 96.9 FL — SIGNIFICANT CHANGE UP (ref 80–100)
MCV RBC AUTO: 96.9 FL — SIGNIFICANT CHANGE UP (ref 80–100)
MONOCYTES # BLD AUTO: 0.34 K/UL — SIGNIFICANT CHANGE UP (ref 0–0.9)
MONOCYTES NFR BLD AUTO: 5 % — SIGNIFICANT CHANGE UP (ref 2–14)
NEUTROPHILS # BLD AUTO: 6.17 K/UL — SIGNIFICANT CHANGE UP (ref 1.8–7.4)
NEUTROPHILS NFR BLD AUTO: 82 % — HIGH (ref 43–77)
NITRITE UR-MCNC: NEGATIVE — SIGNIFICANT CHANGE UP
NRBC BLD AUTO-RTO: 0 /100 WBCS — SIGNIFICANT CHANGE UP (ref 0–0)
NRBC BLD AUTO-RTO: SIGNIFICANT CHANGE UP /100 WBCS (ref 0–0)
PH UR: 5.5 — SIGNIFICANT CHANGE UP (ref 5–8)
PLATELET # BLD AUTO: 235 K/UL — SIGNIFICANT CHANGE UP (ref 150–400)
PLATELET # BLD AUTO: 250 K/UL — SIGNIFICANT CHANGE UP (ref 150–400)
POTASSIUM SERPL-MCNC: 3.7 MMOL/L — SIGNIFICANT CHANGE UP (ref 3.5–5.3)
POTASSIUM SERPL-MCNC: 4.6 MMOL/L — SIGNIFICANT CHANGE UP (ref 3.5–5.3)
POTASSIUM SERPL-SCNC: 3.7 MMOL/L — SIGNIFICANT CHANGE UP (ref 3.5–5.3)
POTASSIUM SERPL-SCNC: 4.6 MMOL/L — SIGNIFICANT CHANGE UP (ref 3.5–5.3)
PROT SERPL-MCNC: 6.4 GM/DL — SIGNIFICANT CHANGE UP (ref 6–8.3)
PROT UR-MCNC: 30 MG/DL
RBC # BLD: 4.47 M/UL — SIGNIFICANT CHANGE UP (ref 3.8–5.2)
RBC # BLD: 4.56 M/UL — SIGNIFICANT CHANGE UP (ref 3.8–5.2)
RBC # FLD: 14.7 % — HIGH (ref 10.3–14.5)
RBC # FLD: 14.8 % — HIGH (ref 10.3–14.5)
SODIUM SERPL-SCNC: 141 MMOL/L — SIGNIFICANT CHANGE UP (ref 135–145)
SODIUM SERPL-SCNC: 143 MMOL/L — SIGNIFICANT CHANGE UP (ref 135–145)
SP GR SPEC: 1.02 — SIGNIFICANT CHANGE UP (ref 1–1.03)
TROPONIN I, HIGH SENSITIVITY RESULT: 160.5 NG/L — HIGH
TROPONIN I, HIGH SENSITIVITY RESULT: 163.5 NG/L — HIGH
UROBILINOGEN FLD QL: 0.2 MG/DL — SIGNIFICANT CHANGE UP (ref 0.2–1)
WBC # BLD: 6.78 K/UL — SIGNIFICANT CHANGE UP (ref 3.8–10.5)
WBC # BLD: 8.8 K/UL — SIGNIFICANT CHANGE UP (ref 3.8–10.5)
WBC # FLD AUTO: 6.78 K/UL — SIGNIFICANT CHANGE UP (ref 3.8–10.5)
WBC # FLD AUTO: 8.8 K/UL — SIGNIFICANT CHANGE UP (ref 3.8–10.5)

## 2025-04-29 PROCEDURE — 99239 HOSP IP/OBS DSCHRG MGMT >30: CPT

## 2025-04-29 PROCEDURE — 93010 ELECTROCARDIOGRAM REPORT: CPT

## 2025-04-29 PROCEDURE — 99285 EMERGENCY DEPT VISIT HI MDM: CPT

## 2025-04-29 PROCEDURE — 71045 X-RAY EXAM CHEST 1 VIEW: CPT | Mod: 26

## 2025-04-29 RX ORDER — ASPIRIN 325 MG
1 TABLET ORAL
Qty: 0 | Refills: 0 | DISCHARGE
Start: 2025-04-29

## 2025-04-29 RX ORDER — ONDANSETRON HCL/PF 4 MG/2 ML
4 VIAL (ML) INJECTION ONCE
Refills: 0 | Status: COMPLETED | OUTPATIENT
Start: 2025-04-29 | End: 2025-04-29

## 2025-04-29 RX ORDER — CEFEPIME 2 G/20ML
1000 INJECTION, POWDER, FOR SOLUTION INTRAVENOUS ONCE
Refills: 0 | Status: COMPLETED | OUTPATIENT
Start: 2025-04-29 | End: 2025-04-29

## 2025-04-29 RX ORDER — LABETALOL HYDROCHLORIDE 200 MG/1
10 TABLET, FILM COATED ORAL ONCE
Refills: 0 | Status: COMPLETED | OUTPATIENT
Start: 2025-04-29 | End: 2025-04-29

## 2025-04-29 RX ORDER — TOBRAMYCIN 300 MG/4ML
300 SOLUTION RESPIRATORY (INHALATION) ONCE
Refills: 0 | Status: DISCONTINUED | OUTPATIENT
Start: 2025-04-29 | End: 2025-04-30

## 2025-04-29 RX ORDER — VANCOMYCIN HCL IN 5 % DEXTROSE 1.5G/250ML
1000 PLASTIC BAG, INJECTION (ML) INTRAVENOUS ONCE
Refills: 0 | Status: COMPLETED | OUTPATIENT
Start: 2025-04-29 | End: 2025-04-29

## 2025-04-29 RX ORDER — ATORVASTATIN CALCIUM 80 MG/1
1 TABLET, FILM COATED ORAL
Qty: 0 | Refills: 0 | DISCHARGE
Start: 2025-04-29

## 2025-04-29 RX ADMIN — DORZOLAMIDE 1 DROP(S): 20 SOLUTION/ DROPS OPHTHALMIC at 05:11

## 2025-04-29 RX ADMIN — Medication 4 MILLIGRAM(S): at 22:16

## 2025-04-29 RX ADMIN — HEPARIN SODIUM 5000 UNIT(S): 1000 INJECTION INTRAVENOUS; SUBCUTANEOUS at 05:07

## 2025-04-29 RX ADMIN — BRIMONIDINE TARTRATE 1 DROP(S): 1.5 SOLUTION/ DROPS OPHTHALMIC at 05:11

## 2025-04-29 RX ADMIN — FUROSEMIDE 20 MILLIGRAM(S): 10 INJECTION INTRAMUSCULAR; INTRAVENOUS at 05:07

## 2025-04-29 RX ADMIN — LABETALOL HYDROCHLORIDE 10 MILLIGRAM(S): 200 TABLET, FILM COATED ORAL at 22:15

## 2025-04-29 RX ADMIN — Medication 81 MILLIGRAM(S): at 11:29

## 2025-04-29 RX ADMIN — Medication 500 MILLILITER(S): at 22:46

## 2025-04-29 RX ADMIN — CLOPIDOGREL BISULFATE 75 MILLIGRAM(S): 75 TABLET, FILM COATED ORAL at 11:29

## 2025-04-29 RX ADMIN — OXYBUTYNIN CHLORIDE 10 MILLIGRAM(S): 5 TABLET, FILM COATED, EXTENDED RELEASE ORAL at 05:07

## 2025-04-29 NOTE — PROGRESS NOTE ADULT - PROVIDER SPECIALTY LIST ADULT
Hospitalist
Hospitalist
Neurology
Hospitalist
Neurology
Neurology
Hospitalist
Neurology
Hospitalist
Hospitalist

## 2025-04-29 NOTE — ED PROVIDER NOTE - NS ED ROS FT
Review of systems cannot be completed due to the critical nature of this patient's condition.  Acute care caveat will take place.

## 2025-04-29 NOTE — ED PROVIDER NOTE - PHYSICAL EXAMINATION
GENERAL: Patient appears uncomfortable.    EYES: Patient's left pupil is reactive to light.  Extraocular eye movement is intact.    ENT: Head is atraumatic.  Posterior oropharynx is unremarkable.      RESPIRATORY: Lungs are clear to auscultation bilaterally.  The patient has no significant wheezing, rhonchi, or rales.    CARDIOVASCULAR: The patient has a regular rate and rhythm with no significant murmurs, rubs, or gallops.    ABDOMEN: Abdominal distention.  Generalized tenderness to palpation.    SKIN: Skin is intact without evidence of significant lacerations or sores.    MUSCULOSKELETAL: Patient has good range of motion of all extremities.  The patient has good distal cap refill.  The patient has palpable distal pulses.  No obvious edema is noted.    NEUROLOGIC: Cranial nerves II through XII are grossly within normal limits.  Sensory and motor examination is unremarkable.

## 2025-04-29 NOTE — ED ADULT TRIAGE NOTE - CHIEF COMPLAINT QUOTE
From [facility] today was her admission was desaturation 86% RA place on 3 liter still was hypoxia , was going to  stared antibiotics for aspiration pneumonia as per staff, pt DNR, DNI hx cva left side weakness

## 2025-04-29 NOTE — ED PROVIDER NOTE - WR ORDER NAME 1
Agata NGUYEN Keli Patient Age: 35 year old  MESSAGE: Interpreting service used: No      Obstetrics & Gynecology- Reason for call: wants to know if she can get dental x-ray or not       ALLERGIES:  Patient has no known allergies.  Current Outpatient Medications   Medication   • Prenatal Vit-Fe Fumarate-FA (PRENATAL VITAMIN PO)   • fluconazole (DIFLUCAN) 150 MG tablet   • Valacyclovir HCl (VALTREX) 1000 MG Tab   • fluconazole (DIFLUCAN) 150 MG tablet     No current facility-administered medications for this visit.      PHARMACY to use:     Seeking advice at this time    Call connected to OBGYN Triage Queue.  Routed to provider's clinical support pool.          Pharmacy preference(s) on file:   [id number] - [facility] [address] [address]  Phone: [phone] Fax: [phone]      CALL BACK INFO: Ok to leave response (including medical information) on answering machine      PCP: Other Other         INS: Payor: [employer] / Plan: JUAN PABLO MUÑOZ BP [id number] / Product Type: [facility] BRONZE   PATIENT ADDRESS:  [address] [place] [address]      
Pt called in to verify ok to have dental xray for pain following root canal. Pt advised this is ok but to ensure that lead apron is used. Pt verbalized understanding.   
Xray Chest 1 View- PORTABLE-Urgent

## 2025-04-29 NOTE — ED ADULT NURSE NOTE - OBJECTIVE STATEMENT
Pt brought in from [facility] with family at bedside. As per daughter, pt had a stroke wednesday and was admited to . Pt was discharged from  today and transferred to [facility]. Per daughter, within an hour of being at [facility], pt "tanked." Pt was desating at [facility], 86%  Pt  place on 3 liters at [facility] and was still was hypoxic , Pt was going to be started on antibiotics for aspiration pneumonia as per staff, pt DNR, DNI. Pt arrived on non rebreather to ED. Pt placed on bedside cardiac monitor. Pt tachycardic and  tachypneic upon arrival.  hx cva left side weakness. Pt AOX2 brought in from [facility] with family at bedside. As per daughter, pt had a stroke wednesday and was admited to . Pt was discharged from  today and transferred to [facility]. Per daughter, within an hour of being at [facility], pt "tanked." Pt was desating at [facility], 86%  Pt  place on 3 liters at [facility] and was still was hypoxic , Pt was going to be started on antibiotics for aspiration pneumonia as per staff, pt DNR, DNI. Pt arrived on non rebreather to ED. Pt placed on bedside cardiac monitor. Pt tachycardic and  tachypneic upon arrival.  hx cva left side weakness.

## 2025-04-29 NOTE — DISCHARGE NOTE PROVIDER - NSDCCPCAREPLAN_GEN_ALL_CORE_FT
PRINCIPAL DISCHARGE DIAGNOSIS  Diagnosis: CVA (cerebrovascular accident)  Assessment and Plan of Treatment:       SECONDARY DISCHARGE DIAGNOSES  Diagnosis: Elevated troponin  Assessment and Plan of Treatment:     Diagnosis: Chronic combined systolic and diastolic heart failure  Assessment and Plan of Treatment:     Diagnosis: Weakness of left side of body  Assessment and Plan of Treatment:     Diagnosis: Dysphagia  Assessment and Plan of Treatment:

## 2025-04-29 NOTE — ED PROVIDER NOTE - CLINICAL SUMMARY MEDICAL DECISION MAKING FREE TEXT BOX
Patient is a 89-year-old female presenting to the emergency department today for hypoxia.  CBC shows no leukocytosis.  No acute anemia.  No thrombocytopenia.  Troponin elevated at 160.5.  Delta troponin of 163.5.  EKG shows no acute ischemic changes.  No evidence of acute arrhythmias.  No significant electrode abnormalities.  No evidence of TARIK at this time.  No significantly elevated LFTs.  Lactic acid elevated 2.1.  Patient given 5 8 cc bolus of normal saline.  Urinalysis shows no urinary tract infection.  X-ray shows evidence of possible pneumonia in the left lower lobe.  Compared to x-ray 1 month ago the patient had confirmed pneumonia in the left lower lobe.  Patient was given empiric antibiotics.  Noted to be hypoxic on room air.  Requiring initiation of nasal cannula.  Does not wear oxygen at baseline.    As the patient is noted be hypoxic with possible pneumonia, we believe that she would benefit from admission to the hospital for further workup and management.    The patient had a CT of the abdomen pelvis as well as a CT of the head ordered.  These have not been performed at this time.  For this reason, the patient will be signed out to the oncoming physician.  Please see the oncoming physician's addendum's, notes, and progress notes for any change in this patient's management or care.

## 2025-04-29 NOTE — DISCHARGE NOTE PROVIDER - NSDCMRMEDTOKEN_GEN_ALL_CORE_FT
aspirin 81 mg oral tablet, chewable: 1 tab(s) orally once a day  atorvastatin 40 mg oral tablet: 1 tab(s) orally once a day (at bedtime)  Centrum: 1 tab(s) orally once a day with breakfast  cholecalciferol 25 mcg (1000 intl units) oral tablet: 1 tab(s) orally once a day with breakfast  clopidogrel 75 mg oral tablet: 1 tab(s) orally once a day  CoQ10 100 mg oral capsule: 1 cap(s) orally once a day with breakfast  furosemide 20 mg oral tablet: 1 tab(s) orally once a day  losartan 50 mg oral tablet: 1 tab(s) orally once a day  metoprolol succinate 25 mg oral tablet, extended release: 1 tab(s) orally once a day  Omega-3 1000 mg oral capsule: 1 cap(s) orally once a day with breakfast  Simbrinza 1%- 0.2% ophthalmic suspension: 1 drop(s) to each affected eye 2 times a day at awakening and bedtime  tolterodine 4 mg oral capsule, extended release: 1 cap(s) orally once a day

## 2025-04-29 NOTE — DISCHARGE NOTE PROVIDER - HOSPITAL COURSE
Admitting Diagnosis: Worsening left-sided weakness, rule out cerebrovascular accident (CVA).    Discharge Diagnoses:    Acute Cerebrovascular Accident: Right anterior cerebral artery (KASSIE) territory infarct confirmed by MRI, presenting with worsening left-sided weakness and ambulatory dysfunction.  Chronic Combined Systolic and Diastolic Heart Failure (EF 40-45%, G2DD – April 2025).  History of Cerebrovascular Accident with residual left-sided weakness and dysphagia.  Hypertension.  Cervical Cancer Status Post Radiation.  Glaucoma.  Gastroenteritis (resolved).  Elevated Troponin (non-ischemic).  Hospital Course:    The patient presented with worsening left-sided weakness of several days duration. Initial CT head was negative for hemorrhage or mass lesion but showed extensive intracranial atherosclerosis. CTA head and neck revealed diffuse extensive intracranial atherosclerosis without large vessel occlusion. Perfusion CTA did not convincingly demonstrate acute infarction. MRI brain subsequently revealed an acute infarct in the right KASSIE territory with adjacent smaller acute infarcts, confirming the diagnosis of acute CVA.    The patient experienced an episode of gastroenteritis during her admission, which resolved with conservative management. She also had a transient elevation in troponin, which was deemed not consistent with acute coronary syndrome given the lack of dynamic change and correlating symptoms. Her chronic heart failure remained stable and she did not exhibit signs of fluid overload. Her hypertension was managed medically with good control.    The patient’s chronic dysphagia was managed with a pureed diet per her daughter’s preference. Prophylactic subcutaneous heparin was administered.    Discharge Disposition: ([facility]).    Disposition: Stable for discharge.  Outpatient followup discussed.  Total time spent on discharge is  35  minutes.

## 2025-04-29 NOTE — PROGRESS NOTE ADULT - ASSESSMENT
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 Admitted for CVA r/o.    < from: MR Head No Cont (04.25.25 @ 15:16) >  Acute infarct noted in the right anterior cerebral artery territory  measuring 1.5 x 0.7 cm. Adjacent smaller acute infarcts also noted.  < end of copied text >    # Diarrhea - probable gastroenteritis.  No pain.  WBC wnl.  Observe off abx.  Now resolved.  Pt taking po intake.      # Acute on Chronic CVA   ·  Plan: - P/w worsening L sided weakness, and now ambulatory dysfunction due to overall weakness  - CT head = No intracranial hemorrhage or mass lesion. Intracranial atherosclerosis is extensive. There is superimposed increased density in the basilar artery that may reflect embolus  - CTA head&neck = Diffuse Extensive intracranial atherosclerosis.  No large vessel occlusion.  - CTA perfusion = No acute infarction of the brain is convincingly demonstrated.  - C/w home ASA, Lipitor.  On Plavix  - MRI Head positive for acute CVA R KASSIE territory with adjacent smaller infarcts.        Problem/Plan - 3:  ·  Problem: Elevated troponin.   ·  Plan: - 69.1 -> 74.3 -> 97.1  - not uptrending, not consistent with ACS     Problem/Plan - 4:  ·  Problem: Chronic combined systolic and diastolic heart failure.   ·  Plan: - TTE 04/2025 = EF40-45%, G2DD  - Not in fluid overload     Problem/Plan - 5:  ·  Problem: Dysphagia.   ·  Plan: - Pt has chronic dysphagia and is on very soft diet, per daughter  - Daughter prefers pt to be on pureed diet while inpatient.     Problem/Plan - 6:  ·  Problem: HTN (hypertension).   ·  Plan: - BP stable, titrate meds as needed.       Problem/Plan - 7:  ·  Problem: Prophylactic measure.   ·  Plan: - Hep SC.      D/w pt's daughter Umu   Agrees with YANN placement  Stable for d/c to [facility]

## 2025-04-29 NOTE — ED PROVIDER NOTE - OBJECTIVE STATEMENT
This patient is a 89-year-old female presenting to the emergency department today with complaints of hypoxia.  She has a past medical history of heart failure, CVA, cervical cancer, and hypertension.  History of urinary and bowel incontinence.  Patient was admitted to the hospital for a CVA due to left-sided weakness and ultimately discharged today.  She had an episode of gastroenteritis during her admission with resolved with conservative management.  Also had a transient elevation in her troponin.  Ultimately discharged today to a rehab facility.  There she was noted to become hypoxic and was sent to the emergency department.  No other complaints at this time.

## 2025-04-29 NOTE — ED ADULT NURSE NOTE - CHIEF COMPLAINT QUOTE
From [facility] today was her admission was desaturation 86% RA place on 3 liter still was hypoxia , was going to  stared antibiotics for aspiration pneumonia as per staff, pt DNR, DNI hx cva left side weakness

## 2025-04-29 NOTE — PROGRESS NOTE ADULT - ASSESSMENT
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 O/E left sided weakness NIHSS 5  CTH no acute finding  CTA: extensive intracranial athero as above  MRI brain with acute R KASSIE infarct , chronic R basal ganglia infarct   a1c 6.2    Impression: R hemiparesis 2/2 R KASSIE infarct, suspected mechanism likely intracrnial atherosclerosis     Mr Brain as above   Aspirin for secondary stroke prevention at this time.   Plavix 75 mg x 3 months  Atorvastatin, home dose, LDL 48  TTE as above  DVT prophylaxis, Neurochecks   gradual normotension- avoid hypotension  PT/OT/Speech and swallow/safety eval.  awaiting YANN Douglass DO  Vascular Neurology  Office [phone]  Available via [employer] Teams

## 2025-04-29 NOTE — ED PROVIDER NOTE - CARE PLAN
1 Principal Discharge DX:	Pneumonia   Principal Discharge DX:	Pneumonia, aspiration  Secondary Diagnosis:	SBO (small bowel obstruction)  Secondary Diagnosis:	Ischemia, bowel  Secondary Diagnosis:	Disorder of portal venous system

## 2025-04-29 NOTE — PROGRESS NOTE ADULT - SUBJECTIVE AND OBJECTIVE BOX
Patient: MARTHA LEIGH [id number] 89y Female                            Hospitalist Attending Note    Diarrhea resolved.  Daughter Umu at bedside.  Reports pt slightly more tired, but no new deficits / complaints.  1 episode vomiting.  Tolerating p ointake.  presently No Abdominal pain, nausea, vomiting diarrhea.   No additional complaints.       ____________________PHYSICAL EXAM:  GENERAL:  NAD, Awake, verbally responsive.  Oriented to person, place.  Follows commands.    HEENT: NCAT  CARDIOVASCULAR:  S1, S2  LUNGS: CTAB  ABDOMEN:  soft, (-) tenderness, (-) distension, (+) bowel sounds, (-) guarding, (-) rebound (-) rigidity  EXTREMITIES:  no cyanosis / clubbing / edema.   NEURO: LUE/ LLE strength 2/5, RUE, RLE strength 4/5.    ____________________    VITALS:  Vital Signs Last 24 Hrs  T(C): 36.6 (2025 10:26), Max: 37.3 (2025 16:12)  T(F): 97.9 (2025 10:26), Max: 99.2 (2025 16:12)  HR: 118 (2025 10:26) (102 - 118)  BP: 133/84 (2025 10:26) (133/84 - 178/75)  BP(mean): --  RR: 18 (2025 10:26) (18 - 18)  SpO2: 99% (2025 10:26) (93% - 99%)    Parameters below as of 2025 10:26  Patient On (Oxygen Delivery Method): room air     Daily     Daily Weight in k (2025 04:47)  CAPILLARY BLOOD GLUCOSE        I&O's Summary    2025 07:01  -  2025 07:00  --------------------------------------------------------  IN: 700 mL / OUT: 300 mL / NET: 400 mL        LABS:                        14.2   8.80  )-----------( 250      ( 2025 10:50 )             44.2         141  |  112[H]  |  40[H]  ----------------------------<  154[H]  3.7   |  24  |  1.30    Ca    8.8      2025 10:50          Urinalysis Basic - ( 2025 10:50 )    Color: x / Appearance: x / SG: x / pH: x  Gluc: 154 mg/dL / Ketone: x  / Bili: x / Urobili: x   Blood: x / Protein: x / Nitrite: x   Leuk Esterase: x / RBC: x / WBC x   Sq Epi: x / Non Sq Epi: x / Bacteria: x              MEDICATIONS:  acetaminophen     Tablet .. 650 milliGRAM(s) Oral every 6 hours PRN  aluminum hydroxide/magnesium hydroxide/simethicone Suspension 30 milliLiter(s) Oral every 4 hours PRN  aspirin  chewable 81 milliGRAM(s) Oral daily  atorvastatin 40 milliGRAM(s) Oral at bedtime  brimonidine 0.2% Ophthalmic Solution 1 Drop(s) Both EYES two times a day  clopidogrel Tablet 75 milliGRAM(s) Oral daily  dorzolamide 2% Ophthalmic Solution 1 Drop(s) Both EYES <User Schedule>  furosemide    Tablet 20 milliGRAM(s) Oral daily  heparin   Injectable 5000 Unit(s) SubCutaneous every 12 hours  labetalol Injectable 5 milliGRAM(s) IV Push once PRN  melatonin 3 milliGRAM(s) Oral at bedtime PRN  oxybutynin 10 milliGRAM(s) Oral two times a day    
Patient seen and examined this am. No new events    MEDICATIONS:    acetaminophen     Tablet .. 650 milliGRAM(s) Oral every 6 hours PRN  aluminum hydroxide/magnesium hydroxide/simethicone Suspension 30 milliLiter(s) Oral every 4 hours PRN  aspirin  chewable 81 milliGRAM(s) Oral daily  atorvastatin 10 milliGRAM(s) Oral at bedtime  brimonidine 0.2% Ophthalmic Solution 1 Drop(s) Both EYES two times a day  clopidogrel Tablet 75 milliGRAM(s) Oral daily  dorzolamide 2% Ophthalmic Solution 1 Drop(s) Both EYES <User Schedule>  furosemide    Tablet 20 milliGRAM(s) Oral daily  heparin   Injectable 5000 Unit(s) SubCutaneous every 12 hours  labetalol Injectable 5 milliGRAM(s) IV Push once PRN  melatonin 3 milliGRAM(s) Oral at bedtime PRN  oxybutynin 10 milliGRAM(s) Oral two times a day      LABS:                          14.2   5.60  )-----------( 214      ( 24 Apr 2025 07:50 )             43.7     04-24    140  |  111[H]  |  30[H]  ----------------------------<  95  4.4   |  20[L]  |  1.27    Ca    9.1      24 Apr 2025 07:50    TPro  6.1  /  Alb  2.7[L]  /  TBili  0.6  /  DBili  x   /  AST  33  /  ALT  32  /  AlkPhos  97  04-24    CAPILLARY BLOOD GLUCOSE        PT/INR - ( 23 Apr 2025 15:33 )   PT: 10.9 sec;   INR: 0.94 ratio         PTT - ( 23 Apr 2025 15:33 )  PTT:33.9 sec  Urinalysis Basic - ( 24 Apr 2025 07:50 )    Color: x / Appearance: x / SG: x / pH: x  Gluc: 95 mg/dL / Ketone: x  / Bili: x / Urobili: x   Blood: x / Protein: x / Nitrite: x   Leuk Esterase: x / RBC: x / WBC x   Sq Epi: x / Non Sq Epi: x / Bacteria: x      I&O's Summary    24 Apr 2025 07:01  -  25 Apr 2025 07:00  --------------------------------------------------------  IN: 360 mL / OUT: 1100 mL / NET: -740 mL    25 Apr 2025 07:01  -  25 Apr 2025 09:40  --------------------------------------------------------  IN: 320 mL / OUT: 0 mL / NET: 320 mL      Vital Signs Last 24 Hrs  T(C): 36.7 (25 Apr 2025 05:57), Max: 36.8 (24 Apr 2025 10:45)  T(F): 98 (25 Apr 2025 05:57), Max: 98.2 (24 Apr 2025 10:45)  HR: 84 (25 Apr 2025 05:57) (71 - 105)  BP: 148/70 (25 Apr 2025 05:57) (116/72 - 156/69)  BP(mean): --  RR: 18 (25 Apr 2025 05:57) (18 - 18)  SpO2: 97% (25 Apr 2025 05:57) (94% - 100%)    Parameters below as of 25 Apr 2025 05:57  Patient On (Oxygen Delivery Method): room air          On Neurological Examination:    Neuro: AAOx2; knows age, sys its May, obeys commands, no dysarthria; calculations intact, fluent names, repeats     CN: PERRL, EOMI, VFF normal gaze preference, no facial palsy,     Motor: RUE no drift RLE drift, LUE some effort contratced LLE some effort    Sensory: Intact to LT and PP no extinction    Coordination: FTN intact b/l                                             GENERAL Exam:     Nontoxic , No Acute Distress   	  HEENT:  normocephalic, atraumatic  		  LUNGS:	Clear bilaterally  No Wheeze    	  HEART:	 Normal S1S2   No murmur RRR        	  GI/ ABDOMEN:  Soft  Non tender    EXTREMITIES:   No Edema  No Clubbing  No Cyanosis No Edema    MUSCULOSKELETAL: Normal Range of Motion  	   SKIN:      Normal   No Ecchymosis            RADIOLOGY  < from: CT Angio Neck Stroke Protocol w/ IV Cont (04.23.25 @ 15:28) >  IMPRESSION:    1.   RIGHT CAROTID SYSTEM:    Atherosclerotic plaque carotid bulb without    hemodynamically significant stenosis. Internal carotid tortuosity and   fusiform dilatation, likely atherosclerotic    2.   LEFT CAROTID SYSTEM:     Atherosclerotic plaque carotid bulb without    hemodynamically significant stenosis.    3.   VERTEBRAL CIRCULATION:    Patent.    4.  ANTERIOR INTRACRANIAL CIRCULATION:     Extensive intracranial   atherosclerosis internal carotid arteries, at least moderate,and   anterior cerebral and middle cerebral arteries, moderate to severe.    5.  POSTERIOR INTRACRANIAL CIRCULATION:    Extensive intracranial   atherosclerosis vertebral arteries, at least moderate, basilar artery,   mild to moderate, and posterior cerebral arteries, severe on the right   and moderate on the left.    6.  No large vessel occlusion.    7.  BRAIN PERFUSION:   No acute infarction of the brain is convincingly   demonstrated.    --- End of Report ---    < end of copied text >            
                          Patient: MARTHA LEIGH [id number] 89y Female                            Hospitalist Attending Note    Seen with daughter Daniel at bedside.  Reports mother with continued L sided weakness, worse than baseline.   No new complaints.     ____________________PHYSICAL EXAM:  GENERAL:  NAD, awake, follows commands.    HEENT: NCAT  CARDIOVASCULAR:  S1, S2  LUNGS: CTAB  ABDOMEN:  soft, (-) tenderness, (-) distension, (+) bowel sounds, (-) guarding, (-) rebound (-) rigidity  EXTREMITIES:  no cyanosis / clubbing / edema.   NEURO: LUE/ LLE strength 2/5, RUE, RLE strength 4/5.    ____________________     VITALS:  Vital Signs Last 24 Hrs  T(C): 36.3 (2025 15:51), Max: 36.9 (2025 04:39)  T(F): 97.4 (2025 15:51), Max: 98.4 (2025 04:39)  HR: 105 (2025 15:51) (73 - 105)  BP: 116/72 (2025 15:51) (116/72 - 201/66)  BP(mean): --  RR: 18 (2025 15:51) (16 - 18)  SpO2: 98% (2025 15:51) (95% - 98%)    Parameters below as of 2025 15:51  Patient On (Oxygen Delivery Method): room air     Daily     Daily Weight in k.2 (2025 04:39)  CAPILLARY BLOOD GLUCOSE        I&O's Summary    2025 07:01  -  2025 07:00  --------------------------------------------------------  IN: 0 mL / OUT: 600 mL / NET: -600 mL    2025 07:01  -  2025 18:23  --------------------------------------------------------  IN: 360 mL / OUT: 0 mL / NET: 360 mL        HISTORY:  PAST MEDICAL & SURGICAL HISTORY:  Hypertension      History of cervical cancer      History of cerebrovascular accident (CVA) with residual deficit      Chronic heart failure with preserved ejection fraction (HFpEF)      Urinary and bowel incontinence      S/P lumpectomy of breast      Allergies    penicillin (Hives)    Intolerances       LABS:                        14.2   5.60  )-----------( 214      ( 2025 07:50 )             43.7       Urinalysis with Rflx Culture (collected 2025 17:02)    04-24    140  |  111[H]  |  30[H]  ----------------------------<  95  4.4   |  20[L]  |  1.27    Ca    9.1      2025 07:50    TPro  6.1  /  Alb  2.7[L]  /  TBili  0.6  /  DBili  x   /  AST  33  /  ALT  32  /  AlkPhos  97  04-24    PT/INR - ( 2025 15:33 )   PT: 10.9 sec;   INR: 0.94 ratio         PTT - ( 2025 15:33 )  PTT:33.9 sec  LIVER FUNCTIONS - ( 2025 07:50 )  Alb: 2.7 g/dL / Pro: 6.1 gm/dL / ALK PHOS: 97 U/L / ALT: 32 U/L / AST: 33 U/L / GGT: x           Urinalysis Basic - ( 2025 07:50 )    Color: x / Appearance: x / SG: x / pH: x  Gluc: 95 mg/dL / Ketone: x  / Bili: x / Urobili: x   Blood: x / Protein: x / Nitrite: x   Leuk Esterase: x / RBC: x / WBC x   Sq Epi: x / Non Sq Epi: x / Bacteria: x      CARDIAC MARKERS ( 2025 19:28 )  x     / x     / x     / x     / 3.0 ng/mL      Urinalysis with Rflx Culture (collected 2025 17:02)          MEDICATIONS:  MEDICATIONS  (STANDING):  aspirin  chewable 81 milliGRAM(s) Oral daily  atorvastatin 10 milliGRAM(s) Oral at bedtime  brimonidine 0.2% Ophthalmic Solution 1 Drop(s) Both EYES two times a day  clopidogrel Tablet 75 milliGRAM(s) Oral daily  dorzolamide 2% Ophthalmic Solution 1 Drop(s) Both EYES <User Schedule>  furosemide    Tablet 20 milliGRAM(s) Oral daily  heparin   Injectable 5000 Unit(s) SubCutaneous every 12 hours  oxybutynin 10 milliGRAM(s) Oral two times a day    MEDICATIONS  (PRN):  acetaminophen     Tablet .. 650 milliGRAM(s) Oral every 6 hours PRN Temp greater or equal to 38C (100.4F), Mild Pain (1 - 3)  aluminum hydroxide/magnesium hydroxide/simethicone Suspension 30 milliLiter(s) Oral every 4 hours PRN Dyspepsia  labetalol Injectable 5 milliGRAM(s) IV Push once PRN if SBP >220  melatonin 3 milliGRAM(s) Oral at bedtime PRN Insomnia  
                          Patient: MARTHA LEIGH [id number] 89y Female                            Hospitalist Attending Note    Seen with daughter Daniel at bedside.  Daughter Umu present via phone.    Reports mother with continued L sided weakness, unchanged.  No new complaints.     ____________________PHYSICAL EXAM:  GENERAL:  NAD, Alert, verbally responsive.  Oriented to person, place.  Follows commands.    HEENT: NCAT  CARDIOVASCULAR:  S1, S2  LUNGS: CTAB  ABDOMEN:  soft, (-) tenderness, (-) distension, (+) bowel sounds, (-) guarding, (-) rebound (-) rigidity  EXTREMITIES:  no cyanosis / clubbing / edema.   NEURO: LUE/ LLE strength 2/5, RUE, RLE strength 4/5.    ____________________      VITALS:  Vital Signs Last 24 Hrs  T(C): 36.6 (2025 15:38), Max: 37.4 (2025 23:29)  T(F): 97.9 (2025 15:38), Max: 99.3 (2025 23:29)  HR: 102 (2025 15:38) (99 - 119)  BP: 126/71 (2025 15:38) (126/71 - 148/74)  BP(mean): --  RR: 18 (2025 15:38) (18 - 18)  SpO2: 94% (2025 15:38) (94% - 98%)    Parameters below as of 2025 04:31  Patient On (Oxygen Delivery Method): room air     Daily     Daily Weight in k.2 (2025 04:31)  CAPILLARY BLOOD GLUCOSE        I&O's Summary    2025 07:  -  2025 07:00  --------------------------------------------------------  IN: 960 mL / OUT: 1050 mL / NET: -90 mL    2025 07:01  -  2025 16:10  --------------------------------------------------------  IN: 600 mL / OUT: 0 mL / NET: 600 mL        LABS:                        13.2   5.74  )-----------( 159      ( 2025 06:20 )             41.3     04-    143  |  111[H]  |  35[H]  ----------------------------<  96  4.5   |  24  |  1.41[H]    Ca    8.8      2025 06:20          Urinalysis Basic - ( 2025 06:20 )    Color: x / Appearance: x / SG: x / pH: x  Gluc: 96 mg/dL / Ketone: x  / Bili: x / Urobili: x   Blood: x / Protein: x / Nitrite: x   Leuk Esterase: x / RBC: x / WBC x   Sq Epi: x / Non Sq Epi: x / Bacteria: x            Urinalysis with Rflx Culture (collected 2025 17:02)        MEDICATIONS:  acetaminophen     Tablet .. 650 milliGRAM(s) Oral every 6 hours PRN  aluminum hydroxide/magnesium hydroxide/simethicone Suspension 30 milliLiter(s) Oral every 4 hours PRN  aspirin  chewable 81 milliGRAM(s) Oral daily  atorvastatin 10 milliGRAM(s) Oral at bedtime  brimonidine 0.2% Ophthalmic Solution 1 Drop(s) Both EYES two times a day  clopidogrel Tablet 75 milliGRAM(s) Oral daily  dorzolamide 2% Ophthalmic Solution 1 Drop(s) Both EYES <User Schedule>  furosemide    Tablet 20 milliGRAM(s) Oral daily  heparin   Injectable 5000 Unit(s) SubCutaneous every 12 hours  labetalol Injectable 5 milliGRAM(s) IV Push once PRN  melatonin 3 milliGRAM(s) Oral at bedtime PRN  oxybutynin 10 milliGRAM(s) Oral two times a day    
                          Patient: MARTHA LEIGH [id number] 89y Female                            Hospitalist Attending Note    Seen with daughter Umu at bedside.    No new complaints.     ____________________PHYSICAL EXAM:  GENERAL:  NAD, Alert, verbally responsive.  Oriented to person, place.  Follows commands.    HEENT: NCAT  CARDIOVASCULAR:  S1, S2  LUNGS: CTAB  ABDOMEN:  soft, (-) tenderness, (-) distension, (+) bowel sounds, (-) guarding, (-) rebound (-) rigidity  EXTREMITIES:  no cyanosis / clubbing / edema.   NEURO: LUE/ LLE strength 2/5, RUE, RLE strength 4/5.    ____________________        VITALS:  Vital Signs Last 24 Hrs  T(C): 36.8 (2025 08:20), Max: 36.9 (2025 23:34)  T(F): 98.3 (2025 08:20), Max: 98.4 (2025 23:34)  HR: 100 (2025 12:04) (76 - 100)  BP: 162/83 (2025 12:04) (131/75 - 162/83)  BP(mean): --  RR: 20 (2025 12:04) (18 - 20)  SpO2: 97% (2025 12:04) (95% - 99%)    Parameters below as of 2025 12:04  Patient On (Oxygen Delivery Method): room air     Daily     Daily Weight in k.7 (2025 04:48)  CAPILLARY BLOOD GLUCOSE        I&O's Summary    2025 07:01  -  2025 07:00  --------------------------------------------------------  IN: 600 mL / OUT: 500 mL / NET: 100 mL        LABS:                        13.2   5.74  )-----------( 159      ( 2025 06:20 )             41.3         143  |  111[H]  |  35[H]  ----------------------------<  96  4.5   |  24  |  1.41[H]    Ca    8.8      2025 06:20          Urinalysis Basic - ( 2025 06:20 )    Color: x / Appearance: x / SG: x / pH: x  Gluc: 96 mg/dL / Ketone: x  / Bili: x / Urobili: x   Blood: x / Protein: x / Nitrite: x   Leuk Esterase: x / RBC: x / WBC x   Sq Epi: x / Non Sq Epi: x / Bacteria: x              MEDICATIONS:  acetaminophen     Tablet .. 650 milliGRAM(s) Oral every 6 hours PRN  aluminum hydroxide/magnesium hydroxide/simethicone Suspension 30 milliLiter(s) Oral every 4 hours PRN  aspirin  chewable 81 milliGRAM(s) Oral daily  atorvastatin 10 milliGRAM(s) Oral at bedtime  brimonidine 0.2% Ophthalmic Solution 1 Drop(s) Both EYES two times a day  clopidogrel Tablet 75 milliGRAM(s) Oral daily  dorzolamide 2% Ophthalmic Solution 1 Drop(s) Both EYES <User Schedule>  furosemide    Tablet 20 milliGRAM(s) Oral daily  heparin   Injectable 5000 Unit(s) SubCutaneous every 12 hours  labetalol Injectable 5 milliGRAM(s) IV Push once PRN  melatonin 3 milliGRAM(s) Oral at bedtime PRN  oxybutynin 10 milliGRAM(s) Oral two times a day    
Patient seen and examined this am. No new events    Medications: MEDICATIONS  (STANDING):  aspirin  chewable 81 milliGRAM(s) Oral daily  atorvastatin 40 milliGRAM(s) Oral at bedtime  brimonidine 0.2% Ophthalmic Solution 1 Drop(s) Both EYES two times a day  clopidogrel Tablet 75 milliGRAM(s) Oral daily  dorzolamide 2% Ophthalmic Solution 1 Drop(s) Both EYES <User Schedule>  furosemide    Tablet 20 milliGRAM(s) Oral daily  heparin   Injectable 5000 Unit(s) SubCutaneous every 12 hours  oxybutynin 10 milliGRAM(s) Oral two times a day    MEDICATIONS  (PRN):  acetaminophen     Tablet .. 650 milliGRAM(s) Oral every 6 hours PRN Temp greater or equal to 38C (100.4F), Mild Pain (1 - 3)  aluminum hydroxide/magnesium hydroxide/simethicone Suspension 30 milliLiter(s) Oral every 4 hours PRN Dyspepsia  labetalol Injectable 5 milliGRAM(s) IV Push once PRN if SBP >220  melatonin 3 milliGRAM(s) Oral at bedtime PRN Insomnia       Vitals:  Vital Signs Last 24 Hrs  T(C): 36.8 (28 Apr 2025 17:58), Max: 37.3 (28 Apr 2025 16:12)  T(F): 98.2 (28 Apr 2025 17:58), Max: 99.2 (28 Apr 2025 16:12)  HR: 110 (28 Apr 2025 17:58) (98 - 113)  BP: 156/78 (28 Apr 2025 17:58) (142/78 - 178/75)  BP(mean): --  RR: 18 (28 Apr 2025 17:58) (18 - 18)  SpO2: 94% (28 Apr 2025 17:58) (94% - 96%)    Parameters below as of 28 Apr 2025 17:58  Patient On (Oxygen Delivery Method): room air            On Neurological Examination:    Neuro: AAOx2; knows age, sys its May, obeys commands, no dysarthria; calculations intact, fluent names, repeats     CN: PERRL, EOMI, VFF normal gaze preference, no facial palsy,     Motor: RUE no drift RLE drift, LUE some effort contratced LLE some effort    Sensory: Intact to LT and PP no extinction    Coordination: FTN intact b/l                                             GENERAL Exam:     Nontoxic , No Acute Distress   	  HEENT:  normocephalic, atraumatic  		  LUNGS:	Clear bilaterally  No Wheeze    	  HEART:	 Normal S1S2   No murmur RRR        	  GI/ ABDOMEN:  Soft  Non tender    EXTREMITIES:   No Edema  No Clubbing  No Cyanosis No Edema    MUSCULOSKELETAL: Normal Range of Motion  	   SKIN:      Normal   No Ecchymosis          LABS:                          14.2   6.78  )-----------( 235      ( 29 Apr 2025 20:58 )             43.3     04-29    143  |  115[H]  |  45[H]  ----------------------------<  189[H]  4.6   |  20[L]  |  1.37[H]    Ca    8.5      29 Apr 2025 20:58    TPro  6.4  /  Alb  2.4[L]  /  TBili  0.5  /  DBili  x   /  AST  42[H]  /  ALT  27  /  AlkPhos  85  04-29    LIVER FUNCTIONS - ( 29 Apr 2025 20:58 )  Alb: 2.4 g/dL / Pro: 6.4 gm/dL / ALK PHOS: 85 U/L / ALT: 27 U/L / AST: 42 U/L / GGT: x             Urinalysis Basic - ( 29 Apr 2025 20:58 )    Color: x / Appearance: x / SG: x / pH: x  Gluc: 189 mg/dL / Ketone: x  / Bili: x / Urobili: x   Blood: x / Protein: x / Nitrite: x   Leuk Esterase: x / RBC: x / WBC x   Sq Epi: x / Non Sq Epi: x / Bacteria: x              RADIOLOGY  < from: CT Angio Neck Stroke Protocol w/ IV Cont (04.23.25 @ 15:28) >  IMPRESSION:    1.   RIGHT CAROTID SYSTEM:    Atherosclerotic plaque carotid bulb without    hemodynamically significant stenosis. Internal carotid tortuosity and   fusiform dilatation, likely atherosclerotic    2.   LEFT CAROTID SYSTEM:     Atherosclerotic plaque carotid bulb without    hemodynamically significant stenosis.    3.   VERTEBRAL CIRCULATION:    Patent.    4.  ANTERIOR INTRACRANIAL CIRCULATION:     Extensive intracranial   atherosclerosis internal carotid arteries, at least moderate,and   anterior cerebral and middle cerebral arteries, moderate to severe.    5.  POSTERIOR INTRACRANIAL CIRCULATION:    Extensive intracranial   atherosclerosis vertebral arteries, at least moderate, basilar artery,   mild to moderate, and posterior cerebral arteries, severe on the right   and moderate on the left.    6.  No large vessel occlusion.    7.  BRAIN PERFUSION:   No acute infarction of the brain is convincingly   demonstrated.    --- End of Report ---    < end of copied text >          ACC: [id number] EXAM:  MR BRAIN   ORDERED BY: FLAQUITO CLIFFORD     PROCEDURE DATE:  04/25/2025          INTERPRETATION:  CLINICAL STATEMENT: Left-sided weakness    TECHNIQUE: MRI of the brain without gadolinium.    COMPARISON: CT head 4/23/2025    FINDINGS:  Moderate diffuse parenchymal volume loss noted.    There are nonspecific T2 prolongation signal abnormalities in the   periventricular subcortical white matter likely related to moderate   chronic microvascular ischemic changes.    Chronic infarct noted in the right corona radiata/basal ganglia. Tiny   chronic infarcts noted in the cerebellum.    There is no acute parenchymal hemorrhage, parenchymal mass, mass effect   or midline shift. There is no extra-axial fluid collection.  There is no   hydrocephalus.    Acute infarct noted in the right anterior cerebral artery territory   measuring 1.5 x 0.7 cm. Adjacent smaller acute infarcts also noted.    The visualized paranasal sinuses are well-aerated    IMPRESSION:  Acute infarct right anterior cerebral artery territory.    No acute intracranial hemorrhage    --- End of Report ---        CONCLUSIONS:     1. Left ventricular cavity is normal in size. Left ventricular wall   thickness is severely increased. Severe left ventricular hypertrophy.   Left ventricular systolic function is mildly to moderately decreased with   an ejection fraction visually estimated at 40 to 45 %. Regional wall   motion abnormalities present.   2. There is moderate (grade 2) left ventricular diastolic dysfunction.   3. Normal right ventricular cavity size and reduced right ventricular   systolic function.   4. Left atrium is mildly dilated.   5. There is severe calcification of the mitral valve annulus, Moderate   mitral valve leaflet calcification. Mild mitral valve stenosis. Mild   mitral regurgitation.   6. 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   There is moderate calcification of the aortic valve leaflets. Moderate   aortic regurgitation.   7. No pericardial effusion seen.   8. The inferior vena cava is normal in size (normal <2.1cm) with normal   inspiratory collapse (normal >50%) consistent with normal right atrial   pressure (  3, range 0-5mmHg).   9. Pulmonary artery systolic pressure could not be estimated.    
Patient seen and examined this am. No new events    Medications: MEDICATIONS  (STANDING):  aspirin  chewable 81 milliGRAM(s) Oral daily  atorvastatin 10 milliGRAM(s) Oral at bedtime  brimonidine 0.2% Ophthalmic Solution 1 Drop(s) Both EYES two times a day  clopidogrel Tablet 75 milliGRAM(s) Oral daily  dorzolamide 2% Ophthalmic Solution 1 Drop(s) Both EYES <User Schedule>  furosemide    Tablet 20 milliGRAM(s) Oral daily  heparin   Injectable 5000 Unit(s) SubCutaneous every 12 hours  oxybutynin 10 milliGRAM(s) Oral two times a day    MEDICATIONS  (PRN):  acetaminophen     Tablet .. 650 milliGRAM(s) Oral every 6 hours PRN Temp greater or equal to 38C (100.4F), Mild Pain (1 - 3)  aluminum hydroxide/magnesium hydroxide/simethicone Suspension 30 milliLiter(s) Oral every 4 hours PRN Dyspepsia  labetalol Injectable 5 milliGRAM(s) IV Push once PRN if SBP >220  melatonin 3 milliGRAM(s) Oral at bedtime PRN Insomnia       Vitals:  Vital Signs Last 24 Hrs  T(C): 36.7 (27 Apr 2025 04:48), Max: 37 (26 Apr 2025 10:45)  T(F): 98 (27 Apr 2025 04:48), Max: 98.6 (26 Apr 2025 10:45)  HR: 78 (27 Apr 2025 04:48) (76 - 102)  BP: 151/72 (27 Apr 2025 04:48) (126/71 - 151/72)  BP(mean): --  RR: 18 (27 Apr 2025 04:48) (18 - 18)  SpO2: 95% (27 Apr 2025 04:48) (94% - 98%)    Parameters below as of 26 Apr 2025 16:45  Patient On (Oxygen Delivery Method): room air            On Neurological Examination:    Neuro: AAOx2; knows age, sys its May, obeys commands, no dysarthria; calculations intact, fluent names, repeats     CN: PERRL, EOMI, VFF normal gaze preference, no facial palsy,     Motor: RUE no drift RLE drift, LUE some effort contratced LLE some effort    Sensory: Intact to LT and PP no extinction    Coordination: FTN intact b/l                                             GENERAL Exam:     Nontoxic , No Acute Distress   	  HEENT:  normocephalic, atraumatic  		  LUNGS:	Clear bilaterally  No Wheeze    	  HEART:	 Normal S1S2   No murmur RRR        	  GI/ ABDOMEN:  Soft  Non tender    EXTREMITIES:   No Edema  No Clubbing  No Cyanosis No Edema    MUSCULOSKELETAL: Normal Range of Motion  	   SKIN:      Normal   No Ecchymosis          LABS:                          13.2   5.74  )-----------( 159      ( 26 Apr 2025 06:20 )             41.3     04-26    143  |  111[H]  |  35[H]  ----------------------------<  96  4.5   |  24  |  1.41[H]    Ca    8.8      26 Apr 2025 06:20          Urinalysis Basic - ( 26 Apr 2025 06:20 )    Color: x / Appearance: x / SG: x / pH: x  Gluc: 96 mg/dL / Ketone: x  / Bili: x / Urobili: x   Blood: x / Protein: x / Nitrite: x   Leuk Esterase: x / RBC: x / WBC x   Sq Epi: x / Non Sq Epi: x / Bacteria: x          RADIOLOGY  < from: CT Angio Neck Stroke Protocol w/ IV Cont (04.23.25 @ 15:28) >  IMPRESSION:    1.   RIGHT CAROTID SYSTEM:    Atherosclerotic plaque carotid bulb without    hemodynamically significant stenosis. Internal carotid tortuosity and   fusiform dilatation, likely atherosclerotic    2.   LEFT CAROTID SYSTEM:     Atherosclerotic plaque carotid bulb without    hemodynamically significant stenosis.    3.   VERTEBRAL CIRCULATION:    Patent.    4.  ANTERIOR INTRACRANIAL CIRCULATION:     Extensive intracranial   atherosclerosis internal carotid arteries, at least moderate,and   anterior cerebral and middle cerebral arteries, moderate to severe.    5.  POSTERIOR INTRACRANIAL CIRCULATION:    Extensive intracranial   atherosclerosis vertebral arteries, at least moderate, basilar artery,   mild to moderate, and posterior cerebral arteries, severe on the right   and moderate on the left.    6.  No large vessel occlusion.    7.  BRAIN PERFUSION:   No acute infarction of the brain is convincingly   demonstrated.    --- End of Report ---    < end of copied text >          ACC: [id number] EXAM:  MR BRAIN   ORDERED BY: FLAQUITO CLIFFORD     PROCEDURE DATE:  04/25/2025          INTERPRETATION:  CLINICAL STATEMENT: Left-sided weakness    TECHNIQUE: MRI of the brain without gadolinium.    COMPARISON: CT head 4/23/2025    FINDINGS:  Moderate diffuse parenchymal volume loss noted.    There are nonspecific T2 prolongation signal abnormalities in the   periventricular subcortical white matter likely related to moderate   chronic microvascular ischemic changes.    Chronic infarct noted in the right corona radiata/basal ganglia. Tiny   chronic infarcts noted in the cerebellum.    There is no acute parenchymal hemorrhage, parenchymal mass, mass effect   or midline shift. There is no extra-axial fluid collection.  There is no   hydrocephalus.    Acute infarct noted in the right anterior cerebral artery territory   measuring 1.5 x 0.7 cm. Adjacent smaller acute infarcts also noted.    The visualized paranasal sinuses are well-aerated    IMPRESSION:  Acute infarct right anterior cerebral artery territory.    No acute intracranial hemorrhage    --- End of Report ---        CONCLUSIONS:     1. Left ventricular cavity is normal in size. Left ventricular wall   thickness is severely increased. Severe left ventricular hypertrophy.   Left ventricular systolic function is mildly to moderately decreased with   an ejection fraction visually estimated at 40 to 45 %. Regional wall   motion abnormalities present.   2. There is moderate (grade 2) left ventricular diastolic dysfunction.   3. Normal right ventricular cavity size and reduced right ventricular   systolic function.   4. Left atrium is mildly dilated.   5. There is severe calcification of the mitral valve annulus, Moderate   mitral valve leaflet calcification. Mild mitral valve stenosis. Mild   mitral regurgitation.   6. 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   There is moderate calcification of the aortic valve leaflets. Moderate   aortic regurgitation.   7. No pericardial effusion seen.   8. The inferior vena cava is normal in size (normal <2.1cm) with normal   inspiratory collapse (normal >50%) consistent with normal right atrial   pressure (  3, range 0-5mmHg).   9. Pulmonary artery systolic pressure could not be estimated.    
                          Patient: MARTHA LEIGH [id number] 89y Female                            Hospitalist Attending Note    RN reports significant diarrhea, requiring Flexiseal.  Pt denies complaints.  No Abdominal pain, nausea, vomiting.    No additional complaints.   ____________________PHYSICAL EXAM:  GENERAL:  NAD, Alert, verbally responsive.  Oriented to person, place.  Follows commands.    HEENT: NCAT  CARDIOVASCULAR:  S1, S2  LUNGS: CTAB  ABDOMEN:  soft, (-) tenderness, (-) distension, (+) bowel sounds, (-) guarding, (-) rebound (-) rigidity  EXTREMITIES:  no cyanosis / clubbing / edema.   NEURO: LUE/ LLE strength 2/5, RUE, RLE strength 4/5.    ____________________        VITALS:  Vital Signs Last 24 Hrs  T(C): 37.3 (2025 16:12), Max: 37.3 (2025 16:12)  T(F): 99.2 (2025 16:12), Max: 99.2 (2025 16:12)  HR: 113 (2025 16:12) (98 - 113)  BP: 178/75 (2025 16:12) (142/78 - 178/75)  BP(mean): --  RR: 18 (2025 16:12) (18 - 18)  SpO2: 94% (2025 16:12) (94% - 96%)    Parameters below as of 2025 12:00  Patient On (Oxygen Delivery Method): room air     Daily     Daily Weight in k.5 (2025 04:30)  CAPILLARY BLOOD GLUCOSE        I&O's Summary    2025 07:01  -  2025 07:00  --------------------------------------------------------  IN: 0 mL / OUT: 1100 mL / NET: -1100 mL    2025 07:01  -  2025 17:41  --------------------------------------------------------  IN: 600 mL / OUT: 0 mL / NET: 600 mL        LABS:                        14.5   8.06  )-----------( 221      ( 2025 07:15 )             46.0     04-    146[H]  |  113[H]  |  41[H]  ----------------------------<  146[H]  4.0   |  25  |  1.32[H]    Ca    8.9      2025 07:15          Urinalysis Basic - ( 2025 07:15 )    Color: x / Appearance: x / SG: x / pH: x  Gluc: 146 mg/dL / Ketone: x  / Bili: x / Urobili: x   Blood: x / Protein: x / Nitrite: x   Leuk Esterase: x / RBC: x / WBC x   Sq Epi: x / Non Sq Epi: x / Bacteria: x              MEDICATIONS:  acetaminophen     Tablet .. 650 milliGRAM(s) Oral every 6 hours PRN  aluminum hydroxide/magnesium hydroxide/simethicone Suspension 30 milliLiter(s) Oral every 4 hours PRN  aspirin  chewable 81 milliGRAM(s) Oral daily  atorvastatin 40 milliGRAM(s) Oral at bedtime  brimonidine 0.2% Ophthalmic Solution 1 Drop(s) Both EYES two times a day  clopidogrel Tablet 75 milliGRAM(s) Oral daily  dorzolamide 2% Ophthalmic Solution 1 Drop(s) Both EYES <User Schedule>  furosemide    Tablet 20 milliGRAM(s) Oral daily  heparin   Injectable 5000 Unit(s) SubCutaneous every 12 hours  labetalol Injectable 5 milliGRAM(s) IV Push once PRN  melatonin 3 milliGRAM(s) Oral at bedtime PRN  oxybutynin 10 milliGRAM(s) Oral two times a day  sodium chloride 0.9%. 1000 milliLiter(s) IV Continuous <Continuous>    
                          Patient: MARTHA LEIGH [id number] 89y Female                            Hospitalist Attending Note    Seen with daughter Daniel at bedside.  Reports mother with continued L sided weakness, unchanged.  Ambulated only 3 steps with PT.  No new complaints.     ____________________PHYSICAL EXAM:  GENERAL:  NAD, awake, follows commands.    HEENT: NCAT  CARDIOVASCULAR:  S1, S2  LUNGS: CTAB  ABDOMEN:  soft, (-) tenderness, (-) distension, (+) bowel sounds, (-) guarding, (-) rebound (-) rigidity  EXTREMITIES:  no cyanosis / clubbing / edema.   NEURO: LUE/ LLE strength 2/5, RUE, RLE strength 4/5.    ____________________    VITALS:  Vital Signs Last 24 Hrs  T(C): 36.8 (2025 15:51), Max: 36.8 (2025 15:51)  T(F): 98.3 (2025 15:51), Max: 98.3 (2025 15:51)  HR: 119 (2025 21:28) (71 - 119)  BP: 128/67 (2025 15:51) (120/77 - 156/69)  BP(mean): --  RR: 18 (2025 15:51) (18 - 18)  SpO2: 94% (2025 15:51) (94% - 100%)    Parameters below as of 2025 08:30  Patient On (Oxygen Delivery Method): room air     Daily     Daily Weight in k.2 (2025 04:38)  CAPILLARY BLOOD GLUCOSE        I&O's Summary    2025 07:01  -  2025 07:00  --------------------------------------------------------  IN: 360 mL / OUT: 1100 mL / NET: -740 mL    2025 07:01  -  2025 22:04  --------------------------------------------------------  IN: 960 mL / OUT: 900 mL / NET: 60 mL        LABS:                        14.2   5.60  )-----------( 214      ( 2025 07:50 )             43.7     04-    140  |  111[H]  |  30[H]  ----------------------------<  95  4.4   |  20[L]  |  1.27    Ca    9.1      2025 07:50    TPro  6.1  /  Alb  2.7[L]  /  TBili  0.6  /  DBili  x   /  AST  33  /  ALT  32  /  AlkPhos  97  04-24      LIVER FUNCTIONS - ( 2025 07:50 )  Alb: 2.7 g/dL / Pro: 6.1 gm/dL / ALK PHOS: 97 U/L / ALT: 32 U/L / AST: 33 U/L / GGT: x           Urinalysis Basic - ( 2025 07:50 )    Color: x / Appearance: x / SG: x / pH: x  Gluc: 95 mg/dL / Ketone: x  / Bili: x / Urobili: x   Blood: x / Protein: x / Nitrite: x   Leuk Esterase: x / RBC: x / WBC x   Sq Epi: x / Non Sq Epi: x / Bacteria: x            Urinalysis with Rflx Culture (collected 2025 17:02)        MEDICATIONS:  acetaminophen     Tablet .. 650 milliGRAM(s) Oral every 6 hours PRN  aluminum hydroxide/magnesium hydroxide/simethicone Suspension 30 milliLiter(s) Oral every 4 hours PRN  aspirin  chewable 81 milliGRAM(s) Oral daily  atorvastatin 10 milliGRAM(s) Oral at bedtime  brimonidine 0.2% Ophthalmic Solution 1 Drop(s) Both EYES two times a day  clopidogrel Tablet 75 milliGRAM(s) Oral daily  dorzolamide 2% Ophthalmic Solution 1 Drop(s) Both EYES <User Schedule>  furosemide    Tablet 20 milliGRAM(s) Oral daily  heparin   Injectable 5000 Unit(s) SubCutaneous every 12 hours  labetalol Injectable 5 milliGRAM(s) IV Push once PRN  melatonin 3 milliGRAM(s) Oral at bedtime PRN  oxybutynin 10 milliGRAM(s) Oral two times a day    
Patient seen and examined this am. No new events    Medications: MEDICATIONS  (STANDING):  aspirin  chewable 81 milliGRAM(s) Oral daily  atorvastatin 40 milliGRAM(s) Oral at bedtime  brimonidine 0.2% Ophthalmic Solution 1 Drop(s) Both EYES two times a day  clopidogrel Tablet 75 milliGRAM(s) Oral daily  dorzolamide 2% Ophthalmic Solution 1 Drop(s) Both EYES <User Schedule>  furosemide    Tablet 20 milliGRAM(s) Oral daily  heparin   Injectable 5000 Unit(s) SubCutaneous every 12 hours  oxybutynin 10 milliGRAM(s) Oral two times a day    MEDICATIONS  (PRN):  acetaminophen     Tablet .. 650 milliGRAM(s) Oral every 6 hours PRN Temp greater or equal to 38C (100.4F), Mild Pain (1 - 3)  aluminum hydroxide/magnesium hydroxide/simethicone Suspension 30 milliLiter(s) Oral every 4 hours PRN Dyspepsia  labetalol Injectable 5 milliGRAM(s) IV Push once PRN if SBP >220  melatonin 3 milliGRAM(s) Oral at bedtime PRN Insomnia       Vitals:  Vital Signs Last 24 Hrs  T(C): 36.8 (28 Apr 2025 17:58), Max: 37.3 (28 Apr 2025 16:12)  T(F): 98.2 (28 Apr 2025 17:58), Max: 99.2 (28 Apr 2025 16:12)  HR: 110 (28 Apr 2025 17:58) (98 - 113)  BP: 156/78 (28 Apr 2025 17:58) (142/78 - 178/75)  BP(mean): --  RR: 18 (28 Apr 2025 17:58) (18 - 18)  SpO2: 94% (28 Apr 2025 17:58) (94% - 96%)    Parameters below as of 28 Apr 2025 17:58  Patient On (Oxygen Delivery Method): room air            On Neurological Examination:    Neuro: AAOx2; knows age, sys its May, obeys commands, no dysarthria; calculations intact, fluent names, repeats     CN: PERRL, EOMI, VFF normal gaze preference, no facial palsy,     Motor: RUE no drift RLE drift, LUE some effort contratced LLE some effort    Sensory: Intact to LT and PP no extinction    Coordination: FTN intact b/l                                             GENERAL Exam:     Nontoxic , No Acute Distress   	  HEENT:  normocephalic, atraumatic  		  LUNGS:	Clear bilaterally  No Wheeze    	  HEART:	 Normal S1S2   No murmur RRR        	  GI/ ABDOMEN:  Soft  Non tender    EXTREMITIES:   No Edema  No Clubbing  No Cyanosis No Edema    MUSCULOSKELETAL: Normal Range of Motion  	   SKIN:      Normal   No Ecchymosis        LABS:                          14.5   8.06  )-----------( 221      ( 28 Apr 2025 07:15 )             46.0     04-28    146[H]  |  113[H]  |  41[H]  ----------------------------<  146[H]  4.0   |  25  |  1.32[H]    Ca    8.9      28 Apr 2025 07:15          Urinalysis Basic - ( 28 Apr 2025 07:15 )    Color: x / Appearance: x / SG: x / pH: x  Gluc: 146 mg/dL / Ketone: x  / Bili: x / Urobili: x   Blood: x / Protein: x / Nitrite: x   Leuk Esterase: x / RBC: x / WBC x   Sq Epi: x / Non Sq Epi: x / Bacteria: x          RADIOLOGY  < from: CT Angio Neck Stroke Protocol w/ IV Cont (04.23.25 @ 15:28) >  IMPRESSION:    1.   RIGHT CAROTID SYSTEM:    Atherosclerotic plaque carotid bulb without    hemodynamically significant stenosis. Internal carotid tortuosity and   fusiform dilatation, likely atherosclerotic    2.   LEFT CAROTID SYSTEM:     Atherosclerotic plaque carotid bulb without    hemodynamically significant stenosis.    3.   VERTEBRAL CIRCULATION:    Patent.    4.  ANTERIOR INTRACRANIAL CIRCULATION:     Extensive intracranial   atherosclerosis internal carotid arteries, at least moderate,and   anterior cerebral and middle cerebral arteries, moderate to severe.    5.  POSTERIOR INTRACRANIAL CIRCULATION:    Extensive intracranial   atherosclerosis vertebral arteries, at least moderate, basilar artery,   mild to moderate, and posterior cerebral arteries, severe on the right   and moderate on the left.    6.  No large vessel occlusion.    7.  BRAIN PERFUSION:   No acute infarction of the brain is convincingly   demonstrated.    --- End of Report ---    < end of copied text >          ACC: [id number] EXAM:  MR BRAIN   ORDERED BY: FLAQUITO CLIFFORD     PROCEDURE DATE:  04/25/2025          INTERPRETATION:  CLINICAL STATEMENT: Left-sided weakness    TECHNIQUE: MRI of the brain without gadolinium.    COMPARISON: CT head 4/23/2025    FINDINGS:  Moderate diffuse parenchymal volume loss noted.    There are nonspecific T2 prolongation signal abnormalities in the   periventricular subcortical white matter likely related to moderate   chronic microvascular ischemic changes.    Chronic infarct noted in the right corona radiata/basal ganglia. Tiny   chronic infarcts noted in the cerebellum.    There is no acute parenchymal hemorrhage, parenchymal mass, mass effect   or midline shift. There is no extra-axial fluid collection.  There is no   hydrocephalus.    Acute infarct noted in the right anterior cerebral artery territory   measuring 1.5 x 0.7 cm. Adjacent smaller acute infarcts also noted.    The visualized paranasal sinuses are well-aerated    IMPRESSION:  Acute infarct right anterior cerebral artery territory.    No acute intracranial hemorrhage    --- End of Report ---        CONCLUSIONS:     1. Left ventricular cavity is normal in size. Left ventricular wall   thickness is severely increased. Severe left ventricular hypertrophy.   Left ventricular systolic function is mildly to moderately decreased with   an ejection fraction visually estimated at 40 to 45 %. Regional wall   motion abnormalities present.   2. There is moderate (grade 2) left ventricular diastolic dysfunction.   3. Normal right ventricular cavity size and reduced right ventricular   systolic function.   4. Left atrium is mildly dilated.   5. There is severe calcification of the mitral valve annulus, Moderate   mitral valve leaflet calcification. Mild mitral valve stenosis. Mild   mitral regurgitation.   6. 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   There is moderate calcification of the aortic valve leaflets. Moderate   aortic regurgitation.   7. No pericardial effusion seen.   8. The inferior vena cava is normal in size (normal <2.1cm) with normal   inspiratory collapse (normal >50%) consistent with normal right atrial   pressure (  3, range 0-5mmHg).   9. Pulmonary artery systolic pressure could not be estimated.

## 2025-04-29 NOTE — DISCHARGE NOTE PROVIDER - CARE PROVIDER_API CALL
Adan De Anda)  Neurology  [address], [address]  [place] [address]  Phone: [phone]  Fax: [phone]  Follow Up Time:     Primary MD,   Phone: (   )    -  Fax: (   )    -  Follow Up Time:     Cardiology,   Phone: (   )    -  Fax: (   )    -  Follow Up Time:

## 2025-04-29 NOTE — ED PROVIDER NOTE - PROGRESS NOTE DETAILS
Lipid profile is good. Hyperlipidemia is stable. Follow low cholesterol diet. Continue current treatment. family notified at bedside of grave prognosis and results thus far, likely sbo, possible ischemic bowel, possible portal venous gas, also aspiration pna visible  pt. stable thus far, improved from arrival  surgery consulted for bedside eval given results  pt. is known DNR/DNI and likely not a candidate for emergency surgery at this time  will need admission, antbx given  pending sx consult pt. stable, admitting to medicine, surgery states no acute surgical intervention, but will follow

## 2025-04-29 NOTE — DISCHARGE NOTE PROVIDER - PROVIDER TOKENS
PROVIDER:[TOKEN:[[id number]]],FREE:[LAST:[Primary MD],PHONE:[(   )    -],FAX:[(   )    -]],FREE:[LAST:[Cardiology],PHONE:[(   )    -],FAX:[(   )    -]]

## 2025-04-29 NOTE — DISCHARGE NOTE PROVIDER - NSDCFUADDINST_GEN_ALL_CORE_FT
Ensure 1 can oral twice daily    It is important to see your primary physician as well as any specialty physicians within the next week to perform a comprehensive medical review.  Call their offices for an appointment as soon as you leave the hospital.  You will also need to see them for renewal of your medications.  If have any difficulty following with a physician, contact the [facility] [phone] or via [email or web address].   To obtain your results, you can access the [email or web address] Patient Portal at [email or web address].  Your medical issues appear to be stable at this time, but if your symptoms recur or worsen, contact your physicians and/or return to the hospital if necessary.  If you encounter any issues or questions with your medication, call your physicians before stopping the medication.  Do not drive.  Limit your diet to 2 grams of sodium daily.

## 2025-04-30 DIAGNOSIS — Z51.5 ENCOUNTER FOR PALLIATIVE CARE: ICD-10-CM

## 2025-04-30 DIAGNOSIS — I50.42 CHRONIC COMBINED SYSTOLIC (CONGESTIVE) AND DIASTOLIC (CONGESTIVE) HEART FAILURE: ICD-10-CM

## 2025-04-30 DIAGNOSIS — J96.01 ACUTE RESPIRATORY FAILURE WITH HYPOXIA: ICD-10-CM

## 2025-04-30 DIAGNOSIS — R68.89 OTHER GENERAL SYMPTOMS AND SIGNS: ICD-10-CM

## 2025-04-30 DIAGNOSIS — Z86.73 PERSONAL HISTORY OF TRANSIENT ISCHEMIC ATTACK (TIA), AND CEREBRAL INFARCTION WITHOUT RESIDUAL DEFICITS: ICD-10-CM

## 2025-04-30 DIAGNOSIS — R53.81 OTHER MALAISE: ICD-10-CM

## 2025-04-30 DIAGNOSIS — K56.609 UNSPECIFIED INTESTINAL OBSTRUCTION, UNSPECIFIED AS TO PARTIAL VERSUS COMPLETE OBSTRUCTION: ICD-10-CM

## 2025-04-30 DIAGNOSIS — I16.1 HYPERTENSIVE EMERGENCY: ICD-10-CM

## 2025-04-30 DIAGNOSIS — J69.0 PNEUMONITIS DUE TO INHALATION OF FOOD AND VOMIT: ICD-10-CM

## 2025-04-30 DIAGNOSIS — K55.9 VASCULAR DISORDER OF INTESTINE, UNSPECIFIED: ICD-10-CM

## 2025-04-30 DIAGNOSIS — N28.9 DISORDER OF KIDNEY AND URETER, UNSPECIFIED: ICD-10-CM

## 2025-04-30 DIAGNOSIS — N85.9 NONINFLAMMATORY DISORDER OF UTERUS, UNSPECIFIED: ICD-10-CM

## 2025-04-30 LAB
ALBUMIN SERPL ELPH-MCNC: 2.1 G/DL — LOW (ref 3.3–5)
ALP SERPL-CCNC: 77 U/L — SIGNIFICANT CHANGE UP (ref 40–120)
ALT FLD-CCNC: 23 U/L — SIGNIFICANT CHANGE UP (ref 12–78)
ANION GAP SERPL CALC-SCNC: 4 MMOL/L — LOW (ref 5–17)
AST SERPL-CCNC: 15 U/L — SIGNIFICANT CHANGE UP (ref 15–37)
BASOPHILS # BLD AUTO: 0.03 K/UL — SIGNIFICANT CHANGE UP (ref 0–0.2)
BASOPHILS NFR BLD AUTO: 0.6 % — SIGNIFICANT CHANGE UP (ref 0–2)
BILIRUB SERPL-MCNC: 0.6 MG/DL — SIGNIFICANT CHANGE UP (ref 0.2–1.2)
BUN SERPL-MCNC: 38 MG/DL — HIGH (ref 7–23)
CALCIUM SERPL-MCNC: 8.3 MG/DL — LOW (ref 8.5–10.1)
CHLORIDE SERPL-SCNC: 118 MMOL/L — HIGH (ref 96–108)
CO2 SERPL-SCNC: 22 MMOL/L — SIGNIFICANT CHANGE UP (ref 22–31)
CREAT SERPL-MCNC: 1.07 MG/DL — SIGNIFICANT CHANGE UP (ref 0.5–1.3)
EGFR: 48 ML/MIN/1.73M2 — LOW
EGFR: 48 ML/MIN/1.73M2 — LOW
EOSINOPHIL # BLD AUTO: 0.01 K/UL — SIGNIFICANT CHANGE UP (ref 0–0.5)
EOSINOPHIL NFR BLD AUTO: 0.2 % — SIGNIFICANT CHANGE UP (ref 0–6)
GLUCOSE SERPL-MCNC: 102 MG/DL — HIGH (ref 70–99)
HCT VFR BLD CALC: 38.2 % — SIGNIFICANT CHANGE UP (ref 34.5–45)
HGB BLD-MCNC: 12.4 G/DL — SIGNIFICANT CHANGE UP (ref 11.5–15.5)
IMM GRANULOCYTES NFR BLD AUTO: 0.4 % — SIGNIFICANT CHANGE UP (ref 0–0.9)
LACTATE SERPL-SCNC: 2 MMOL/L — SIGNIFICANT CHANGE UP (ref 0.7–2)
LYMPHOCYTES # BLD AUTO: 0.35 K/UL — LOW (ref 1–3.3)
LYMPHOCYTES # BLD AUTO: 6.4 % — LOW (ref 13–44)
MCHC RBC-ENTMCNC: 31.8 PG — SIGNIFICANT CHANGE UP (ref 27–34)
MCHC RBC-ENTMCNC: 32.5 G/DL — SIGNIFICANT CHANGE UP (ref 32–36)
MCV RBC AUTO: 97.9 FL — SIGNIFICANT CHANGE UP (ref 80–100)
MONOCYTES # BLD AUTO: 0.51 K/UL — SIGNIFICANT CHANGE UP (ref 0–0.9)
MONOCYTES NFR BLD AUTO: 9.4 % — SIGNIFICANT CHANGE UP (ref 2–14)
NEUTROPHILS # BLD AUTO: 4.52 K/UL — SIGNIFICANT CHANGE UP (ref 1.8–7.4)
NEUTROPHILS NFR BLD AUTO: 83 % — HIGH (ref 43–77)
NRBC BLD AUTO-RTO: 0 /100 WBCS — SIGNIFICANT CHANGE UP (ref 0–0)
PLATELET # BLD AUTO: 165 K/UL — SIGNIFICANT CHANGE UP (ref 150–400)
POTASSIUM SERPL-MCNC: 3.3 MMOL/L — LOW (ref 3.5–5.3)
POTASSIUM SERPL-SCNC: 3.3 MMOL/L — LOW (ref 3.5–5.3)
PROT SERPL-MCNC: 5.5 GM/DL — LOW (ref 6–8.3)
RBC # BLD: 3.9 M/UL — SIGNIFICANT CHANGE UP (ref 3.8–5.2)
RBC # FLD: 15.1 % — HIGH (ref 10.3–14.5)
SODIUM SERPL-SCNC: 144 MMOL/L — SIGNIFICANT CHANGE UP (ref 135–145)
WBC # BLD: 5.44 K/UL — SIGNIFICANT CHANGE UP (ref 3.8–10.5)
WBC # FLD AUTO: 5.44 K/UL — SIGNIFICANT CHANGE UP (ref 3.8–10.5)

## 2025-04-30 PROCEDURE — 99223 1ST HOSP IP/OBS HIGH 75: CPT | Mod: FS

## 2025-04-30 PROCEDURE — 70450 CT HEAD/BRAIN W/O DYE: CPT | Mod: 26

## 2025-04-30 PROCEDURE — 74177 CT ABD & PELVIS W/CONTRAST: CPT | Mod: 26

## 2025-04-30 PROCEDURE — 71260 CT THORAX DX C+: CPT | Mod: 26

## 2025-04-30 PROCEDURE — 99222 1ST HOSP IP/OBS MODERATE 55: CPT

## 2025-04-30 RX ORDER — LOSARTAN POTASSIUM 100 MG/1
50 TABLET, FILM COATED ORAL DAILY
Refills: 0 | Status: DISCONTINUED | OUTPATIENT
Start: 2025-04-30 | End: 2025-05-01

## 2025-04-30 RX ORDER — METOPROLOL SUCCINATE 50 MG/1
25 TABLET, EXTENDED RELEASE ORAL DAILY
Refills: 0 | Status: DISCONTINUED | OUTPATIENT
Start: 2025-04-30 | End: 2025-05-01

## 2025-04-30 RX ORDER — SODIUM CHLORIDE 9 G/1000ML
1000 INJECTION, SOLUTION INTRAVENOUS
Refills: 0 | Status: DISCONTINUED | OUTPATIENT
Start: 2025-04-30 | End: 2025-05-10

## 2025-04-30 RX ORDER — CEFEPIME 2 G/20ML
1000 INJECTION, POWDER, FOR SOLUTION INTRAVENOUS EVERY 8 HOURS
Refills: 0 | Status: DISCONTINUED | OUTPATIENT
Start: 2025-04-30 | End: 2025-04-30

## 2025-04-30 RX ORDER — CLOPIDOGREL BISULFATE 75 MG/1
75 TABLET, FILM COATED ORAL DAILY
Refills: 0 | Status: DISCONTINUED | OUTPATIENT
Start: 2025-04-30 | End: 2025-05-15

## 2025-04-30 RX ORDER — ATORVASTATIN CALCIUM 80 MG/1
40 TABLET, FILM COATED ORAL AT BEDTIME
Refills: 0 | Status: DISCONTINUED | OUTPATIENT
Start: 2025-04-30 | End: 2025-05-15

## 2025-04-30 RX ORDER — ACETAMINOPHEN 500 MG/5ML
650 LIQUID (ML) ORAL EVERY 6 HOURS
Refills: 0 | Status: DISCONTINUED | OUTPATIENT
Start: 2025-04-30 | End: 2025-05-15

## 2025-04-30 RX ORDER — VANCOMYCIN HCL IN 5 % DEXTROSE 1.5G/250ML
1000 PLASTIC BAG, INJECTION (ML) INTRAVENOUS ONCE
Refills: 0 | Status: DISCONTINUED | OUTPATIENT
Start: 2025-04-30 | End: 2025-04-30

## 2025-04-30 RX ORDER — ASPIRIN 325 MG
81 TABLET ORAL DAILY
Refills: 0 | Status: DISCONTINUED | OUTPATIENT
Start: 2025-04-30 | End: 2025-05-15

## 2025-04-30 RX ORDER — CEFEPIME 2 G/20ML
1000 INJECTION, POWDER, FOR SOLUTION INTRAVENOUS EVERY 24 HOURS
Refills: 0 | Status: DISCONTINUED | OUTPATIENT
Start: 2025-04-30 | End: 2025-05-02

## 2025-04-30 RX ADMIN — METOPROLOL SUCCINATE 25 MILLIGRAM(S): 50 TABLET, EXTENDED RELEASE ORAL at 05:42

## 2025-04-30 RX ADMIN — Medication 250 MILLIGRAM(S): at 03:02

## 2025-04-30 RX ADMIN — Medication 100 MILLIEQUIVALENT(S): at 14:23

## 2025-04-30 RX ADMIN — CEFEPIME 100 MILLIGRAM(S): 2 INJECTION, POWDER, FOR SOLUTION INTRAVENOUS at 01:22

## 2025-04-30 RX ADMIN — Medication 100 MILLIEQUIVALENT(S): at 17:26

## 2025-04-30 RX ADMIN — LOSARTAN POTASSIUM 50 MILLIGRAM(S): 100 TABLET, FILM COATED ORAL at 05:42

## 2025-04-30 RX ADMIN — Medication 100 MILLIEQUIVALENT(S): at 15:46

## 2025-04-30 RX ADMIN — SODIUM CHLORIDE 60 MILLILITER(S): 9 INJECTION, SOLUTION INTRAVENOUS at 12:20

## 2025-04-30 NOTE — H&P ADULT - PROBLEM SELECTOR PLAN 7
- incidental finding on CT  - f/u outpatient - incidental finding on CT: lll-defined 1.8 x 1.4 cm hypodense ending focus in the region of the lower uterus.  - f/u outpatient

## 2025-04-30 NOTE — CONSULT NOTE ADULT - PROBLEM SELECTOR RECOMMENDATION 5
was discharged yesterday following acute right KASSIE infarct, CTH shows evolving right KASSIE infarct  on asa and plavix

## 2025-04-30 NOTE — H&P ADULT - PROBLEM SELECTOR PLAN 4
- CT abdomen/pelvis revealed possible SBO, bowel ischemia, scattered punctate foci of gas in the right lower quadrant ventral abdominal wall and in the right groin soft tissues possibly soft tissue infection. Ill-defined 1.8 x 1.4 cm hypodense ending focus in the region of the lower uterus.  - surgery consulted: no intervention due to comorbidities  - c/w conservative management  - NPO  - guarded prognosis  - ongoing GOC discussion - CT abdomen/pelvis revealed possible SBO, bowel ischemia, scattered punctate foci of gas in the right lower quadrant ventral abdominal wall and in the right groin soft tissues possibly soft tissue infection. Ill-defined 1.8 x 1.4 cm hypodense ending focus in the region of the lower uterus.  - surgery consulted: no intervention due to comorbidities  - c/w conservative management  - f/u lactate  - NPO  - guarded prognosis  - ongoing GOC discussion - CT abdomen/pelvis revealed possible SBO, bowel ischemia, scattered punctate foci of gas in the right lower quadrant ventral abdominal wall and in the right groin soft tissues possibly soft tissue infection. Ill-defined 1.8 x 1.4 cm hypodense ending focus in the region of the lower uterus.  - surgery consulted: no intervention due to comorbidities  - c/w conservative management  - gentle ivf with D5LR @60 cc/hr  - f/u lactate  - NPO  - guarded prognosis  - ongoing GOC discussion

## 2025-04-30 NOTE — H&P ADULT - PROBLEM SELECTOR PLAN 1
- likely multifactorial: aspiration pneumonia. hx of CHF   - CT reviewed as above  - Currently on 2L  NC   - c/w abx: cefepime and vancomycin  - Supplemental O2 to keep SpO2 >92%  - bronchodilators as needed - likely multifactorial: aspiration pneumonia, less likely CHF   - CT reviewed as above  - Currently on 2L NC   - c/w abx: cefepime and vancomycin  - Supplemental O2 to keep SpO2 >92%  - bronchodilators as needed

## 2025-04-30 NOTE — H&P ADULT - ASSESSMENT
89-year-old female with past medical history of hypertension, combined CHF, CVA, cervical cancer status post radiation, glaucoma, discharged yesterday after being admitted for acute CVA, gastroenteritis, and elevated troponin who was brought back to the ED due to hypoxia and vomiting. CT chest findings consistent with likely aspiration pneumonia. CT abdomen/pelvis revealed possible SBO, bowel ischemia, scattered punctate foci of gas in the right lower quadrant ventral abdominal wall and in the right groin soft tissues possibly soft tissue infection. Ill-defined 1.8 x 1.4 cm hypodense ending focus in the region of the lower uterus. Surgery was consulted; however, recommend conservative management due to comorbidities. Admit to telemetry

## 2025-04-30 NOTE — ED ADULT NURSE REASSESSMENT NOTE - NS ED NURSE REASSESS COMMENT FT1
pt skin clean and intact.  pt does have ecchymosis on bilateral upper extremities.   repeat lactate sent

## 2025-04-30 NOTE — CONSULT NOTE ADULT - PROBLEM SELECTOR RECOMMENDATION 4
Echo from April 4/2/25 EF 40-45%, regional WMA, grade II diastolic dysfunction, moderate AR  on IVF as she is NPO, monitor I&Os

## 2025-04-30 NOTE — CONSULT NOTE ADULT - PROBLEM SELECTOR RECOMMENDATION 9
CT abdomen with Dilated mid and distal small bowel loops with air-fluid levels measuring up to approximately 4 cm.  Questionable transition point and/or tethered small bowel loop in the right lower quadrant  surgery eval appreciated, recommending conservative management, no surgical intervention planned  NPO, bowel rest  IVF CT abdomen with Dilated mid and distal small bowel loops with air-fluid levels measuring up to approximately 4 cm.  Questionable transition point and/or tethered small bowel loop in the right lower quadrant  no abdominal pain or tenderness and no nausea, please monitor and document BMs  surgery eval appreciated, recommending conservative management, no surgical intervention planned  NPO, bowel rest  IVF CT abdomen with Dilated mid and distal small bowel loops with air-fluid levels measuring up to approximately 4 cm. Questionable transition point and/or tethered small bowel loop in the right lower quadrant  no abdominal pain or tenderness and no nausea, please monitor and document BMs  surgery eval appreciated, recommending conservative management, no surgical intervention planned  NPO, bowel rest  IVF

## 2025-04-30 NOTE — CONSULT NOTE ADULT - CONVERSATION DETAILS
Spoke with patient's daughter's Mary and Daniel.  Mary was in person and Daniel via phone.  Introduced self and palliative medicine as well as our role in the care team.  Patient had requested this NP reach out to her daughters as she was forgetful.     Patient's daughter Mary shared that patient had been walking around at home with walker and able to navigate some steps. [place] and was doing rather well.  She had been hospitalized earlier in the month and since then had a few events where she felt like she couldn't breathe and was seen by EMS but did not require transport.  Patient then admitted with acute CVA and was discharged to rehab.  Shortly after arriving to rehab she had episode of hypoxemia and did vomit.  She was transferred to ED for further eval.  They are aware of CT findings with concern for obstruction as well as surgical eval and no surgery indicated.  They are aware she would be a poor surgical candidate as well although there are no indications.  Discussed patient has been free of vomiting or nausea and pain and will continue with bowel regimen.  Ultimately will be some time to see how patient does.  Explained also being treated for infection concern for aspiration as well as concern for soft tissue infection in the abdomen.   Explained while there are no decisions that need to be made at this time, palliative is involved as patient could have further issues and to help with additional support or to assist if/when decision points arise.  Daughters both confirm patient has a MOLST indicating DNR/I.      After call ended with Daniel, Mary asked about palliative vs. hospice.  Explained philosophy of care for both.  Provided  anticipatory guidance about possible hospice consideration in near future if patient declines further or her goals change and she wishes to focus on comfort and quality of life.  FOr now wishes remain to continue with current medical management and treat the treatable.

## 2025-04-30 NOTE — H&P ADULT - HISTORY OF PRESENT ILLNESS
89-year-old female with past medical history of hypertension, combined CHF, CVA, cervical cancer status post radiation, glaucoma, discharged yesterday after being admitted for acute CVA, gastroenteritis, and elevated troponin who was brought back to the ED due to hypoxia and vomiting. ROS not obtained due to altered mental status.  On presentation, the patient was tachycardic, hypertensive, and tachypneic. Labs notable for troponin 160, bicarb 20, creatinine 1.3(around baseline), lactate 2.1. CT chest findings consistent with likely aspiration pneumonia. CT abdomen/pelvis revealed possible SBO, bowel ischemia, scattered punctate foci of gas in the right lower quadrant ventral abdominal wall and in the right groin soft tissues possibly soft tissue infection. Ill-defined 1.8 x 1.4 cm hypodense ending focus in the region of the lower uterus. Surgery was consulted; however, recommend conservative management due to comorbidities. The patient receives cefepime, vancomycin, 500 cc ivf bolus, hydralazine, labetalol, Zofran in the ED.

## 2025-04-30 NOTE — H&P ADULT - CONVERSATION DETAILS
GOC discussed at length with HCP(daughters at bedside) and patient. Pt is encephalopathic and therefore decision deferred to HCP who opted for DNR/DNI code status based previous discussions with patient. GOC discussed at length with HCP(daughters at bedside) and patient. Pt is encephalopathic and therefore decision deferred to HCP who opted for DNR/DNI code status based previous discussions with patient. Would like trial of ivf and antibiotics befor making further decisions

## 2025-04-30 NOTE — CONSULT NOTE ADULT - ASSESSMENT
89 year old female PMH of HF, CVA, cervical cancer s/p RT, presented to the ED for hypoxemia and vomiting.  Patient found to have SBO no surgical intervention planned and scattered punctate foci of gas in RLQ of ventral abdominal wall, concern for soft tissue infection and started on abx.

## 2025-04-30 NOTE — H&P ADULT - PROBLEM SELECTOR PLAN 6
- CT shows scattered punctate foci of gas in the right lower quadrant ventral abdominal wall and in the right groin soft tissues possibly soft tissue infection.  - c/w abx : cefepime and vancomycin  - ctm

## 2025-04-30 NOTE — H&P ADULT - NSHPLABSRESULTS_GEN_ALL_CORE
LABS:  cret                        14.2   6.78  )-----------( 235      ( 29 Apr 2025 20:58 )             43.3     04-29    143  |  115[H]  |  45[H]  ----------------------------<  189[H]  4.6   |  20[L]  |  1.37[H]    Ca    8.5      29 Apr 2025 20:58    TPro  6.4  /  Alb  2.4[L]  /  TBili  0.5  /  DBili  x   /  AST  42[H]  /  ALT  27  /  AlkPhos  85  04-29    < from: CT Abdomen and Pelvis w/ IV Cont (04.30.25 @ 00:40) >    IMPRESSION:    CT CHEST:  Patchy tree-in-bud nodules in the right upper lobe and right lower lobe.    Patchy airspace consolidation the right lower lobe.  Correlate clinically   for aspiration and/or pneumonia.    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  Follow-up with cardiothoracic   surgery is recommended for long-term management.    CT ABDOMEN/PELVIS:  Dilated mid and distal small bowel loops with air-fluid levels measuring   up to approximately 4 cm.  Questionable transition point and/or tethered   small bowel loop in the right lower quadrant, suboptimally evaluated due   to motion artifact.  A small bowel obstruction isnot excluded.  Small   bowel ileus is in the differential diagnosis.    There is mesenteric edema and suspected small foci of mesenteric gas in   the right lower quadrant.  There is also punctate focus of portal venous   gas the left hepatic lobe.  The findings raise the possibility of bowel   ischemia.  Surgical evaluation is recommended.    No gastrointestinal perforation or abscess/drainable fluid collection.    There are scattered punctate foci of gas in the right lower quadrant   ventral abdominal wall and in the right groin soft tissues without   surrounding edema, fluid collection or abscess.  Findings are of   uncertain etiology and clinical significance.  Soft tissue infection   should be excluded clinically.    Limited evaluation ofthe pelvic organs.  Suspected 7 mm fibroid.    Ill-defined 1.8 x 1.4 cm hypodense ending focus in the region of the   lower uterus.  Several surgical clips in the region of the cervix or   vagina.  Pelvic ultrasound may be obtained as clinically warranted.          < end of copied text >

## 2025-04-30 NOTE — CONSULT NOTE ADULT - NS ATTEND AMEND GEN_ALL_CORE FT
89 y F hx of HFrEF (40-45%),  cervical cancer s/p RT, recent adm for acute R KASSIE infarct, transferred from [facility] for vomiting, hypoxia. CT shows evidence of SBO, with concern for soft tissue infection w focus of gas. Abx per ID. No acute surgical intervention indicated per surgery. NPO, on IVF. GOC discussion as above, pt has 2 daughters, MOLST on file for DNR/DNI. They wish to continue with conservative med mgmt for now. Palliative team will follow for ongoing GOC discussions pending clinical course.

## 2025-04-30 NOTE — H&P ADULT - PROBLEM SELECTOR PLAN 9
- cr 1.3 (unchanged from previous admission)  - Unclear if history of kidney disease  - Avoid nephrotoxin  - i/os  - f/u am labs  - Monitor electrolytes and replete as needed

## 2025-04-30 NOTE — CONSULT NOTE ADULT - ASSESSMENT
89F w/ PMHx HTN, CHF, cervical CA s/p RT, urinary/bowel incontinence, one prior abdominal surgery, [facility] Patient also with episode of vomiting.  CT A/P revealed dilated mid/distal small bowel loops w/ air fluid levels - SBO vs ileus; mesenteric edema, suspected small foci of mesenteric gas in RLQ, punctate focus portal venous gas in L hepatic lobe - poss bowel ischemia; no GI perforation or abscess, or drainable fluid collection; scattered punctate foci of gas in RLQ, ventral abdominal wall and in R groin soft tissue, possible soft tissue infection.  No leukocytosis, BUN/Cr elevated to 45/1.37 relatively stable from prior admission, lactate elevated to 2.1.  Abdomen softly distended, no rebound, no guarding.  Discussed with family if surgery would align with goals of care if offered, family declined and said they would not want to go through with surgery    Plan as discussed with Dr. Huston:  - Recommend conservative management with bowel rest and NPO for now  - If vomitting recurs would consider placement of NGT  - Monitor for signs of bowel function  - Recommend medical evaluation for possible admission and ongoing treatment of other acute and chronic conditions  - Surgery will follow

## 2025-04-30 NOTE — CONSULT NOTE ADULT - PROBLEM SELECTOR RECOMMENDATION 2
CT abdomen with mesenteric edema, suspected small foci of mesenteric gas in the right lower quadrant.  There is also punctate focus of portal venous gas the left hepatic lobe.  The findings raise the possibility of bowel ischemia.   lactate was 2.1-->2 and no leukocytosis   on cefepime

## 2025-04-30 NOTE — H&P ADULT - NSHPPHYSICALEXAM_GEN_ALL_CORE
GENERAL: NAD  HEAD:  Atraumatic, normocephalic  EYES: EOMI, PERRL  NECK: Supple, trachea midline  HEART: Regular rate and rhythm  LUNGS: Unlabored respirations. + diffuse rhonchi  ABDOMEN: Soft, nontender, nondistended, +BS  EXTREMITIES: 2+ peripheral pulses bilaterally. No clubbing, cyanosis, or edema  NERVOUS SYSTEM:  A&Ox0, lethargic

## 2025-04-30 NOTE — CONSULT NOTE ADULT - PROBLEM SELECTOR RECOMMENDATION 7
See [facility] conversation above.  Patient with 2 daughters who are surrogate decision makers.  Patient participatory but forgetful and defers more detailed conversations to her daughters.  MOLST on file with DNR/I.  Goals are to continue with current medical management and hoping for improvement with understanding patient could decline.  Palliative available as needed.     Message sent to Dr. Vallecillo

## 2025-04-30 NOTE — CONSULT NOTE ADULT - SUBJECTIVE AND OBJECTIVE BOX
HPI:  89-year-old female with past medical history of hypertension, combined CHF, CVA, cervical cancer status post radiation, glaucoma, discharged yesterday after being admitted for acute CVA, gastroenteritis, and elevated troponin who was brought back to the ED due to hypoxia and vomiting. ROS not obtained due to altered mental status.  On presentation, the patient was tachycardic, hypertensive, and tachypneic. Labs notable for troponin 160, bicarb 20, creatinine 1.3(around baseline), lactate 2.1. CT chest findings consistent with likely aspiration pneumonia. CT abdomen/pelvis revealed possible SBO, bowel ischemia, scattered punctate foci of gas in the right lower quadrant ventral abdominal wall and in the right groin soft tissues possibly soft tissue infection. Ill-defined 1.8 x 1.4 cm hypodense ending focus in the region of the lower uterus. Surgery was consulted; however, recommend conservative management due to comorbidities. The patient receives cefepime, vancomycin, 500 cc ivf bolus, hydralazine, labetalol, Zofran in the ED.  (30 Apr 2025 04:53)    PERTINENT PM/SXH:   Hypertension    Cervical cancer    Abdominal mass    Depression (emotion)    GI (gastrointestinal bleed)    Hemorrhoid    CVA (cerebral infarction)    Depression    Diverticulitis    CHF (congestive heart failure)    History of cervical cancer    History of cerebrovascular accident (CVA) with residual deficit    Chronic heart failure with preserved ejection fraction (HFpEF)    Urinary and bowel incontinence      S/P abdominal surgery, follow-up exam    S/P lumpectomy of breast      FAMILY HISTORY:    ITEMS NOT CHECKED ARE NOT PRESENT    SOCIAL HISTORY:   Significant other/partner:  [ ]  Children: yes  [ ]  Nondenominational/Spirituality: Mandaeism  Substance hx:  [ ]   Tobacco hx:  [ ]   Alcohol hx: [ ]   Home Opioid hx:  [ ] I-Stop Reference No:  Living Situation: [ ]Home  [ ]Long term care  [ x]Rehab [ ]Other    ADVANCE DIRECTIVES:    DNR  MOLST  [x ]  Living Will  [ ]   DECISION MAKER(s):  [ ] Health Care Proxy(s)  [ x] Surrogate(s)  [ ] Guardian           Name(s): Phone Number(s): Daniel (daughter) [phone]/ [place] [phone]    BASELINE (I)ADL(s) (prior to admission):  [place]: [ ]Total  [ ] Moderate [ x]Dependent    Allergies    penicillin (Hives)    Intolerances    MEDICATIONS  (STANDING):  aspirin  chewable 81 milliGRAM(s) Oral daily  atorvastatin 40 milliGRAM(s) Oral at bedtime  cefepime   IVPB 1000 milliGRAM(s) IV Intermittent every 24 hours  clopidogrel Tablet 75 milliGRAM(s) Oral daily  dextrose 5% + lactated ringers. 1000 milliLiter(s) (60 mL/Hr) IV Continuous <Continuous>  losartan 50 milliGRAM(s) Oral daily  metoprolol succinate ER 25 milliGRAM(s) Oral daily    MEDICATIONS  (PRN):  acetaminophen     Tablet .. 650 milliGRAM(s) Oral every 6 hours PRN Temp greater or equal to 38C (100.4F), Mild Pain (1 - 3)    PRESENT SYMPTOMS: [ ]Unable to obtain due to poor mentation   Source if other than patient:  [ ]Family   [ ]Team     Pain: [ ] yes [ ] no  QOL impact -   Location -                    Aggravating factors -  Quality -  Radiation -  Timing-  Severity (0-10 scale):  Minimal acceptable level (0-10 scale):     PAIN AD Score:     [email or web address] (press ctrl +  left click to view)    Dyspnea:                           [ ]Mild [ ]Moderate [ ]Severe  Anxiety:                             [ ]Mild [ ]Moderate [ ]Severe  Fatigue:                             [ ]Mild [ ]Moderate [ ]Severe  Nausea:                             [ ]Mild [ ]Moderate [ ]Severe  Loss of appetite:              [ ]Mild [ ]Moderate [ ]Severe  Constipation:                    [ ]Mild [ ]Moderate [ ]Severe    Other Symptoms:  [ ]All other review of systems negative     Karnofsky Performance Score/Palliative Performance Status Version 2:         %    http://npcrc.org/files/news/palliative_performance_scale_ppsv2.pdf  PHYSICAL EXAM:  Vital Signs Last 24 Hrs  T(C): 37.2 (30 Apr 2025 10:23), Max: 37.2 (30 Apr 2025 10:23)  T(F): 99 (30 Apr 2025 10:23), Max: 99 (30 Apr 2025 10:23)  HR: 75 (30 Apr 2025 10:23) (75 - 123)  BP: 156/67 (30 Apr 2025 10:23) (149/56 - 204/75)  BP(mean): --  RR: 19 (30 Apr 2025 10:23) (18 - 30)  SpO2: 96% (30 Apr 2025 10:23) (90% - 99%)    Parameters below as of 30 Apr 2025 10:23  Patient On (Oxygen Delivery Method): nasal cannula     I&O's Summary    30 Apr 2025 07:01  -  30 Apr 2025 12:05  --------------------------------------------------------  IN: 0 mL / OUT: 0 mL / NET: 0 mL    GENERAL:  [ ]Alert  [ ]Oriented x   [ ]Lethargic  [ ]Cachexia  [ ]Unarousable  [ ]Verbal  [ ]Non-Verbal  Behavioral:   [ ] Anxiety  [ ] Delirium [ ] Agitation [ ] Other  HEENT:  [ ]Normal   [ ]Dry mouth   [ ]ET Tube/Trach  [ ]Oral lesions  PULMONARY:   [ ]Clear [ ]Tachypnea  [ ]Audible excessive secretions   [ ]Rhonchi        [ ]Right [ ]Left [ ]Bilateral  [ ]Crackles        [ ]Right [ ]Left [ ]Bilateral  [ ]Wheezing     [ ]Right [ ]Left [ ]Bilateral  CARDIOVASCULAR:    [ ]Regular [ ]Irregular [ ]Tachy  [ ][place]ayden [ ]Murmur [ ]Other  GASTROINTESTINAL:  [ ]Soft  [ ]Distended   [ ]+BS  [ ]Non tender [ ]Tender  [ ]PEG [ ]OGT/ NGT  Last BM: GENITOURINARY:  [ ]Normal [ ] Incontinent   [ ]Oliguria/Anuria   [ ][place]  MUSCULOSKELETAL:   [ ]Normal   [ ]Weakness  [ ]Bed/Wheelchair bound [ ]Edema  NEUROLOGIC:   [ ]No focal deficits  [ ] Cognitive impairment  [ ] Dysphagia [ ]Dysarthria [ ] Paresis [ ]Other   SKIN:   [ ]Normal   [ ]Pressure ulcer(s)  [ ]Rash    CRITICAL CARE:  [ ] Shock Present  [ ]Septic [ ]Cardiogenic [ ]Neurologic [ ]Hypovolemic  [ ]  Vasopressors [ ]  Inotropes   [ ] Respiratory failure present [ ] mechanical ventilation [ ] non-invasive ventilatory support [ ] High flow  [ ] Acute  [ ] Chronic [ ] Hypoxic  [ ] Hypercarbic [ ] Other  [ ] Other organ failure     LABS:                        12.4   5.44  )-----------( 165      ( 30 Apr 2025 10:55 )             38.2   04-29    143  |  115[H]  |  45[H]  ----------------------------<  189[H]  4.6   |  20[L]  |  1.37[H]    Ca    8.5      29 Apr 2025 20:58    TPro  6.4  /  Alb  2.4[L]  /  TBili  0.5  /  DBili  x   /  AST  42[H]  /  ALT  27  /  AlkPhos  85  04-29    Urinalysis with Rflx Culture (04.29.25 @ 22:35)    Urine Appearance: Clear   Color: Yellow   Specific Gravity: 1.023   pH Urine: 5.5   Protein, Urine: 30 mg/dL   Glucose Qualitative, Urine: Negative mg/dL   Ketone - Urine: Negative mg/dL   Blood, Urine: Trace   Bilirubin: Negative   Urobilinogen: 0.2 mg/dL   Leukocyte Esterase Concentration: Trace   Nitrite: Negative    RADIOLOGY & ADDITIONAL STUDIES:  < from: CT Abdomen and Pelvis w/ IV Cont (04.30.25 @ 00:40) >  IMPRESSION:  CT CHEST:  Patchy tree-in-bud nodules in the right upper lobe and right lower lobe.    Patchy airspace consolidation the right lower lobe.  Correlate clinically   for aspiration and/or pneumonia.  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  Follow-up with cardiothoracic   surgery is recommended for long-term management.  CT ABDOMEN/PELVIS:  Dilated mid and distal small bowel loops with air-fluid levels measuring   up to approximately 4 cm.  Questionable transition point and/or tethered   small bowel loop in the right lower quadrant, suboptimally evaluated due   to motion artifact.  A small bowel obstruction isnot excluded.  Small   bowel ileus is in the differential diagnosis.  There is mesenteric edema and suspected small foci of mesenteric gas in   the right lower quadrant.  There is also punctate focus of portal venous   gas the left hepatic lobe.  The findings raise the possibility of bowel   ischemia.  Surgical evaluation is recommended.  No gastrointestinal perforation or abscess/drainable fluid collection.  There are scattered punctate foci of gas in the right lower quadrant   ventral abdominal wall and in the right groin soft tissues without   surrounding edema, fluid collection or abscess.  Findings are of   uncertain etiology and clinical significance.  Soft tissue infection   should be excluded clinically.  Limited evaluation ofthe pelvic organs.  Suspected 7 mm fibroid.    Ill-defined 1.8 x 1.4 cm hypodense ending focus in the region of the   lower uterus.  Several surgical clips in the region of the cervix or   vagina.  Pelvic ultrasound may be obtained as clinically warranted.   Findings were discussed with Dr. HOLDEN JEONG [id number] 4/30/2025   1:50 AM by Dr. Fajardo with read back confirmation.  --- End of Report ---  < end of copied text >    < from: CT Head No Cont (04.30.25 @ 00:37) >  IMPRESSION:  EVOLVING RIGHT ANTERIOR CEREBRAL ARTERY TERRITORY INFARCT. NO ACUTE   HEMORRHAGE.  -- End of Report ---  < end of copied text >    < from: TTE Echo Complete w/o Contrast w/ Doppler (04.02.25 @ 11:25) >  CONCLUSIONS:   1. Left ventricular cavity is normal in size. Left ventricular wall   thickness is severely increased. Severe left ventricular hypertrophy.   Left ventricular systolic function is mildly to moderately decreased with   an ejection fraction visually estimated at 40 to 45 %. Regional wall   motion abnormalities present.   2. There is moderate (grade 2) left ventricular diastolic dysfunction.   3. Normal right ventricular cavity size and reduced right ventricular   systolic function.   4. Left atrium is mildly dilated.   5. There is severe calcification of the mitral valve annulus, Moderate   mitral valve leaflet calcification. Mild mitral valve stenosis. Mild   mitral regurgitation.   6. 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   There is moderate calcification of the aortic valve leaflets. Moderate   aortic regurgitation.   7. No pericardial effusion seen.   8. The inferior vena cava is normal in size (normal <2.1cm) with normal   inspiratory collapse (normal >50%) consistent with normal right atrial   pressure (  3, range 0-5mmHg).   9. Pulmonary artery systolic pressure could not be estimated.  < end of copied text >    PROTEIN CALORIE MALNUTRITION PRESENT: [ ] Yes [ ] No  [ ] PPSV2 < or = to 30% [ ] significant weight loss  [ ] poor nutritional intake [ ] catabolic state [ ] anasarca     Artificial Nutrition [ ]     REFERRALS:   [ ]Chaplaincy  [ ] Hospice  [ ]Child Life  [ ]Social Work  [ ]Case management [ ]Holistic Therapy     Goals of Care Document:     HPI:  89-year-old female with past medical history of hypertension, combined CHF, CVA, cervical cancer status post radiation, glaucoma, discharged yesterday after being admitted for acute CVA, gastroenteritis, and elevated troponin who was brought back to the ED due to hypoxia and vomiting. ROS not obtained due to altered mental status.  On presentation, the patient was tachycardic, hypertensive, and tachypneic. Labs notable for troponin 160, bicarb 20, creatinine 1.3(around baseline), lactate 2.1. CT chest findings consistent with likely aspiration pneumonia. CT abdomen/pelvis revealed possible SBO, bowel ischemia, scattered punctate foci of gas in the right lower quadrant ventral abdominal wall and in the right groin soft tissues possibly soft tissue infection. Ill-defined 1.8 x 1.4 cm hypodense ending focus in the region of the lower uterus. Surgery was consulted; however, recommend conservative management due to comorbidities. The patient receives cefepime, vancomycin, 500 cc ivf bolus, hydralazine, labetalol, Zofran in the ED.  (30 Apr 2025 04:53)    PERTINENT PM/SXH:   Hypertension    Cervical cancer    Abdominal mass    Depression (emotion)    GI (gastrointestinal bleed)    Hemorrhoid    CVA (cerebral infarction)    Depression    Diverticulitis    CHF (congestive heart failure)    History of cervical cancer    History of cerebrovascular accident (CVA) with residual deficit    Chronic heart failure with preserved ejection fraction (HFpEF)    Urinary and bowel incontinence      S/P abdominal surgery, follow-up exam    S/P lumpectomy of breast      FAMILY HISTORY:    ITEMS NOT CHECKED ARE NOT PRESENT    SOCIAL HISTORY:   Significant other/partner:  [ ]  Children: yes  [ ]  Confucianism/Spirituality: Samaritan  Substance hx:  [ ]   Tobacco hx:  [ ]   Alcohol hx: [ ]   Home Opioid hx:  [ ] I-Stop Reference No: | Reference #: [id number]  Living Situation: [ ]Home  [ ]Long term care  [ x]Rehab [ ]Other    ADVANCE DIRECTIVES:    DNR  MOLST  [x ]  Living Will  [ ]   DECISION MAKER(s):  [ ] Health Care Proxy(s)  [ x] Surrogate(s)  [ ] Guardian           Name(s): Phone Number(s): Daniel (daughter) [phone]/ [place] [phone]    BASELINE (I)ADL(s) (prior to admission):  [place]: [ ]Total  [ ] Moderate [ ]Dependent    Allergies    penicillin (Hives)    Intolerances    MEDICATIONS  (STANDING):  aspirin  chewable 81 milliGRAM(s) Oral daily  atorvastatin 40 milliGRAM(s) Oral at bedtime  cefepime   IVPB 1000 milliGRAM(s) IV Intermittent every 24 hours  clopidogrel Tablet 75 milliGRAM(s) Oral daily  dextrose 5% + lactated ringers. 1000 milliLiter(s) (60 mL/Hr) IV Continuous <Continuous>  losartan 50 milliGRAM(s) Oral daily  metoprolol succinate ER 25 milliGRAM(s) Oral daily    MEDICATIONS  (PRN):  acetaminophen     Tablet .. 650 milliGRAM(s) Oral every 6 hours PRN Temp greater or equal to 38C (100.4F), Mild Pain (1 - 3)    PRESENT SYMPTOMS: [ ]Unable to obtain due to poor mentation   Source if other than patient:  [ ]Family   [ ]Team     Pain: [ ] yes [x ] no  QOL impact -   Location -                    Aggravating factors -  Quality -  Radiation -  Timing-  Severity (0-10 scale):  Minimal acceptable level (0-10 scale):     PAIN AD Score:     [email or web address] (press ctrl +  left click to view)    Dyspnea:                           [ ]Mild [ ]Moderate [ ]Severe  Anxiety:                             [ ]Mild [ ]Moderate [ ]Severe  Fatigue:                             [ ]Mild [ ]Moderate [ ]Severe  Nausea:                             [ ]Mild [ ]Moderate [ ]Severe  Loss of appetite:              [ ]Mild [ ]Moderate [ ]Severe  Constipation:                    [ ]Mild [ ]Moderate [ ]Severe    Other Symptoms:  [ ]All other review of systems negative     Karnofsky Performance Score/Palliative Performance Status Version 2:         %    http://npcrc.org/files/news/palliative_performance_scale_ppsv2.pdf  PHYSICAL EXAM:  Vital Signs Last 24 Hrs  T(C): 37.2 (30 Apr 2025 10:23), Max: 37.2 (30 Apr 2025 10:23)  T(F): 99 (30 Apr 2025 10:23), Max: 99 (30 Apr 2025 10:23)  HR: 75 (30 Apr 2025 10:23) (75 - 123)  BP: 156/67 (30 Apr 2025 10:23) (149/56 - 204/75)  BP(mean): --  RR: 19 (30 Apr 2025 10:23) (18 - 30)  SpO2: 96% (30 Apr 2025 10:23) (90% - 99%)    Parameters below as of 30 Apr 2025 10:23  Patient On (Oxygen Delivery Method): nasal cannula     I&O's Summary    30 Apr 2025 07:01  -  30 Apr 2025 12:05  --------------------------------------------------------  IN: 0 mL / OUT: 0 mL / NET: 0 mL    GENERAL:  [ x]Alert  [ x]Oriented x 2-3   [ ]Lethargic  [ ]Cachexia  [ ]Unarousable  [x ]Verbal  [ ]Non-Verbal  Behavioral:   [ ] Anxiety  [ ] Delirium [ ] Agitation [ ] Other  HEENT:  [ ]Normal   [ ]Dry mouth   [ ]ET Tube/Trach  [ ]Oral lesions  PULMONARY:   [x ]Clear diminished breath sounds bilateral lower lobes [ ]Tachypnea  [ ]Audible excessive secretions   [ ]Rhonchi        [ ]Right [ ]Left [ ]Bilateral  [ ]Crackles        [ ]Right [ ]Left [ ]Bilateral  [ ]Wheezing     [ ]Right [ ]Left [ ]Bilateral  CARDIOVASCULAR:    [x ]Regular [ ]Irregular [ ]Tachy  [ ]Cayden [ ]Murmur [ ]Other  GASTROINTESTINAL:  [ x]Soft  [ ]Distended   [ ]+BS  [ x]Non tender [ ]Tender  [ ]PEG [ ]OGT/ NGT  Last BM: GENITOURINARY:  [ ]Normal [x ] Incontinent   [ ]Oliguria/Anuria   [ ][place]  MUSCULOSKELETAL:   [ ]Normal   [x ]Weakness  [ ]Bed/Wheelchair bound [ ]Edema  NEUROLOGIC:   [ ]No focal deficits  [x ] Cognitive impairment  [ ] Dysphagia [ ]Dysarthria [ ] Paresis [ ]Other   SKIN:   [ ]Normal   [ ]Pressure ulcer(s)  [ ]Rash    CRITICAL CARE:  [ ] Shock Present  [ ]Septic [ ]Cardiogenic [ ]Neurologic [ ]Hypovolemic  [ ]  Vasopressors [ ]  Inotropes   [ ] Respiratory failure present [ ] mechanical ventilation [ ] non-invasive ventilatory support [ ] High flow  [ ] Acute  [ ] Chronic [ ] Hypoxic  [ ] Hypercarbic [ ] Other  [ ] Other organ failure     LABS:                        12.4   5.44  )-----------( 165      ( 30 Apr 2025 10:55 )             38.2   04-29    143  |  115[H]  |  45[H]  ----------------------------<  189[H]  4.6   |  20[L]  |  1.37[H]    Ca    8.5      29 Apr 2025 20:58    TPro  6.4  /  Alb  2.4[L]  /  TBili  0.5  /  DBili  x   /  AST  42[H]  /  ALT  27  /  AlkPhos  85  04-29    Urinalysis with Rflx Culture (04.29.25 @ 22:35)    Urine Appearance: Clear   Color: Yellow   Specific Gravity: 1.023   pH Urine: 5.5   Protein, Urine: 30 mg/dL   Glucose Qualitative, Urine: Negative mg/dL   Ketone - Urine: Negative mg/dL   Blood, Urine: Trace   Bilirubin: Negative   Urobilinogen: 0.2 mg/dL   Leukocyte Esterase Concentration: Trace   Nitrite: Negative    RADIOLOGY & ADDITIONAL STUDIES:  < from: CT Abdomen and Pelvis w/ IV Cont (04.30.25 @ 00:40) >  IMPRESSION:  CT CHEST:  Patchy tree-in-bud nodules in the right upper lobe and right lower lobe.    Patchy airspace consolidation the right lower lobe.  Correlate clinically   for aspiration and/or pneumonia.  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  Follow-up with cardiothoracic   surgery is recommended for long-term management.  CT ABDOMEN/PELVIS:  Dilated mid and distal small bowel loops with air-fluid levels measuring   up to approximately 4 cm.  Questionable transition point and/or tethered   small bowel loop in the right lower quadrant, suboptimally evaluated due   to motion artifact.  A small bowel obstruction isnot excluded.  Small   bowel ileus is in the differential diagnosis.  There is mesenteric edema and suspected small foci of mesenteric gas in   the right lower quadrant.  There is also punctate focus of portal venous   gas the left hepatic lobe.  The findings raise the possibility of bowel   ischemia.  Surgical evaluation is recommended.  No gastrointestinal perforation or abscess/drainable fluid collection.  There are scattered punctate foci of gas in the right lower quadrant   ventral abdominal wall and in the right groin soft tissues without   surrounding edema, fluid collection or abscess.  Findings are of   uncertain etiology and clinical significance.  Soft tissue infection   should be excluded clinically.  Limited evaluation ofthe pelvic organs.  Suspected 7 mm fibroid.    Ill-defined 1.8 x 1.4 cm hypodense ending focus in the region of the   lower uterus.  Several surgical clips in the region of the cervix or   vagina.  Pelvic ultrasound may be obtained as clinically warranted.   Findings were discussed with Dr. HOLDEN JEONG [id number] 4/30/2025   1:50 AM by Dr. Fajardo with read back confirmation.  --- End of Report ---  < end of copied text >    < from: CT Head No Cont (04.30.25 @ 00:37) >  IMPRESSION:  EVOLVING RIGHT ANTERIOR CEREBRAL ARTERY TERRITORY INFARCT. NO ACUTE   HEMORRHAGE.  -- End of Report ---  < end of copied text >    < from: TTE Echo Complete w/o Contrast w/ Doppler (04.02.25 @ 11:25) >  CONCLUSIONS:   1. Left ventricular cavity is normal in size. Left ventricular wall   thickness is severely increased. Severe left ventricular hypertrophy.   Left ventricular systolic function is mildly to moderately decreased with   an ejection fraction visually estimated at 40 to 45 %. Regional wall   motion abnormalities present.   2. There is moderate (grade 2) left ventricular diastolic dysfunction.   3. Normal right ventricular cavity size and reduced right ventricular   systolic function.   4. Left atrium is mildly dilated.   5. There is severe calcification of the mitral valve annulus, Moderate   mitral valve leaflet calcification. Mild mitral valve stenosis. Mild   mitral regurgitation.   6. 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   There is moderate calcification of the aortic valve leaflets. Moderate   aortic regurgitation.   7. No pericardial effusion seen.   8. The inferior vena cava is normal in size (normal <2.1cm) with normal   inspiratory collapse (normal >50%) consistent with normal right atrial   pressure (  3, range 0-5mmHg).   9. Pulmonary artery systolic pressure could not be estimated.  < end of copied text >    PROTEIN CALORIE MALNUTRITION PRESENT: [ ] Yes [ ] No  [ ] PPSV2 < or = to 30% [ ] significant weight loss  [ ] poor nutritional intake [ ] catabolic state [ ] anasarca     Artificial Nutrition [ ]     REFERRALS:   [ ]Chaplaincy  [ ] Hospice  [ ]Child Life  [ ]Social Work  [ ]Case management [ ]Holistic Therapy     Goals of Care Document:     HPI:  89-year-old female with past medical history of hypertension, combined CHF, CVA, cervical cancer status post radiation, glaucoma, discharged yesterday after being admitted for acute CVA, gastroenteritis, and elevated troponin who was brought back to the ED due to hypoxia and vomiting. ROS not obtained due to altered mental status.  On presentation, the patient was tachycardic, hypertensive, and tachypneic. Labs notable for troponin 160, bicarb 20, creatinine 1.3(around baseline), lactate 2.1. CT chest findings consistent with likely aspiration pneumonia. CT abdomen/pelvis revealed possible SBO, bowel ischemia, scattered punctate foci of gas in the right lower quadrant ventral abdominal wall and in the right groin soft tissues possibly soft tissue infection. Ill-defined 1.8 x 1.4 cm hypodense ending focus in the region of the lower uterus. Surgery was consulted; however, recommend conservative management due to comorbidities. The patient receives cefepime, vancomycin, 500 cc ivf bolus, hydralazine, labetalol, Zofran in the ED.  (30 Apr 2025 04:53)    PERTINENT PM/SXH:   Hypertension    Cervical cancer    Abdominal mass    Depression (emotion)    GI (gastrointestinal bleed)    Hemorrhoid    CVA (cerebral infarction)    Depression    Diverticulitis    CHF (congestive heart failure)    History of cervical cancer    History of cerebrovascular accident (CVA) with residual deficit    Chronic heart failure with preserved ejection fraction (HFpEF)    Urinary and bowel incontinence      S/P abdominal surgery, follow-up exam    S/P lumpectomy of breast      FAMILY HISTORY:    ITEMS NOT CHECKED ARE NOT PRESENT    SOCIAL HISTORY:   Significant other/partner: no [ ]  Children: yes  [ ]  Rastafarian/Spirituality: Restoration  Substance hx:  [ ]   Tobacco hx:  [ ]   Alcohol hx: [ ]   Home Opioid hx:  [ ] I-Stop Reference No: | Reference #: [id number]  Living Situation: [ ]Home  [ ]Long term care  [ x]Rehab [ ]Other    ADVANCE DIRECTIVES:    DNR  MOLST  [x ]  Living Will  [ ]   DECISION MAKER(s):  [ ] Health Care Proxy(s)  [ x] Surrogate(s)  [ ] Guardian           Name(s): Phone Number(s): Daniel (daughter) [phone]/ [place] [phone]    BASELINE (I)ADL(s) (prior to admission):  [place]: [ ]Total  [x ] Moderate [ ]Dependent    Allergies    penicillin (Hives)    Intolerances    MEDICATIONS  (STANDING):  aspirin  chewable 81 milliGRAM(s) Oral daily  atorvastatin 40 milliGRAM(s) Oral at bedtime  cefepime   IVPB 1000 milliGRAM(s) IV Intermittent every 24 hours  clopidogrel Tablet 75 milliGRAM(s) Oral daily  dextrose 5% + lactated ringers. 1000 milliLiter(s) (60 mL/Hr) IV Continuous <Continuous>  losartan 50 milliGRAM(s) Oral daily  metoprolol succinate ER 25 milliGRAM(s) Oral daily    MEDICATIONS  (PRN):  acetaminophen     Tablet .. 650 milliGRAM(s) Oral every 6 hours PRN Temp greater or equal to 38C (100.4F), Mild Pain (1 - 3)    PRESENT SYMPTOMS: [ ]Unable to obtain due to poor mentation   Source if other than patient:  [ ]Family   [ ]Team     Pain: [ ] yes [x ] no  QOL impact -   Location -                    Aggravating factors -  Quality -  Radiation -  Timing-  Severity (0-10 scale):  Minimal acceptable level (0-10 scale):     PAIN AD Score:     [email or web address] (press ctrl +  left click to view)    Dyspnea:                           [ ]Mild [ ]Moderate [ ]Severe  Anxiety:                             [ ]Mild [ ]Moderate [ ]Severe  Fatigue:                             [ ]Mild [ ]Moderate [ ]Severe  Nausea:                             [ ]Mild [ ]Moderate [ ]Severe  Loss of appetite:              [ ]Mild [ ]Moderate [ ]Severe  Constipation:                    [ ]Mild [ ]Moderate [ ]Severe    Other Symptoms:  [ ]All other review of systems negative     Karnofsky Performance Score/Palliative Performance Status Version 2:        40 %    http://npcrc.org/files/news/palliative_performance_scale_ppsv2.pdf  PHYSICAL EXAM:  Vital Signs Last 24 Hrs  T(C): 37.2 (30 Apr 2025 10:23), Max: 37.2 (30 Apr 2025 10:23)  T(F): 99 (30 Apr 2025 10:23), Max: 99 (30 Apr 2025 10:23)  HR: 75 (30 Apr 2025 10:23) (75 - 123)  BP: 156/67 (30 Apr 2025 10:23) (149/56 - 204/75)  BP(mean): --  RR: 19 (30 Apr 2025 10:23) (18 - 30)  SpO2: 96% (30 Apr 2025 10:23) (90% - 99%)    Parameters below as of 30 Apr 2025 10:23  Patient On (Oxygen Delivery Method): nasal cannula     I&O's Summary    30 Apr 2025 07:01  -  30 Apr 2025 12:05  --------------------------------------------------------  IN: 0 mL / OUT: 0 mL / NET: 0 mL    GENERAL:  [ x]Alert  [ x]Oriented x 2-3   [ ]Lethargic  [ ]Cachexia  [ ]Unarousable  [x ]Verbal  [ ]Non-Verbal  Behavioral:   [ ] Anxiety  [ ] Delirium [ ] Agitation [ ] Other  HEENT:  [ ]Normal   [ ]Dry mouth   [ ]ET Tube/Trach  [ ]Oral lesions  PULMONARY:   [x ]Clear diminished breath sounds bilateral lower lobes [ ]Tachypnea  [ ]Audible excessive secretions   [ ]Rhonchi        [ ]Right [ ]Left [ ]Bilateral  [ ]Crackles        [ ]Right [ ]Left [ ]Bilateral  [ ]Wheezing     [ ]Right [ ]Left [ ]Bilateral  CARDIOVASCULAR:    [x ]Regular [ ]Irregular [ ]Tachy  [ ]Cayden [ ]Murmur [ ]Other  GASTROINTESTINAL:  [ x]Soft  [ ]Distended   [ ]+BS  [ x]Non tender [ ]Tender  [ ]PEG [ ]OGT/ NGT  Last BM: GENITOURINARY:  [ ]Normal [x ] Incontinent   [ ]Oliguria/Anuria   [ ][place]  MUSCULOSKELETAL:   [ ]Normal   [x ]Weakness  [ ]Bed/Wheelchair bound [ ]Edema  NEUROLOGIC:   [ ]No focal deficits  [x ] Cognitive impairment  [ ] Dysphagia [ ]Dysarthria [ ] Paresis [ ]Other   SKIN:   [ ]Normal   [ ]Pressure ulcer(s)  [ ]Rash    CRITICAL CARE:  [ ] Shock Present  [ ]Septic [ ]Cardiogenic [ ]Neurologic [ ]Hypovolemic  [ ]  Vasopressors [ ]  Inotropes   [ ] Respiratory failure present [ ] mechanical ventilation [ ] non-invasive ventilatory support [ ] High flow  [ ] Acute  [ ] Chronic [ ] Hypoxic  [ ] Hypercarbic [ ] Other  [ ] Other organ failure     LABS:                        12.4   5.44  )-----------( 165      ( 30 Apr 2025 10:55 )             38.2   04-29    143  |  115[H]  |  45[H]  ----------------------------<  189[H]  4.6   |  20[L]  |  1.37[H]    Ca    8.5      29 Apr 2025 20:58    TPro  6.4  /  Alb  2.4[L]  /  TBili  0.5  /  DBili  x   /  AST  42[H]  /  ALT  27  /  AlkPhos  85  04-29    Urinalysis with Rflx Culture (04.29.25 @ 22:35)    Urine Appearance: Clear   Color: Yellow   Specific Gravity: 1.023   pH Urine: 5.5   Protein, Urine: 30 mg/dL   Glucose Qualitative, Urine: Negative mg/dL   Ketone - Urine: Negative mg/dL   Blood, Urine: Trace   Bilirubin: Negative   Urobilinogen: 0.2 mg/dL   Leukocyte Esterase Concentration: Trace   Nitrite: Negative    RADIOLOGY & ADDITIONAL STUDIES:  < from: CT Abdomen and Pelvis w/ IV Cont (04.30.25 @ 00:40) >  IMPRESSION:  CT CHEST:  Patchy tree-in-bud nodules in the right upper lobe and right lower lobe.    Patchy airspace consolidation the right lower lobe.  Correlate clinically   for aspiration and/or pneumonia.  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  Follow-up with cardiothoracic   surgery is recommended for long-term management.  CT ABDOMEN/PELVIS:  Dilated mid and distal small bowel loops with air-fluid levels measuring   up to approximately 4 cm.  Questionable transition point and/or tethered   small bowel loop in the right lower quadrant, suboptimally evaluated due   to motion artifact.  A small bowel obstruction isnot excluded.  Small   bowel ileus is in the differential diagnosis.  There is mesenteric edema and suspected small foci of mesenteric gas in   the right lower quadrant.  There is also punctate focus of portal venous   gas the left hepatic lobe.  The findings raise the possibility of bowel   ischemia.  Surgical evaluation is recommended.  No gastrointestinal perforation or abscess/drainable fluid collection.  There are scattered punctate foci of gas in the right lower quadrant   ventral abdominal wall and in the right groin soft tissues without   surrounding edema, fluid collection or abscess.  Findings are of   uncertain etiology and clinical significance.  Soft tissue infection   should be excluded clinically.  Limited evaluation ofthe pelvic organs.  Suspected 7 mm fibroid.    Ill-defined 1.8 x 1.4 cm hypodense ending focus in the region of the   lower uterus.  Several surgical clips in the region of the cervix or   vagina.  Pelvic ultrasound may be obtained as clinically warranted.   Findings were discussed with Dr. HOLDEN JEONG [phone] 4/30/2025   1:50 AM by Dr. Fajardo with read back confirmation.  --- End of Report ---  < end of copied text >    < from: CT Head No Cont (04.30.25 @ 00:37) >  IMPRESSION:  EVOLVING RIGHT ANTERIOR CEREBRAL ARTERY TERRITORY INFARCT. NO ACUTE   HEMORRHAGE.  -- End of Report ---  < end of copied text >    < from: TTE Echo Complete w/o Contrast w/ Doppler (04.02.25 @ 11:25) >  CONCLUSIONS:   1. Left ventricular cavity is normal in size. Left ventricular wall   thickness is severely increased. Severe left ventricular hypertrophy.   Left ventricular systolic function is mildly to moderately decreased with   an ejection fraction visually estimated at 40 to 45 %. Regional wall   motion abnormalities present.   2. There is moderate (grade 2) left ventricular diastolic dysfunction.   3. Normal right ventricular cavity size and reduced right ventricular   systolic function.   4. Left atrium is mildly dilated.   5. There is severe calcification of the mitral valve annulus, Moderate   mitral valve leaflet calcification. Mild mitral valve stenosis. Mild   mitral regurgitation.   6. 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   There is moderate calcification of the aortic valve leaflets. Moderate   aortic regurgitation.   7. No pericardial effusion seen.   8. The inferior vena cava is normal in size (normal <2.1cm) with normal   inspiratory collapse (normal >50%) consistent with normal right atrial   pressure (  3, range 0-5mmHg).   9. Pulmonary artery systolic pressure could not be estimated.  < end of copied text >    PROTEIN CALORIE MALNUTRITION PRESENT: [ ] Yes [ ] No  [ ] PPSV2 < or = to 30% [ ] significant weight loss  [ ] poor nutritional intake [ ] catabolic state [ ] anasarca     Artificial Nutrition [ ]     REFERRALS:   [ ]Chaplaincy  [ ] Hospice  [ ]Child Life  [ ]Social Work  [ ]Case management [ ]Holistic Therapy     Goals of Care Document:

## 2025-04-30 NOTE — H&P ADULT - PROBLEM SELECTOR PLAN 10
- discharged yesterday after being admitted for R KASSIE infarct  - CTH today: evolving R KASSIE territory infarct, no acute hemorrhage.   - c/w: statin, plavix

## 2025-04-30 NOTE — CONSULT NOTE ADULT - SUBJECTIVE AND OBJECTIVE BOX
Patient is a 89y old  Female who presents with a chief complaint of hypoxia.     HPI: 89F w/ PMHx HTN, Cervical CA s/p RT, CHF, breast lumpectomy, abdominal surgery for a mass vs polyp, urinary and bowel incontinence recently admitted with CVA. HPI obtained from daughters Mary and Daniel at bedside. Patient was discharged from hospital yesterday 25 AM to [facility]. Upon arrival patient found to be hypoxic to low 80s. Family reports patient was not acting like self. Vomited once last night, large amount of diarrhea the day prior. States she did eat breakfast but was otherwise fatigued throughout the day. Daughter reports she noticed minimal urine output compared to days prior. Denies any complaints of nausea, abdominal pain, fever, chills.      PAST MEDICAL & SURGICAL HISTORY:  Hypertension    History of cervical cancer    History of cerebrovascular accident (CVA) with residual deficit    Chronic heart failure with preserved ejection fraction (HFpEF)    Urinary and bowel incontinence    S/P lumpectomy of breast          Review of Systems:  Negative except  as above in HPI      Allergies    penicillin (Hives)      Vital Signs Last 24 Hrs  T(C): 36.6 (2025 19:34), Max: 36.9 (2025 04:47)  T(F): 97.8 (2025 19:34), Max: 98.5 (2025 04:47)  HR: 84 (2025 01:29) (84 - 123)  BP: 149/56 (2025 01:29) (133/84 - 204/75)  BP(mean): --  RR: 25 (2025 01:29) (18 - 30)  SpO2: 99% (2025 01:29) (90% - 99%)    Parameters below as of 2025 01:29  Patient On (Oxygen Delivery Method): nasal cannula, 2        Physical Exam:  General:  Appears stated age, well-groomed, no distress  Eyes: EOMI, conjunctiva clear  HENT:  NCAT  Chest:  respirations nonlabored, breathing comfortably w/ NC in place  Cardiovascular:  Regular rate & rhythm  Abdomen:  softly distended, nontender, no rebound, no guarding  Extremities:  no pedal edema or calf tenderness noted  Skin:  No rash  Musculoskeletal:  normal strength  Neuro/Psych:  Alert and reponsive    LABS:                        14.2   6.78  )-----------( 235      ( 2025 20:58 )             43.3         143  |  115[H]  |  45[H]  ----------------------------<  189[H]  4.6   |  20[L]  |  1.37[H]    Ca    8.5      2025 20:58    TPro  6.4  /  Alb  2.4[L]  /  TBili  0.5  /  DBili  x   /  AST  42[H]  /  ALT  27  /  AlkPhos  85      Lactate 2.1      Urinalysis Basic - ( 2025 22:35 )    Color: Yellow / Appearance: Clear / S.023 / pH: x  Gluc: x / Ketone: Negative mg/dL  / Bili: Negative / Urobili: 0.2 mg/dL   Blood: x / Protein: 30 mg/dL / Nitrite: Negative   Leuk Esterase: Trace / RBC: 1 /HPF / WBC 3 /HPF   Sq Epi: x / Non Sq Epi: x / Bacteria: Few /HPF      RADIOLOGY & ADDITIONAL STUDIES:  < from: CT Abdomen and Pelvis w/ IV Cont (25 @ 00:40) >    ACC: [id number] EXAM:  CT ABDOMEN AND PELVIS IC   ORDERED BY: EDWIN CHAVIS     ACC: [id number] EXAM:  CT CHEST IC   ORDERED BY: HOLDEN JEONG     PROCEDURE DATE:  2025          INTERPRETATION:  CLINICAL INFORMATION: Hypoxia.  Abdominal distention and  vomiting.    COMPARISON: CT chest from 2023.    CONTRAST/COMPLICATIONS:  IV Contrast: Omnipaque 350  85 cc administered   15 cc discarded  Oral Contrast: NONE  .    PROCEDURE:  CT of the Chest, Abdomen and Pelvis was performed.  Sagittal and coronal reformats were performed.    FINDINGS:  CHEST:  The examination is degraded by moderate respiratory motion artifact.    LUNGS AND LARGE AIRWAYS: Patent central airways.  Bibasilar subsegmental   atelectasis.  Patchy tree-in-bud nodules in the right upper lobe and   right lower lobe.  Patchy airspace consolidation in the right lower lobe.  PLEURA: No pleural effusion.  VESSELS: Aneurysmal dilatation the aortic root to approximately 4.4-4.5   cm in transverse caliber at the sinuses of Valsalva (6:72).  The   remainder of the thoracic aorta is normal caliber.  No gross evidence of   aortic dissection or pulmonary embolism.  Scattered atherosclerotic   calcifications of the thoracic aorta and arch vessels.  HEART: Cardiomegaly. Mild calcification aortic valve leaflets.  There is   qualitatively mild atherosclerotic calcification of coronary arteries.    Dystrophic calcification of the mitral annulus.  No pericardial effusion.  MEDIASTINUM AND MYCHAL: No lymphadenopathy.  CHEST WALL AND LOWER NECK: Scattered small thyroid nodules measure up to   8 mm in the thyroid isthmus (2:27); in the absence of a family history or   risk factors for thyroid cancer, the American College [employer] Radiology does   not recommend routine follow-up with incidental thyroid.  Osteoarthrosis   in both glenohumeral joints.    ABDOMEN AND PELVIS:  The examination is degraded by moderate respiratory motion artifact.    LIVER: Within normal limits.  BILE DUCTS: Normal caliber.  GALLBLADDER: Within normal limits.  SPLEEN: Within normal limits.  PANCREAS: Poorly visualized likely atrophic and obscured by motion   artifact.  ADRENALS: Mild adrenal gland thickening without nodularity.  KIDNEYS/URETERS: Symmetric renal enhancement.  No hydronephrosis.    Approximately 1.3 cm right lower pole renal cyst.    BLADDER: Within normal limits.  REPRODUCTIVE ORGANS: Limited evaluation.  Suspected 7 mm fibroid (2:246).    Ill-defined 1.8 x 1.4 cm hypoattenuating focus in the region of the   lower uterus (2:250).  Several surgical clips in the region of the cervix   or vagina.    BOWEL: There are dilated mid and distal small bowel loops with air-fluid   levels measuring up to approximately 4 cm.  There is questionable   transition point and/or tethered small bowel loop in the right lower   quadrant (2:183-190), suboptimally evaluated due to motion artifact.  Adjacent to this region there is mesenteric edema and suspected small   foci of mesenteric venous gas (2:193 and 2:197-198.  There is also a   focus of portal venous gas in the left hepatic lobe (2:113 and 6:67).    The findings raise the possibility of bowel ischemia.  No   gastrointestinal perforation.  There is severe diffuse sigmoid colonic   diverticulosis without evidence for acute diverticulitis.  PERITONEUM/RETROPERITONEUM: No ascites, free air or abscess.  VESSELS: Within normal limits.  LYMPH NODES: No lymphadenopathy.  ABDOMINAL WALL: There are scattered punctate foci of subcutaneous gas in   the right lower quadrant ventral abdominal wall (2:238) and in the right   groin soft tissues (2:244-260), without surrounding edema, fluid   collection or abscess  BONES: Diffuse osteopenia.  Thoracolumbar scoliosis.  Multilevel   degenerative changes in the spine.  Minimal hip osteoarthrosis.  No acute   fracture or suspicious osseous lesion.    IMPRESSION:    CT CHEST:  Patchy tree-in-bud nodules in the right upper lobe and right lower lobe.    Patchy airspace consolidation the right lower lobe.  Correlate clinically   for aspiration and/or pneumonia.    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  Follow-up with cardiothoracic   surgery is recommended for long-term management.    CT ABDOMEN/PELVIS:  Dilated mid and distal small bowel loops with air-fluid levels measuring   up to approximately 4 cm.  Questionable transition point and/or tethered   small bowel loop in the right lower quadrant, suboptimally evaluated due   to motion artifact.  A small bowel obstruction isnot excluded.  Small   bowel ileus is in the differential diagnosis.    There is mesenteric edema and suspected small foci of mesenteric gas in   the right lower quadrant.  There is also punctate focus of portal venous   gas the left hepatic lobe.  The findings raise the possibility of bowel   ischemia.  Surgical evaluation is recommended.    No gastrointestinal perforation or abscess/drainable fluid collection.    There are scattered punctate foci of gas in the right lower quadrant   ventral abdominal wall and in the right groin soft tissues without   surrounding edema, fluid collection or abscess.  Findings are of   uncertain etiology and clinical significance.  Soft tissue infection   should be excluded clinically.    Limited evaluation ofthe pelvic organs.  Suspected 7 mm fibroid.    Ill-defined 1.8 x 1.4 cm hypodense ending focus in the region of the   lower uterus.  Several surgical clips in the region of the cervix or   vagina.  Pelvic ultrasound may be obtained as clinically warranted.         Findings were discussed with Dr. HOLDEN JEONG [id number] 2025   1:50 AM by Dr. Fajardo with read back confirmation.    --- End of Report ---            CLIFTON FAJARDO MD; Attending Radiologist  This document has been electronically signed. 2025  2:11AM    < end of copied text >

## 2025-05-01 LAB
ALBUMIN SERPL ELPH-MCNC: 2.1 G/DL — LOW (ref 3.3–5)
ALP SERPL-CCNC: 67 U/L — SIGNIFICANT CHANGE UP (ref 40–120)
ALT FLD-CCNC: 20 U/L — SIGNIFICANT CHANGE UP (ref 12–78)
ANION GAP SERPL CALC-SCNC: 6 MMOL/L — SIGNIFICANT CHANGE UP (ref 5–17)
AST SERPL-CCNC: 15 U/L — SIGNIFICANT CHANGE UP (ref 15–37)
BILIRUB SERPL-MCNC: 0.5 MG/DL — SIGNIFICANT CHANGE UP (ref 0.2–1.2)
BUN SERPL-MCNC: 33 MG/DL — HIGH (ref 7–23)
CALCIUM SERPL-MCNC: 8.6 MG/DL — SIGNIFICANT CHANGE UP (ref 8.5–10.1)
CHLORIDE SERPL-SCNC: 120 MMOL/L — HIGH (ref 96–108)
CO2 SERPL-SCNC: 20 MMOL/L — LOW (ref 22–31)
CREAT SERPL-MCNC: 0.92 MG/DL — SIGNIFICANT CHANGE UP (ref 0.5–1.3)
EGFR: 57 ML/MIN/1.73M2 — LOW
EGFR: 57 ML/MIN/1.73M2 — LOW
GLUCOSE BLDC GLUCOMTR-MCNC: 102 MG/DL — HIGH (ref 70–99)
GLUCOSE BLDC GLUCOMTR-MCNC: 401 MG/DL — HIGH (ref 70–99)
GLUCOSE SERPL-MCNC: 97 MG/DL — SIGNIFICANT CHANGE UP (ref 70–99)
HCT VFR BLD CALC: 38 % — SIGNIFICANT CHANGE UP (ref 34.5–45)
HGB BLD-MCNC: 12.2 G/DL — SIGNIFICANT CHANGE UP (ref 11.5–15.5)
MCHC RBC-ENTMCNC: 31.2 PG — SIGNIFICANT CHANGE UP (ref 27–34)
MCHC RBC-ENTMCNC: 32.1 G/DL — SIGNIFICANT CHANGE UP (ref 32–36)
MCV RBC AUTO: 97.2 FL — SIGNIFICANT CHANGE UP (ref 80–100)
MRSA PCR RESULT.: SIGNIFICANT CHANGE UP
NRBC BLD AUTO-RTO: 0 /100 WBCS — SIGNIFICANT CHANGE UP (ref 0–0)
PLATELET # BLD AUTO: 201 K/UL — SIGNIFICANT CHANGE UP (ref 150–400)
POTASSIUM SERPL-MCNC: 3.9 MMOL/L — SIGNIFICANT CHANGE UP (ref 3.5–5.3)
POTASSIUM SERPL-SCNC: 3.9 MMOL/L — SIGNIFICANT CHANGE UP (ref 3.5–5.3)
PROT SERPL-MCNC: 5.5 GM/DL — LOW (ref 6–8.3)
RBC # BLD: 3.91 M/UL — SIGNIFICANT CHANGE UP (ref 3.8–5.2)
RBC # FLD: 15.1 % — HIGH (ref 10.3–14.5)
S AUREUS DNA NOSE QL NAA+PROBE: SIGNIFICANT CHANGE UP
SODIUM SERPL-SCNC: 146 MMOL/L — HIGH (ref 135–145)
VANCOMYCIN FLD-MCNC: 7.8 UG/ML — SIGNIFICANT CHANGE UP
WBC # BLD: 5.72 K/UL — SIGNIFICANT CHANGE UP (ref 3.8–10.5)
WBC # FLD AUTO: 5.72 K/UL — SIGNIFICANT CHANGE UP (ref 3.8–10.5)

## 2025-05-01 PROCEDURE — 99233 SBSQ HOSP IP/OBS HIGH 50: CPT

## 2025-05-01 PROCEDURE — 99232 SBSQ HOSP IP/OBS MODERATE 35: CPT

## 2025-05-01 PROCEDURE — G0545: CPT

## 2025-05-01 PROCEDURE — 99223 1ST HOSP IP/OBS HIGH 75: CPT

## 2025-05-01 RX ORDER — METOPROLOL SUCCINATE 50 MG/1
5 TABLET, EXTENDED RELEASE ORAL EVERY 6 HOURS
Refills: 0 | Status: DISCONTINUED | OUTPATIENT
Start: 2025-05-01 | End: 2025-05-15

## 2025-05-01 RX ORDER — CLOTRIMAZOLE 10 MG/1
1 LOZENGE ORAL; TOPICAL
Refills: 0 | Status: DISCONTINUED | OUTPATIENT
Start: 2025-05-01 | End: 2025-05-01

## 2025-05-01 RX ADMIN — CLOTRIMAZOLE 1 LOZENGE: 10 LOZENGE ORAL; TOPICAL at 21:18

## 2025-05-01 RX ADMIN — METOPROLOL SUCCINATE 5 MILLIGRAM(S): 50 TABLET, EXTENDED RELEASE ORAL at 23:38

## 2025-05-01 RX ADMIN — SODIUM CHLORIDE 60 MILLILITER(S): 9 INJECTION, SOLUTION INTRAVENOUS at 08:14

## 2025-05-01 RX ADMIN — SODIUM CHLORIDE 60 MILLILITER(S): 9 INJECTION, SOLUTION INTRAVENOUS at 10:06

## 2025-05-01 RX ADMIN — Medication 5 MILLIGRAM(S): at 17:00

## 2025-05-01 RX ADMIN — CEFEPIME 100 MILLIGRAM(S): 2 INJECTION, POWDER, FOR SOLUTION INTRAVENOUS at 02:51

## 2025-05-01 NOTE — CONSULT NOTE ADULT - REASON FOR ADMISSION
Acute respiratory failure likely due to aspiration pna, SBO, possible bowel ischemia
Acute respiratory failure likely due to aspiration pna, SBO, possible bowel ischemia

## 2025-05-01 NOTE — CONSULT NOTE ADULT - ASSESSMENT
89-year-old female with past medical history of hypertension, combined CHF, CVA, cervical cancer status post radiation, glaucoma, discharged yesterday after being admitted for acute CVA, gastroenteritis, and elevated troponin who was brought back to the ED due to hypoxia and vomiting. ROS not obtained due to altered mental status.  On presentation, the patient was tachycardic, hypertensive, and tachypneic. Labs notable for troponin 160, bicarb 20, creatinine 1.3(around baseline), lactate 2.1. CT chest findings consistent with likely aspiration pneumonia. CT abdomen/pelvis revealed possible SBO, bowel ischemia, scattered punctate foci of gas in the right lower quadrant ventral abdominal wall and in the right groin soft tissues possibly soft tissue infection. Ill-defined 1.8 x 1.4 cm hypodense ending focus in the region of the lower uterus. Surgery was consulted; however, recommend conservative management due to comorbidities. The patient receives cefepime, vancomycin, 500 cc ivf bolus, hydralazine, labetalol, Zofran in the ED    ------INCOMPLETE NOTE- RECOMMENDATIONS TO FOLLOW---  89-year-old female with past medical history of hypertension, combined CHF, CVA, cervical cancer status post radiation, glaucoma, discharged yesterday after being admitted for acute CVA, gastroenteritis, and elevated troponin who was brought back to the ED due to hypoxia and vomiting. ROS not obtained due to altered mental status.  On presentation, the patient was tachycardic, hypertensive, and tachypneic.   Labs notable for troponin 160, bicarb 20, creatinine 1.3(around baseline), lactate 2.1.   CT chest (I personally reviewed) findings consistent with likely aspiration pneumonia.   CT abdomen/pelvis revealed possible SBO, bowel ischemia, scattered punctate foci of gas in the right lower quadrant ventral abdominal wall and in the right groin soft tissues possibly soft tissue infection. Ill-defined 1.8 x 1.4 cm hypodense ending focus in the region of the lower uterus.   patient appears well and if comfortable   Imaging suggest RLL pneumonia    Plan:  continue cefepime   follow all cultures until final   advise to introduce incentive spirometer  swallowing evaluation  clotrimazole lozenge for possible thrush   frequent turns, offloading, and nutrition optimization to avoid bedsores-consider protective heel/leg protectors   early PT and OT   monitor for further signs of ileus vs bowel obstuction     Discussed with Dr. Avel Suazo, DO  Chief, Infectious Disease at [facility]  Reachable via [employer] Teams or ID office: [phone]  Weekdays: After 5pm, please call [phone] for all inquiries and new consults  Weekends: Message on-call infectious disease physician via teams (see [place])

## 2025-05-01 NOTE — PHYSICAL THERAPY INITIAL EVALUATION ADULT - ADDITIONAL COMMENTS
Per previous PT eval (4/2/2025 and 4/25/25) and verified with daughters. Pt resides in a private home with her daughter, 5 steps to enter with both rails far part, bedroom is on 2nd fl accessible by chair lift.  +HHA 8hr x 5 days/week. Pt uses diapers 24hr/day due to bladder/bowel incontinence. Daughter states that patient has a walker, rollator, cane, wheelchair, and shower seat.  Able to walk 25-30ft with rolling walker and assist from HHA/family. Needs 2 person assist on stairs using handrail.

## 2025-05-01 NOTE — PHYSICAL THERAPY INITIAL EVALUATION ADULT - TRANSFER TRAINING, PT EVAL
Pt will independently perform sit to/from stand transfers without LOB using rolling walker by 4-6 weeks.

## 2025-05-01 NOTE — CONSULT NOTE ADULT - SUBJECTIVE AND OBJECTIVE BOX
[facility]  INFECTIOUS DISEASES   [address] [place] [address]  Tel: [phone]     Fax: [phone]  ==============================================================================  DO Marlo Garza MD Sehrish Shahid, MD Alexandra Gutman, NP   ==============================================================================    MARTHA LEIGH  MRN-[id number]  Female  89y ([birth year])        Patient is a 89y old  Female who presents with a chief complaint of Acute respiratory failure likely due to aspiration pna, SBO, possible bowel ischemia (01 May 2025 10:31)      HPI:  89-year-old female with past medical history of hypertension, combined CHF, CVA, cervical cancer status post radiation, glaucoma, discharged yesterday after being admitted for acute CVA, gastroenteritis, and elevated troponin who was brought back to the ED due to hypoxia and vomiting. ROS not obtained due to altered mental status.  On presentation, the patient was tachycardic, hypertensive, and tachypneic. Labs notable for troponin 160, bicarb 20, creatinine 1.3(around baseline), lactate 2.1. CT chest findings consistent with likely aspiration pneumonia. CT abdomen/pelvis revealed possible SBO, bowel ischemia, scattered punctate foci of gas in the right lower quadrant ventral abdominal wall and in the right groin soft tissues possibly soft tissue infection. Ill-defined 1.8 x 1.4 cm hypodense ending focus in the region of the lower uterus. Surgery was consulted; however, recommend conservative management due to comorbidities. The patient receives cefepime, vancomycin, 500 cc ivf bolus, hydralazine, labetalol, Zofran in the ED.  (30 Apr 2025 04:53)      ID consulted for workup and antibiotic management     PAST MEDICAL & SURGICAL HISTORY:  Hypertension      History of cervical cancer      History of cerebrovascular accident (CVA) with residual deficit      Chronic heart failure with preserved ejection fraction (HFpEF)      Urinary and bowel incontinence      S/P lumpectomy of breast          Allergies  penicillin (Hives)        ANTIMICROBIALS:  cefepime   IVPB 1000 every 24 hours      MEDICATIONS  (STANDING):  cefepime   IVPB   100 mL/Hr IV Intermittent (05-01-25 @ 02:51)    cefepime   IVPB   100 mL/Hr IV Intermittent (04-30-25 @ 01:22)    vancomycin  IVPB.   250 mL/Hr IV Intermittent (04-30-25 @ 03:02)        OTHER MEDS: MEDICATIONS  (STANDING):  acetaminophen     Tablet .. 650 every 6 hours PRN  aspirin  chewable 81 daily  atorvastatin 40 at bedtime  clopidogrel Tablet 75 daily  losartan 50 daily  metoprolol succinate ER 25 daily      SOCIAL HISTORY:     Smoking Cigarettes [ ]Active [ ] Former [ ]Denies   ETOH [ ]denies [ ]Former [ ]Current Use denies   Drug Use [ ]Never [ ] Former [ ] Active     FAMILY HISTORY:      REVIEW OF SYSTEMS  [  ] ROS unobtainable because:    [  ] All other systems negative except as noted below:	    Constitutional:  [ ] fever [ ] chills  [ ] weight loss  [ ] weakness  Skin:  [ ] rash [ ] phlebitis	  Eyes: [ ] icterus [ ] pain  [ ] discharge	  ENMT: [ ] sore throat  [ ] thrush [ ] ulcers [ ] exudates  Respiratory: [ ] dyspnea [ ] hemoptysis [ ] cough [ ] sputum	  Cardiovascular:  [ ] chest pain [ ] palpitations [ ] edema	  Gastrointestinal:  [ ] nausea [ ] vomiting [ ] diarrhea [ ] constipation [ ] pain	  Genitourinary:  [ ] dysuria [ ] frequency [ ] hematuria [ ] discharge [ ] flank pain  [ ] incontinence  Musculoskeletal:  [ ] myalgias [ ] arthralgias [ ] arthritis  [ ] back pain  Neurological:  [ ] headache [ ] seizures  [ ] confusion/altered mental status  Psychiatric:  [ ] anxiety [ ] depression	  Hematology/Lymphatics:  [ ] lymphadenopathy  Endocrine:  [ ] adrenal [ ] thyroid  Allergic/Immunologic:	 [ ] transplant [ ] seasonal    Vital Signs Last 24 Hrs  T(F): 99.2 (05-01-25 @ 05:07), Max: 99.3 (04-25-25 @ 23:29)    Vital Signs Last 24 Hrs  HR: 84 (05-01-25 @ 05:07) (84 - 95)  BP: 148/64 (05-01-25 @ 05:07) (148/64 - 156/77)  RR: 18 (05-01-25 @ 05:07)  SpO2: 98% (05-01-25 @ 05:07) (95% - 98%)  Wt(kg): --    PHYSICAL EXAM:  Constitutional: non-toxic, no distress  HEAD/EYES: anicteric, no conjunctival injection  ENT:  supple, no thrush  Cardiovascular:   normal S1, S2, no murmur, no edema  Respiratory:  clear BS bilaterally, no wheezes, no rales  GI:  soft, non-tender, normal bowel sounds  :  no wilkerson, no CVA tenderness  Musculoskeletal:  no synovitis, normal ROM  Neurologic: awake and alert, normal strength, no focal findings  Skin:  no rash, no erythema, no phlebitis  Heme/Onc: no lymphadenopathy   Psychiatric:  awake, alert, appropriate mood        WBC  WBC Count: 5.72 K/uL (05-01 @ 07:10)  WBC Count: 5.44 K/uL (04-30 @ 10:55)  WBC Count: 6.78 K/uL (04-29 @ 20:58)  WBC Count: 8.80 K/uL (04-29 @ 10:50)  WBC Count: 8.06 K/uL (04-28 @ 07:15)  WBC Count: 5.74 K/uL (04-26 @ 06:20)        CBC                        12.2   5.72  )-----------( 201      ( 01 May 2025 07:10 )             38.0     BMP/CMP  05-01    146[H]  |  120[H]  |  33[H]  ----------------------------<  97  3.9   |  20[L]  |  0.92    Ca    8.6      01 May 2025 07:10    TPro  5.5[L]  /  Alb  2.1[L]  /  TBili  0.5  /  DBili  x   /  AST  15  /  ALT  20  /  AlkPhos  67  05-01    Creatinine Trend  Creatinine Trend: 0.92<--, 1.07<--, 1.37<--, 1.30<--, 1.32<--, 1.41<--    Lipase: 9 U/L (04-29-25 @ 20:58)      Lactate, Blood: 2.0 mmol/L (04-30-25 @ 06:15)  Lactate, Blood: 2.1 mmol/L (04-29-25 @ 20:58)            MICROBIOLOGY:      Vancomycin Level, Random: 7.8 ug/mL (05-01-25 @ 07:10)        RADIOLOGY:      ACC: [id number] EXAM:  XR CHEST PORTABLE URGENT 1V   ORDERED BY: EDWIN CHAVIS     PROCEDURE DATE:  04/29/2025          INTERPRETATION:  An AP portable semiupright chest radiograph was   performed for shortness of breath.    Comparison is made to 4/1/2025.    There are linear atelectatic bands in the right middle and right lower   lobe, not present previously, while the remaining lungs are clear. No   infiltrates are seen. There is no pneumothorax. There is no pleural   effusion identified on either side. The thoracic aorta is atherosclerotic   and tortuous. There is a calcified mitral annulus. The cardiac silhouette   remains unchanged and is without CHF. There are degenerative changes of   the thoracic spine and shoulders.    IMPRESSION:  1. Minimal linear atelectasis in the right middle and right lower lobes   compared to the prior exam.  2. Prominent left ventricle, atherosclerosis but no CHF, unchanged.          RENETTA REYES MD  This document has been electronically signed. Apr 30 2025 10:56AM      ACC: [id number] EXAM:  CT BRAIN   ORDERED BY: EDWIN CHAVIS     PROCEDURE DATE:  04/30/2025          INTERPRETATION:  CLINICAL INDICATION:  vomiting    TECHNIQUE: Noncontrast CT examination of the head was performed.  Coronal and sagittal reformats were also obtained.    COMPARISON: 4/25/2025    FINDINGS:  INTRA-AXIAL: There is loss of gray-white differentiation in the right   anterior cerebral artery territory consistent with an evolving infarct.   No acute hemorrhage. Chronic right corona radiata/basal ganglia infarct.   Stable punctate focus of hyperdensity in the right frontal   periventricular white matter. Chronic cerebellar infarct. There are   periventricular white matter hypodensities that are nonspecific in nature   but may reflect chronic ischemic microvascular disease.  EXTRA-AXIAL: No extra-axial fluid collection is present.  VENTRICLES AND SULCI: Parenchymal volume is commensurate with patient   age. No hydrocephalus.  VISUALIZED SINUSES: Mild mucosal thickening.  VISUALIZED MASTOIDS: Clear.  CALVARIUM: Normal.    IMPRESSION:    EVOLVING RIGHT ANTERIOR CEREBRAL ARTERY TERRITORY INFARCT. NO ACUTE   HEMORRHAGE.      JAZMIN GIPSON MD; Attending Radiologist  This document has been electronically signed. Apr 30 2025  1:30AM    ACC: [id number] EXAM:  CT ABDOMEN AND PELVIS IC   ORDERED BY: EDWIN CHAVIS     ACC: [id number] EXAM:  CT CHEST IC   ORDERED BY: HOLDEN PAYNENYASIA     PROCEDURE DATE:  04/30/2025          INTERPRETATION:  CLINICAL INFORMATION: Hypoxia.  Abdominal distention and  vomiting.    COMPARISON: CT chest from 01/28/2023.    CONTRAST/COMPLICATIONS:  IV Contrast: Omnipaque 350  85 cc administered   15 cc discarded  Oral Contrast: NONE  .    PROCEDURE:  CT of the Chest, Abdomen and Pelvis was performed.  Sagittal and coronal reformats were performed.    FINDINGS:  CHEST:  The examination is degraded by moderate respiratory motion artifact.    LUNGS AND LARGE AIRWAYS: Patent central airways.  Bibasilar subsegmental   atelectasis.  Patchy tree-in-bud nodules in the right upper lobe and   right lower lobe.  Patchy airspace consolidation in the right lower lobe.  PLEURA: No pleural effusion.  VESSELS: Aneurysmal dilatation the aortic root to approximately 4.4-4.5   cm in transverse caliber at the sinuses of Valsalva (6:72).  The   remainder of the thoracic aorta is normal caliber.  No gross evidence of   aortic dissection or pulmonary embolism.  Scattered atherosclerotic   calcifications of the thoracic aorta and arch vessels.  HEART: Cardiomegaly. Mild calcification aortic valve leaflets.  There is   qualitatively mild atherosclerotic calcification of coronary arteries.    Dystrophic calcification of the mitral annulus.  No pericardial effusion.  MEDIASTINUM AND MYCHAL: No lymphadenopathy.  CHEST WALL AND LOWER NECK: Scattered small thyroid nodules measure up to   8 mm in the thyroid isthmus (2:27); in the absence of a family history or   risk factors for thyroid cancer, the American College [employer] does   not recommend routine follow-up with incidental thyroid.  Osteoarthrosis   in both glenohumeral joints.    ABDOMEN AND PELVIS:  The examination is degraded by moderate respiratory motion artifact.    LIVER: Within normal limits.  BILE DUCTS: Normal caliber.  GALLBLADDER: Within normal limits.  SPLEEN: Within normal limits.  PANCREAS: Poorly visualized likely atrophic and obscured by motion   artifact.  ADRENALS: Mild adrenal gland thickening without nodularity.  KIDNEYS/URETERS: Symmetric renal enhancement.  No hydronephrosis.    Approximately 1.3 cm right lower pole renal cyst.    BLADDER: Within normal limits.  REPRODUCTIVE ORGANS: Limited evaluation.  Suspected 7 mm fibroid (2:246).    Ill-defined 1.8 x 1.4 cm hypoattenuating focus in the region of the   lower uterus (2:250).  Several surgical clips in the region of the cervix   or vagina.    BOWEL: There are dilated mid and distal small bowel loops with air-fluid   levels measuring up to approximately 4 cm.  There is questionable   transition point and/or tethered small bowel loop in the right lower   quadrant (2:183-190), suboptimally evaluated due to motion artifact.  Adjacent to this region there is mesenteric edema and suspected small   foci of mesenteric venous gas (2:193 and 2:197-198.  There is also a   focus of portal venous gas in the left hepatic lobe (2:113 and 6:67).    The findings raise the possibility of bowel ischemia.  No   gastrointestinal perforation.  There is severe diffuse sigmoid colonic   diverticulosis without evidence for acute diverticulitis.  PERITONEUM/RETROPERITONEUM: No ascites, free air or abscess.  VESSELS: Within normal limits.  LYMPH NODES: No lymphadenopathy.  ABDOMINAL WALL: There are scattered punctate foci of subcutaneous gas in   the right lower quadrant ventral abdominal wall (2:238) and in the right   groin soft tissues (2:244-260), without surrounding edema, fluid   collection or abscess  BONES: Diffuse osteopenia.  Thoracolumbar scoliosis.  Multilevel   degenerative changes in the spine.  Minimal hip osteoarthrosis.  No acute   fracture or suspicious osseous lesion.    IMPRESSION:    CT CHEST:  Patchy tree-in-bud nodules in the right upper lobe and right lower lobe.    Patchy airspace consolidation the right lower lobe.  Correlate clinically   for aspiration and/or pneumonia.    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  Follow-up with cardiothoracic   surgery is recommended for long-term management.    CT ABDOMEN/PELVIS:  Dilated mid and distal small bowel loops with air-fluid levels measuring   up to approximately 4 cm.  Questionable transition point and/or tethered   small bowel loop in the right lower quadrant, suboptimally evaluated due   to motion artifact.  A small bowel obstruction isnot excluded.  Small   bowel ileus is in the differential diagnosis.    There is mesenteric edema and suspected small foci of mesenteric gas in   the right lower quadrant.  There is also punctate focus of portal venous   gas the left hepatic lobe.  The findings raise the possibility of bowel   ischemia.  Surgical evaluation is recommended.    No gastrointestinal perforation or abscess/drainable fluid collection.    There are scattered punctate foci of gas in the right lower quadrant   ventral abdominal wall and in the right groin soft tissues without   surrounding edema, fluid collection or abscess.  Findings are of   uncertain etiology and clinical significance.  Soft tissue infection   should be excluded clinically.    Limited evaluation ofthe pelvic organs.  Suspected 7 mm fibroid.    Ill-defined 1.8 x 1.4 cm hypodense ending focus in the region of the   lower uterus.  Several surgical clips in the region of the cervix or   vagina.  Pelvic ultrasound may be obtained as clinically warranted.         Findings were discussed with Dr. HOLDEN JEONG [phone] 4/30/2025   1:50 AM by Dr. Fajardo with read back confirmation.      CLIFTON FAJARDO MD; Attending Radiologist  This document has been electronically signed. Apr 30 2025  2:11AM      I have personally reviewed the above imaging  [facility]  INFECTIOUS DISEASES   [address] [place] [address]  Tel: [phone]     Fax: [phone]  ==============================================================================  DO Marlo Garza MD Sehrish Shahid, MD Alexandra Gutman, NP   ==============================================================================    MARTHA LEIGH  MRN-[id number]  Female  89y ([birth year])        Patient is a 89y old  Female who presents with a chief complaint of Acute respiratory failure likely due to aspiration pna, SBO, possible bowel ischemia (01 May 2025 10:31)      HPI:  89-year-old female with past medical history of hypertension, combined CHF, CVA, cervical cancer status post radiation, glaucoma, discharged yesterday after being admitted for acute CVA, gastroenteritis, and elevated troponin who was brought back to the ED due to hypoxia and vomiting. ROS not obtained due to altered mental status.  On presentation, the patient was tachycardic, hypertensive, and tachypneic. Labs notable for troponin 160, bicarb 20, creatinine 1.3(around baseline), lactate 2.1. CT chest findings consistent with likely aspiration pneumonia. CT abdomen/pelvis revealed possible SBO, bowel ischemia, scattered punctate foci of gas in the right lower quadrant ventral abdominal wall and in the right groin soft tissues possibly soft tissue infection. Ill-defined 1.8 x 1.4 cm hypodense ending focus in the region of the lower uterus. Surgery was consulted; however, recommend conservative management due to comorbidities. The patient receives cefepime, vancomycin, 500 cc ivf bolus, hydralazine, labetalol, Zofran in the ED.  (30 Apr 2025 04:53)      ID consulted for workup and antibiotic management     PAST MEDICAL & SURGICAL HISTORY:  Hypertension      History of cervical cancer      History of cerebrovascular accident (CVA) with residual deficit      Chronic heart failure with preserved ejection fraction (HFpEF)      Urinary and bowel incontinence      S/P lumpectomy of breast          Allergies  penicillin (Hives)        ANTIMICROBIALS:  cefepime   IVPB 1000 every 24 hours      MEDICATIONS  (STANDING):  cefepime   IVPB   100 mL/Hr IV Intermittent (05-01-25 @ 02:51)    cefepime   IVPB   100 mL/Hr IV Intermittent (04-30-25 @ 01:22)    vancomycin  IVPB.   250 mL/Hr IV Intermittent (04-30-25 @ 03:02)        OTHER MEDS: MEDICATIONS  (STANDING):  acetaminophen     Tablet .. 650 every 6 hours PRN  aspirin  chewable 81 daily  atorvastatin 40 at bedtime  clopidogrel Tablet 75 daily  losartan 50 daily  metoprolol succinate ER 25 daily      SOCIAL HISTORY:     Smoking Cigarettes [ ]Active [ ] Former [x ]Denies   ETOH [ x]denies [ ]Former [ ]Current Use denies   Drug Use [x ]Never [ ] Former [ ] Active     FAMILY HISTORY:      REVIEW OF SYSTEMS  [  ] ROS unobtainable because:    [x  ] All other systems negative except as noted below:	    Constitutional:  [ ] fever [ ] chills  [ ] weight loss  [ ] weakness  Skin:  [ ] rash [ ] phlebitis	  Eyes: [ ] icterus [ ] pain  [ ] discharge	  ENMT: [ ] sore throat  [ ] thrush [ ] ulcers [ ] exudates  Respiratory: [ ] dyspnea [ ] hemoptysis [ x] cough [ ] sputum	  Cardiovascular:  [ ] chest pain [ ] palpitations [ ] edema	  Gastrointestinal:  [ ] nausea [ ] vomiting [ ] diarrhea [ ] constipation [ ] pain	  Genitourinary:  [ ] dysuria [ ] frequency [ ] hematuria [ ] discharge [ ] flank pain  [ ] incontinence  Musculoskeletal:  [ ] myalgias [ ] arthralgias [ ] arthritis  [ ] back pain  Neurological:  [ ] headache [ ] seizures  [ ] confusion/altered mental status  Psychiatric:  [ ] anxiety [ ] depression	  Hematology/Lymphatics:  [ ] lymphadenopathy  Endocrine:  [ ] adrenal [ ] thyroid  Allergic/Immunologic:	 [ ] transplant [ ] seasonal    Vital Signs Last 24 Hrs  T(F): 99.2 (05-01-25 @ 05:07), Max: 99.3 (04-25-25 @ 23:29)    Vital Signs Last 24 Hrs  HR: 84 (05-01-25 @ 05:07) (84 - 95)  BP: 148/64 (05-01-25 @ 05:07) (148/64 - 156/77)  RR: 18 (05-01-25 @ 05:07)  SpO2: 98% (05-01-25 @ 05:07) (95% - 98%)  Wt(kg): --    PHYSICAL EXAM:  Constitutional: non-toxic, no distress, elderly female pleasant   HEAD/EYES: anicteric, no conjunctival injection  ENT:  supple, thrush vs leukoplakia on tongue   Cardiovascular:   normal S1, S2, no murmur, no edema  Respiratory:  relatively clear BS bilaterally, no wheezes, no rales  GI:  soft, non-tender, normal bowel sounds  :  no wilkerson, no CVA tenderness  Musculoskeletal:  no synovitis, normal ROM  Neurologic: awake and alertx3, 4/5 strength, no focal findings  Skin:  no rash, no erythema, no phlebitis  Heme/Onc: no cervical lymphadenopathy   Psychiatric:  awake, alert, appropriate mood        WBC  WBC Count: 5.72 K/uL (05-01 @ 07:10)  WBC Count: 5.44 K/uL (04-30 @ 10:55)  WBC Count: 6.78 K/uL (04-29 @ 20:58)  WBC Count: 8.80 K/uL (04-29 @ 10:50)  WBC Count: 8.06 K/uL (04-28 @ 07:15)  WBC Count: 5.74 K/uL (04-26 @ 06:20)        CBC                        12.2   5.72  )-----------( 201      ( 01 May 2025 07:10 )             38.0     BMP/CMP  05-01    146[H]  |  120[H]  |  33[H]  ----------------------------<  97  3.9   |  20[L]  |  0.92    Ca    8.6      01 May 2025 07:10    TPro  5.5[L]  /  Alb  2.1[L]  /  TBili  0.5  /  DBili  x   /  AST  15  /  ALT  20  /  AlkPhos  67  05-01    Creatinine Trend  Creatinine Trend: 0.92<--, 1.07<--, 1.37<--, 1.30<--, 1.32<--, 1.41<--    Lipase: 9 U/L (04-29-25 @ 20:58)      Lactate, Blood: 2.0 mmol/L (04-30-25 @ 06:15)  Lactate, Blood: 2.1 mmol/L (04-29-25 @ 20:58)        MICROBIOLOGY:      Vancomycin Level, Random: 7.8 ug/mL (05-01-25 @ 07:10)        RADIOLOGY:      ACC: [id number] EXAM:  XR CHEST PORTABLE URGENT 1V   ORDERED BY: EDWIN CHAVIS     PROCEDURE DATE:  04/29/2025          INTERPRETATION:  An AP portable semiupright chest radiograph was   performed for shortness of breath.    Comparison is made to 4/1/2025.    There are linear atelectatic bands in the right middle and right lower   lobe, not present previously, while the remaining lungs are clear. No   infiltrates are seen. There is no pneumothorax. There is no pleural   effusion identified on either side. The thoracic aorta is atherosclerotic   and tortuous. There is a calcified mitral annulus. The cardiac silhouette   remains unchanged and is without CHF. There are degenerative changes of   the thoracic spine and shoulders.    IMPRESSION:  1. Minimal linear atelectasis in the right middle and right lower lobes   compared to the prior exam.  2. Prominent left ventricle, atherosclerosis but no CHF, unchanged.          RENETTA REYES MD  This document has been electronically signed. Apr 30 2025 10:56AM      ACC: [id number] EXAM:  CT BRAIN   ORDERED BY: EDWIN CHAVIS     PROCEDURE DATE:  04/30/2025          INTERPRETATION:  CLINICAL INDICATION:  vomiting    TECHNIQUE: Noncontrast CT examination of the head was performed.  Coronal and sagittal reformats were also obtained.    COMPARISON: 4/25/2025    FINDINGS:  INTRA-AXIAL: There is loss of gray-white differentiation in the right   anterior cerebral artery territory consistent with an evolving infarct.   No acute hemorrhage. Chronic right corona radiata/basal ganglia infarct.   Stable punctate focus of hyperdensity in the right frontal   periventricular white matter. Chronic cerebellar infarct. There are   periventricular white matter hypodensities that are nonspecific in nature   but may reflect chronic ischemic microvascular disease.  EXTRA-AXIAL: No extra-axial fluid collection is present.  VENTRICLES AND SULCI: Parenchymal volume is commensurate with patient   age. No hydrocephalus.  VISUALIZED SINUSES: Mild mucosal thickening.  VISUALIZED MASTOIDS: Clear.  CALVARIUM: Normal.    IMPRESSION:    EVOLVING RIGHT ANTERIOR CEREBRAL ARTERY TERRITORY INFARCT. NO ACUTE   HEMORRHAGE.      JAZMIN GIPSON MD; Attending Radiologist  This document has been electronically signed. Apr 30 2025  1:30AM    ACC: [id number] EXAM:  CT ABDOMEN AND PELVIS IC   ORDERED BY: EDWIN CHAVIS     ACC: [id number] EXAM:  CT CHEST IC   ORDERED BY: HOLDEN JEONG     PROCEDURE DATE:  04/30/2025          INTERPRETATION:  CLINICAL INFORMATION: Hypoxia.  Abdominal distention and  vomiting.    COMPARISON: CT chest from 01/28/2023.    CONTRAST/COMPLICATIONS:  IV Contrast: Omnipaque 350  85 cc administered   15 cc discarded  Oral Contrast: NONE  .    PROCEDURE:  CT of the Chest, Abdomen and Pelvis was performed.  Sagittal and coronal reformats were performed.    FINDINGS:  CHEST:  The examination is degraded by moderate respiratory motion artifact.    LUNGS AND LARGE AIRWAYS: Patent central airways.  Bibasilar subsegmental   atelectasis.  Patchy tree-in-bud nodules in the right upper lobe and   right lower lobe.  Patchy airspace consolidation in the right lower lobe.  PLEURA: No pleural effusion.  VESSELS: Aneurysmal dilatation the aortic root to approximately 4.4-4.5   cm in transverse caliber at the sinuses of Valsalva (6:72).  The   remainder of the thoracic aorta is normal caliber.  No gross evidence of   aortic dissection or pulmonary embolism.  Scattered atherosclerotic   calcifications of the thoracic aorta and arch vessels.  HEART: Cardiomegaly. Mild calcification aortic valve leaflets.  There is   qualitatively mild atherosclerotic calcification of coronary arteries.    Dystrophic calcification of the mitral annulus.  No pericardial effusion.  MEDIASTINUM AND MYCHAL: No lymphadenopathy.  CHEST WALL AND LOWER NECK: Scattered small thyroid nodules measure up to   8 mm in the thyroid isthmus (2:27); in the absence of a family history or   risk factors for thyroid cancer, the American College of Radiology does   not recommend routine follow-up with incidental thyroid.  Osteoarthrosis   in both glenohumeral joints.    ABDOMEN AND PELVIS:  The examination is degraded by moderate respiratory motion artifact.    LIVER: Within normal limits.  BILE DUCTS: Normal caliber.  GALLBLADDER: Within normal limits.  SPLEEN: Within normal limits.  PANCREAS: Poorly visualized likely atrophic and obscured by motion   artifact.  ADRENALS: Mild adrenal gland thickening without nodularity.  KIDNEYS/URETERS: Symmetric renal enhancement.  No hydronephrosis.    Approximately 1.3 cm right lower pole renal cyst.    BLADDER: Within normal limits.  REPRODUCTIVE ORGANS: Limited evaluation.  Suspected 7 mm fibroid (2:246).    Ill-defined 1.8 x 1.4 cm hypoattenuating focus in the region of the   lower uterus (2:250).  Several surgical clips in the region of the cervix   or vagina.    BOWEL: There are dilated mid and distal small bowel loops with air-fluid   levels measuring up to approximately 4 cm.  There is questionable   transition point and/or tethered small bowel loop in the right lower   quadrant (2:183-190), suboptimally evaluated due to motion artifact.  Adjacent to this region there is mesenteric edema and suspected small   foci of mesenteric venous gas (2:193 and 2:197-198.  There is also a   focus of portal venous gas in the left hepatic lobe (2:113 and 6:67).    The findings raise the possibility of bowel ischemia.  No   gastrointestinal perforation.  There is severe diffuse sigmoid colonic   diverticulosis without evidence for acute diverticulitis.  PERITONEUM/RETROPERITONEUM: No ascites, free air or abscess.  VESSELS: Within normal limits.  LYMPH NODES: No lymphadenopathy.  ABDOMINAL WALL: There are scattered punctate foci of subcutaneous gas in   the right lower quadrant ventral abdominal wall (2:238) and in the right   groin soft tissues (2:244-260), without surrounding edema, fluid   collection or abscess  BONES: Diffuse osteopenia.  Thoracolumbar scoliosis.  Multilevel   degenerative changes in the spine.  Minimal hip osteoarthrosis.  No acute   fracture or suspicious osseous lesion.    IMPRESSION:    CT CHEST:  Patchy tree-in-bud nodules in the right upper lobe and right lower lobe.    Patchy airspace consolidation the right lower lobe.  Correlate clinically   for aspiration and/or pneumonia.    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  Follow-up with cardiothoracic   surgery is recommended for long-term management.    CT ABDOMEN/PELVIS:  Dilated mid and distal small bowel loops with air-fluid levels measuring   up to approximately 4 cm.  Questionable transition point and/or tethered   small bowel loop in the right lower quadrant, suboptimally evaluated due   to motion artifact.  A small bowel obstruction isnot excluded.  Small   bowel ileus is in the differential diagnosis.    There is mesenteric edema and suspected small foci of mesenteric gas in   the right lower quadrant.  There is also punctate focus of portal venous   gas the left hepatic lobe.  The findings raise the possibility of bowel   ischemia.  Surgical evaluation is recommended.    No gastrointestinal perforation or abscess/drainable fluid collection.    There are scattered punctate foci of gas in the right lower quadrant   ventral abdominal wall and in the right groin soft tissues without   surrounding edema, fluid collection or abscess.  Findings are of   uncertain etiology and clinical significance.  Soft tissue infection   should be excluded clinically.    Limited evaluation ofthe pelvic organs.  Suspected 7 mm fibroid.    Ill-defined 1.8 x 1.4 cm hypodense ending focus in the region of the   lower uterus.  Several surgical clips in the region of the cervix or   vagina.  Pelvic ultrasound may be obtained as clinically warranted.         Findings were discussed with Dr. HOLDEN JEONG [phone] 4/30/2025   1:50 AM by Dr. Fajardo with read back confirmation.      CLIFTON FAJARDO MD; Attending Radiologist  This document has been electronically signed. Apr 30 2025  2:11AM      I have personally reviewed the above imaging

## 2025-05-01 NOTE — PHYSICAL THERAPY INITIAL EVALUATION ADULT - BED MOBILITY TRAINING, PT EVAL
Pt will independently perform all aspects of bed mobility to help prevent pressure ulcers, by 4-6 weeks.

## 2025-05-01 NOTE — PHYSICAL THERAPY INITIAL EVALUATION ADULT - RANGE OF MOTION EXAMINATION, REHAB EVAL
L UE weakness from hx CVA/Right UE ROM was WFL (within functional limits)/Left LE ROM was WFL (within functional limits)/Right LE ROM was WFL (within functional limits)/deficits as listed below

## 2025-05-01 NOTE — PHYSICAL THERAPY INITIAL EVALUATION ADULT - BALANCE TRAINING, PT EVAL
Pt will be independent in sitting, transfers, standing and ambulation with good balance using appropriate assistive device and prevent falls by 4-6 weeks.

## 2025-05-01 NOTE — PHYSICAL THERAPY INITIAL EVALUATION ADULT - PERTINENT HX OF CURRENT PROBLEM, REHAB EVAL
Pt is a 89-y/o, female, admitted to [facility] due to hypoxia and vomiting. Pt admitted for possible SBO. Pt was recently discharged to [facility], but only stayed less than 1 day and was sent back to the hospital. Pt referred to PT for functional evaluation.

## 2025-05-01 NOTE — PHARMACOTHERAPY INTERVENTION NOTE - COMMENTS
As per policy vancomycin level ordered 5/1 AM.     Second vancomycin dose dc'd as pt already received dose today 3am.
Modified allergy header to state patient received/tolerated cefepime during this admission with no documented reaction.
as crcl is 19.89, requested alternate to tobramycin.

## 2025-05-02 LAB
GLUCOSE BLDC GLUCOMTR-MCNC: 113 MG/DL — HIGH (ref 70–99)
GLUCOSE BLDC GLUCOMTR-MCNC: 130 MG/DL — HIGH (ref 70–99)

## 2025-05-02 PROCEDURE — 99233 SBSQ HOSP IP/OBS HIGH 50: CPT

## 2025-05-02 PROCEDURE — 99232 SBSQ HOSP IP/OBS MODERATE 35: CPT

## 2025-05-02 PROCEDURE — G0545: CPT

## 2025-05-02 PROCEDURE — 99497 ADVNCD CARE PLAN 30 MIN: CPT

## 2025-05-02 RX ORDER — CEFTRIAXONE 500 MG/1
1000 INJECTION, POWDER, FOR SOLUTION INTRAMUSCULAR; INTRAVENOUS EVERY 24 HOURS
Refills: 0 | Status: DISCONTINUED | OUTPATIENT
Start: 2025-05-02 | End: 2025-05-02

## 2025-05-02 RX ORDER — CEFTRIAXONE 500 MG/1
1000 INJECTION, POWDER, FOR SOLUTION INTRAMUSCULAR; INTRAVENOUS EVERY 24 HOURS
Refills: 0 | Status: DISCONTINUED | OUTPATIENT
Start: 2025-05-02 | End: 2025-05-06

## 2025-05-02 RX ORDER — LOSARTAN POTASSIUM 100 MG/1
50 TABLET, FILM COATED ORAL DAILY
Refills: 0 | Status: DISCONTINUED | OUTPATIENT
Start: 2025-05-02 | End: 2025-05-15

## 2025-05-02 RX ORDER — FUROSEMIDE 10 MG/ML
20 INJECTION INTRAMUSCULAR; INTRAVENOUS DAILY
Refills: 0 | Status: DISCONTINUED | OUTPATIENT
Start: 2025-05-02 | End: 2025-05-10

## 2025-05-02 RX ADMIN — Medication 81 MILLIGRAM(S): at 11:52

## 2025-05-02 RX ADMIN — CEFEPIME 100 MILLIGRAM(S): 2 INJECTION, POWDER, FOR SOLUTION INTRAVENOUS at 02:13

## 2025-05-02 RX ADMIN — SODIUM CHLORIDE 60 MILLILITER(S): 9 INJECTION, SOLUTION INTRAVENOUS at 05:13

## 2025-05-02 RX ADMIN — SODIUM CHLORIDE 60 MILLILITER(S): 9 INJECTION, SOLUTION INTRAVENOUS at 23:45

## 2025-05-02 RX ADMIN — METOPROLOL SUCCINATE 5 MILLIGRAM(S): 50 TABLET, EXTENDED RELEASE ORAL at 18:33

## 2025-05-02 RX ADMIN — METOPROLOL SUCCINATE 5 MILLIGRAM(S): 50 TABLET, EXTENDED RELEASE ORAL at 05:23

## 2025-05-02 RX ADMIN — CEFTRIAXONE 100 MILLIGRAM(S): 500 INJECTION, POWDER, FOR SOLUTION INTRAMUSCULAR; INTRAVENOUS at 23:09

## 2025-05-02 RX ADMIN — Medication 500000 UNIT(S): at 05:23

## 2025-05-02 RX ADMIN — CLOPIDOGREL BISULFATE 75 MILLIGRAM(S): 75 TABLET, FILM COATED ORAL at 11:53

## 2025-05-02 RX ADMIN — LOSARTAN POTASSIUM 50 MILLIGRAM(S): 100 TABLET, FILM COATED ORAL at 11:53

## 2025-05-02 RX ADMIN — METOPROLOL SUCCINATE 5 MILLIGRAM(S): 50 TABLET, EXTENDED RELEASE ORAL at 11:52

## 2025-05-02 RX ADMIN — Medication 500000 UNIT(S): at 18:33

## 2025-05-02 RX ADMIN — Medication 500000 UNIT(S): at 00:08

## 2025-05-02 RX ADMIN — Medication 500000 UNIT(S): at 11:53

## 2025-05-02 NOTE — SWALLOW BEDSIDE ASSESSMENT ADULT - H & P REVIEW
"89-year-old female with past medical history of hypertension, combined CHF, CVA, cervical cancer status post radiation, glaucoma, discharged yesterday after being admitted for acute CVA, gastroenteritis, and elevated troponin who was brought back to the ED due to hypoxia and vomiting. ROS not obtained due to altered mental status."/yes

## 2025-05-02 NOTE — DIETITIAN INITIAL EVALUATION ADULT - PROBLEM SELECTOR PLAN 4
- CT abdomen/pelvis revealed possible SBO, bowel ischemia, scattered punctate foci of gas in the right lower quadrant ventral abdominal wall and in the right groin soft tissues possibly soft tissue infection. Ill-defined 1.8 x 1.4 cm hypodense ending focus in the region of the lower uterus.  - surgery consulted: no intervention due to comorbidities  - c/w conservative management  - gentle ivf with D5LR @60 cc/hr  - f/u lactate  - NPO  - guarded prognosis  - ongoing GOC discussion

## 2025-05-02 NOTE — SWALLOW BEDSIDE ASSESSMENT ADULT - SWALLOW EVAL: DIAGNOSIS
patient presented with oropharyngeal dysphagia for puree/moderate thick liquids and mild thick liquid. oral phase marked by fair labial seal/stripping utensil, adequate oral containment, and slowed bolus manipulation /anterior posterior transport. there was mild oral residue left lateral tongue cleared with liquid wash. suspect delay in initiation of pharyngeal swallow trigger with reduced laryngeal excursion to palpation. intermittent weak cough/throat clear across textures. suggest pt not safe for po diet, at risk for aspiration. oral means of nutrition/hydration contra indicated

## 2025-05-02 NOTE — DIETITIAN INITIAL EVALUATION ADULT - NSICDXPASTMEDICALHX_GEN_ALL_CORE_FT
DIABETES Follow up    CC:  Diabetes    HPI: Kassi Aranda is a 71year old female here for a follow up. Most recent A1C 8.5%. Her brother had a heart surgery recently so she says she has eaten out a little more recently since she was at the hospital.     She has gained 3 lbs since the last visit. Exercise limited but plans on walking more with weather improving. Freestyle garfield data     Type 2 DM  -diagnosed at age 39  -family hx: mother and father     current medications:   Victoza 1.8 mg daily  Basaglar to 30 units daily   Novolog 6 units before meals plus sliding scale Â Â Â        Blood sugar                         EXTRA Insulin (units)                        <200                                    ADD ZERO                        201-250                               ADD 2                        251-300                               ADD 4                        301-350                               ADD 6                        351-400                               ADD 8                        >400    ADD 10    Thyroid nodule: 2.2 cm biopsied 5/2019 and benign, repeat US done this year 2.3 cm, biopsied again ordered by Dr. Zahra Fuentes whom she say before me and it was benign. HLD - takes atorvastatin 20 mg po qhs    UTD with eye exam, follows with podiatry regularly. Nephropathy: yes, takes irbesartan 300 mg daily     ROS: As per HPI, all other systems were reviewed and noted to be negative or not pertinent.     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) 06/08/2022     Priority: Low   â¢ Cataract, cortical, right eye 03/15/2021     Priority: Low   â¢ Trochanteric bursitis of left hip 01/28/2021     Priority: Low   â¢ Muscle tightness 01/28/2021     Priority: Low   â¢ Muscle weakness 01/28/2021     Priority: Low   â¢ Acute low back pain 2020     Priority: Low   â¢ Medicare annual wellness visit, subsequent 2020     Priority: Low   â¢ Microalbuminuria      Priority: Low   â¢ Depression      Priority: Low   â¢ Bilateral renal cysts      Priority: Low     CT done 10/2019, stable, no need for follow up     â¢ Pulmonary nodules      Priority: Low     last CT done 10/2019, need to repeat in 10/2020     â¢ Patient nonadherence 10/09/2019     Priority: Low   â¢ Pulmonary nodule 2019     Priority: Low   â¢ Kidney cysts 2019     Priority: Low   â¢ Thyroid nodule 2019     Priority: Low     Biopsy May 2019 negative     â¢ H. pylori infection 2019     Priority: Low   â¢ Vitamin D deficiency 2016     Priority: Low   â¢ Uncontrolled type 2 diabetes mellitus with microalbuminuria, with long-term current use of insulin 2014     Priority: Low     Family History   Problem Relation Age of Onset   â¢ Hypertension Mother    â¢ Diabetes Mother    â¢ Hypertension Father    â¢ Diabetes Father    â¢ Cancer, Prostate Maternal Grandfather    â¢ Heart disease Brother         Murmur   â¢ Alcohol Abuse Son    â¢ Cancer, Skin Melanoma Son    â¢ Patient is unaware of any medical problems Son    â¢ Patient is unaware of any medical problems Daughter      Past Surgical History:   Procedure Laterality Date   â¢ Bladder surgery     â¢ Carpal tunnel release     â¢ Cholecystectomy     â¢ Foot surgery      hammertoe   â¢ Rotator cuff repair Right    â¢ Tonsillectomy       Social History     Socioeconomic History   â¢ Marital status:      Spouse name: Not on file   â¢ Number of children: 3   â¢ Years of education: Not on file   â¢ Highest education level: Not on file   Occupational History   â¢ Occupation: retired     Comment: grandkids live with her   Tobacco Use   â¢ Smoking status: Former Smoker     Packs/day: 0.25     Years: 0.30     Pack years: 0.07     Types: Cigarettes     Start date:      Quit date:      Years since quittin.5   â¢ Smokeless tobacco: Never Used   â¢ Tobacco comment: quit fifty years ago/ only smoked for a few months   Vaping Use   â¢ Vaping Use: never used   Substance and Sexual Activity   â¢ Alcohol use: Yes     Alcohol/week: 1.0 standard drink     Types: 1 Standard drinks or equivalent per week   â¢ Drug use: No   â¢ Sexual activity: Yes   Other Topics Concern   â¢ Not on file   Social History Narrative    Lives with daughter and grand kids     Social Determinants of Health     Financial Resource Strain: Not on file   Food Insecurity: Not on file   Transportation Needs: Not on file   Physical Activity: Not on file   Stress: Not on file   Social Connections: Not on file   Intimate Partner Violence: Not on file     Immunization History   Administered Date(s) Administered   â¢ COVID-19 12Y+ Pfizer-[employer]Ntech - Requires Dilution 03/08/2021, 03/29/2021, 11/21/2021   â¢ DT: Child type diphtheria/tetanus 04/20/2005   â¢ Hep B, Unspecified Formulation 04/20/2005, 05/21/2005, 10/21/2005   â¢ Influenza, Unspecified Formulation 11/02/2002, 10/25/2003, 10/17/2004, 10/31/2006, 10/30/2007, 10/21/2008, 09/17/2009, 10/13/2010, 10/10/2011, 10/04/2012, 10/10/2013, 10/15/2015, 09/07/2016, 09/22/2017   â¢ Influenza, injectable, quadrivalent, preservative-free 02/14/2019, 10/30/2019, 09/25/2020   â¢ Pneumococcal Conjugate 13 Valent Vacc (Prevnar 13) 05/26/2018   â¢ Pneumococcal Polysaccharide Vacc (Pneumovax 23) 01/06/2005, 11/20/2007, 08/12/2019   â¢ Shingrix (Shingles Zoster) 02/23/2019, 08/18/2019   â¢ Td:Adult type tetanus/diphtheria 04/20/2005, 08/25/2021   â¢ Tdap 03/17/2011   â¢ Tuberculin Skin Test Unspecified Formulation 12/21/1998, 12/18/2013   â¢ Zostavax (Zoster Shingles) 02/13/2013       ALLERGIES:   Allergen Reactions   â¢ Keflex Nausea & Vomiting   â¢ Cephalexin Nausea & Vomiting       Medications:  Medication reconciliation was performed today.    Current Outpatient Medications   Medication Sig Dispense Refill   â¢ insulin glargine (Lantus SoloStar) 100 UNIT/ML pen-injector Inject 30 Units into the skin nightly. Dx: E11.65. Prime 2 units before each dose. 30 mL 1   â¢ BEVACizumab (Avastin) 25 MG/ML injection 1.25 mg by Intravitreal route. â¢ hydroCORTisone (CORTIZONE) 2.5 % cream Apply 1 application topically 2 times daily. 30 g 1   â¢ NovoLOG FlexPen 100 UNIT/ML pen-injector PRIME 2 UNITS BEFORE EACH DOSE. 6 UNITS FOR BREAKFAST,  5 units LUNCH AND 4 units for DINNER PLUS SLIDING SCALE. TOTAL DAILY DOSE 50 UNITS/TOTAL 45 each 1   â¢ BD Pen Needle Ramila 2nd Gen 32G X 4 MM Misc USE TO INJECT FOUR TIMES A DAY AND VICTOZA DAILY 400 each 1   â¢ nystatin (MYCOSTATIN) 566711 UNIT/GM ointment Apply 1 application topically 2 times daily. 30 g 1   â¢ clotrimazole-betamethasone (LOTRISONE) 1-0.05 % cream Apply topically 2 times daily. 30 g 0   â¢ nystatin-triamcinolone (MYCOLOG II) 912944-7.1 UNIT/GM-% ointment Apply topically 2 times daily. 30 g 0   â¢ nystatin-triamcinolone (MYCOLOG II) 896461-2.1 UNIT/GM-% ointment Apply topically 2 times daily. 30 g 0   â¢ prednisoLONE acetate (PRED FORTE) 1 % ophthalmic suspension Place 1 drop into both eyes 4 times daily. 5 mL 0   â¢ liraglutide (Victoza) 18 MG/3ML pen-injector Inject 1.8 mg into the skin daily. 27 mL 2   â¢ FLUoxetine (PROzac) 20 MG capsule TAKE 2 CAPSULES BY MOUTH EVERY  capsule 1   â¢ atorvastatin (LIPITOR) 40 MG tablet TAKE 1 TABLET BY MOUTH EVERY DAY 90 tablet 3   â¢ hydrochlorothiazide (HYDRODIURIL) 25 MG tablet TAKE 1 TABLET BY MOUTH EVERY DAY 90 tablet 0   â¢ FreeStyle Paige 2 Sensor (FreeStyle Paige 2 Sensor Systm) Misc 1 Device every 14 days. 6 each 3   â¢ Continuous Blood Gluc  (FreeStyle Paige 2 [place]) Device 1 Device 4 times daily. 1 each 0   â¢ Accu-Chek FastClix Lancets Misc Use to check blood sugar three times daily. Type II Diabetes - E11.29. Insulin- dependent 300 each 1   â¢ Glucose Blood (BLOOD GLUCOSE TEST STRIPS) Strip Use to check blood sugar three times daily. Type II Diabetes - E11.29. Insulin- dependent 300 strip 1   â¢ amLODIPine (NORVASC) 5 MG tablet TAKE 1 TABLET BY MOUTH EVERY DAY 90 tablet 1   â¢ irbesartan (AVAPRO) 300 MG tablet Take 1 tablet by mouth daily. 90 tablet 1   â¢ Blood Glucose Monitoring Suppl (ACCU-CHEK GUIDE) w/Device Kit 1 each 3 times daily. Use to check blood sugar three times daily. Type II Diabetes - E11.29. Insulin- dependent 1 kit 0   â¢ pantoprazole (PROTONIX) 40 MG tablet Take 1 tablet by mouth daily. 90 tablet 3   â¢ aspirin 81 MG chewable tablet      â¢ Multiple Vitamins-Minerals (CENTRUM SILVER) tablet        No current facility-administered medications for this visit. VITAL SIGNS:  There were no vitals taken for this visit. There is no height or weight on file to calculate BMI.     PHYSICAL EXAM:  GENERAL APPEARANCE: well appearing, in no acute distress  EYES: conjunctiva clear, no lid lag, no proptosis  EARS: hearing was grossly intact  Thyroid- 2 cm left thyroid nodule palpated   LUNGS: CTA b/l  CVS: RRR   MUSCULOSKELETAL: normal gait and station  NEUROLOGICAL: alert, oriented x 3  SKIN: warm, dry, good turgor, no rash  PSYCH appropriate mood and affect       LAB TESTS/STUDIES:   I personally reviewed the following lab studies  Component      Latest Ref Rng & Units 8/9/2021   CALCIUM      8.6 - 10.6 mg/dL 9.5   Albumin      3.6 - 5.1 g/dL 4.0   ALK PHOSPHATASE      45 - 130 U/L 87   ALT/SGPT      4 - 38 U/L 24   AST/SGOT      14 - 43 U/L 27   TOTAL BILIRUBIN      0.0 - 1.3 mg/dL 0.4   BUN      7 - 20 mg/dL 22 (H)   Chloride      96 - 107 mmol/L 103   CO2      22 - 32 mmol/L 29   CREATININE, SERUM      0.5 - 1.4 mg/dL 1.7 (H)   Potassium      3.5 - 5.3 mmol/L 4.0   Sodium      136 - 146 mmol/L 142   GLUCOSE, FASTING      60 - 100 mg/dL 118 (H)   TOTAL PROTEIN      6.4 - 8.5 g/dL 7.2   EGFR AFRICAN AMERICAN      >60 mL/min/1.73m2 36 (L)   EGFR* NON-AFRICAN AMERICAN      >60 mL/min/1.73m2 30 (L)   CHOLESTEROL      140 - 200 mg/dL 200   HDL      >40 mg/dL 46 CALCULATED LDL      30 - 100 mg/dL 128 (H)   TRIGLYCERIDE      0 - 200 mg/dL 129   GLYCOHEMOGLOBIN A1C      4.2 - 6.0 % 10.0 (H)   Est Avg Glucose      0 - 154 mg/dL 241 (H)     Component      Latest Ref Rng & Units 6/8/2022 7/13/2022   Sodium      136 - 146 mmol/L 141    Potassium      3.5 - 5.3 mmol/L 4.2    GLUCOSE, RANDOM      70 - 200 mg/dL 143    CREATININE, SERUM      0.5 - 1.4 mg/dL 2.2 (H)    CO2      22 - 32 mmol/L 29    Chloride      96 - 107 mmol/L 102    CALCIUM      8.6 - 10.6 mg/dL 10.7 (H)    BUN      7 - 20 mg/dL 36 (H)    EGFR AFRICAN AMERICAN-R      >60 mL/min/1.73m2 27 (L)    EGFR NON-AFRICAN AMERICAN-R      >60 mL/min/1.73m2 22 (L)    CHOLESTEROL      140 - 200 mg/dL  222 (H)   HDL      >40 mg/dL  62   CALCULATED LDL      30 - 100 mg/dL  133 (H)   TRIGLYCERIDE      0 - 200 mg/dL  136   MICROALBUMIN, URINE RANDOM      mg/L 125.9    CREATININE, URINE RANDOM      30.0 - 125.0 mg/dL 71.0    MICROALBUMIN/CREATININE RATIO      <30.0 mg/G 177.3 (H)    GLYCOHEMOGLOBIN A1C      4.2 - 6.0 %  8.5 (H)   Est Avg Glucose      0 - 154 mg/dL  198 (H)   TSH (WITH REFLEX TO FREE T4)      0.30 - 4.82 m[iU]/L  2.21       US Thyroid   2022  EXAM: US THYROID  Â   Â   CLINICAL INDICATION: Thyroid nodule  Â   Â   COMPARISON: 6/9/2021. Â   Â   FINDINGS: The right thyroid lobe measures 4.9 x 1.5 x 1.5 cm. The left  thyroid lobe measures 4.4 x 2.3 x 2.1 cm. The isthmus measures 0.5 cm. There is heterogeneous echogenicity throughout the thyroid parenchyma. Â   Within the right thyroid lower pole, there is a hypoechoic solid mass  measuring 8 x 6 x 7 mm previously measured 8 x 5 x 7 mm. This is considered  a TI-RADS 4 lesion. In the right thyroid mid to lower pole there is another  hypoechoic mass measuring 5 x 4 x 5 mm. This is considered a TI-RADS 4  lesion. In the left thyroid midpole, there is a heterogeneous hypoechoic  solid-appearing mass measuring 2.3 x 1.7 x 1.8 cm and is stable in size.   This is considered a TI-RADS 4 lesion. Â   Â   IMPRESSION:  1. Bilateral thyroid nodules as noted above. Histology cannot be  determined based on sonographic appearance. Follow-up is advised. Â   ACR TI-RADS is a reporting system for thyroid nodules on ultrasound  proposed by the [employer] (ACR) . This uses a standardized scoring system for reports providing users with  recommendations for when to use fine     US Thyroid:  The left lobe of the thyroid gland measures 4.7 x 2.2 x 2.2 cm. In the midpole is a stable 2.3 x 1.7 x 1.8 cm heterogeneously hypoechoic  solid well-circumscribed wider than tall nodule without microcalcifications  which is considered TI RADS 4. Biopsy:   SPECIMEN SOURCE:   FNA, THYROID; LEFT MID POLE   Â    SPECIMEN ADEQUACY:   Satisfactory for interpretation. Â    INTERPRETATION:   NEGATIVE FOR CARCINOMA   Â    Comments: The aspirate is consistent with a benign follicular nodule, colloid type   with cystic changes. The slides were reviewed at the time of FNA procedure, and were noted to   be adequate for assessment. This was communicated to Dr. Balaji Whipple on   6/9/2021       Madi Graf is a 71year old female here for a follow up of Type 2 DM, HTN, HLD, obesity and thyroid nodule. Type 2 DM  Uncontrolled with most recent A1c 10.6% repeat now. Freestyle garfield data reviewed. Average glucose 130, TIR 86%, 12% high, 2% low  BG improving with some hypoglycemia noted.     Continue victoza 1.8 mg daily  Decrease basaglar 30 units a day   Decrease novolog 6 units before breakfast, 5 units lunch and 4 units for dinner and add sliding scale                          Blood sugar                         EXTRA Insulin (units)                        <200                                    ADD ZERO                        201-250                               ADD 1                        251-300                               ADD 2                        301-350 ADD 3                        351-400                               ADD 4                        >400       ADD 5  Please call [phone] if blood glucose <80 or >350  Pt on four times a day insulin and using freestyle garfield 2 to check her BG.      Dyslipidemia  C/w atorvastatin to 40 mg p.o. nightly    Thyroid nodule  Most recent US thyroid stable, repeat in 1 year     Vitamin D deficiency  -on vitamin D supplementation   -repeat 25-OH D now    RTC in 3 months    Electronically signed by   Dora Wolfe MD  Staff Endocrinologist   07/15/22  824 am PAST MEDICAL HISTORY:  Chronic heart failure with preserved ejection fraction (HFpEF)     History of cerebrovascular accident (CVA) with residual deficit     History of cervical cancer     Hypertension     Urinary and bowel incontinence

## 2025-05-02 NOTE — SWALLOW BEDSIDE ASSESSMENT ADULT - ORAL PHASE
Delayed oral transit time noted mild residue left lateral tongue/Delayed oral transit time/Stasis in lateral sulci

## 2025-05-02 NOTE — SWALLOW BEDSIDE ASSESSMENT ADULT - SWALLOW EVAL: ORAL MUSCULATURE
informal observation- adequate for speech. generalized weakness and overall decreased ROM/generally intact

## 2025-05-02 NOTE — DIETITIAN INITIAL EVALUATION ADULT - PROBLEM SELECTOR PLAN 3
- /75 on presentation   - Home medications: losartan  - Permissive hypertension for now, goal 160s/100s.    - ctm

## 2025-05-02 NOTE — DIETITIAN INITIAL EVALUATION ADULT - ENTERAL
If family decides on PEG, recommend Jevity 1.2 to goal rate 50ml/hr [id number] total volume, 1440 kcal, 67 g protein, 972 ml H2O; provides >75% of pt's protein-energy needs. If BG levels become further elevated, recommend Glucerna 1.2 at same rate(provides 1200ml total volume, 1440 kcal, 72 g protein, 972ml H2O; provides >75% of pt's estimated protein-energy needs)

## 2025-05-02 NOTE — SWALLOW BEDSIDE ASSESSMENT ADULT - SLP PERTINENT HISTORY OF CURRENT PROBLEM
pt admitted to medicine for Acute respiratory failure likely due to aspiration pna, SBO, possible bowel ischemia

## 2025-05-02 NOTE — SWALLOW BEDSIDE ASSESSMENT ADULT - PHARYNGEAL PHASE
Cough post oral intake/Delayed cough post oral intake Delayed pharyngeal swallow/Decreased laryngeal elevation/Cough post oral intake/Delayed cough post oral intake

## 2025-05-02 NOTE — DIETITIAN INITIAL EVALUATION ADULT - NUTRITIONGOAL OUTCOME1
Pt to be provided with nutrition/hydration as medically feasible, as tolerated, and within pt/family's wishes for care

## 2025-05-02 NOTE — DIETITIAN INITIAL EVALUATION ADULT - PROBLEM SELECTOR PLAN 1
- likely multifactorial: aspiration pneumonia, less likely CHF   - CT reviewed as above  - Currently on 2L NC   - c/w abx: cefepime and vancomycin  - Supplemental O2 to keep SpO2 >92%  - bronchodilators as needed

## 2025-05-02 NOTE — SWALLOW BEDSIDE ASSESSMENT ADULT - ADDITIONAL RECOMMENDATIONS
GOALS  pt/ family education regarding oral care /aspiration precautions and will demonstrate understanding and carryover.   Suggest medical team initiate GOC conversation for pt /family wishes regarding oral feeding and PEG tube.  Consider palliative care consult

## 2025-05-02 NOTE — DIETITIAN INITIAL EVALUATION ADULT - PROBLEM SELECTOR PLAN 7
- incidental finding on CT: lll-defined 1.8 x 1.4 cm hypodense ending focus in the region of the lower uterus.  - f/u outpatient

## 2025-05-02 NOTE — DIETITIAN INITIAL EVALUATION ADULT - PERTINENT LABORATORY DATA
05-01    146[H]  |  120[H]  |  33[H]  ----------------------------<  97  3.9   |  20[L]  |  0.92    Ca    8.6      01 May 2025 07:10    TPro  5.5[L]  /  Alb  2.1[L]  /  TBili  0.5  /  DBili  x   /  AST  15  /  ALT  20  /  AlkPhos  67  05-01  POCT Blood Glucose.: 130 mg/dL (05-02-25 @ 11:17)  A1C with Estimated Average Glucose Result: 6.2 % (04-24-25 @ 07:50)  A1C with Estimated Average Glucose Result: 6.3 % (04-23-25 @ 15:33)  A1C with Estimated Average Glucose Result: 5.9 % (11-14-24 @ 07:12)

## 2025-05-02 NOTE — DIETITIAN INITIAL EVALUATION ADULT - OTHER INFO
Call for new or worsening neurologic symptoms or side effects to medication.   
Pt seen earlier today sleeping soundly in bed with head tilted downward obstructing view of pt's face. No family present at bedside at time of visit. Per chart, pt lived with daughter and had HHA 8hrs x 5 days per week; was at rehab prior to current admission. Per chart pt with PMH of HTN CHF, CVA, cervical cancer s/p radiation, glaucoma. Pt with recent admission for acute CVA, gastroenteritis, and elevated troponin, and was discharged to [facility] rehab on 04/29 and presented back to ED the following day due to hypoxia and vomiting. CT chest consistent with likely aspiration pna. CT abdomen/pelvis showed possible SBO vs. ileus. Surgery consulted, however recommend conservative management due to comorbidities. +BM per surgery.  s/p swallow eval this AM(05/02) with SLP recommendation for NPO pending pt/family wishes. Pt DNR/DNI status; family to have GOC discussions regarding feeding tube placement.

## 2025-05-02 NOTE — DIETITIAN INITIAL EVALUATION ADULT - PERTINENT MEDS FT
MEDICATIONS  (STANDING):  aspirin  chewable 81 milliGRAM(s) Oral daily  atorvastatin 40 milliGRAM(s) Oral at bedtime  cefepime   IVPB 1000 milliGRAM(s) IV Intermittent every 24 hours  clopidogrel Tablet 75 milliGRAM(s) Oral daily  dextrose 5% + lactated ringers. 1000 milliLiter(s) (60 mL/Hr) IV Continuous <Continuous>  furosemide    Tablet 20 milliGRAM(s) Oral daily  losartan 50 milliGRAM(s) Oral daily  metoprolol tartrate Injectable 5 milliGRAM(s) IV Push every 6 hours  nystatin    Suspension 923111 Unit(s) Oral four times a day    MEDICATIONS  (PRN):  acetaminophen     Tablet .. 650 milliGRAM(s) Oral every 6 hours PRN Temp greater or equal to 38C (100.4F), Mild Pain (1 - 3)

## 2025-05-02 NOTE — SWALLOW BEDSIDE ASSESSMENT ADULT - COMMENTS
pt seen bedside seated upright on NC02 and alert. pt responded to simple questions for assessment ie "when is your birthday?", noted reduced initiation and she was able to follow directives. pt seen bedside seated upright on NC02 and alert. pt responded to simple questions for assessment ie "when is your birthday?", noted reduced initiation and she was able to follow directives.    CT chest w/ IV contrast 4/30/2025 Patchy tree-in-bud nodules in the right upper lobe and right lower lobe. Patchy airspace consolidation the right lower lobe.  Correlate clinically for aspiration and/or pneumonia. 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  Follow-up with cardiothoracic surgery is recommended for long-term management. pt seen bedside seated upright on NC02 and alert. pt responded to simple questions for assessment ie "when is your birthday?", noted reduced initiation and she was able to follow directives. pt demonstrated low volume with strained vocal quality/ ?decreased breath support.     CT chest w/ IV contrast 4/30/2025 Patchy tree-in-bud nodules in the right upper lobe and right lower lobe. Patchy airspace consolidation the right lower lobe.  Correlate clinically for aspiration and/or pneumonia. 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  Follow-up with cardiothoracic surgery is recommended for long-term management.

## 2025-05-03 LAB
ALBUMIN SERPL ELPH-MCNC: 1.9 G/DL — LOW (ref 3.3–5)
ALP SERPL-CCNC: 70 U/L — SIGNIFICANT CHANGE UP (ref 40–120)
ALT FLD-CCNC: 19 U/L — SIGNIFICANT CHANGE UP (ref 12–78)
ANION GAP SERPL CALC-SCNC: 4 MMOL/L — LOW (ref 5–17)
AST SERPL-CCNC: 16 U/L — SIGNIFICANT CHANGE UP (ref 15–37)
BILIRUB SERPL-MCNC: 0.4 MG/DL — SIGNIFICANT CHANGE UP (ref 0.2–1.2)
BUN SERPL-MCNC: 23 MG/DL — SIGNIFICANT CHANGE UP (ref 7–23)
CALCIUM SERPL-MCNC: 8.6 MG/DL — SIGNIFICANT CHANGE UP (ref 8.5–10.1)
CHLORIDE SERPL-SCNC: 119 MMOL/L — HIGH (ref 96–108)
CO2 SERPL-SCNC: 23 MMOL/L — SIGNIFICANT CHANGE UP (ref 22–31)
CREAT SERPL-MCNC: 0.84 MG/DL — SIGNIFICANT CHANGE UP (ref 0.5–1.3)
EGFR: 64 ML/MIN/1.73M2 — SIGNIFICANT CHANGE UP
EGFR: 64 ML/MIN/1.73M2 — SIGNIFICANT CHANGE UP
GLUCOSE BLDC GLUCOMTR-MCNC: 114 MG/DL — HIGH (ref 70–99)
GLUCOSE BLDC GLUCOMTR-MCNC: 126 MG/DL — HIGH (ref 70–99)
GLUCOSE BLDC GLUCOMTR-MCNC: 153 MG/DL — HIGH (ref 70–99)
GLUCOSE SERPL-MCNC: 131 MG/DL — HIGH (ref 70–99)
HCT VFR BLD CALC: 35.7 % — SIGNIFICANT CHANGE UP (ref 34.5–45)
HGB BLD-MCNC: 11.5 G/DL — SIGNIFICANT CHANGE UP (ref 11.5–15.5)
LEGIONELLA AG UR QL: NEGATIVE — SIGNIFICANT CHANGE UP
MCHC RBC-ENTMCNC: 31.3 PG — SIGNIFICANT CHANGE UP (ref 27–34)
MCHC RBC-ENTMCNC: 32.2 G/DL — SIGNIFICANT CHANGE UP (ref 32–36)
MCV RBC AUTO: 97 FL — SIGNIFICANT CHANGE UP (ref 80–100)
MRSA PCR RESULT.: SIGNIFICANT CHANGE UP
NRBC BLD AUTO-RTO: 0 /100 WBCS — SIGNIFICANT CHANGE UP (ref 0–0)
PLATELET # BLD AUTO: 229 K/UL — SIGNIFICANT CHANGE UP (ref 150–400)
POTASSIUM SERPL-MCNC: 3.2 MMOL/L — LOW (ref 3.5–5.3)
POTASSIUM SERPL-SCNC: 3.2 MMOL/L — LOW (ref 3.5–5.3)
PROT SERPL-MCNC: 5 GM/DL — LOW (ref 6–8.3)
RBC # BLD: 3.68 M/UL — LOW (ref 3.8–5.2)
RBC # FLD: 14.8 % — HIGH (ref 10.3–14.5)
S AUREUS DNA NOSE QL NAA+PROBE: SIGNIFICANT CHANGE UP
S PNEUM AG UR QL: NEGATIVE — SIGNIFICANT CHANGE UP
SODIUM SERPL-SCNC: 146 MMOL/L — HIGH (ref 135–145)
WBC # BLD: 6.5 K/UL — SIGNIFICANT CHANGE UP (ref 3.8–10.5)
WBC # FLD AUTO: 6.5 K/UL — SIGNIFICANT CHANGE UP (ref 3.8–10.5)

## 2025-05-03 PROCEDURE — 99232 SBSQ HOSP IP/OBS MODERATE 35: CPT

## 2025-05-03 RX ADMIN — METOPROLOL SUCCINATE 5 MILLIGRAM(S): 50 TABLET, EXTENDED RELEASE ORAL at 00:06

## 2025-05-03 RX ADMIN — METOPROLOL SUCCINATE 5 MILLIGRAM(S): 50 TABLET, EXTENDED RELEASE ORAL at 18:08

## 2025-05-03 RX ADMIN — Medication 500000 UNIT(S): at 12:07

## 2025-05-03 RX ADMIN — METOPROLOL SUCCINATE 5 MILLIGRAM(S): 50 TABLET, EXTENDED RELEASE ORAL at 12:06

## 2025-05-03 RX ADMIN — Medication 500000 UNIT(S): at 00:05

## 2025-05-03 RX ADMIN — Medication 81 MILLIGRAM(S): at 12:07

## 2025-05-03 RX ADMIN — Medication 500000 UNIT(S): at 18:09

## 2025-05-03 RX ADMIN — METOPROLOL SUCCINATE 5 MILLIGRAM(S): 50 TABLET, EXTENDED RELEASE ORAL at 06:06

## 2025-05-03 RX ADMIN — Medication 500000 UNIT(S): at 06:06

## 2025-05-03 RX ADMIN — CLOPIDOGREL BISULFATE 75 MILLIGRAM(S): 75 TABLET, FILM COATED ORAL at 12:06

## 2025-05-03 RX ADMIN — ATORVASTATIN CALCIUM 40 MILLIGRAM(S): 80 TABLET, FILM COATED ORAL at 21:40

## 2025-05-03 RX ADMIN — Medication 40 MILLIEQUIVALENT(S): at 12:06

## 2025-05-04 LAB
ANION GAP SERPL CALC-SCNC: 5 MMOL/L — SIGNIFICANT CHANGE UP (ref 5–17)
BUN SERPL-MCNC: 20 MG/DL — SIGNIFICANT CHANGE UP (ref 7–23)
CALCIUM SERPL-MCNC: 8.4 MG/DL — LOW (ref 8.5–10.1)
CHLORIDE SERPL-SCNC: 120 MMOL/L — HIGH (ref 96–108)
CO2 SERPL-SCNC: 22 MMOL/L — SIGNIFICANT CHANGE UP (ref 22–31)
CREAT SERPL-MCNC: 0.72 MG/DL — SIGNIFICANT CHANGE UP (ref 0.5–1.3)
EGFR: 77 ML/MIN/1.73M2 — SIGNIFICANT CHANGE UP
EGFR: 77 ML/MIN/1.73M2 — SIGNIFICANT CHANGE UP
GLUCOSE SERPL-MCNC: 131 MG/DL — HIGH (ref 70–99)
POTASSIUM SERPL-MCNC: 3.4 MMOL/L — LOW (ref 3.5–5.3)
POTASSIUM SERPL-SCNC: 3.4 MMOL/L — LOW (ref 3.5–5.3)
SODIUM SERPL-SCNC: 147 MMOL/L — HIGH (ref 135–145)

## 2025-05-04 PROCEDURE — 99232 SBSQ HOSP IP/OBS MODERATE 35: CPT

## 2025-05-04 PROCEDURE — G0545: CPT

## 2025-05-04 RX ADMIN — ATORVASTATIN CALCIUM 40 MILLIGRAM(S): 80 TABLET, FILM COATED ORAL at 22:14

## 2025-05-04 RX ADMIN — Medication 500000 UNIT(S): at 17:28

## 2025-05-04 RX ADMIN — Medication 500000 UNIT(S): at 05:40

## 2025-05-04 RX ADMIN — FUROSEMIDE 20 MILLIGRAM(S): 10 INJECTION INTRAMUSCULAR; INTRAVENOUS at 05:40

## 2025-05-04 RX ADMIN — METOPROLOL SUCCINATE 5 MILLIGRAM(S): 50 TABLET, EXTENDED RELEASE ORAL at 17:31

## 2025-05-04 RX ADMIN — LOSARTAN POTASSIUM 50 MILLIGRAM(S): 100 TABLET, FILM COATED ORAL at 05:40

## 2025-05-04 RX ADMIN — METOPROLOL SUCCINATE 5 MILLIGRAM(S): 50 TABLET, EXTENDED RELEASE ORAL at 11:59

## 2025-05-04 RX ADMIN — METOPROLOL SUCCINATE 5 MILLIGRAM(S): 50 TABLET, EXTENDED RELEASE ORAL at 05:40

## 2025-05-04 RX ADMIN — METOPROLOL SUCCINATE 5 MILLIGRAM(S): 50 TABLET, EXTENDED RELEASE ORAL at 00:33

## 2025-05-04 RX ADMIN — CEFTRIAXONE 100 MILLIGRAM(S): 500 INJECTION, POWDER, FOR SOLUTION INTRAMUSCULAR; INTRAVENOUS at 22:14

## 2025-05-04 RX ADMIN — Medication 500000 UNIT(S): at 00:33

## 2025-05-04 RX ADMIN — Medication 81 MILLIGRAM(S): at 11:59

## 2025-05-04 RX ADMIN — Medication 500000 UNIT(S): at 11:59

## 2025-05-04 RX ADMIN — CEFTRIAXONE 100 MILLIGRAM(S): 500 INJECTION, POWDER, FOR SOLUTION INTRAMUSCULAR; INTRAVENOUS at 00:24

## 2025-05-04 RX ADMIN — Medication 40 MILLIEQUIVALENT(S): at 11:58

## 2025-05-04 RX ADMIN — CLOPIDOGREL BISULFATE 75 MILLIGRAM(S): 75 TABLET, FILM COATED ORAL at 11:59

## 2025-05-05 LAB
ANION GAP SERPL CALC-SCNC: 5 MMOL/L — SIGNIFICANT CHANGE UP (ref 5–17)
BUN SERPL-MCNC: 16 MG/DL — SIGNIFICANT CHANGE UP (ref 7–23)
CALCIUM SERPL-MCNC: 8.5 MG/DL — SIGNIFICANT CHANGE UP (ref 8.5–10.1)
CHLORIDE SERPL-SCNC: 118 MMOL/L — HIGH (ref 96–108)
CO2 SERPL-SCNC: 23 MMOL/L — SIGNIFICANT CHANGE UP (ref 22–31)
CREAT SERPL-MCNC: 0.65 MG/DL — SIGNIFICANT CHANGE UP (ref 0.5–1.3)
EGFR: 81 ML/MIN/1.73M2 — SIGNIFICANT CHANGE UP
EGFR: 81 ML/MIN/1.73M2 — SIGNIFICANT CHANGE UP
GLUCOSE SERPL-MCNC: 94 MG/DL — SIGNIFICANT CHANGE UP (ref 70–99)
POTASSIUM SERPL-MCNC: 3.5 MMOL/L — SIGNIFICANT CHANGE UP (ref 3.5–5.3)
POTASSIUM SERPL-SCNC: 3.5 MMOL/L — SIGNIFICANT CHANGE UP (ref 3.5–5.3)
SODIUM SERPL-SCNC: 146 MMOL/L — HIGH (ref 135–145)

## 2025-05-05 PROCEDURE — 99232 SBSQ HOSP IP/OBS MODERATE 35: CPT

## 2025-05-05 PROCEDURE — 99232 SBSQ HOSP IP/OBS MODERATE 35: CPT | Mod: FS

## 2025-05-05 PROCEDURE — G0545: CPT

## 2025-05-05 RX ADMIN — METOPROLOL SUCCINATE 5 MILLIGRAM(S): 50 TABLET, EXTENDED RELEASE ORAL at 05:32

## 2025-05-05 RX ADMIN — Medication 500000 UNIT(S): at 05:32

## 2025-05-05 RX ADMIN — Medication 500000 UNIT(S): at 23:59

## 2025-05-05 RX ADMIN — Medication 500000 UNIT(S): at 00:21

## 2025-05-05 RX ADMIN — ATORVASTATIN CALCIUM 40 MILLIGRAM(S): 80 TABLET, FILM COATED ORAL at 21:07

## 2025-05-05 RX ADMIN — Medication 81 MILLIGRAM(S): at 11:16

## 2025-05-05 RX ADMIN — Medication 500000 UNIT(S): at 17:04

## 2025-05-05 RX ADMIN — Medication 500000 UNIT(S): at 11:14

## 2025-05-05 RX ADMIN — CEFTRIAXONE 100 MILLIGRAM(S): 500 INJECTION, POWDER, FOR SOLUTION INTRAMUSCULAR; INTRAVENOUS at 23:59

## 2025-05-05 RX ADMIN — METOPROLOL SUCCINATE 5 MILLIGRAM(S): 50 TABLET, EXTENDED RELEASE ORAL at 17:04

## 2025-05-05 RX ADMIN — FUROSEMIDE 20 MILLIGRAM(S): 10 INJECTION INTRAMUSCULAR; INTRAVENOUS at 05:25

## 2025-05-05 RX ADMIN — LOSARTAN POTASSIUM 50 MILLIGRAM(S): 100 TABLET, FILM COATED ORAL at 05:24

## 2025-05-05 RX ADMIN — CLOPIDOGREL BISULFATE 75 MILLIGRAM(S): 75 TABLET, FILM COATED ORAL at 11:15

## 2025-05-05 RX ADMIN — METOPROLOL SUCCINATE 5 MILLIGRAM(S): 50 TABLET, EXTENDED RELEASE ORAL at 11:15

## 2025-05-05 RX ADMIN — METOPROLOL SUCCINATE 5 MILLIGRAM(S): 50 TABLET, EXTENDED RELEASE ORAL at 00:16

## 2025-05-05 RX ADMIN — METOPROLOL SUCCINATE 5 MILLIGRAM(S): 50 TABLET, EXTENDED RELEASE ORAL at 23:59

## 2025-05-06 PROCEDURE — 99232 SBSQ HOSP IP/OBS MODERATE 35: CPT

## 2025-05-06 PROCEDURE — 99233 SBSQ HOSP IP/OBS HIGH 50: CPT

## 2025-05-06 RX ORDER — DORZOLAMIDE 20 MG/ML
1 SOLUTION/ DROPS OPHTHALMIC THREE TIMES A DAY
Refills: 0 | Status: DISCONTINUED | OUTPATIENT
Start: 2025-05-06 | End: 2025-05-10

## 2025-05-06 RX ORDER — IPRATROPIUM BROMIDE AND ALBUTEROL SULFATE .5; 2.5 MG/3ML; MG/3ML
3 SOLUTION RESPIRATORY (INHALATION) EVERY 6 HOURS
Refills: 0 | Status: DISCONTINUED | OUTPATIENT
Start: 2025-05-06 | End: 2025-05-07

## 2025-05-06 RX ADMIN — SODIUM CHLORIDE 60 MILLILITER(S): 9 INJECTION, SOLUTION INTRAVENOUS at 07:38

## 2025-05-06 RX ADMIN — DORZOLAMIDE 1 DROP(S): 20 SOLUTION/ DROPS OPHTHALMIC at 22:44

## 2025-05-06 RX ADMIN — Medication 500000 UNIT(S): at 05:35

## 2025-05-06 RX ADMIN — Medication 500000 UNIT(S): at 18:09

## 2025-05-06 RX ADMIN — LOSARTAN POTASSIUM 50 MILLIGRAM(S): 100 TABLET, FILM COATED ORAL at 05:36

## 2025-05-06 RX ADMIN — FUROSEMIDE 20 MILLIGRAM(S): 10 INJECTION INTRAMUSCULAR; INTRAVENOUS at 05:36

## 2025-05-06 RX ADMIN — Medication 500000 UNIT(S): at 12:06

## 2025-05-06 RX ADMIN — CLOPIDOGREL BISULFATE 75 MILLIGRAM(S): 75 TABLET, FILM COATED ORAL at 12:07

## 2025-05-06 RX ADMIN — IPRATROPIUM BROMIDE AND ALBUTEROL SULFATE 3 MILLILITER(S): .5; 2.5 SOLUTION RESPIRATORY (INHALATION) at 18:19

## 2025-05-06 RX ADMIN — METOPROLOL SUCCINATE 5 MILLIGRAM(S): 50 TABLET, EXTENDED RELEASE ORAL at 18:10

## 2025-05-06 RX ADMIN — ATORVASTATIN CALCIUM 40 MILLIGRAM(S): 80 TABLET, FILM COATED ORAL at 22:33

## 2025-05-06 RX ADMIN — SODIUM CHLORIDE 60 MILLILITER(S): 9 INJECTION, SOLUTION INTRAVENOUS at 00:00

## 2025-05-06 RX ADMIN — Medication 81 MILLIGRAM(S): at 12:06

## 2025-05-06 RX ADMIN — METOPROLOL SUCCINATE 5 MILLIGRAM(S): 50 TABLET, EXTENDED RELEASE ORAL at 12:07

## 2025-05-06 RX ADMIN — METOPROLOL SUCCINATE 5 MILLIGRAM(S): 50 TABLET, EXTENDED RELEASE ORAL at 05:35

## 2025-05-06 NOTE — PROGRESS NOTE ADULT - PROBLEM SELECTOR PLAN 5
was discharged following acute right KASSIE infarct, CTH shows evolving right KASSIE infarct  on asa and plavix.  mental status has been waxing and waning, suspect some component of delirium, family concerned about possible UTI
as above
was discharged yesterday following acute right KASSIE infarct, CTH shows evolving right KASSIE infarct  on asa and plavix.

## 2025-05-06 NOTE — PROGRESS NOTE ADULT - CONVERSATION DETAILS
Spoke with patient's daughters, Mary and Daniel as well as Dr. Vallecillo and Ai Ruiz from [facility].  Discussed families concern about patient's condition.  She had been ordered for a swallow eval yesterday and it was done today with reccs for NPO.  Explained options of pleasure feeds with understanding that patient could aspirate or not eat consistently, but objective is to allow her to eat if desired with aspiration precautions.  Patient continues on abx, being treated for thrush and wish to continue with current management and see how patient does in the coming days with initiation of pleasure feeds.  Explained likely to get a sense of how patient is doing in the coming days.  Discussed even if patient does improve she is still at risk for further decline given medical issues with multiple hospital stays prior as well as issues with suspected delirium.  They verbalized understanding and will continue to communicate with the team as needed.  MOLST on file with DNR/I.
Spoke with patient's daughter, Daniel via phone as she was at work and not available to speak in person.  Patient's family had apparently discussed home hospice with primary team.  Daniel said she is not sure if she is delusional but thinks patient is getting better.  She would like to try and get stronger at rehab at possible, but not sure and wants to talk to her sisterUmu a bit more.  Explained that if patient is going to get better she will in spite of hospice.  Explained they can opt for rehab if they want to try and get patient stronger but she may have more issues and decline further.  If patient declines further at rehab can transition to hospice as well.  Patient's sister to come to the hospital later today and they will discuss this further.  Palliative offered follow up meeting tomorrow as well.  Family to call and let this NP know when they may be available to speak further.
Spoke with patient's daughter Umu in person and her sister, Daniel via phone as she was at work.  Discussed options of [facility] vs. home with home care/PT vs. hospice care at home.  They said patient has been waxing and waning and at times doing better than others.  She has been eating reasonably well according to them.      They understand patient has comorbidities and is weaker and will not go back to prior baseline but would like to see if she can get a little stronger.  For now wish for patient to go to [facility] and see if she can participate and get some improvement in strength.  Explained if patient declines further, does not participate or goals change can consider home hospice.  Educated about hospice and their philosophy of care as well as where those services can take place.  They wish to continue with current management and plan for [facility].  Aware IVF will not continue outside of the hospital and patient's swallowing is an ongoing issue with possible aspiration at anytime, but will continue with pleasure feeds and aspiration precautions.

## 2025-05-06 NOTE — PROGRESS NOTE ADULT - PROBLEM SELECTOR PLAN 2
as above
CT abdomen with mesenteric edema, suspected small foci of mesenteric gas in the right lower quadrant.  There is also punctate focus of portal venous gas the left hepatic lobe.  The findings raise the possibility of bowel ischemia.   lactate was 2.1-->2 and no leukocytosis   s/p ceftriaxone
CT abdomen with mesenteric edema, suspected small foci of mesenteric gas in the right lower quadrant.  There is also punctate focus of portal venous gas the left hepatic lobe.  The findings raise the possibility of bowel ischemia.   lactate was 2.1-->2 and no leukocytosis   on ceftriazone

## 2025-05-06 NOTE — PROGRESS NOTE ADULT - PROBLEM SELECTOR PROBLEM 4
SBO (small bowel obstruction)
Chronic combined systolic and diastolic heart failure
Chronic combined systolic and diastolic heart failure

## 2025-05-06 NOTE — PROGRESS NOTE ADULT - PROBLEM SELECTOR PLAN 7
- incidental finding on CT: lll-defined 1.8 x 1.4 cm hypodense ending focus in the region of the lower uterus.  - f/u outpatient
See GOC conversation above.  Patient with MOLST on file indicating DNR/I.  Family interested in [facility].  Palliative available as needed.     Discussed with Dr. Vallecillo and PT
See GOC conversation above.  Patient with MOLST on file indicating DNR/I.  Patient's family discussing home with hospice vs. [facility].  Family to discuss offered follow up meeting tomorrow.     Message sent to Dr. Vallecillo

## 2025-05-06 NOTE — PROGRESS NOTE ADULT - PROBLEM SELECTOR PLAN 1
CT abdomen with Dilated mid and distal small bowel loops with air-fluid levels measuring up to approximately 4 cm. Questionable transition point and/or tethered small bowel loop in the right lower quadrant  has had BMs, tolerating po diet, no abdominal pain or nausea/vomiting  please monitor and document BMs, last BM 5/6
- likely multifactorial: aspiration pneumonia, less likely CHF   - CT reviewed as above  - Currently on 2L NC   - c/w abx: cefepime , will get ID to evaluate further need for antibiotics   - Supplemental O2 to keep SpO2 >92%  - bronchodilators as needed
CT abdomen with Dilated mid and distal small bowel loops with air-fluid levels measuring up to approximately 4 cm. Questionable transition point and/or tethered small bowel loop in the right lower quadrant  has had BMs, tolerating po diet, no abdominal pain or nausea/vomiting  please monitor and document BMs, last BM 5/4

## 2025-05-06 NOTE — PROGRESS NOTE ADULT - PROBLEM SELECTOR PLAN 3
vomiting episode prior to arrival, suspected aspiration  SLP eval appreciated, family opted for pleasure feeds starting Friday, recent CVA  tolerating po  HOBE and oral hygiene
improved bp now at baseline
vomiting episode prior to arrival, suspected aspiration  SLP eval appreciated, family opted for pleasure feeds starting Friday, recent CVA  tolerating po  HOBE and oral hygiene

## 2025-05-06 NOTE — PROGRESS NOTE ADULT - PROBLEM SELECTOR PLAN 4
- CT abdomen/pelvis revealed possible SBO, bowel ischemia, scattered punctate foci of gas in the right lower quadrant ventral abdominal wall and in the right groin soft tissues possibly soft tissue infection. Ill-defined 1.8 x 1.4 cm hypodense ending focus in the region of the lower uterus.  - surgery consulted: no intervention due to comorbidities  - c/w conservative management  - gentle ivf with D5LR @60 cc/hr  - f/u lactate  - NPO  - guarded prognosis  - ongoing GOC discussion
Echo from April 4/2/25 EF 40-45%, regional WMA, grade II diastolic dysfunction, moderate AR  on IVF as she is NPO, monitor I&Os.
Echo from April 4/2/25 EF 40-45%, regional WMA, grade II diastolic dysfunction, moderate AR  on lasix  on IVF as she is NPO, monitor I&Os.

## 2025-05-06 NOTE — PROGRESS NOTE ADULT - PROBLEM SELECTOR PLAN 6
assistance with ADLs  turn and position q 2 hours  OOB to chair as able    [facility] vs. hospice?
- CT shows scattered punctate foci of gas in the right lower quadrant ventral abdominal wall and in the right groin soft tissues possibly soft tissue infection.  - on abx   - ctm
assistance with ADLs  turn and position q 2 hours  OOB to chair as able    plan for [facility]

## 2025-05-07 LAB
ALBUMIN SERPL ELPH-MCNC: 1.9 G/DL — LOW (ref 3.3–5)
ALP SERPL-CCNC: 81 U/L — SIGNIFICANT CHANGE UP (ref 40–120)
ALT FLD-CCNC: 21 U/L — SIGNIFICANT CHANGE UP (ref 12–78)
ANION GAP SERPL CALC-SCNC: 5 MMOL/L — SIGNIFICANT CHANGE UP (ref 5–17)
AST SERPL-CCNC: 16 U/L — SIGNIFICANT CHANGE UP (ref 15–37)
BILIRUB SERPL-MCNC: 0.4 MG/DL — SIGNIFICANT CHANGE UP (ref 0.2–1.2)
BUN SERPL-MCNC: 13 MG/DL — SIGNIFICANT CHANGE UP (ref 7–23)
CALCIUM SERPL-MCNC: 8.2 MG/DL — LOW (ref 8.5–10.1)
CHLORIDE SERPL-SCNC: 110 MMOL/L — HIGH (ref 96–108)
CO2 SERPL-SCNC: 26 MMOL/L — SIGNIFICANT CHANGE UP (ref 22–31)
CREAT SERPL-MCNC: 0.73 MG/DL — SIGNIFICANT CHANGE UP (ref 0.5–1.3)
EGFR: 76 ML/MIN/1.73M2 — SIGNIFICANT CHANGE UP
EGFR: 76 ML/MIN/1.73M2 — SIGNIFICANT CHANGE UP
FLUAV AG NPH QL: SIGNIFICANT CHANGE UP
FLUBV AG NPH QL: SIGNIFICANT CHANGE UP
GLUCOSE SERPL-MCNC: 102 MG/DL — HIGH (ref 70–99)
HCT VFR BLD CALC: 38.1 % — SIGNIFICANT CHANGE UP (ref 34.5–45)
HGB BLD-MCNC: 12.5 G/DL — SIGNIFICANT CHANGE UP (ref 11.5–15.5)
MAGNESIUM SERPL-MCNC: 1.7 MG/DL — SIGNIFICANT CHANGE UP (ref 1.6–2.6)
MCHC RBC-ENTMCNC: 31.7 PG — SIGNIFICANT CHANGE UP (ref 27–34)
MCHC RBC-ENTMCNC: 32.8 G/DL — SIGNIFICANT CHANGE UP (ref 32–36)
MCV RBC AUTO: 96.7 FL — SIGNIFICANT CHANGE UP (ref 80–100)
NRBC BLD AUTO-RTO: 0 /100 WBCS — SIGNIFICANT CHANGE UP (ref 0–0)
PHOSPHATE SERPL-MCNC: 2.3 MG/DL — LOW (ref 2.5–4.5)
PLATELET # BLD AUTO: 212 K/UL — SIGNIFICANT CHANGE UP (ref 150–400)
POTASSIUM SERPL-MCNC: 3.6 MMOL/L — SIGNIFICANT CHANGE UP (ref 3.5–5.3)
POTASSIUM SERPL-SCNC: 3.6 MMOL/L — SIGNIFICANT CHANGE UP (ref 3.5–5.3)
PROT SERPL-MCNC: 5.1 GM/DL — LOW (ref 6–8.3)
RBC # BLD: 3.94 M/UL — SIGNIFICANT CHANGE UP (ref 3.8–5.2)
RBC # FLD: 14.3 % — SIGNIFICANT CHANGE UP (ref 10.3–14.5)
RSV RNA NPH QL NAA+NON-PROBE: DETECTED
SARS-COV-2 RNA SPEC QL NAA+PROBE: SIGNIFICANT CHANGE UP
SODIUM SERPL-SCNC: 141 MMOL/L — SIGNIFICANT CHANGE UP (ref 135–145)
SOURCE RESPIRATORY: SIGNIFICANT CHANGE UP
WBC # BLD: 8.76 K/UL — SIGNIFICANT CHANGE UP (ref 3.8–10.5)
WBC # FLD AUTO: 8.76 K/UL — SIGNIFICANT CHANGE UP (ref 3.8–10.5)

## 2025-05-07 PROCEDURE — 70450 CT HEAD/BRAIN W/O DYE: CPT | Mod: 26

## 2025-05-07 PROCEDURE — 99233 SBSQ HOSP IP/OBS HIGH 50: CPT

## 2025-05-07 PROCEDURE — 71045 X-RAY EXAM CHEST 1 VIEW: CPT | Mod: 26

## 2025-05-07 RX ORDER — IPRATROPIUM BROMIDE AND ALBUTEROL SULFATE .5; 2.5 MG/3ML; MG/3ML
3 SOLUTION RESPIRATORY (INHALATION) EVERY 6 HOURS
Refills: 0 | Status: COMPLETED | OUTPATIENT
Start: 2025-05-07 | End: 2025-05-10

## 2025-05-07 RX ORDER — DEXTROMETHORPHAN HBR, GUAIFENESIN 200 MG/10ML
600 LIQUID ORAL EVERY 12 HOURS
Refills: 0 | Status: COMPLETED | OUTPATIENT
Start: 2025-05-07 | End: 2025-05-11

## 2025-05-07 RX ORDER — POTASSIUM PHOSPHATE, MONOBASIC POTASSIUM PHOSPHATE, DIBASIC INJECTION, 236; 224 MG/ML; MG/ML
30 SOLUTION, CONCENTRATE INTRAVENOUS ONCE
Refills: 0 | Status: COMPLETED | OUTPATIENT
Start: 2025-05-07 | End: 2025-05-08

## 2025-05-07 RX ADMIN — Medication 500000 UNIT(S): at 11:50

## 2025-05-07 RX ADMIN — LOSARTAN POTASSIUM 50 MILLIGRAM(S): 100 TABLET, FILM COATED ORAL at 06:00

## 2025-05-07 RX ADMIN — DORZOLAMIDE 1 DROP(S): 20 SOLUTION/ DROPS OPHTHALMIC at 22:23

## 2025-05-07 RX ADMIN — SODIUM CHLORIDE 60 MILLILITER(S): 9 INJECTION, SOLUTION INTRAVENOUS at 01:51

## 2025-05-07 RX ADMIN — Medication 81 MILLIGRAM(S): at 11:54

## 2025-05-07 RX ADMIN — CLOPIDOGREL BISULFATE 75 MILLIGRAM(S): 75 TABLET, FILM COATED ORAL at 11:55

## 2025-05-07 RX ADMIN — METOPROLOL SUCCINATE 5 MILLIGRAM(S): 50 TABLET, EXTENDED RELEASE ORAL at 06:01

## 2025-05-07 RX ADMIN — Medication 500000 UNIT(S): at 19:02

## 2025-05-07 RX ADMIN — SODIUM CHLORIDE 60 MILLILITER(S): 9 INJECTION, SOLUTION INTRAVENOUS at 11:55

## 2025-05-07 RX ADMIN — METOPROLOL SUCCINATE 5 MILLIGRAM(S): 50 TABLET, EXTENDED RELEASE ORAL at 11:55

## 2025-05-07 RX ADMIN — IPRATROPIUM BROMIDE AND ALBUTEROL SULFATE 3 MILLILITER(S): .5; 2.5 SOLUTION RESPIRATORY (INHALATION) at 23:22

## 2025-05-07 RX ADMIN — DORZOLAMIDE 1 DROP(S): 20 SOLUTION/ DROPS OPHTHALMIC at 06:02

## 2025-05-07 RX ADMIN — FUROSEMIDE 20 MILLIGRAM(S): 10 INJECTION INTRAMUSCULAR; INTRAVENOUS at 06:00

## 2025-05-07 RX ADMIN — METOPROLOL SUCCINATE 5 MILLIGRAM(S): 50 TABLET, EXTENDED RELEASE ORAL at 00:22

## 2025-05-07 RX ADMIN — METOPROLOL SUCCINATE 5 MILLIGRAM(S): 50 TABLET, EXTENDED RELEASE ORAL at 19:02

## 2025-05-07 RX ADMIN — Medication 500000 UNIT(S): at 00:22

## 2025-05-07 RX ADMIN — DEXTROMETHORPHAN HBR, GUAIFENESIN 600 MILLIGRAM(S): 200 LIQUID ORAL at 18:54

## 2025-05-07 RX ADMIN — DORZOLAMIDE 1 DROP(S): 20 SOLUTION/ DROPS OPHTHALMIC at 14:37

## 2025-05-07 RX ADMIN — Medication 500000 UNIT(S): at 23:51

## 2025-05-07 RX ADMIN — Medication 500000 UNIT(S): at 06:00

## 2025-05-08 LAB
ANION GAP SERPL CALC-SCNC: 4 MMOL/L — LOW (ref 5–17)
BUN SERPL-MCNC: 15 MG/DL — SIGNIFICANT CHANGE UP (ref 7–23)
CALCIUM SERPL-MCNC: 7.8 MG/DL — LOW (ref 8.5–10.1)
CHLORIDE SERPL-SCNC: 112 MMOL/L — HIGH (ref 96–108)
CO2 SERPL-SCNC: 25 MMOL/L — SIGNIFICANT CHANGE UP (ref 22–31)
CREAT SERPL-MCNC: 0.66 MG/DL — SIGNIFICANT CHANGE UP (ref 0.5–1.3)
EGFR: 81 ML/MIN/1.73M2 — SIGNIFICANT CHANGE UP
EGFR: 81 ML/MIN/1.73M2 — SIGNIFICANT CHANGE UP
GLUCOSE SERPL-MCNC: 96 MG/DL — SIGNIFICANT CHANGE UP (ref 70–99)
HCT VFR BLD CALC: 36.4 % — SIGNIFICANT CHANGE UP (ref 34.5–45)
HGB BLD-MCNC: 11.5 G/DL — SIGNIFICANT CHANGE UP (ref 11.5–15.5)
MAGNESIUM SERPL-MCNC: 1.6 MG/DL — SIGNIFICANT CHANGE UP (ref 1.6–2.6)
MCHC RBC-ENTMCNC: 30.9 PG — SIGNIFICANT CHANGE UP (ref 27–34)
MCHC RBC-ENTMCNC: 31.6 G/DL — LOW (ref 32–36)
MCV RBC AUTO: 97.8 FL — SIGNIFICANT CHANGE UP (ref 80–100)
NRBC BLD AUTO-RTO: 0 /100 WBCS — SIGNIFICANT CHANGE UP (ref 0–0)
PHOSPHATE SERPL-MCNC: 4.5 MG/DL — SIGNIFICANT CHANGE UP (ref 2.5–4.5)
PLATELET # BLD AUTO: 221 K/UL — SIGNIFICANT CHANGE UP (ref 150–400)
POTASSIUM SERPL-MCNC: 4.2 MMOL/L — SIGNIFICANT CHANGE UP (ref 3.5–5.3)
POTASSIUM SERPL-SCNC: 4.2 MMOL/L — SIGNIFICANT CHANGE UP (ref 3.5–5.3)
RBC # BLD: 3.72 M/UL — LOW (ref 3.8–5.2)
RBC # FLD: 14.5 % — SIGNIFICANT CHANGE UP (ref 10.3–14.5)
SODIUM SERPL-SCNC: 141 MMOL/L — SIGNIFICANT CHANGE UP (ref 135–145)
WBC # BLD: 7.76 K/UL — SIGNIFICANT CHANGE UP (ref 3.8–10.5)
WBC # FLD AUTO: 7.76 K/UL — SIGNIFICANT CHANGE UP (ref 3.8–10.5)

## 2025-05-08 PROCEDURE — 99233 SBSQ HOSP IP/OBS HIGH 50: CPT

## 2025-05-08 RX ADMIN — METOPROLOL SUCCINATE 5 MILLIGRAM(S): 50 TABLET, EXTENDED RELEASE ORAL at 13:02

## 2025-05-08 RX ADMIN — METOPROLOL SUCCINATE 5 MILLIGRAM(S): 50 TABLET, EXTENDED RELEASE ORAL at 05:54

## 2025-05-08 RX ADMIN — IPRATROPIUM BROMIDE AND ALBUTEROL SULFATE 3 MILLILITER(S): .5; 2.5 SOLUTION RESPIRATORY (INHALATION) at 23:23

## 2025-05-08 RX ADMIN — DEXTROMETHORPHAN HBR, GUAIFENESIN 600 MILLIGRAM(S): 200 LIQUID ORAL at 18:46

## 2025-05-08 RX ADMIN — METOPROLOL SUCCINATE 5 MILLIGRAM(S): 50 TABLET, EXTENDED RELEASE ORAL at 18:47

## 2025-05-08 RX ADMIN — IPRATROPIUM BROMIDE AND ALBUTEROL SULFATE 3 MILLILITER(S): .5; 2.5 SOLUTION RESPIRATORY (INHALATION) at 17:02

## 2025-05-08 RX ADMIN — Medication 500000 UNIT(S): at 18:46

## 2025-05-08 RX ADMIN — Medication 500000 UNIT(S): at 13:00

## 2025-05-08 RX ADMIN — Medication 81 MILLIGRAM(S): at 12:59

## 2025-05-08 RX ADMIN — METOPROLOL SUCCINATE 5 MILLIGRAM(S): 50 TABLET, EXTENDED RELEASE ORAL at 00:07

## 2025-05-08 RX ADMIN — DORZOLAMIDE 1 DROP(S): 20 SOLUTION/ DROPS OPHTHALMIC at 06:04

## 2025-05-08 RX ADMIN — POTASSIUM PHOSPHATE, MONOBASIC POTASSIUM PHOSPHATE, DIBASIC INJECTION, 83.33 MILLIMOLE(S): 236; 224 SOLUTION, CONCENTRATE INTRAVENOUS at 00:07

## 2025-05-08 RX ADMIN — Medication 500000 UNIT(S): at 05:53

## 2025-05-08 RX ADMIN — DEXTROMETHORPHAN HBR, GUAIFENESIN 600 MILLIGRAM(S): 200 LIQUID ORAL at 06:08

## 2025-05-08 RX ADMIN — IPRATROPIUM BROMIDE AND ALBUTEROL SULFATE 3 MILLILITER(S): .5; 2.5 SOLUTION RESPIRATORY (INHALATION) at 11:00

## 2025-05-08 RX ADMIN — LOSARTAN POTASSIUM 50 MILLIGRAM(S): 100 TABLET, FILM COATED ORAL at 05:56

## 2025-05-08 RX ADMIN — METOPROLOL SUCCINATE 5 MILLIGRAM(S): 50 TABLET, EXTENDED RELEASE ORAL at 23:57

## 2025-05-08 RX ADMIN — DORZOLAMIDE 1 DROP(S): 20 SOLUTION/ DROPS OPHTHALMIC at 22:53

## 2025-05-08 RX ADMIN — IPRATROPIUM BROMIDE AND ALBUTEROL SULFATE 3 MILLILITER(S): .5; 2.5 SOLUTION RESPIRATORY (INHALATION) at 05:03

## 2025-05-08 RX ADMIN — DORZOLAMIDE 1 DROP(S): 20 SOLUTION/ DROPS OPHTHALMIC at 13:01

## 2025-05-08 RX ADMIN — CLOPIDOGREL BISULFATE 75 MILLIGRAM(S): 75 TABLET, FILM COATED ORAL at 13:00

## 2025-05-08 RX ADMIN — FUROSEMIDE 20 MILLIGRAM(S): 10 INJECTION INTRAMUSCULAR; INTRAVENOUS at 05:55

## 2025-05-09 DIAGNOSIS — I63.89 OTHER CEREBRAL INFARCTION: ICD-10-CM

## 2025-05-09 DIAGNOSIS — Z79.899 OTHER LONG TERM (CURRENT) DRUG THERAPY: ICD-10-CM

## 2025-05-09 DIAGNOSIS — I11.0 HYPERTENSIVE HEART DISEASE WITH HEART FAILURE: ICD-10-CM

## 2025-05-09 DIAGNOSIS — K52.9 NONINFECTIVE GASTROENTERITIS AND COLITIS, UNSPECIFIED: ICD-10-CM

## 2025-05-09 DIAGNOSIS — F32.89 OTHER SPECIFIED DEPRESSIVE EPISODES: ICD-10-CM

## 2025-05-09 DIAGNOSIS — R15.9 FULL INCONTINENCE OF FECES: ICD-10-CM

## 2025-05-09 DIAGNOSIS — R29.705 NIHSS SCORE 5: ICD-10-CM

## 2025-05-09 DIAGNOSIS — H40.9 UNSPECIFIED GLAUCOMA: ICD-10-CM

## 2025-05-09 DIAGNOSIS — R32 UNSPECIFIED URINARY INCONTINENCE: ICD-10-CM

## 2025-05-09 DIAGNOSIS — I63.521 CEREBRAL INFARCTION DUE TO UNSPECIFIED OCCLUSION OR STENOSIS OF RIGHT ANTERIOR CEREBRAL ARTERY: ICD-10-CM

## 2025-05-09 DIAGNOSIS — Z29.9 ENCOUNTER FOR PROPHYLACTIC MEASURES, UNSPECIFIED: ICD-10-CM

## 2025-05-09 DIAGNOSIS — Z79.82 LONG TERM (CURRENT) USE OF ASPIRIN: ICD-10-CM

## 2025-05-09 DIAGNOSIS — I50.42 CHRONIC COMBINED SYSTOLIC (CONGESTIVE) AND DIASTOLIC (CONGESTIVE) HEART FAILURE: ICD-10-CM

## 2025-05-09 DIAGNOSIS — R13.10 DYSPHAGIA, UNSPECIFIED: ICD-10-CM

## 2025-05-09 DIAGNOSIS — Z88.0 ALLERGY STATUS TO PENICILLIN: ICD-10-CM

## 2025-05-09 DIAGNOSIS — I69.391 DYSPHAGIA FOLLOWING CEREBRAL INFARCTION: ICD-10-CM

## 2025-05-09 DIAGNOSIS — R26.2 DIFFICULTY IN WALKING, NOT ELSEWHERE CLASSIFIED: ICD-10-CM

## 2025-05-09 DIAGNOSIS — R53.1 WEAKNESS: ICD-10-CM

## 2025-05-09 DIAGNOSIS — R79.89 OTHER SPECIFIED ABNORMAL FINDINGS OF BLOOD CHEMISTRY: ICD-10-CM

## 2025-05-09 DIAGNOSIS — G81.91 HEMIPLEGIA, UNSPECIFIED AFFECTING RIGHT DOMINANT SIDE: ICD-10-CM
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
## 2025-05-09 DIAGNOSIS — Z92.3 PERSONAL HISTORY OF IRRADIATION: ICD-10-CM

## 2025-05-09 DIAGNOSIS — I69.328 OTHER SPEECH AND LANGUAGE DEFICITS FOLLOWING CEREBRAL INFARCTION: ICD-10-CM

## 2025-05-09 DIAGNOSIS — I63.421 CEREBRAL INFARCTION DUE TO EMBOLISM OF RIGHT ANTERIOR CEREBRAL ARTERY: ICD-10-CM

## 2025-05-09 DIAGNOSIS — Z98.890 OTHER SPECIFIED POSTPROCEDURAL STATES: ICD-10-CM
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
## 2025-05-09 LAB
ANION GAP SERPL CALC-SCNC: 1 MMOL/L — LOW (ref 5–17)
BUN SERPL-MCNC: 13 MG/DL — SIGNIFICANT CHANGE UP (ref 7–23)
CALCIUM SERPL-MCNC: 7.8 MG/DL — LOW (ref 8.5–10.1)
CHLORIDE SERPL-SCNC: 113 MMOL/L — HIGH (ref 96–108)
CO2 SERPL-SCNC: 27 MMOL/L — SIGNIFICANT CHANGE UP (ref 22–31)
CREAT SERPL-MCNC: 0.66 MG/DL — SIGNIFICANT CHANGE UP (ref 0.5–1.3)
EGFR: 81 ML/MIN/1.73M2 — SIGNIFICANT CHANGE UP
EGFR: 81 ML/MIN/1.73M2 — SIGNIFICANT CHANGE UP
GLUCOSE SERPL-MCNC: 120 MG/DL — HIGH (ref 70–99)
HCT VFR BLD CALC: 32.1 % — LOW (ref 34.5–45)
HGB BLD-MCNC: 10.2 G/DL — LOW (ref 11.5–15.5)
MAGNESIUM SERPL-MCNC: 1.7 MG/DL — SIGNIFICANT CHANGE UP (ref 1.6–2.6)
MCHC RBC-ENTMCNC: 30.9 PG — SIGNIFICANT CHANGE UP (ref 27–34)
MCHC RBC-ENTMCNC: 31.8 G/DL — LOW (ref 32–36)
MCV RBC AUTO: 97.3 FL — SIGNIFICANT CHANGE UP (ref 80–100)
NRBC BLD AUTO-RTO: 0 /100 WBCS — SIGNIFICANT CHANGE UP (ref 0–0)
PHOSPHATE SERPL-MCNC: 2.4 MG/DL — LOW (ref 2.5–4.5)
PLATELET # BLD AUTO: 215 K/UL — SIGNIFICANT CHANGE UP (ref 150–400)
POTASSIUM SERPL-MCNC: 3.8 MMOL/L — SIGNIFICANT CHANGE UP (ref 3.5–5.3)
POTASSIUM SERPL-SCNC: 3.8 MMOL/L — SIGNIFICANT CHANGE UP (ref 3.5–5.3)
RBC # BLD: 3.3 M/UL — LOW (ref 3.8–5.2)
RBC # FLD: 14.4 % — SIGNIFICANT CHANGE UP (ref 10.3–14.5)
SODIUM SERPL-SCNC: 141 MMOL/L — SIGNIFICANT CHANGE UP (ref 135–145)
WBC # BLD: 6.82 K/UL — SIGNIFICANT CHANGE UP (ref 3.8–10.5)
WBC # FLD AUTO: 6.82 K/UL — SIGNIFICANT CHANGE UP (ref 3.8–10.5)

## 2025-05-09 PROCEDURE — 99233 SBSQ HOSP IP/OBS HIGH 50: CPT

## 2025-05-09 RX ADMIN — DORZOLAMIDE 1 DROP(S): 20 SOLUTION/ DROPS OPHTHALMIC at 22:45

## 2025-05-09 RX ADMIN — SODIUM CHLORIDE 60 MILLILITER(S): 9 INJECTION, SOLUTION INTRAVENOUS at 12:13

## 2025-05-09 RX ADMIN — LOSARTAN POTASSIUM 50 MILLIGRAM(S): 100 TABLET, FILM COATED ORAL at 06:01

## 2025-05-09 RX ADMIN — IPRATROPIUM BROMIDE AND ALBUTEROL SULFATE 3 MILLILITER(S): .5; 2.5 SOLUTION RESPIRATORY (INHALATION) at 05:01

## 2025-05-09 RX ADMIN — METOPROLOL SUCCINATE 5 MILLIGRAM(S): 50 TABLET, EXTENDED RELEASE ORAL at 17:46

## 2025-05-09 RX ADMIN — DORZOLAMIDE 1 DROP(S): 20 SOLUTION/ DROPS OPHTHALMIC at 06:05

## 2025-05-09 RX ADMIN — DEXTROMETHORPHAN HBR, GUAIFENESIN 600 MILLIGRAM(S): 200 LIQUID ORAL at 17:46

## 2025-05-09 RX ADMIN — IPRATROPIUM BROMIDE AND ALBUTEROL SULFATE 3 MILLILITER(S): .5; 2.5 SOLUTION RESPIRATORY (INHALATION) at 11:01

## 2025-05-09 RX ADMIN — Medication 81 MILLIGRAM(S): at 12:04

## 2025-05-09 RX ADMIN — METOPROLOL SUCCINATE 5 MILLIGRAM(S): 50 TABLET, EXTENDED RELEASE ORAL at 05:59

## 2025-05-09 RX ADMIN — FUROSEMIDE 20 MILLIGRAM(S): 10 INJECTION INTRAMUSCULAR; INTRAVENOUS at 06:01

## 2025-05-09 RX ADMIN — DEXTROMETHORPHAN HBR, GUAIFENESIN 600 MILLIGRAM(S): 200 LIQUID ORAL at 06:00

## 2025-05-09 RX ADMIN — IPRATROPIUM BROMIDE AND ALBUTEROL SULFATE 3 MILLILITER(S): .5; 2.5 SOLUTION RESPIRATORY (INHALATION) at 17:10

## 2025-05-09 RX ADMIN — IPRATROPIUM BROMIDE AND ALBUTEROL SULFATE 3 MILLILITER(S): .5; 2.5 SOLUTION RESPIRATORY (INHALATION) at 23:50

## 2025-05-09 RX ADMIN — METOPROLOL SUCCINATE 5 MILLIGRAM(S): 50 TABLET, EXTENDED RELEASE ORAL at 12:04

## 2025-05-09 RX ADMIN — ATORVASTATIN CALCIUM 40 MILLIGRAM(S): 80 TABLET, FILM COATED ORAL at 22:45

## 2025-05-09 RX ADMIN — DORZOLAMIDE 1 DROP(S): 20 SOLUTION/ DROPS OPHTHALMIC at 13:15

## 2025-05-09 RX ADMIN — CLOPIDOGREL BISULFATE 75 MILLIGRAM(S): 75 TABLET, FILM COATED ORAL at 12:04

## 2025-05-10 PROCEDURE — 99233 SBSQ HOSP IP/OBS HIGH 50: CPT

## 2025-05-10 RX ORDER — FUROSEMIDE 10 MG/ML
40 INJECTION INTRAMUSCULAR; INTRAVENOUS DAILY
Refills: 0 | Status: DISCONTINUED | OUTPATIENT
Start: 2025-05-11 | End: 2025-05-13

## 2025-05-10 RX ADMIN — METOPROLOL SUCCINATE 5 MILLIGRAM(S): 50 TABLET, EXTENDED RELEASE ORAL at 05:36

## 2025-05-10 RX ADMIN — SODIUM CHLORIDE 60 MILLILITER(S): 9 INJECTION, SOLUTION INTRAVENOUS at 05:09

## 2025-05-10 RX ADMIN — METOPROLOL SUCCINATE 5 MILLIGRAM(S): 50 TABLET, EXTENDED RELEASE ORAL at 23:00

## 2025-05-10 RX ADMIN — DORZOLAMIDE 1 DROP(S): 20 SOLUTION/ DROPS OPHTHALMIC at 05:37

## 2025-05-10 RX ADMIN — IPRATROPIUM BROMIDE AND ALBUTEROL SULFATE 3 MILLILITER(S): .5; 2.5 SOLUTION RESPIRATORY (INHALATION) at 05:04

## 2025-05-10 RX ADMIN — LOSARTAN POTASSIUM 50 MILLIGRAM(S): 100 TABLET, FILM COATED ORAL at 05:39

## 2025-05-10 RX ADMIN — METOPROLOL SUCCINATE 5 MILLIGRAM(S): 50 TABLET, EXTENDED RELEASE ORAL at 18:19

## 2025-05-10 RX ADMIN — ATORVASTATIN CALCIUM 40 MILLIGRAM(S): 80 TABLET, FILM COATED ORAL at 22:36

## 2025-05-10 RX ADMIN — FUROSEMIDE 20 MILLIGRAM(S): 10 INJECTION INTRAMUSCULAR; INTRAVENOUS at 05:39

## 2025-05-10 RX ADMIN — METOPROLOL SUCCINATE 5 MILLIGRAM(S): 50 TABLET, EXTENDED RELEASE ORAL at 13:24

## 2025-05-10 RX ADMIN — Medication 81 MILLIGRAM(S): at 13:24

## 2025-05-10 RX ADMIN — CLOPIDOGREL BISULFATE 75 MILLIGRAM(S): 75 TABLET, FILM COATED ORAL at 13:26

## 2025-05-10 RX ADMIN — IPRATROPIUM BROMIDE AND ALBUTEROL SULFATE 3 MILLILITER(S): .5; 2.5 SOLUTION RESPIRATORY (INHALATION) at 11:02

## 2025-05-10 RX ADMIN — METOPROLOL SUCCINATE 5 MILLIGRAM(S): 50 TABLET, EXTENDED RELEASE ORAL at 01:59

## 2025-05-10 RX ADMIN — DEXTROMETHORPHAN HBR, GUAIFENESIN 600 MILLIGRAM(S): 200 LIQUID ORAL at 18:19

## 2025-05-10 RX ADMIN — DEXTROMETHORPHAN HBR, GUAIFENESIN 600 MILLIGRAM(S): 200 LIQUID ORAL at 05:39

## 2025-05-11 DIAGNOSIS — Z79.02 LONG TERM (CURRENT) USE OF ANTITHROMBOTICS/ANTIPLATELETS: ICD-10-CM

## 2025-05-11 DIAGNOSIS — I16.0 HYPERTENSIVE URGENCY: ICD-10-CM

## 2025-05-11 DIAGNOSIS — Z85.41 PERSONAL HISTORY OF MALIGNANT NEOPLASM OF CERVIX UTERI: ICD-10-CM

## 2025-05-11 DIAGNOSIS — E78.5 HYPERLIPIDEMIA, UNSPECIFIED: ICD-10-CM

## 2025-05-11 LAB
ANION GAP SERPL CALC-SCNC: 7 MMOL/L — SIGNIFICANT CHANGE UP (ref 5–17)
BUN SERPL-MCNC: 16 MG/DL — SIGNIFICANT CHANGE UP (ref 7–23)
CALCIUM SERPL-MCNC: 8.1 MG/DL — LOW (ref 8.5–10.1)
CHLORIDE SERPL-SCNC: 111 MMOL/L — HIGH (ref 96–108)
CO2 SERPL-SCNC: 24 MMOL/L — SIGNIFICANT CHANGE UP (ref 22–31)
CREAT SERPL-MCNC: 0.72 MG/DL — SIGNIFICANT CHANGE UP (ref 0.5–1.3)
EGFR: 77 ML/MIN/1.73M2 — SIGNIFICANT CHANGE UP
EGFR: 77 ML/MIN/1.73M2 — SIGNIFICANT CHANGE UP
GLUCOSE SERPL-MCNC: 75 MG/DL — SIGNIFICANT CHANGE UP (ref 70–99)
PHOSPHATE SERPL-MCNC: 2.4 MG/DL — LOW (ref 2.5–4.5)
POTASSIUM SERPL-MCNC: 3.2 MMOL/L — LOW (ref 3.5–5.3)
POTASSIUM SERPL-SCNC: 3.2 MMOL/L — LOW (ref 3.5–5.3)
SODIUM SERPL-SCNC: 142 MMOL/L — SIGNIFICANT CHANGE UP (ref 135–145)

## 2025-05-11 PROCEDURE — 74018 RADEX ABDOMEN 1 VIEW: CPT | Mod: 26

## 2025-05-11 PROCEDURE — 99232 SBSQ HOSP IP/OBS MODERATE 35: CPT

## 2025-05-11 RX ORDER — POTASSIUM PHOSPHATE, MONOBASIC POTASSIUM PHOSPHATE, DIBASIC INJECTION, 236; 224 MG/ML; MG/ML
30 SOLUTION, CONCENTRATE INTRAVENOUS ONCE
Refills: 0 | Status: COMPLETED | OUTPATIENT
Start: 2025-05-11 | End: 2025-05-11

## 2025-05-11 RX ADMIN — METOPROLOL SUCCINATE 5 MILLIGRAM(S): 50 TABLET, EXTENDED RELEASE ORAL at 23:35

## 2025-05-11 RX ADMIN — ATORVASTATIN CALCIUM 40 MILLIGRAM(S): 80 TABLET, FILM COATED ORAL at 22:06

## 2025-05-11 RX ADMIN — LOSARTAN POTASSIUM 50 MILLIGRAM(S): 100 TABLET, FILM COATED ORAL at 05:59

## 2025-05-11 RX ADMIN — DEXTROMETHORPHAN HBR, GUAIFENESIN 600 MILLIGRAM(S): 200 LIQUID ORAL at 05:58

## 2025-05-11 RX ADMIN — CLOPIDOGREL BISULFATE 75 MILLIGRAM(S): 75 TABLET, FILM COATED ORAL at 12:37

## 2025-05-11 RX ADMIN — METOPROLOL SUCCINATE 5 MILLIGRAM(S): 50 TABLET, EXTENDED RELEASE ORAL at 12:38

## 2025-05-11 RX ADMIN — Medication 81 MILLIGRAM(S): at 12:37

## 2025-05-11 RX ADMIN — POTASSIUM PHOSPHATE, MONOBASIC POTASSIUM PHOSPHATE, DIBASIC INJECTION, 83.33 MILLIMOLE(S): 236; 224 SOLUTION, CONCENTRATE INTRAVENOUS at 13:59

## 2025-05-11 RX ADMIN — FUROSEMIDE 40 MILLIGRAM(S): 10 INJECTION INTRAMUSCULAR; INTRAVENOUS at 06:02

## 2025-05-11 RX ADMIN — Medication 5 MILLIGRAM(S): at 06:00

## 2025-05-11 RX ADMIN — METOPROLOL SUCCINATE 5 MILLIGRAM(S): 50 TABLET, EXTENDED RELEASE ORAL at 05:58

## 2025-05-11 RX ADMIN — METOPROLOL SUCCINATE 5 MILLIGRAM(S): 50 TABLET, EXTENDED RELEASE ORAL at 18:12

## 2025-05-12 LAB
ALBUMIN SERPL ELPH-MCNC: 1.8 G/DL — LOW (ref 3.3–5)
ALP SERPL-CCNC: 97 U/L — SIGNIFICANT CHANGE UP (ref 40–120)
ALT FLD-CCNC: 17 U/L — SIGNIFICANT CHANGE UP (ref 12–78)
ANION GAP SERPL CALC-SCNC: 1 MMOL/L — LOW (ref 5–17)
AST SERPL-CCNC: 22 U/L — SIGNIFICANT CHANGE UP (ref 15–37)
BILIRUB SERPL-MCNC: 0.6 MG/DL — SIGNIFICANT CHANGE UP (ref 0.2–1.2)
BUN SERPL-MCNC: 16 MG/DL — SIGNIFICANT CHANGE UP (ref 7–23)
CALCIUM SERPL-MCNC: 8 MG/DL — LOW (ref 8.5–10.1)
CHLORIDE SERPL-SCNC: 111 MMOL/L — HIGH (ref 96–108)
CO2 SERPL-SCNC: 28 MMOL/L — SIGNIFICANT CHANGE UP (ref 22–31)
CREAT SERPL-MCNC: 0.78 MG/DL — SIGNIFICANT CHANGE UP (ref 0.5–1.3)
EGFR: 70 ML/MIN/1.73M2 — SIGNIFICANT CHANGE UP
EGFR: 70 ML/MIN/1.73M2 — SIGNIFICANT CHANGE UP
GLUCOSE SERPL-MCNC: 110 MG/DL — HIGH (ref 70–99)
HCT VFR BLD CALC: 37.3 % — SIGNIFICANT CHANGE UP (ref 34.5–45)
HGB BLD-MCNC: 11.8 G/DL — SIGNIFICANT CHANGE UP (ref 11.5–15.5)
MAGNESIUM SERPL-MCNC: 1.6 MG/DL — SIGNIFICANT CHANGE UP (ref 1.6–2.6)
MCHC RBC-ENTMCNC: 30.3 PG — SIGNIFICANT CHANGE UP (ref 27–34)
MCHC RBC-ENTMCNC: 31.6 G/DL — LOW (ref 32–36)
MCV RBC AUTO: 95.6 FL — SIGNIFICANT CHANGE UP (ref 80–100)
NRBC BLD AUTO-RTO: 0 /100 WBCS — SIGNIFICANT CHANGE UP (ref 0–0)
PHOSPHATE SERPL-MCNC: 2.2 MG/DL — LOW (ref 2.5–4.5)
PLATELET # BLD AUTO: 305 K/UL — SIGNIFICANT CHANGE UP (ref 150–400)
POTASSIUM SERPL-MCNC: 3.2 MMOL/L — LOW (ref 3.5–5.3)
POTASSIUM SERPL-SCNC: 3.2 MMOL/L — LOW (ref 3.5–5.3)
PROT SERPL-MCNC: 5.4 GM/DL — LOW (ref 6–8.3)
RBC # BLD: 3.9 M/UL — SIGNIFICANT CHANGE UP (ref 3.8–5.2)
RBC # FLD: 14.2 % — SIGNIFICANT CHANGE UP (ref 10.3–14.5)
SODIUM SERPL-SCNC: 140 MMOL/L — SIGNIFICANT CHANGE UP (ref 135–145)
WBC # BLD: 11.26 K/UL — HIGH (ref 3.8–10.5)
WBC # FLD AUTO: 11.26 K/UL — HIGH (ref 3.8–10.5)

## 2025-05-12 PROCEDURE — 99233 SBSQ HOSP IP/OBS HIGH 50: CPT

## 2025-05-12 RX ORDER — SOD PHOS DI, MONO/K PHOS MONO 250 MG
1 TABLET ORAL THREE TIMES A DAY
Refills: 0 | Status: DISCONTINUED | OUTPATIENT
Start: 2025-05-12 | End: 2025-05-13

## 2025-05-12 RX ADMIN — Medication 81 MILLIGRAM(S): at 12:36

## 2025-05-12 RX ADMIN — CLOPIDOGREL BISULFATE 75 MILLIGRAM(S): 75 TABLET, FILM COATED ORAL at 12:36

## 2025-05-12 RX ADMIN — FUROSEMIDE 40 MILLIGRAM(S): 10 INJECTION INTRAMUSCULAR; INTRAVENOUS at 05:36

## 2025-05-12 RX ADMIN — METOPROLOL SUCCINATE 5 MILLIGRAM(S): 50 TABLET, EXTENDED RELEASE ORAL at 17:32

## 2025-05-12 RX ADMIN — Medication 5 MILLIGRAM(S): at 05:38

## 2025-05-12 RX ADMIN — Medication 40 MILLIEQUIVALENT(S): at 19:14

## 2025-05-12 RX ADMIN — Medication 1 PACKET(S): at 21:43

## 2025-05-12 RX ADMIN — METOPROLOL SUCCINATE 5 MILLIGRAM(S): 50 TABLET, EXTENDED RELEASE ORAL at 05:36

## 2025-05-12 RX ADMIN — METOPROLOL SUCCINATE 5 MILLIGRAM(S): 50 TABLET, EXTENDED RELEASE ORAL at 12:47

## 2025-05-12 RX ADMIN — LOSARTAN POTASSIUM 50 MILLIGRAM(S): 100 TABLET, FILM COATED ORAL at 05:37

## 2025-05-12 RX ADMIN — ATORVASTATIN CALCIUM 40 MILLIGRAM(S): 80 TABLET, FILM COATED ORAL at 21:49

## 2025-05-12 NOTE — CHART NOTE - NSCHARTNOTEFT_GEN_A_CORE
Per chart pt with PMH of HTN, CHF, CVA, cervical cancer s/p radiation. Pt presented for hypoxemia and vomiting; found to have SBO, no surgical intervention planned and scattered punctate foci of gas in RLQ of ventral abdominal wall, concern for soft tissue infection and started on abx.  Per palliative care, MOLST on file indicating DNR/DNI; family interested in [facility]. Family opted for pleasure feeds. Per , pt medically cleared for discharge to [facility], pending female bed.    Factors impacting intake: [ ] none [ ] nausea  [ ] vomiting [ ] diarrhea [ ] constipation  [ ]chewing problems [ ] swallowing issues  [x] other: pleasure feeds    Diet Prescription: Diet, Pureed:   Moderately Thick Liquids (MODTHICKLIQS) (05-02-25 @ 14:42)    Intake: mostly 0-25% of pleasure feeds; requires total feeding assistance    Current Weight: No recent weights to trend  % Weight Change: N/A  1+ generalized edema per flow sheets     Pertinent Medications: MEDICATIONS  (STANDING):  aspirin  chewable 81 milliGRAM(s) Oral daily  atorvastatin 40 milliGRAM(s) Oral at bedtime  clopidogrel Tablet 75 milliGRAM(s) Oral daily  furosemide   Injectable 40 milliGRAM(s) IV Push daily  losartan 50 milliGRAM(s) Oral daily  metoprolol tartrate Injectable 5 milliGRAM(s) IV Push every 6 hours  simbrinza 1-0.2% 1 Drop(s) 1 Drop(s) Both EYES two times a day    MEDICATIONS  (PRN):  acetaminophen     Tablet .. 650 milliGRAM(s) Oral every 6 hours PRN Temp greater or equal to 38C (100.4F), Mild Pain (1 - 3)  hydrALAZINE Injectable 5 milliGRAM(s) IV Push every 6 hours PRN sbp>160    Pertinent Labs: 05-12 Na140 mmol/L Glu 110 mg/dL[H] K+ 3.2 mmol/L[L] Cr  0.78 mg/dL BUN 16 mg/dL 05-12 Phos 2.2 mg/dL[L] 05-12 Alb 1.8 g/dL[L] 04-24 Chol 110 mg/dL LDL 48 mg/dL  HDL 48 mg/dL[L] Trig 71 mg/dL  HgbA1c 6.2%    Skin: No pressure ulcers noted per flow sheets    Estimated Needs:   [x] no change since previous assessment on 05/02  [ ] recalculated:     Previous Nutrition Diagnosis:   [x] Inadequate Protein Energy Intake  Etiology: Inability to consume sufficient protein-energy  Signs/Symptoms: NPO x 2 days    Goal: Pt to be provided with nutrition/hydration as medically feasible, as tolerated, and within pt/family's wishes for care - met    Nutrition Diagnosis is [x] ongoing  [ ] resolved [ ] not applicable     New Nutrition Diagnosis: [x] not applicable       Interventions:   Recommend  [x] Continue with pleasure feeds  [ ] Change Diet To:  [ ] Nutrition Supplement  [ ] Nutrition Support  [x] Other: Continue to provide total feeding assistance    Monitoring and Evaluation:   [ x ] PO intake [ x ] Tolerance to diet prescription [ x ] weights [ x ] labs[ x ] follow up per protocol  [ ] other:
seen and examined  c'/w current regiment  palliative consult placed    Vital Signs Last 24 Hrs  T(C): 37.2 (2025 10:23), Max: 37.2 (2025 10:23)  T(F): 99 (2025 10:23), Max: 99 (2025 10:23)  HR: 75 (2025 10:23) (75 - 123)  BP: 156/67 (2025 10:23) (136/78 - 204/75)  BP(mean): --  RR: 19 (2025 10:23) (18 - 30)  SpO2: 96% (2025 10:23) (90% - 99%)    Parameters below as of 2025 10:23  Patient On (Oxygen Delivery Method): nasal cannula                          14.2   6.78  )-----------( 235      ( 2025 20:58 )             43.3     04-    143  |  115[H]  |  45[H]  ----------------------------<  189[H]  4.6   |  20[L]  |  1.37[H]    Ca    8.5      2025 20:58    TPro  6.4  /  Alb  2.4[L]  /  TBili  0.5  /  DBili  x   /  AST  42[H]  /  ALT  27  /  AlkPhos  85  04-29      Urinalysis Basic - ( 2025 22:35 )    Color: Yellow / Appearance: Clear / S.023 / pH: x  Gluc: x / Ketone: Negative mg/dL  / Bili: Negative / Urobili: 0.2 mg/dL   Blood: x / Protein: 30 mg/dL / Nitrite: Negative   Leuk Esterase: Trace / RBC: 1 /HPF / WBC 3 /HPF   Sq Epi: x / Non Sq Epi: x / Bacteria: Few /HPF
Chart reviewed and noted  patient now being worked up for hospice care c continued limited participation c rehab interventions due to fatigue. Patient rehab frequency will now be reduced to 1-2x/wk and will be reviewed again when participation & functional condition improves.
Notified by RN that patient had 3 episodes of watery BMs during her shift. Pt is afebrile, and not on any laxatives. Spoke to ID specialist who stated that patient had completed a course of abx and has RSV which could be contributing factors. For now, lorenzo hold off on sending Cdiff toxin PCR, and advised RN to continue monitoring   MD aware
This is a 89yo F  h/o HTN, CHF, CVA, cervical ca s/p xrt, discharged to rehab after acute R KASSIE infarct and returned later the same day for hypoxia and vomiting, admitted for acute hypoxic resp failure likely from aspiration pna, started on cefepime.  Course also notable for CT A/P c/w possible sbo vs ileus and bowel ischemia; surgical consult recs conservative management 2/2 comorbidities.    Tonight called by RN for pt c/o sore throat. Upon arrival pt seen with two daughters at bedside.    Daughter Umu reports multiple concerns regarding mother's care, including ongoing strict npo status awaiting S&S study, concern for oral thrush causing discomfort and reddened cheeks.    Allergy: PCN  ROS: Negative for 12 body systems unless otherwise specified in HPI    Vitals:  /66 repeat 195/77 HR 92 RR 24 spo2 97% 2lpm NC temp 98.6F rectal  bsfs= 102 mg/dL    gen: frail, elderly F lying in bed, opens eyes to voice  heent: edentulous, thick white coating across sides of tongue, post nasal drip  skin: bilat cheek redness under nasal canula tubing, non blanching, no pustules or vesicles   cv: reg, tachy  lungs: diminished bibasilar bs with occ rhonchi, no wheeze or rales  abd: soft, ntnd  ext: no edema  neuro: alert to person, follows simple commands  : +primafit in place with ~200cc dark urine output in cannister    CT Cx (reviewed by me): RLL pna, atelectasis, no pleural effusion                          12.2   5.72  )-----------( 201      ( 01 May 2025 07:10 )             38.0         05-01    146[H]  |  120[H]  |  33[H]  ----------------------------<  97  3.9   |  20[L]  |  0.92    Ca    8.6      01 May 2025 07:10    TPro  5.5[L]  /  Alb  2.1[L]  /  TBili  0.5  /  DBili  x   /  AST  15  /  ALT  20  /  AlkPhos  67  05-01      A/P: 89F s/p recent CVA discharged to rehab returned with vomiting and hypoxia.  Now on abx for aspiration pna, conservative management for sbo/ileus and suspected bowel ischemia.   Tonight pt c/o sore throat, family with mutiple concerns.  - exam c/w oral thrush. d/w Dr. Suazo - attempted to treat with clotrimazole seu however pt unable to tolerate - spit out. Changed to "painting" with nystatin suspension (pt unable to follow commands for swish & spit) which was better tolerated.  - pt oral bp meds placed on hold while strict npo; started metoprolol 5mg ivp q6hr with parameters. titrate prn  - Will d/w day team in am expediting for S&S and advancing diet.  - GOC: confirmed with daughters pt DNR/DNI but willing to trial NIV if indicated. May need additional clarifications pending outcome of S&S.  - continue IVF D5LR @ 60cc/hr and monitor UO    55 mins assessing presenting problems of acute illness. Medical decision making including Initiating plan of care, reviewing data, reviewing radiology, discussing with multidisciplinary team, non inclusive of procedures, discussing goals of care with patient/family.

## 2025-05-13 LAB
ANION GAP SERPL CALC-SCNC: 5 MMOL/L — SIGNIFICANT CHANGE UP (ref 5–17)
BUN SERPL-MCNC: 19 MG/DL — SIGNIFICANT CHANGE UP (ref 7–23)
CALCIUM SERPL-MCNC: 7.6 MG/DL — LOW (ref 8.5–10.1)
CHLORIDE SERPL-SCNC: 114 MMOL/L — HIGH (ref 96–108)
CO2 SERPL-SCNC: 25 MMOL/L — SIGNIFICANT CHANGE UP (ref 22–31)
CREAT SERPL-MCNC: 0.7 MG/DL — SIGNIFICANT CHANGE UP (ref 0.5–1.3)
EGFR: 80 ML/MIN/1.73M2 — SIGNIFICANT CHANGE UP
EGFR: 80 ML/MIN/1.73M2 — SIGNIFICANT CHANGE UP
GLUCOSE SERPL-MCNC: 118 MG/DL — HIGH (ref 70–99)
MAGNESIUM SERPL-MCNC: 1.8 MG/DL — SIGNIFICANT CHANGE UP (ref 1.6–2.6)
PHOSPHATE SERPL-MCNC: 2.1 MG/DL — LOW (ref 2.5–4.5)
POTASSIUM SERPL-MCNC: 3.3 MMOL/L — LOW (ref 3.5–5.3)
POTASSIUM SERPL-SCNC: 3.3 MMOL/L — LOW (ref 3.5–5.3)
SODIUM SERPL-SCNC: 144 MMOL/L — SIGNIFICANT CHANGE UP (ref 135–145)

## 2025-05-13 PROCEDURE — 99233 SBSQ HOSP IP/OBS HIGH 50: CPT

## 2025-05-13 RX ORDER — POTASSIUM PHOSPHATE, MONOBASIC POTASSIUM PHOSPHATE, DIBASIC INJECTION, 236; 224 MG/ML; MG/ML
30 SOLUTION, CONCENTRATE INTRAVENOUS ONCE
Refills: 0 | Status: COMPLETED | OUTPATIENT
Start: 2025-05-13 | End: 2025-05-13

## 2025-05-13 RX ADMIN — CLOPIDOGREL BISULFATE 75 MILLIGRAM(S): 75 TABLET, FILM COATED ORAL at 17:11

## 2025-05-13 RX ADMIN — Medication 81 MILLIGRAM(S): at 17:10

## 2025-05-13 RX ADMIN — METOPROLOL SUCCINATE 5 MILLIGRAM(S): 50 TABLET, EXTENDED RELEASE ORAL at 00:03

## 2025-05-13 RX ADMIN — METOPROLOL SUCCINATE 5 MILLIGRAM(S): 50 TABLET, EXTENDED RELEASE ORAL at 18:20

## 2025-05-13 RX ADMIN — METOPROLOL SUCCINATE 5 MILLIGRAM(S): 50 TABLET, EXTENDED RELEASE ORAL at 05:34

## 2025-05-13 RX ADMIN — Medication 1 PACKET(S): at 05:34

## 2025-05-13 RX ADMIN — METOPROLOL SUCCINATE 5 MILLIGRAM(S): 50 TABLET, EXTENDED RELEASE ORAL at 11:44

## 2025-05-13 RX ADMIN — METOPROLOL SUCCINATE 5 MILLIGRAM(S): 50 TABLET, EXTENDED RELEASE ORAL at 23:05

## 2025-05-13 RX ADMIN — ATORVASTATIN CALCIUM 40 MILLIGRAM(S): 80 TABLET, FILM COATED ORAL at 21:48

## 2025-05-13 RX ADMIN — POTASSIUM PHOSPHATE, MONOBASIC POTASSIUM PHOSPHATE, DIBASIC INJECTION, 83.33 MILLIMOLE(S): 236; 224 SOLUTION, CONCENTRATE INTRAVENOUS at 15:30

## 2025-05-13 RX ADMIN — LOSARTAN POTASSIUM 50 MILLIGRAM(S): 100 TABLET, FILM COATED ORAL at 05:34

## 2025-05-13 RX ADMIN — FUROSEMIDE 40 MILLIGRAM(S): 10 INJECTION INTRAMUSCULAR; INTRAVENOUS at 05:34

## 2025-05-13 NOTE — PROGRESS NOTE ADULT - TIME BILLING
The necessity of the time spent during the encounter on this date of service was due to:   - Ordering, reviewing, and interpreting labs, testing, and imaging.  - Independently obtaining a review of systems and performing a physical exam  - Reviewing prior hospitalization and where necessary, outpatient records.  - Reviewing consultant recommendations/communicating with consultants  - Counselling and educating patient and family regarding interpretation of aforementioned items and plan of care.    Time-based billing (NON-critical care). Total minutes spent: 52
Lab test review, Radiology Review, Vitals review, Consultant review and discussion, Physical examination, IDR, Assessment and plan; Plan discussion with patient and family
Evaluation of patient, review of medical records and collaboration with care team and family
The necessity of the time spent during the encounter on this date of service was due to:   - Ordering, reviewing, and interpreting labs, testing, and imaging.  - Independently obtaining a review of systems and performing a physical exam  - Reviewing prior hospitalization and where necessary, outpatient records.  - Reviewing consultant recommendations/communicating with consultants  - Counselling and educating patient and family regarding interpretation of aforementioned items and plan of care.    Time-based billing (NON-critical care). Total minutes spent: 52
Lab test review, Radiology Review, Vitals review, Consultant review and discussion, Physical examination, IDR, Assessment and plan; Plan discussion with patient and family
Evaluation of patient, review of medical records and collaboration with care team and family

## 2025-05-14 LAB
ANION GAP SERPL CALC-SCNC: 0 MMOL/L — LOW (ref 5–17)
BUN SERPL-MCNC: 18 MG/DL — SIGNIFICANT CHANGE UP (ref 7–23)
CALCIUM SERPL-MCNC: 7.2 MG/DL — LOW (ref 8.5–10.1)
CHLORIDE SERPL-SCNC: 113 MMOL/L — HIGH (ref 96–108)
CO2 SERPL-SCNC: 29 MMOL/L — SIGNIFICANT CHANGE UP (ref 22–31)
CREAT SERPL-MCNC: 0.61 MG/DL — SIGNIFICANT CHANGE UP (ref 0.5–1.3)
EGFR: 82 ML/MIN/1.73M2 — SIGNIFICANT CHANGE UP
EGFR: 82 ML/MIN/1.73M2 — SIGNIFICANT CHANGE UP
GLUCOSE SERPL-MCNC: 89 MG/DL — SIGNIFICANT CHANGE UP (ref 70–99)
MAGNESIUM SERPL-MCNC: 1.6 MG/DL — SIGNIFICANT CHANGE UP (ref 1.6–2.6)
PHOSPHATE SERPL-MCNC: 2.7 MG/DL — SIGNIFICANT CHANGE UP (ref 2.5–4.5)
POTASSIUM SERPL-MCNC: 3.5 MMOL/L — SIGNIFICANT CHANGE UP (ref 3.5–5.3)
POTASSIUM SERPL-SCNC: 3.5 MMOL/L — SIGNIFICANT CHANGE UP (ref 3.5–5.3)
SODIUM SERPL-SCNC: 142 MMOL/L — SIGNIFICANT CHANGE UP (ref 135–145)

## 2025-05-14 PROCEDURE — 99232 SBSQ HOSP IP/OBS MODERATE 35: CPT

## 2025-05-14 RX ADMIN — METOPROLOL SUCCINATE 5 MILLIGRAM(S): 50 TABLET, EXTENDED RELEASE ORAL at 11:21

## 2025-05-14 RX ADMIN — CLOPIDOGREL BISULFATE 75 MILLIGRAM(S): 75 TABLET, FILM COATED ORAL at 11:21

## 2025-05-14 RX ADMIN — LOSARTAN POTASSIUM 50 MILLIGRAM(S): 100 TABLET, FILM COATED ORAL at 05:28

## 2025-05-14 RX ADMIN — ATORVASTATIN CALCIUM 40 MILLIGRAM(S): 80 TABLET, FILM COATED ORAL at 22:20

## 2025-05-14 RX ADMIN — METOPROLOL SUCCINATE 5 MILLIGRAM(S): 50 TABLET, EXTENDED RELEASE ORAL at 18:34

## 2025-05-14 RX ADMIN — METOPROLOL SUCCINATE 5 MILLIGRAM(S): 50 TABLET, EXTENDED RELEASE ORAL at 05:28

## 2025-05-14 RX ADMIN — Medication 81 MILLIGRAM(S): at 11:21

## 2025-05-14 NOTE — PROGRESS NOTE ADULT - SUBJECTIVE AND OBJECTIVE BOX
HPI:  89-year-old female with past medical history of hypertension, combined CHF, CVA, cervical cancer status post radiation, glaucoma, discharged yesterday after being admitted for acute CVA, gastroenteritis, and elevated troponin who was brought back to the ED due to hypoxia and vomiting. ROS not obtained due to altered mental status.  On presentation, the patient was tachycardic, hypertensive, and tachypneic. Labs notable for troponin 160, bicarb 20, creatinine 1.3(around baseline), lactate 2.1. CT chest findings consistent with likely aspiration pneumonia. CT abdomen/pelvis revealed possible SBO, bowel ischemia, scattered punctate foci of gas in the right lower quadrant ventral abdominal wall and in the right groin soft tissues possibly soft tissue infection. Ill-defined 1.8 x 1.4 cm hypodense ending focus in the region of the lower uterus. Surgery was consulted; however, recommend conservative management due to comorbidities. The patient receives cefepime, vancomycin, 500 cc ivf bolus, hydralazine, labetalol, Zofran in the ED.  (30 Apr 2025 04:53)  Patient is a 89y old  Female who presents with a chief complaint of Acute respiratory failure likely due to aspiration pna, SBO, possible bowel ischemia (07 May 2025 23:32)      INTERVAL HPI/OVERNIGHT EVENTS: RSV positive overnight     MEDICATIONS  (STANDING):  albuterol/ipratropium for Nebulization 3 milliLiter(s) Nebulizer every 6 hours  aspirin  chewable 81 milliGRAM(s) Oral daily  atorvastatin 40 milliGRAM(s) Oral at bedtime  clopidogrel Tablet 75 milliGRAM(s) Oral daily  dextrose 5% + lactated ringers. 1000 milliLiter(s) (60 mL/Hr) IV Continuous <Continuous>  dorzolamide 2% Ophthalmic Solution 1 Drop(s) Both EYES three times a day  furosemide    Tablet 20 milliGRAM(s) Oral daily  guaiFENesin  milliGRAM(s) Oral every 12 hours  losartan 50 milliGRAM(s) Oral daily  metoprolol tartrate Injectable 5 milliGRAM(s) IV Push every 6 hours  simbrinza 1-0.2% 1 Drop(s) 1 Drop(s) Both EYES two times a day    MEDICATIONS  (PRN):  acetaminophen     Tablet .. 650 milliGRAM(s) Oral every 6 hours PRN Temp greater or equal to 38C (100.4F), Mild Pain (1 - 3)  hydrALAZINE Injectable 5 milliGRAM(s) IV Push every 6 hours PRN sbp>160      Allergies    penicillin (Hives)  Tolerated ceftriaxone 2/2023 &amp; cefepime 4/2025 (Other)    Intolerances        REVIEW OF SYSTEMS:  CONSTITUTIONAL: No fever, weight loss, or fatigue  EYES: No eye pain, visual disturbances, or discharge  ENMT:  No difficulty hearing, tinnitus, vertigo; No sinus or throat pain  NECK: No pain or stiffness  BREASTS: No pain, masses, or nipple discharge  RESPIRATORY: No cough, wheezing, chills or hemoptysis; No shortness of breath  CARDIOVASCULAR: No chest pain, palpitations, dizziness, or leg swelling  GASTROINTESTINAL: No abdominal or epigastric pain. No nausea, vomiting, or hematemesis; No diarrhea or constipation. No melena or hematochezia.  GENITOURINARY: No dysuria, frequency, hematuria, or incontinence  NEUROLOGICAL: No headaches, memory loss, loss of strength, numbness, or tremors  SKIN: No itching, burning, rashes, or lesions   LYMPH NODES: No enlarged glands  ENDOCRINE: No heat or cold intolerance; No hair loss  MUSCULOSKELETAL: No joint pain or swelling; No muscle, back, or extremity pain  PSYCHIATRIC: No depression, anxiety, mood swings, or difficulty sleeping  HEME/LYMPH: No easy bruising, or bleeding gums  ALLERGY AND IMMUNOLOGIC: No hives or eczema    Vital Signs Last 24 Hrs  T(C): 37.2 (09 May 2025 05:19), Max: 37.3 (08 May 2025 10:54)  T(F): 98.9 (09 May 2025 05:19), Max: 99.2 (08 May 2025 10:54)  HR: 93 (09 May 2025 05:28) (77 - 95)  BP: 146/68 (09 May 2025 05:19) (120/72 - 177/73)  RR: 17 (09 May 2025 05:19) (17 - 19)  SpO2: 95% (09 May 2025 05:28) (93% - 98%)    Parameters below as of 09 May 2025 05:28  Patient On (Oxygen Delivery Method): nasal cannula        PHYSICAL EXAM:  GENERAL: NAD, well-groomed, well-developed  HEAD:  Atraumatic, Normocephalic  EYES: EOMI, PERRLA, conjunctiva and sclera clear  ENMT: No tonsillar erythema, exudates, or enlargement; Moist mucous membranes, Good dentition, No lesions  NECK: Supple, No JVD, Normal thyroid  NERVOUS SYSTEM:  Alert & Oriented X3, Good concentration; Motor Strength 5/5 B/L upper and lower extremities; DTRs 2+ intact and symmetric  CHEST/LUNG: Clear to ascultation  bilaterally; No rales, rhonchi, wheezing, or rubs  HEART: Regular rate and rhythm; No murmurs, rubs, or gallops  ABDOMEN: Soft, Nontender, Nondistended; Bowel sounds present  EXTREMITIES:  2+ Peripheral Pulses, No clubbing, cyanosis, or edema  LYMPH: No lymphadenopathy noted  SKIN: No rashes or lesions    LABS:                        11.5   7.76  )-----------( 221      ( 08 May 2025 06:35 )             36.4     05-08    141  |  112[H]  |  15  ----------------------------<  96  4.2   |  25  |  0.66    Ca    7.8[L]      08 May 2025 06:35  Phos  4.5     05-08  Mg     1.6     05-08    TPro  5.1[L]  /  Alb  1.9[L]  /  TBili  0.4  /  DBili  x   /  AST  16  /  ALT  21  /  AlkPhos  81  05-07      Urinalysis Basic - ( 08 May 2025 06:35 )    Color: x / Appearance: x / SG: x / pH: x  Gluc: 96 mg/dL / Ketone: x  / Bili: x / Urobili: x   Blood: x / Protein: x / Nitrite: x   Leuk Esterase: x / RBC: x / WBC x   Sq Epi: x / Non Sq Epi: x / Bacteria: x      CAPILLARY BLOOD GLUCOSE          RADIOLOGY & ADDITIONAL TESTS:  < from: CT Head No Cont (05.07.25 @ 18:10) >    ACC: [id number] EXAM:  CT BRAIN   ORDERED BY: GELACIO KELLY     PROCEDURE DATE:  05/07/2025          INTERPRETATION:  CLINICAL INFORMATION: Alteration of  Consciousness    COMPARISON: Head CT April 30, 2025.    Multiple axial sections were performed from the base of skull to vertex   without contrast enhancement. Coronal and sagittal reconstructions were   performed    Parenchymal volume loss and chronic microvessel ischemic changes are   again seen.    Old infarct involving the right basal ganglia region is again seen and   unchanged    Previously noted acute right KASSIE infarct has demonstrated expected   evolutionary changes.    There is a small subcentimeter focus of high attenuation again seen   involving the right frontal subcortical region (4-36). This finding was   present and stable when compared with the prior head CT performed on   October 6, 2023. This could be compatible with an area of mineralization   though the possibility of a cavernous angioma must be considered. MRI of   the brain can be done to characterize this finding if there are no   contraindications    Evaluation of the osseous structures probably window appears unremarkable    The paranasal sinuses mastoid and middle ear regions appear clear.    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 The rest of the study appears   stable.    < end of copied text >  < from: Xray Chest 1 View- PORTABLE-Routine (Xray Chest 1 View- PORTABLE-Routine .) (05.07.25 @ 17:57) >  NTERPRETATION:  An AP portable semiupright chest radiograph was   performed for cough.    Comparison is made to 4/29/2025.    There is increasing retrocardiac density in the left lower lobe, with   atelectasis favored over infiltrate. There is also a small left pleural   effusion. There is subsegmental atelectasis at the right lung base,   grossly unchanged. The remaining lungs are clear. There is no   pneumothorax. There is no hilar or mediastinal widening. The cardiac   silhouette is likely magnified by technique, without CHF. There is a   tortuous atherosclerotic aorta and calcified mitral annulus. There are   degenerative changes of the thoracic spine and shoulders.    IMPRESSION:  1. Increasing retrocardiac density in the left lower lobe, with   atelectasis favored over infiltrate and is seen with a small left pleural   effusion, all of which represent a change from the prior exam.  2. Subsegmental atelectasis at the right lung base is unchanged.  3. Atherosclerosis without CHF.    < end of copied text >    Imaging Personally Reviewed:  [ ] YES  [ ] NO    Consultant(s) Notes Reviewed:  [ ] YES  [ ] NO    Care Discussed with Consultants/Other Providers [ ] YES  [ ] NO
Patient is a 89y old  Female who presents with a chief complaint of Acute respiratory failure likely due to aspiration pna, SBO, possible bowel ischemia (03 May 2025 09:46)    INTERVAL HPI/OVERNIGHT EVENTS:    MEDICATIONS  (STANDING):  aspirin  chewable 81 milliGRAM(s) Oral daily  atorvastatin 40 milliGRAM(s) Oral at bedtime  cefTRIAXone   IVPB 1000 milliGRAM(s) IV Intermittent every 24 hours  clopidogrel Tablet 75 milliGRAM(s) Oral daily  dextrose 5% + lactated ringers. 1000 milliLiter(s) (60 mL/Hr) IV Continuous <Continuous>  furosemide    Tablet 20 milliGRAM(s) Oral daily  losartan 50 milliGRAM(s) Oral daily  metoprolol tartrate Injectable 5 milliGRAM(s) IV Push every 6 hours  nystatin    Suspension 527092 Unit(s) Oral four times a day    MEDICATIONS  (PRN):  acetaminophen     Tablet .. 650 milliGRAM(s) Oral every 6 hours PRN Temp greater or equal to 38C (100.4F), Mild Pain (1 - 3)  hydrALAZINE Injectable 5 milliGRAM(s) IV Push every 6 hours PRN sbp>160    Allergies    penicillin (Hives)  Tolerated ceftriaxone 2/2023 &amp; cefepime 4/2025 (Other)    Intolerances      REVIEW OF SYSTEMS:  All other systems reviewed and are negative    Vital Signs Last 24 Hrs  T(C): 36.9 (04 May 2025 05:46), Max: 36.9 (04 May 2025 05:46)  T(F): 98.4 (04 May 2025 05:46), Max: 98.4 (04 May 2025 05:46)  HR: 52 (04 May 2025 05:46) (52 - 87)  BP: 180/74 (04 May 2025 05:46) (155/68 - 189/61)  BP(mean): --  RR: 18 (04 May 2025 05:46) (16 - 18)  SpO2: 98% (04 May 2025 05:46) (95% - 99%)    Parameters below as of 04 May 2025 05:46  Patient On (Oxygen Delivery Method): nasal cannula      Daily     Daily   I&O's Summary    03 May 2025 07:01  -  04 May 2025 07:00  --------------------------------------------------------  IN: 660 mL / OUT: 700 mL / NET: -40 mL      CAPILLARY BLOOD GLUCOSE      POCT Blood Glucose.: 114 mg/dL (03 May 2025 15:53)  POCT Blood Glucose.: 153 mg/dL (03 May 2025 11:02)    PHYSICAL EXAM:  GENERAL: NAD,    HEAD:  Atraumatic, Normocephalic  EYES: EOMI, PERRLA, conjunctiva and sclera clear  ENMT: No tonsillar erythema, exudates, or enlargement; Moist mucous membranes, Good dentition, No lesions  NECK: Supple, No JVD, Normal thyroid  NERVOUS SYSTEM:  Alert & Oriented X3, Good concentration; Motor Strength 5/5 B/L upper and lower extremities; DTRs 2+ intact and symmetric  CHEST/LUNG: Clear to percussion bilaterally; No rales, rhonchi, wheezing, or rubs  HEART: Regular rate and rhythm; No murmurs, rubs, or gallops  ABDOMEN: Soft, Nontender, Nondistended; Bowel sounds present  EXTREMITIES:  2+ Peripheral Pulses, No clubbing, cyanosis, or edema  LYMPH: No lymphadenopathy noted  SKIN: No rashes or lesions    Labs                          11.5   6.50  )-----------( 229      ( 03 May 2025 07:10 )             35.7     05-04    147[H]  |  120[H]  |  20  ----------------------------<  131[H]  3.4[L]   |  22  |  0.72    Ca    8.4[L]      04 May 2025 06:36    TPro  5.0[L]  /  Alb  1.9[L]  /  TBili  0.4  /  DBili  x   /  AST  16  /  ALT  19  /  AlkPhos  70  05-03          Urinalysis Basic - ( 04 May 2025 06:36 )    Color: x / Appearance: x / SG: x / pH: x  Gluc: 131 mg/dL / Ketone: x  / Bili: x / Urobili: x   Blood: x / Protein: x / Nitrite: x   Leuk Esterase: x / RBC: x / WBC x   Sq Epi: x / Non Sq Epi: x / Bacteria: x                  DVT prophylaxis: > Lovenox 40mg SQ daily  > Heparin   > SCD's
Pt seen and examine at bedside, +bowel function, had swallow evaluation this morning and recommendations were to keep npo as pt is at risk for aspiration, possible feeding tube placement. Family at bedside to have GOC discussion.     T(F): 98.5 (05-02-25 @ 05:24), Max: 98.5 (05-02-25 @ 05:24)  HR: 90 (05-02-25 @ 05:24) (90 - 94)  BP: 163/72 (05-02-25 @ 05:24) (163/72 - 195/77)  RR: 18 (05-02-25 @ 05:24) (18 - 18)  SpO2: 96% (05-02-25 @ 05:24) (95% - 98%)  Wt(kg): --  CAPILLARY BLOOD GLUCOSE      POCT Blood Glucose.: 113 mg/dL (02 May 2025 07:46)  POCT Blood Glucose.: 102 mg/dL (01 May 2025 22:44)  POCT Blood Glucose.: 401 mg/dL (01 May 2025 22:40)        PHYSICAL EXAM:  GENERAL: awake and alert, NAD  HEAD:  Atraumatic, Normocephalic  CHEST/LUNG: non-labored breathing  ABDOMEN: soft, NT, ND  EXTREMITIES: no calf tenderness b/l      LABS:                        12.2   5.72  )-----------( 201      ( 01 May 2025 07:10 )             38.0     05-01    146[H]  |  120[H]  |  33[H]  ----------------------------<  97  3.9   |  20[L]  |  0.92    Ca    8.6      01 May 2025 07:10    TPro  5.5[L]  /  Alb  2.1[L]  /  TBili  0.5  /  DBili  x   /  AST  15  /  ALT  20  /  AlkPhos  67  05-01      I&O's Detail    01 May 2025 07:01  -  02 May 2025 07:00  --------------------------------------------------------  IN:    dextrose 5% + lactated ringers: 720 mL  Total IN: 720 mL    OUT:    Voided (mL): 800 mL  Total OUT: 800 mL    Total NET: -80 mL      89F with SBO vs ileus. No leukocytosis. +BM, no s/p speech and swallow eval recommending npo  - pt may need NG tube feeding until GOC conversation with family about possible PEG tube placement  - No acute surgical intervention at this time, may reconsult prn for peg tube placement by Sx vs GI  - Medical management as per primary team  Plan discussed with Dr. KARTHIK Martinez      
HPI:  89-year-old female with past medical history of hypertension, combined CHF, CVA, cervical cancer status post radiation, glaucoma, discharged yesterday after being admitted for acute CVA, gastroenteritis, and elevated troponin who was brought back to the ED due to hypoxia and vomiting. ROS not obtained due to altered mental status.  On presentation, the patient was tachycardic, hypertensive, and tachypneic. Labs notable for troponin 160, bicarb 20, creatinine 1.3(around baseline), lactate 2.1. CT chest findings consistent with likely aspiration pneumonia. CT abdomen/pelvis revealed possible SBO, bowel ischemia, scattered punctate foci of gas in the right lower quadrant ventral abdominal wall and in the right groin soft tissues possibly soft tissue infection. Ill-defined 1.8 x 1.4 cm hypodense ending focus in the region of the lower uterus. Surgery was consulted; however, recommend conservative management due to comorbidities. The patient receives cefepime, vancomycin, 500 cc ivf bolus, hydralazine, labetalol, Zofran in the ED.  (30 Apr 2025 04:53)  Patient is a 89y old  Female who presents with a chief complaint of Acute respiratory failure likely due to aspiration pna, SBO, possible bowel ischemia (08 May 2025 22:41)      INTERVAL HPI/OVERNIGHT EVENTS: n o acute events     MEDICATIONS  (STANDING):  albuterol/ipratropium for Nebulization 3 milliLiter(s) Nebulizer every 6 hours  aspirin  chewable 81 milliGRAM(s) Oral daily  atorvastatin 40 milliGRAM(s) Oral at bedtime  clopidogrel Tablet 75 milliGRAM(s) Oral daily  dextrose 5% + lactated ringers. 1000 milliLiter(s) (60 mL/Hr) IV Continuous <Continuous>  dorzolamide 2% Ophthalmic Solution 1 Drop(s) Both EYES three times a day  furosemide    Tablet 20 milliGRAM(s) Oral daily  guaiFENesin  milliGRAM(s) Oral every 12 hours  losartan 50 milliGRAM(s) Oral daily  metoprolol tartrate Injectable 5 milliGRAM(s) IV Push every 6 hours  simbrinza 1-0.2% 1 Drop(s) 1 Drop(s) Both EYES two times a day    MEDICATIONS  (PRN):  acetaminophen     Tablet .. 650 milliGRAM(s) Oral every 6 hours PRN Temp greater or equal to 38C (100.4F), Mild Pain (1 - 3)  hydrALAZINE Injectable 5 milliGRAM(s) IV Push every 6 hours PRN sbp>160      Allergies    penicillin (Hives)  Tolerated ceftriaxone 2/2023 &amp; cefepime 4/2025 (Other)    Intolerances        REVIEW OF SYSTEMS:  CONSTITUTIONAL: No fever, weight loss, or fatigue  EYES: No eye pain, visual disturbances, or discharge  ENMT:  No difficulty hearing, tinnitus, vertigo; No sinus or throat pain  NECK: No pain or stiffness  BREASTS: No pain, masses, or nipple discharge  RESPIRATORY: No cough, wheezing, chills or hemoptysis; No shortness of breath  CARDIOVASCULAR: No chest pain, palpitations, dizziness, or leg swelling  GASTROINTESTINAL: No abdominal or epigastric pain. No nausea, vomiting, or hematemesis; No diarrhea or constipation. No melena or hematochezia.  GENITOURINARY: No dysuria, frequency, hematuria, or incontinence  NEUROLOGICAL: No headaches, memory loss, loss of strength, numbness, or tremors  SKIN: No itching, burning, rashes, or lesions   LYMPH NODES: No enlarged glands  ENDOCRINE: No heat or cold intolerance; No hair loss  MUSCULOSKELETAL: No joint pain or swelling; No muscle, back, or extremity pain  PSYCHIATRIC: No depression, anxiety, mood swings, or difficulty sleeping  HEME/LYMPH: No easy bruising, or bleeding gums  ALLERGY AND IMMUNOLOGIC: No hives or eczema    Vital Signs Last 24 Hrs  T(C): 37 (10 May 2025 11:02), Max: 37.2 (10 May 2025 05:27)  T(F): 98.6 (10 May 2025 11:02), Max: 99 (10 May 2025 05:27)  HR: 77 (10 May 2025 11:02) (77 - 88)  BP: 164/69 (10 May 2025 11:02) (155/78 - 167/78)  RR: 16 (10 May 2025 11:02) (16 - 18)  SpO2: 98% (10 May 2025 11:02) (94% - 99%)    Parameters below as of 10 May 2025 11:02  Patient On (Oxygen Delivery Method): nasal cannula        PHYSICAL EXAM:  GENERAL: NAD, well-groomed, well-developed  HEAD:  Atraumatic, Normocephalic  EYES: EOMI, PERRLA, conjunctiva and sclera clear  ENMT: No tonsillar erythema, exudates, or enlargement; Moist mucous membranes, Good dentition, No lesions  NECK: Supple, No JVD, Normal thyroid  NERVOUS SYSTEM:  Alert & Oriented X3, Good concentration; Motor Strength 5/5 B/L upper and lower extremities; DTRs 2+ intact and symmetric  CHEST/LUNG: Clear to ascultation  bilaterally; No rales, rhonchi, wheezing, or rubs  HEART: Regular rate and rhythm; No murmurs, rubs, or gallops  ABDOMEN: Soft, Nontender, Nondistended; Bowel sounds present  EXTREMITIES:  2+ Peripheral Pulses, No clubbing, cyanosis, or edema  LYMPH: No lymphadenopathy noted  SKIN: No rashes or lesions    LABS:                        10.2   6.82  )-----------( 215      ( 09 May 2025 08:10 )             32.1     05-09    141  |  113[H]  |  13  ----------------------------<  120[H]  3.8   |  27  |  0.66    Ca    7.8[L]      09 May 2025 08:10  Phos  2.4     05-09  Mg     1.7     05-09        Urinalysis Basic - ( 09 May 2025 08:10 )    Color: x / Appearance: x / SG: x / pH: x  Gluc: 120 mg/dL / Ketone: x  / Bili: x / Urobili: x   Blood: x / Protein: x / Nitrite: x   Leuk Esterase: x / RBC: x / WBC x   Sq Epi: x / Non Sq Epi: x / Bacteria: x      CAPILLARY BLOOD GLUCOSE          RADIOLOGY & ADDITIONAL TESTS:    Imaging Personally Reviewed:  [ ] YES  [ ] NO    Consultant(s) Notes Reviewed:  [ ] YES  [ ] NO    Care Discussed with Consultants/Other Providers [ ] YES  [ ] NO
INTERVAL HPI/OVERNIGHT EVENTS:  Pt seen and examined at bedside resting comfortably. No acute events reported overnight. Pt offers no complaints at this time. Last BM yesterday.  Denies fever/chills, chest pain, dyspnea, cough, dizziness.     Vital Signs Last 24 Hrs  T(C): 37.3 (01 May 2025 05:07), Max: 37.3 (01 May 2025 05:07)  T(F): 99.2 (01 May 2025 05:07), Max: 99.2 (01 May 2025 05:07)  HR: 84 (01 May 2025 05:07) (84 - 95)  BP: 148/64 (01 May 2025 05:07) (148/64 - 156/77)  BP(mean): --  RR: 18 (01 May 2025 05:07) (18 - 18)  SpO2: 98% (01 May 2025 05:07) (95% - 98%)        PHYSICAL EXAM:  GENERAL: awake and alert, NAD  HEAD:  Atraumatic, Normocephalic  EYES: EOMI, PERRLA  CHEST/LUNG: non-labored breathing  ABDOMEN: soft, NT, ND  EXTREMITIES: no calf tenderness b/l    I&O's Detail    30 Apr 2025 07:01  -  01 May 2025 07:00  --------------------------------------------------------  IN:  Total IN: 0 mL    OUT:    Oral Fluid: 0 mL    Voided (mL): 300 mL  Total OUT: 300 mL    Total NET: -300 mL          LABS:                        12.2   5.72  )-----------( 201      ( 01 May 2025 07:10 )             38.0     05-01    146[H]  |  120[H]  |  33[H]  ----------------------------<  97  3.9   |  20[L]  |  0.92    Ca    8.6      01 May 2025 07:10    TPro  5.5[L]  /  Alb  2.1[L]  /  TBili  0.5  /  DBili  x   /  AST  15  /  ALT  20  /  AlkPhos  67  05-01      Urinalysis Basic - ( 01 May 2025 07:10 )    Color: x / Appearance: x / SG: x / pH: x  Gluc: 97 mg/dL / Ketone: x  / Bili: x / Urobili: x   Blood: x / Protein: x / Nitrite: x   Leuk Esterase: x / RBC: x / WBC x   Sq Epi: x / Non Sq Epi: x / Bacteria: x    
[facility]  INFECTIOUS DISEASES   [address] [place] [address]  Tel: [phone]     Fax: [phone]  ==============================================================================  DO Marlo Garza MD Alexandra Gutman, NP   ==============================================================================      MARTHA LEIGH  [id number]  89y ([birth year])      Interval History: patient seen and examined, mental status waxes and wanes, weak but arousable and participates with daughter in cognitive activities like work games and boggle.    ROS:    [ ] Unobtainable because:  [x] All other systems negative except as noted, limited but when asked she denies any complaints           Allergies  penicillin (Hives)  Tolerated ceftriaxone 2/2023 &amp; cefepime 4/2025 (Other)        ANTIMICROBIALS:    cefTRIAXone   IVPB 1000 every 24 hours  nystatin    Suspension [id number] four times a day      MEDICATIONS  (STANDING):  aspirin  chewable 81 daily  atorvastatin 40 at bedtime  clopidogrel Tablet 75 daily  furosemide    Tablet 20 daily  losartan 50 daily  metoprolol tartrate Injectable 5 every 6 hours      PRN  acetaminophen     Tablet .. 650 milliGRAM(s) Oral every 6 hours PRN  hydrALAZINE Injectable 5 milliGRAM(s) IV Push every 6 hours PRN      Physical Exam:  Vital Signs Last 24 Hrs  T(F): 97.4 (05-05-25 @ 00:06), Max: 98.4 (05-04-25 @ 05:46)  HR: 70 (05-05-25 @ 00:06)  BP: 168/71 (05-05-25 @ 00:06)  RR: 17 (05-05-25 @ 00:06)  SpO2: 100% (05-05-25 @ 00:06) (94% - 100%)  Wt(kg): --      Constitutional: non-toxic, no distress, elderly female pleasant lethargic but arousable   HEAD/EYES: anicteric, no conjunctival injection  ENT:  supple, thrush vs leukoplakia on tongue   Cardiovascular:   normal S1, S2, no murmur, no edema  Respiratory:  faint rales bilaterally, no wheezes  GI:  soft, non-tender, normal bowel sounds  :  no wilkerson, no CVA tenderness  Musculoskeletal:  no synovitis, weakness in extremities   Neurologic: awake and alertx3, 3-4/5 strength, no focal findings  Skin:  no rash, no erythema, no phlebitis  Psychiatric:  awake, alert, lethargic but arousable     CBC  WBC Count: 6.50 K/uL (05-03 @ 07:10)  WBC Count: 5.72 K/uL (05-01 @ 07:10)  WBC Count: 5.44 K/uL (04-30 @ 10:55)  WBC Count: 6.78 K/uL (04-29 @ 20:58)  WBC Count: 8.80 K/uL (04-29 @ 10:50)  WBC Count: 8.06 K/uL (04-28 @ 07:15)                            11.5   6.50  )-----------( 229      ( 03 May 2025 07:10 )             35.7       BMP/CMP  05-04    147[H]  |  120[H]  |  20  ----------------------------<  131[H]  3.4[L]   |  22  |  0.72    Ca    8.4[L]      04 May 2025 06:36    TPro  5.0[L]  /  Alb  1.9[L]  /  TBili  0.4  /  DBili  x   /  AST  16  /  ALT  19  /  AlkPhos  70  05-03      Creatinine Trend: 0.72<--, 0.84<--, 0.92<--, 1.07<--, 1.37<--, 1.30<--        MICROBIOLOGY:        RADIOLOGY:  ACC: [id number] EXAM:  XR CHEST PORTABLE URGENT 1V   ORDERED BY: EDWIN CHAVIS     PROCEDURE DATE:  04/29/2025          INTERPRETATION:  An AP portable semiupright chest radiograph was   performed for shortness of breath.    Comparison is made to 4/1/2025.    There are linear atelectatic bands in the right middle and right lower   lobe, not present previously, while the remaining lungs are clear. No   infiltrates are seen. There is no pneumothorax. There is no pleural   effusion identified on either side. The thoracic aorta is atherosclerotic   and tortuous. There is a calcified mitral annulus. The cardiac silhouette   remains unchanged and is without CHF. There are degenerative changes of   the thoracic spine and shoulders.    IMPRESSION:  1. Minimal linear atelectasis in the right middle and right lower lobes   compared to the prior exam.  2. Prominent left ventricle, atherosclerosis but no CHF, unchanged.      RENETTA REYES MD  This document has been electronically signed. Apr 30 2025 10:56AM    
HPI:  89-year-old female with past medical history of hypertension, combined CHF, CVA, cervical cancer status post radiation, glaucoma, discharged yesterday after being admitted for acute CVA, gastroenteritis, and elevated troponin who was brought back to the ED due to hypoxia and vomiting. ROS not obtained due to altered mental status.  On presentation, the patient was tachycardic, hypertensive, and tachypneic. Labs notable for troponin 160, bicarb 20, creatinine 1.3(around baseline), lactate 2.1. CT chest findings consistent with likely aspiration pneumonia. CT abdomen/pelvis revealed possible SBO, bowel ischemia, scattered punctate foci of gas in the right lower quadrant ventral abdominal wall and in the right groin soft tissues possibly soft tissue infection. Ill-defined 1.8 x 1.4 cm hypodense ending focus in the region of the lower uterus. Surgery was consulted; however, recommend conservative management due to comorbidities. The patient receives cefepime, vancomycin, 500 cc ivf bolus, hydralazine, labetalol, Zofran in the ED.  (30 Apr 2025 04:53)  Patient is a 89y old  Female who presents with a chief complaint of Acute respiratory failure likely due to aspiration pna, SBO, possible bowel ischemia (10 May 2025 19:18)      INTERVAL HPI/OVERNIGHT EVENTS: no acute events overnight     MEDICATIONS  (STANDING):  aspirin  chewable 81 milliGRAM(s) Oral daily  atorvastatin 40 milliGRAM(s) Oral at bedtime  clopidogrel Tablet 75 milliGRAM(s) Oral daily  furosemide   Injectable 40 milliGRAM(s) IV Push daily  losartan 50 milliGRAM(s) Oral daily  metoprolol tartrate Injectable 5 milliGRAM(s) IV Push every 6 hours  simbrinza 1-0.2% 1 Drop(s) 1 Drop(s) Both EYES two times a day    MEDICATIONS  (PRN):  acetaminophen     Tablet .. 650 milliGRAM(s) Oral every 6 hours PRN Temp greater or equal to 38C (100.4F), Mild Pain (1 - 3)  hydrALAZINE Injectable 5 milliGRAM(s) IV Push every 6 hours PRN sbp>160      Allergies    penicillin (Hives)  Tolerated ceftriaxone 2/2023 &amp; cefepime 4/2025 (Other)    Intolerances        REVIEW OF SYSTEMS:  CONSTITUTIONAL: No fever, weight loss, or fatigue  EYES: No eye pain, visual disturbances, or discharge  ENMT:  No difficulty hearing, tinnitus, vertigo; No sinus or throat pain  NECK: No pain or stiffness  BREASTS: No pain, masses, or nipple discharge  RESPIRATORY: No cough, wheezing, chills or hemoptysis; No shortness of breath  CARDIOVASCULAR: No chest pain, palpitations, dizziness, or leg swelling  GASTROINTESTINAL: No abdominal or epigastric pain. No nausea, vomiting, or hematemesis; No diarrhea or constipation. No melena or hematochezia.  GENITOURINARY: No dysuria, frequency, hematuria, or incontinence  NEUROLOGICAL: No headaches, memory loss, loss of strength, numbness, or tremors  SKIN: No itching, burning, rashes, or lesions   LYMPH NODES: No enlarged glands  ENDOCRINE: No heat or cold intolerance; No hair loss  MUSCULOSKELETAL: No joint pain or swelling; No muscle, back, or extremity pain  PSYCHIATRIC: No depression, anxiety, mood swings, or difficulty sleeping  HEME/LYMPH: No easy bruising, or bleeding gums  ALLERGY AND IMMUNOLOGIC: No hives or eczema    Vital Signs Last 24 Hrs  T(C): 36.7 (11 May 2025 05:12), Max: 36.9 (10 May 2025 17:01)  T(F): 98 (11 May 2025 05:12), Max: 98.4 (10 May 2025 17:01)  HR: 84 (11 May 2025 05:12) (69 - 84)  BP: 170/72 (11 May 2025 05:12) (145/60 - 170/72)  RR: 17 (11 May 2025 05:12) (17 - 17)  SpO2: 97% (11 May 2025 05:12) (97% - 97%)    Parameters below as of 10 May 2025 17:01  Patient On (Oxygen Delivery Method): room air        PHYSICAL EXAM:  GENERAL: chronically ill appearing   HEAD:  Atraumatic, Normocephalic  EYES: EOMI, PERRLA, conjunctiva and sclera clear  ENMT: No tonsillar erythema, exudates, or enlargement; Moist mucous membranes, Good dentition, No lesions  NECK: Supple, No JVD, Normal thyroid  NERVOUS SYSTEM: arousable   CHEST/LUNG: Clear to ascultation  bilaterally; No rales, rhonchi, wheezing, or rubs  HEART: Regular rate and rhythm; No murmurs, rubs, or gallops  ABDOMEN: Soft, Nontender, Nondistended; Bowel sounds present  EXTREMITIES:  2+ Peripheral Pulses, No clubbing, cyanosis, or edema  LYMPH: No lymphadenopathy noted  SKIN: No rashes or lesions    LABS:              CAPILLARY BLOOD GLUCOSE          RADIOLOGY & ADDITIONAL TESTS:    Imaging Personally Reviewed:  [ ] YES  [ ] NO    Consultant(s) Notes Reviewed:  [ ] YES  [ ] NO    Care Discussed with Consultants/Other Providers [ ] YES  [ ] NO
HPI:  89-year-old female with past medical history of hypertension, combined CHF, CVA, cervical cancer status post radiation, glaucoma, discharged yesterday after being admitted for acute CVA, gastroenteritis, and elevated troponin who was brought back to the ED due to hypoxia and vomiting. ROS not obtained due to altered mental status.  On presentation, the patient was tachycardic, hypertensive, and tachypneic. Labs notable for troponin 160, bicarb 20, creatinine 1.3(around baseline), lactate 2.1. CT chest findings consistent with likely aspiration pneumonia. CT abdomen/pelvis revealed possible SBO, bowel ischemia, scattered punctate foci of gas in the right lower quadrant ventral abdominal wall and in the right groin soft tissues possibly soft tissue infection. Ill-defined 1.8 x 1.4 cm hypodense ending focus in the region of the lower uterus. Surgery was consulted; however, recommend conservative management due to comorbidities. The patient receives cefepime, vancomycin, 500 cc ivf bolus, hydralazine, labetalol, Zofran in the ED.  (30 Apr 2025 04:53)  Patient is a 89y old  Female who presents with a chief complaint of Acute respiratory failure likely due to aspiration pna, SBO, possible bowel ischemia (12 May 2025 14:49)      INTERVAL HPI/OVERNIGHT EVENTS: no acute events letahrgic     MEDICATIONS  (STANDING):  aspirin  chewable 81 milliGRAM(s) Oral daily  atorvastatin 40 milliGRAM(s) Oral at bedtime  clopidogrel Tablet 75 milliGRAM(s) Oral daily  losartan 50 milliGRAM(s) Oral daily  metoprolol tartrate Injectable 5 milliGRAM(s) IV Push every 6 hours  simbrinza 1-0.2% 1 Drop(s) 1 Drop(s) Both EYES two times a day    MEDICATIONS  (PRN):  acetaminophen     Tablet .. 650 milliGRAM(s) Oral every 6 hours PRN Temp greater or equal to 38C (100.4F), Mild Pain (1 - 3)  hydrALAZINE Injectable 5 milliGRAM(s) IV Push every 6 hours PRN sbp>160      Allergies    penicillin (Hives)  Tolerated ceftriaxone 2/2023 &amp; cefepime 4/2025 (Other)    Intolerances        REVIEW OF SYSTEMS:  unable to provide     Vital Signs Last 24 Hrs  T(C): 36.7 (14 May 2025 05:16), Max: 36.8 (13 May 2025 17:50)  T(F): 98.1 (14 May 2025 05:16), Max: 98.3 (13 May 2025 17:50)  HR: 84 (14 May 2025 05:16) (72 - 93)  BP: 149/66 (14 May 2025 05:16) (145/76 - 161/73)  RR: 17 (14 May 2025 05:16) (17 - 17)  SpO2: 96% (14 May 2025 05:16) (95% - 96%)    Parameters below as of 14 May 2025 05:16  Patient On (Oxygen Delivery Method): nasal cannula        PHYSICAL EXAM:  GENERAL: NAD, HEAD:  Atraumatic, Normocephalic  EYES: EOMI, PERRLA, conjunctiva and sclera clear  ENMT: No tonsillar erythema, exudates, or enlargement; Moist mucous membranes, Good dentition, No lesions  NECK: Supple, No JVD, Normal thyroid  NERVOUS SYSTEM:  awakable but lethargic   CHEST/LUNG: Clear to ascultation  bilaterally; No rales, rhonchi, wheezing, or rubs  HEART: Regular rate and rhythm; No murmurs, rubs, or gallops  ABDOMEN: Soft, Nontender, Nondistended; Bowel sounds present  EXTREMITIES:  edema legs   LYMPH: No lymphadenopathy noted  SKIN: No rashes or lesions    LABS:                        11.8   11.26 )-----------( 305      ( 12 May 2025 07:16 )             37.3     05-13    144  |  114[H]  |  19  ----------------------------<  118[H]  3.3[L]   |  25  |  0.70    Ca    7.6[L]      13 May 2025 07:40  Phos  2.1     05-13  Mg     1.8     05-13    TPro  5.4[L]  /  Alb  1.8[L]  /  TBili  0.6  /  DBili  x   /  AST  22  /  ALT  17  /  AlkPhos  97  05-12      Urinalysis Basic - ( 13 May 2025 07:40 )    Color: x / Appearance: x / SG: x / pH: x  Gluc: 118 mg/dL / Ketone: x  / Bili: x / Urobili: x   Blood: x / Protein: x / Nitrite: x   Leuk Esterase: x / RBC: x / WBC x   Sq Epi: x / Non Sq Epi: x / Bacteria: x      CAPILLARY BLOOD GLUCOSE          RADIOLOGY & ADDITIONAL TESTS:    Imaging Personally Reviewed:  [ ] YES  [ ] NO    Consultant(s) Notes Reviewed:  [ ] YES  [ ] NO    Care Discussed with Consultants/Other Providers [ ] YES  [ ] NO
Patient is a 89y old  Female who presents with a chief complaint of Acute respiratory failure likely due to aspiration pna, SBO, possible bowel ischemia (02 May 2025 17:45)    INTERVAL HPI/OVERNIGHT EVENTS:    MEDICATIONS  (STANDING):  aspirin  chewable 81 milliGRAM(s) Oral daily  atorvastatin 40 milliGRAM(s) Oral at bedtime  cefTRIAXone   IVPB 1000 milliGRAM(s) IV Intermittent every 24 hours  clopidogrel Tablet 75 milliGRAM(s) Oral daily  dextrose 5% + lactated ringers. 1000 milliLiter(s) (60 mL/Hr) IV Continuous <Continuous>  furosemide    Tablet 20 milliGRAM(s) Oral daily  losartan 50 milliGRAM(s) Oral daily  metoprolol tartrate Injectable 5 milliGRAM(s) IV Push every 6 hours  nystatin    Suspension 601953 Unit(s) Oral four times a day  potassium chloride   Powder 40 milliEquivalent(s) Oral once    MEDICATIONS  (PRN):  acetaminophen     Tablet .. 650 milliGRAM(s) Oral every 6 hours PRN Temp greater or equal to 38C (100.4F), Mild Pain (1 - 3)  hydrALAZINE Injectable 5 milliGRAM(s) IV Push every 6 hours PRN sbp>160    Allergies    penicillin (Hives)  Tolerated ceftriaxone 2/2023 &amp; cefepime 4/2025 (Other)    Intolerances      REVIEW OF SYSTEMS:  All other systems reviewed and are negative    Vital Signs Last 24 Hrs  T(C): 37 (03 May 2025 05:16), Max: 37.2 (02 May 2025 23:33)  T(F): 98.6 (03 May 2025 05:16), Max: 99 (02 May 2025 23:33)  HR: 86 (03 May 2025 05:16) (86 - 100)  BP: 137/69 (03 May 2025 05:16) (137/69 - 178/96)  BP(mean): --  RR: 17 (03 May 2025 05:16) (17 - 18)  SpO2: 94% (03 May 2025 05:16) (94% - 95%)    Parameters below as of 03 May 2025 05:16  Patient On (Oxygen Delivery Method): nasal cannula      Daily     Daily   I&O's Summary    02 May 2025 07:01  -  03 May 2025 07:00  --------------------------------------------------------  IN: 770 mL / OUT: 425 mL / NET: 345 mL      CAPILLARY BLOOD GLUCOSE      POCT Blood Glucose.: 126 mg/dL (03 May 2025 07:55)  POCT Blood Glucose.: 130 mg/dL (02 May 2025 11:17)    PHYSICAL EXAM:  GENERAL: NAD,    HEAD:  Atraumatic, Normocephalic  EYES: EOMI, PERRLA, conjunctiva and sclera clear  ENMT: No tonsillar erythema, exudates, or enlargement; Moist mucous membranes, Good dentition, No lesions  NECK: Supple, No JVD, Normal thyroid  NERVOUS SYSTEM:  Alert & Oriented X3, Good concentration; Motor Strength 5/5 B/L upper and lower extremities; DTRs 2+ intact and symmetric  CHEST/LUNG: Clear to percussion bilaterally; No rales, rhonchi, wheezing, or rubs  HEART: Regular rate and rhythm; No murmurs, rubs, or gallops  ABDOMEN: Soft, Nontender, Nondistended; Bowel sounds present  EXTREMITIES:  2+ Peripheral Pulses, No clubbing, cyanosis, or edema  LYMPH: No lymphadenopathy noted  SKIN: No rashes or lesions    Labs                          11.5   6.50  )-----------( 229      ( 03 May 2025 07:10 )             35.7     05-03    146[H]  |  119[H]  |  23  ----------------------------<  131[H]  3.2[L]   |  23  |  0.84    Ca    8.6      03 May 2025 07:10    TPro  5.0[L]  /  Alb  1.9[L]  /  TBili  0.4  /  DBili  x   /  AST  16  /  ALT  19  /  AlkPhos  70  05-03          Urinalysis Basic - ( 03 May 2025 07:10 )    Color: x / Appearance: x / SG: x / pH: x  Gluc: 131 mg/dL / Ketone: x  / Bili: x / Urobili: x   Blood: x / Protein: x / Nitrite: x   Leuk Esterase: x / RBC: x / WBC x   Sq Epi: x / Non Sq Epi: x / Bacteria: x                  DVT prophylaxis: > Lovenox 40mg SQ daily  > Heparin   > SCD's
SUBJECTIVE AND OBJECTIVE: Patient laying in bed with private HHA assisting with lunch  INTERVAL HPI/OVERNIGHT EVENTS:    DNR on chart: yes  Allergies    penicillin (Hives)  Tolerated ceftriaxone 2/2023 &amp; cefepime 4/2025 (Other)    Intolerances    MEDICATIONS  (STANDING):  aspirin  chewable 81 milliGRAM(s) Oral daily  atorvastatin 40 milliGRAM(s) Oral at bedtime  cefTRIAXone   IVPB 1000 milliGRAM(s) IV Intermittent every 24 hours  clopidogrel Tablet 75 milliGRAM(s) Oral daily  dextrose 5% + lactated ringers. 1000 milliLiter(s) (60 mL/Hr) IV Continuous <Continuous>  furosemide    Tablet 20 milliGRAM(s) Oral daily  losartan 50 milliGRAM(s) Oral daily  metoprolol tartrate Injectable 5 milliGRAM(s) IV Push every 6 hours  nystatin    Suspension 794235 Unit(s) Oral four times a day    MEDICATIONS  (PRN):  acetaminophen     Tablet .. 650 milliGRAM(s) Oral every 6 hours PRN Temp greater or equal to 38C (100.4F), Mild Pain (1 - 3)  hydrALAZINE Injectable 5 milliGRAM(s) IV Push every 6 hours PRN sbp>160      ITEMS UNCHECKED ARE NOT PRESENT    PRESENT SYMPTOMS: [x ]Unable to obtain due to poor mentation   Source if other than patient:  [ ]Family   [ ]Team     Pain:  [ ]yes [x ]no  QOL impact -   Location -                    Aggravating factors -  Quality -  Radiation -  Timing-  Severity (0-10 scale):  Minimal acceptable level (0-10 scale):     Dyspnea:                           [ ]Mild [ ]Moderate [ ]Severe  Anxiety:                             [ ]Mild [ ]Moderate [ ]Severe  Fatigue:                             [ ]Mild [ ]Moderate [ ]Severe  Nausea:                             [ ]Mild [ ]Moderate [ ]Severe  Loss of appetite:              [ ]Mild [ ]Moderate [ ]Severe  Constipation:                    [ ]Mild [ ]Moderate [ ]Severe    PAIN AD Score:	  http://geriatrictoolkit.[employer].edu/cog/painad.pdf (Ctrl + left click to view)    Other Symptoms:  [ ]All other review of systems negative     Palliative Performance Status Version 2:        30-40 %      http://[email or web address].org/files/news/[email or web address]_performance_scale_[email or web address].pdf  PHYSICAL EXAM:  Vital Signs Last 24 Hrs  T(C): 36.5 (05 May 2025 11:20), Max: 36.7 (05 May 2025 05:29)  T(F): 97.7 (05 May 2025 11:20), Max: 98 (05 May 2025 05:29)  HR: 83 (05 May 2025 11:20) (70 - 86)  BP: 178/64 (05 May 2025 11:20) (157/65 - 178/64)  BP(mean): --  RR: 18 (05 May 2025 11:20) (17 - 18)  SpO2: 95% (05 May 2025 11:20) (94% - 100%)    Parameters below as of 05 May 2025 11:20  Patient On (Oxygen Delivery Method): nasal cannula  O2 Flow (L/min): 2   I&O's Summary    04 May 2025 07:01  -  05 May 2025 07:00  --------------------------------------------------------  IN: 1430 mL / OUT: 900 mL / NET: 530 mL       GENERAL:  [ x]Alert  [ ]Oriented x   [ ]Lethargic  [ ]Cachexia  [ ]Unarousable  [ x]Verbal  [ ]Non-Verbal  Behavioral:   [ ]Anxiety  [ ]Delirium [ ]Agitation [ ]Other  HEENT:  [ ]Normal   [ ]Dry mouth   [ ]ET Tube/Trach  [ ]Oral lesions  PULMONARY:   [ ]Clear [ ]Tachypnea  [ ]Audible excessive secretions   [ ]Rhonchi        [ ]Right [ ]Left [ ]Bilateral  [ ]Crackles        [ ]Right [ ]Left [ ]Bilateral  [ ]Wheezing     [ ]Right [ ]Left [ ]Bilateral  [ ]Diminished BS [ ] Right [ ]Left [ ]Bilateral  CARDIOVASCULAR:    [ ]Regular [ ]Irregular [ ]Tachy  [ ]Cayden [ ]Murmur [ ]Other  GASTROINTESTINAL:  [ ]Soft  [ ]Distended   [ ]+BS  [ ]Non tender [ ]Tender  [ ]PEG [ ]OGT/ NGT   Last BM:  5/4/25  GENITOURINARY:  [ ]Normal [x ]Incontinent   [ ]Oliguria/Anuria   [ ][place]  MUSCULOSKELETAL:   [ ]Normal   [x ]Weakness  [ ]Bed/Wheelchair bound [ ]Edema  NEUROLOGIC:   [ ]No focal deficits  [x ] Cognitive impairment  [ ] Dysphagia [ ]Dysarthria [ ] Paresis [ ]Other   SKIN:   [ ]Normal  [ ]Rash   [ ]Pressure ulcer(s) [ ]y [ ]n present on admission    CRITICAL CARE:  [ ]Shock Present  [ ]Septic [ ]Cardiogenic [ ]Neurologic [ ]Hypovolemic  [ ]Vasopressors [ ]Inotropes  [ ]Respiratory failure present [ ]Mechanical Ventilation [ ]Non-invasive ventilatory support [ ]High-Flow  [ ]Acute  [ ]Chronic [ ]Hypoxic  [ ]Hypercarbic [ ]Other  [ ]Other organ failure     LABS:  05-05    146[H]  |  118[H]  |  16  ----------------------------<  94  3.5   |  23  |  0.65    Ca    8.5      05 May 2025 08:00    RADIOLOGY & ADDITIONAL STUDIES: none new    Protein Calorie Malnutrition Present: [ ]mild [ ]moderate [ ]severe [ ]underweight [ ]morbid obesity  https://www.andeal.org/vault/2440/web/files/ONC/Table_Clinical%20Characteristics%20to%20Document%20Malnutrition-White%20JV%20et%20al%202012.pdf    Height (cm): 152.4 (04-29-25 @ 19:34), 152.4 (04-23-25 @ 15:04), 152.4 (11-13-24 @ 18:12)  Weight (kg): 52.6 (04-30-25 @ 06:41), 53.1 (04-23-25 @ 15:04), 49.9 (04-02-25 @ 18:20)  BMI (kg/m2): 22.6 (04-30-25 @ 06:41), 22.9 (04-29-25 @ 19:34), 22.9 (04-23-25 @ 15:04)    [ ]PPSV2 < or = 30%  [ ]significant weight loss [ ]poor nutritional intake [ ]anasarca    [ ]Artificial Nutrition    REFERRALS:   [ ]Chaplaincy  [ ]Hospice  [ ]Child Life  [ ]Social Work  [ ]Case management [ ]Holistic Therapy     Goals of Care Document:    
[facility]  INFECTIOUS DISEASES   [address] [place] [address]  Tel: [phone]     Fax: [phone]  ==============================================================================  DO Marlo Garza MD Alexandra Gutman, NP   ==============================================================================      MARTHA LEIGH  MRN-[id number]  89y ([birth year])      Interval History: patient seen and examined, feeling well, reading the newspaper, ate lunch, no complaints .    ROS:    [ ] Unobtainable because:  [x] All other systems negative except as noted, limited but when asked she denies any complaints           Allergies  penicillin (Hives)  Tolerated ceftriaxone 2/2023 &amp; cefepime 4/2025 (Other)        ANTIMICROBIALS:    cefTRIAXone   IVPB 1000 every 24 hours  nystatin    Suspension [id number] four times a day      MEDICATIONS  (STANDING):  aspirin  chewable 81 daily  atorvastatin 40 at bedtime  clopidogrel Tablet 75 daily  furosemide    Tablet 20 daily  losartan 50 daily  metoprolol tartrate Injectable 5 every 6 hours      PRN  acetaminophen     Tablet .. 650 milliGRAM(s) Oral every 6 hours PRN  hydrALAZINE Injectable 5 milliGRAM(s) IV Push every 6 hours PRN      Physical Exam:  Vital Signs Last 24 Hrs  T(F): 97.6 (05-05-25 @ 17:13), Max: 98 (05-05-25 @ 05:29)  HR: 67 (05-05-25 @ 17:13)  BP: 156/67 (05-05-25 @ 17:13)  RR: 18 (05-05-25 @ 17:13)  SpO2: 96% (05-05-25 @ 17:13) (94% - 100%)  Wt(kg): --      Constitutional: non-toxic, no distress, elderly female pleasant lethargic but arousable   HEAD/EYES: anicteric, no conjunctival injection  ENT:  supple, thrush vs leukoplakia on tongue   Cardiovascular:   normal S1, S2, no murmur, no edema  Respiratory:  faint rales bilaterally, no wheezes  GI:  soft, non-tender, normal bowel sounds  :  no wilkerson, no CVA tenderness  Musculoskeletal:  no synovitis, weakness in extremities   Neurologic: awake and alertx3, 3-4/5 strength, no focal findings  Skin:  no rash, no erythema, no phlebitis  Psychiatric:  awake, alert, lethargic but arousable     CBC    CBC  WBC Count: 6.50 K/uL (05-03 @ 07:10)  WBC Count: 5.72 K/uL (05-01 @ 07:10)  WBC Count: 5.44 K/uL (04-30 @ 10:55)  WBC Count: 6.78 K/uL (04-29 @ 20:58)  WBC Count: 8.80 K/uL (04-29 @ 10:50)        BMP/CMP  05-05    146[H]  |  118[H]  |  16  ----------------------------<  94  3.5   |  23  |  0.65    Ca    8.5      05 May 2025 08:00        Creatinine Trend: 0.65<--, 0.72<--, 0.84<--, 0.92<--, 1.07<--, 1.37<--        MICROBIOLOGY:        RADIOLOGY:  ACC: [id number] EXAM:  XR CHEST PORTABLE URGENT 1V   ORDERED BY: EDWIN CHAVIS     PROCEDURE DATE:  04/29/2025          INTERPRETATION:  An AP portable semiupright chest radiograph was   performed for shortness of breath.    Comparison is made to 4/1/2025.    There are linear atelectatic bands in the right middle and right lower   lobe, not present previously, while the remaining lungs are clear. No   infiltrates are seen. There is no pneumothorax. There is no pleural   effusion identified on either side. The thoracic aorta is atherosclerotic   and tortuous. There is a calcified mitral annulus. The cardiac silhouette   remains unchanged and is without CHF. There are degenerative changes of   the thoracic spine and shoulders.    IMPRESSION:  1. Minimal linear atelectasis in the right middle and right lower lobes   compared to the prior exam.  2. Prominent left ventricle, atherosclerosis but no CHF, unchanged.      RENETTA REYES MD  This document has been electronically signed. Apr 30 2025 10:56AM    
HPI:  89-year-old female with past medical history of hypertension, combined CHF, CVA, cervical cancer status post radiation, glaucoma, discharged yesterday after being admitted for acute CVA, gastroenteritis, and elevated troponin who was brought back to the ED due to hypoxia and vomiting. ROS not obtained due to altered mental status.  On presentation, the patient was tachycardic, hypertensive, and tachypneic. Labs notable for troponin 160, bicarb 20, creatinine 1.3(around baseline), lactate 2.1. CT chest findings consistent with likely aspiration pneumonia. CT abdomen/pelvis revealed possible SBO, bowel ischemia, scattered punctate foci of gas in the right lower quadrant ventral abdominal wall and in the right groin soft tissues possibly soft tissue infection. Ill-defined 1.8 x 1.4 cm hypodense ending focus in the region of the lower uterus. Surgery was consulted; however, recommend conservative management due to comorbidities. The patient receives cefepime, vancomycin, 500 cc ivf bolus, hydralazine, labetalol, Zofran in the ED.  (30 Apr 2025 04:53)  Patient is a 89y old  Female who presents with a chief complaint of Acute respiratory failure likely due to aspiration pna, SBO, possible bowel ischemia (06 May 2025 14:13)      INTERVAL HPI/OVERNIGHT EVENTS: family concerned about increase in secretions and rhonchi     MEDICATIONS  (STANDING):  albuterol/ipratropium for Nebulization 3 milliLiter(s) Nebulizer every 6 hours  aspirin  chewable 81 milliGRAM(s) Oral daily  atorvastatin 40 milliGRAM(s) Oral at bedtime  clopidogrel Tablet 75 milliGRAM(s) Oral daily  dextrose 5% + lactated ringers. 1000 milliLiter(s) (60 mL/Hr) IV Continuous <Continuous>  dorzolamide 2% Ophthalmic Solution 1 Drop(s) Both EYES three times a day  furosemide    Tablet 20 milliGRAM(s) Oral daily  guaiFENesin  milliGRAM(s) Oral every 12 hours  losartan 50 milliGRAM(s) Oral daily  metoprolol tartrate Injectable 5 milliGRAM(s) IV Push every 6 hours  nystatin    Suspension [id number] Unit(s) Oral four times a day  potassium phosphate IVPB 30 milliMole(s) IV Intermittent once  simbrinza 1-0.2% 1 Drop(s) 1 Drop(s) Both EYES two times a day    MEDICATIONS  (PRN):  acetaminophen     Tablet .. 650 milliGRAM(s) Oral every 6 hours PRN Temp greater or equal to 38C (100.4F), Mild Pain (1 - 3)  hydrALAZINE Injectable 5 milliGRAM(s) IV Push every 6 hours PRN sbp>160      Allergies    penicillin (Hives)  Tolerated ceftriaxone 2/2023 &amp; cefepime 4/2025 (Other)    Intolerances        REVIEW OF SYSTEMS:  awake somewhat uncomfortable    cough and secretions     Vital Signs Last 24 Hrs  T(C): 36.7 (07 May 2025 23:31), Max: 37.2 (07 May 2025 17:28)  T(F): 98.1 (07 May 2025 23:31), Max: 99 (07 May 2025 17:28)  HR: 82 (07 May 2025 23:31) (82 - 97)  BP: 160/73 (07 May 2025 23:31) (154/72 - 184/72)  RR: 17 (07 May 2025 23:31) (17 - 20)  SpO2: 95% (07 May 2025 23:31) (94% - 98%)    Parameters below as of 07 May 2025 21:12  Patient On (Oxygen Delivery Method): nasal cannula        PHYSICAL EXAM:  GENERAL: respiratory distress  HEAD:  Atraumatic, Normocephalic  EYES: EOMI, PERRLA, conjunctiva and sclera clear  ENMT: No tonsillar erythema, exudates, or enlargement; Moist mucous membranes, Good dentition, No lesions  NECK: Supple, No JVD, Normal thyroid  NERVOUS SYSTEM:  lethargic but arousability asking for water   CHEST/LUNG: rhonchi   HEART: Regular rate and rhythm; No murmurs, rubs, or gallops  ABDOMEN: Soft, Nontender, Nondistended; Bowel sounds present  EXTREMITIES:  2+ Peripheral Pulses, No clubbing, cyanosis, or edema  LYMPH: No lymphadenopathy noted  SKIN: No rashes or lesions    LABS:                        12.5   8.76  )-----------( 212      ( 07 May 2025 18:18 )             38.1     05-07    141  |  110[H]  |  13  ----------------------------<  102[H]  3.6   |  26  |  0.73    Ca    8.2[L]      07 May 2025 18:18  Phos  2.3     05-07  Mg     1.7     05-07    TPro  5.1[L]  /  Alb  1.9[L]  /  TBili  0.4  /  DBili  x   /  AST  16  /  ALT  21  /  AlkPhos  81  05-07      Urinalysis Basic - ( 07 May 2025 18:18 )    Color: x / Appearance: x / SG: x / pH: x  Gluc: 102 mg/dL / Ketone: x  / Bili: x / Urobili: x   Blood: x / Protein: x / Nitrite: x   Leuk Esterase: x / RBC: x / WBC x   Sq Epi: x / Non Sq Epi: x / Bacteria: x  < from: CT Head No Cont (05.07.25 @ 18:10) >  COMPARISON: Head CT April 30, 2025.    Multiple axial sections were performed from the base of skull to vertex   without contrast enhancement. Coronal and sagittal reconstructions were   performed    Parenchymal volume loss and chronic microvessel ischemic changes are   again seen.    Old infarct involving the right basal ganglia region is again seen and   unchanged    Previously noted acute right KASSIE infarct has demonstrated expected   evolutionary changes.    There is a small subcentimeter focus of high attenuation again seen   involving the right frontal subcortical region (4-36). This finding was   present and stable when compared with the prior head CT performed on   October 6, 2023. This could be compatible with an area of mineralization   though the possibility of a cavernous angioma must be considered. MRI of   the brain can be done to characterize this finding if there are no   contraindications    Evaluation of the osseous structures probably window appears unremarkable    The paranasal sinuses mastoid and middle ear regions appear clear.    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 The rest of the study appears   stable.    < end of copied text >  < from: Xray Chest 1 View- PORTABLE-Routine (Xray Chest 1 View- PORTABLE-Routine .) (05.07.25 @ 17:57) >  IMPRESSION:  1. Increasing retrocardiac density in the left lower lobe, with   atelectasis favored over infiltrate and is seen with a small left pleural   effusion, all of which represent a change from the prior exam.  2. Subsegmental atelectasis at the right lung base is unchanged.  3. Atherosclerosis without CHF.    < end of copied text >            RADIOLOGY & ADDITIONAL TESTS:    Imaging Personally Reviewed:  [X ] YES  [ ] NO    Consultant(s) Notes Reviewed:  [ ] YES  [ ] NO    Care Discussed with Consultants/Other Providers [ ] YES  [ ] NO
HPI:  89-year-old female with past medical history of hypertension, combined CHF, CVA, cervical cancer status post radiation, glaucoma, discharged yesterday after being admitted for acute CVA, gastroenteritis, and elevated troponin who was brought back to the ED due to hypoxia and vomiting. ROS not obtained due to altered mental status.  On presentation, the patient was tachycardic, hypertensive, and tachypneic. Labs notable for troponin 160, bicarb 20, creatinine 1.3(around baseline), lactate 2.1. CT chest findings consistent with likely aspiration pneumonia. CT abdomen/pelvis revealed possible SBO, bowel ischemia, scattered punctate foci of gas in the right lower quadrant ventral abdominal wall and in the right groin soft tissues possibly soft tissue infection. Ill-defined 1.8 x 1.4 cm hypodense ending focus in the region of the lower uterus. Surgery was consulted; however, recommend conservative management due to comorbidities. The patient receives cefepime, vancomycin, 500 cc ivf bolus, hydralazine, labetalol, Zofran in the ED.  (30 Apr 2025 04:53)  Patient is a 89y old  Female who presents with a chief complaint of Acute respiratory failure likely due to aspiration pna, SBO, possible bowel ischemia (09 May 2025 12:08)      INTERVAL HPI/OVERNIGHT EVENTS:    MEDICATIONS  (STANDING):  albuterol/ipratropium for Nebulization 3 milliLiter(s) Nebulizer every 6 hours  aspirin  chewable 81 milliGRAM(s) Oral daily  atorvastatin 40 milliGRAM(s) Oral at bedtime  clopidogrel Tablet 75 milliGRAM(s) Oral daily  furosemide    Tablet 20 milliGRAM(s) Oral daily  guaiFENesin  milliGRAM(s) Oral every 12 hours  losartan 50 milliGRAM(s) Oral daily  metoprolol tartrate Injectable 5 milliGRAM(s) IV Push every 6 hours  simbrinza 1-0.2% 1 Drop(s) 1 Drop(s) Both EYES two times a day    MEDICATIONS  (PRN):  acetaminophen     Tablet .. 650 milliGRAM(s) Oral every 6 hours PRN Temp greater or equal to 38C (100.4F), Mild Pain (1 - 3)  hydrALAZINE Injectable 5 milliGRAM(s) IV Push every 6 hours PRN sbp>160      Allergies    penicillin (Hives)  Tolerated ceftriaxone 2/2023 &amp; cefepime 4/2025 (Other)    Intolerances        REVIEW OF SYSTEMS:  CONSTITUTIONAL: No fever, weight loss, or fatigue  EYES: No eye pain, visual disturbances, or discharge  ENMT:  No difficulty hearing, tinnitus, vertigo; No sinus or throat pain  NECK: No pain or stiffness  BREASTS: No pain, masses, or nipple discharge  RESPIRATORY: No cough, wheezing, chills or hemoptysis; No shortness of breath  CARDIOVASCULAR: No chest pain, palpitations, dizziness, or leg swelling  GASTROINTESTINAL: No abdominal or epigastric pain. No nausea, vomiting, or hematemesis; No diarrhea or constipation. No melena or hematochezia.  GENITOURINARY: No dysuria, frequency, hematuria, or incontinence  NEUROLOGICAL: No headaches, memory loss, loss of strength, numbness, or tremors  SKIN: No itching, burning, rashes, or lesions   LYMPH NODES: No enlarged glands  ENDOCRINE: No heat or cold intolerance; No hair loss  MUSCULOSKELETAL: No joint pain or swelling; No muscle, back, or extremity pain  PSYCHIATRIC: No depression, anxiety, mood swings, or difficulty sleeping  HEME/LYMPH: No easy bruising, or bleeding gums  ALLERGY AND IMMUNOLOGIC: No hives or eczema    Vital Signs Last 24 Hrs  T(C): 36.9 (10 May 2025 17:01), Max: 37.2 (10 May 2025 05:27)  T(F): 98.4 (10 May 2025 17:01), Max: 99 (10 May 2025 05:27)  HR: 79 (10 May 2025 17:01) (77 - 85)  BP: 163/74 (10 May 2025 17:01) (155/78 - 167/78)  RR: 17 (10 May 2025 17:01) (16 - 18)  SpO2: 97% (10 May 2025 17:01) (94% - 99%)    Parameters below as of 10 May 2025 17:01  Patient On (Oxygen Delivery Method): room air        PHYSICAL EXAM:  GENERAL: NAD  HEAD:  Atraumatic, Normocephalic  EYES: EOMI, PERRLA, conjunctiva and sclera clear  NERVOUS SYSTEM:  lethargic today   CHEST/LUNG: rhonchio bilateral   HEART: Regular rate and rhythm; No murmurs, rubs, or gallops  ABDOMEN: Soft, Nontender, Nondistended; Bowel sounds present  SKIN: bruising upper extremity edema   LABS:                        10.2   6.82  )-----------( 215      ( 09 May 2025 08:10 )             32.1     05-09    141  |  113[H]  |  13  ----------------------------<  120[H]  3.8   |  27  |  0.66    Ca    7.8[L]      09 May 2025 08:10  Phos  2.4     05-09  Mg     1.7     05-09        Urinalysis Basic - ( 09 May 2025 08:10 )    Color: x / Appearance: x / SG: x / pH: x  Gluc: 120 mg/dL / Ketone: x  / Bili: x / Urobili: x   Blood: x / Protein: x / Nitrite: x   Leuk Esterase: x / RBC: x / WBC x   Sq Epi: x / Non Sq Epi: x / Bacteria: x      CAPILLARY BLOOD GLUCOSE          RADIOLOGY & ADDITIONAL TESTS:    Imaging Personally Reviewed:  [ X] YES  [ ] NO  < from: CT Head No Cont (05.07.25 @ 18:10) >  ACC: [id number] EXAM:  CT BRAIN   ORDERED BY: GELACIO KELLY     PROCEDURE DATE:  05/07/2025          INTERPRETATION:  CLINICAL INFORMATION: Alteration of  Consciousness    COMPARISON: Head CT April 30, 2025.    Multiple axial sections were performed from the base of skull to vertex   without contrast enhancement. Coronal and sagittal reconstructions were   performed    Parenchymal volume loss and chronic microvessel ischemic changes are   again seen.    Old infarct involving the right basal ganglia region is again seen and   unchanged    Previously noted acute right KASSIE infarct has demonstrated expected   evolutionary changes.    There is a small subcentimeter focus of high attenuation again seen   involving the right frontal subcortical region (4-36). This finding was   present and stable when compared with the prior head CT performed on   October 6, 2023. This could be compatible with an area of mineralization   though the possibility of a cavernous angioma must be considered. MRI of   the brain can be done to characterize this finding if there are no   contraindications    Evaluation of the osseous structures probably window appears unremarkable    The paranasal sinuses mastoid and middle ear regions appear clear.    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 The rest of the study appears   stable.    < end of copied text >  < from: Xray Chest 1 View- PORTABLE-Routine (Xray Chest 1 View- PORTABLE-Routine .) (05.07.25 @ 17:57) >  IMPRESSION:  1. Increasing retrocardiac density in the left lower lobe, with   atelectasis favored over infiltrate and is seen with a small left pleural   effusion, all of which represent a change from the prior exam.  2. Subsegmental atelectasis at the right lung base is unchanged.  3. Atherosclerosis without CHF.    < end of copied text >    Consultant(s) Notes Reviewed:  [X ] YES  [ ] NO    Care Discussed with Consultants/Other Providers [ ] YES  [ ] NO
Patient is a 89y old  Female who presents with a chief complaint of Acute respiratory failure likely due to aspiration pna, SBO, possible bowel ischemia (04 May 2025 23:54)    INTERVAL HPI/OVERNIGHT EVENTS: no events     MEDICATIONS  (STANDING):  aspirin  chewable 81 milliGRAM(s) Oral daily  atorvastatin 40 milliGRAM(s) Oral at bedtime  cefTRIAXone   IVPB 1000 milliGRAM(s) IV Intermittent every 24 hours  clopidogrel Tablet 75 milliGRAM(s) Oral daily  dextrose 5% + lactated ringers. 1000 milliLiter(s) (60 mL/Hr) IV Continuous <Continuous>  furosemide    Tablet 20 milliGRAM(s) Oral daily  losartan 50 milliGRAM(s) Oral daily  metoprolol tartrate Injectable 5 milliGRAM(s) IV Push every 6 hours  nystatin    Suspension [id number] Unit(s) Oral four times a day    MEDICATIONS  (PRN):  acetaminophen     Tablet .. 650 milliGRAM(s) Oral every 6 hours PRN Temp greater or equal to 38C (100.4F), Mild Pain (1 - 3)  hydrALAZINE Injectable 5 milliGRAM(s) IV Push every 6 hours PRN sbp>160    Allergies    penicillin (Hives)  Tolerated ceftriaxone 2/2023 &amp; cefepime 4/2025 (Other)    Intolerances      REVIEW OF SYSTEMS:  All other systems reviewed and are negative    Vital Signs Last 24 Hrs  T(C): 36.7 (05 May 2025 05:29), Max: 36.7 (05 May 2025 05:29)  T(F): 98 (05 May 2025 05:29), Max: 98 (05 May 2025 05:29)  HR: 86 (05 May 2025 05:29) (70 - 86)  BP: 166/70 (05 May 2025 05:29) (157/65 - 168/71)  BP(mean): --  RR: 18 (05 May 2025 05:29) (17 - 18)  SpO2: 94% (05 May 2025 05:29) (94% - 100%)    Parameters below as of 04 May 2025 17:24  Patient On (Oxygen Delivery Method): nasal cannula  O2 Flow (L/min): 2    Daily     Daily   I&O's Summary    04 May 2025 07:01  -  05 May 2025 07:00  --------------------------------------------------------  IN: 1430 mL / OUT: 900 mL / NET: 530 mL      CAPILLARY BLOOD GLUCOSE        PHYSICAL EXAM:  GENERAL: NAD,    HEAD:  Atraumatic, Normocephalic  EYES: EOMI, PERRLA, conjunctiva and sclera clear  ENMT: No tonsillar erythema, exudates, or enlargement; Moist mucous membranes, Good dentition, No lesions  NECK: Supple, No JVD, Normal thyroid  NERVOUS SYSTEM:  Alert & Oriented X3, Good concentration; Motor Strength 5/5 B/L upper and lower extremities; DTRs 2+ intact and symmetric  CHEST/LUNG: Clear to percussion bilaterally; No rales, rhonchi, wheezing, or rubs  HEART: Regular rate and rhythm; No murmurs, rubs, or gallops  ABDOMEN: Soft, Nontender, Nondistended; Bowel sounds present  EXTREMITIES:  2+ Peripheral Pulses, No clubbing, cyanosis, or edema  LYMPH: No lymphadenopathy noted  SKIN: No rashes or lesions    Labs      05-05    146[H]  |  118[H]  |  16  ----------------------------<  94  3.5   |  23  |  0.65    Ca    8.5      05 May 2025 08:00            Urinalysis Basic - ( 05 May 2025 08:00 )    Color: x / Appearance: x / SG: x / pH: x  Gluc: 94 mg/dL / Ketone: x  / Bili: x / Urobili: x   Blood: x / Protein: x / Nitrite: x   Leuk Esterase: x / RBC: x / WBC x   Sq Epi: x / Non Sq Epi: x / Bacteria: x                  DVT prophylaxis: > Lovenox 40mg SQ daily  > Heparin   > SCD's
Patient is a 89y old  Female who presents with a chief complaint of Acute respiratory failure likely due to aspiration pna, SBO, possible bowel ischemia (05 May 2025 17:48)    INTERVAL HPI/OVERNIGHT EVENTS:    MEDICATIONS  (STANDING):  aspirin  chewable 81 milliGRAM(s) Oral daily  atorvastatin 40 milliGRAM(s) Oral at bedtime  clopidogrel Tablet 75 milliGRAM(s) Oral daily  dextrose 5% + lactated ringers. 1000 milliLiter(s) (60 mL/Hr) IV Continuous <Continuous>  furosemide    Tablet 20 milliGRAM(s) Oral daily  losartan 50 milliGRAM(s) Oral daily  metoprolol tartrate Injectable 5 milliGRAM(s) IV Push every 6 hours  nystatin    Suspension [id number] Unit(s) Oral four times a day    MEDICATIONS  (PRN):  acetaminophen     Tablet .. 650 milliGRAM(s) Oral every 6 hours PRN Temp greater or equal to 38C (100.4F), Mild Pain (1 - 3)  hydrALAZINE Injectable 5 milliGRAM(s) IV Push every 6 hours PRN sbp>160    Allergies    penicillin (Hives)  Tolerated ceftriaxone 2/2023 &amp; cefepime 4/2025 (Other)    Intolerances      REVIEW OF SYSTEMS:  All other systems reviewed and are negative    Vital Signs Last 24 Hrs  T(C): 36.7 (06 May 2025 04:26), Max: 37.1 (06 May 2025 00:11)  T(F): 98 (06 May 2025 04:26), Max: 98.7 (06 May 2025 00:11)  HR: 87 (06 May 2025 04:26) (67 - 97)  BP: 174/71 (06 May 2025 04:26) (156/67 - 178/64)  BP(mean): --  RR: 18 (06 May 2025 04:26) (18 - 18)  SpO2: 96% (06 May 2025 04:26) (94% - 96%)    Parameters below as of 06 May 2025 04:26  Patient On (Oxygen Delivery Method): nasal cannula  O2 Flow (L/min): 2    Daily     Daily   I&O's Summary    05 May 2025 07:01  -  06 May 2025 07:00  --------------------------------------------------------  IN: 0 mL / OUT: 450 mL / NET: -450 mL      CAPILLARY BLOOD GLUCOSE        PHYSICAL EXAM:  GENERAL: NAD,    HEAD:  Atraumatic, Normocephalic  EYES: EOMI, PERRLA, conjunctiva and sclera clear  ENMT: No tonsillar erythema, exudates, or enlargement; Moist mucous membranes, Good dentition, No lesions  NECK: Supple, No JVD, Normal thyroid  NERVOUS SYSTEM:  Alert & Oriented X3, Good concentration; Motor Strength 5/5 B/L upper and lower extremities; DTRs 2+ intact and symmetric  CHEST/LUNG: Clear to percussion bilaterally; No rales, rhonchi, wheezing, or rubs  HEART: Regular rate and rhythm; No murmurs, rubs, or gallops  ABDOMEN: Soft, Nontender, Nondistended; Bowel sounds present  EXTREMITIES:  2+ Peripheral Pulses, No clubbing, cyanosis, or edema  LYMPH: No lymphadenopathy noted  SKIN: No rashes or lesions    Labs      05-05    146[H]  |  118[H]  |  16  ----------------------------<  94  3.5   |  23  |  0.65    Ca    8.5      05 May 2025 08:00            Urinalysis Basic - ( 05 May 2025 08:00 )    Color: x / Appearance: x / SG: x / pH: x  Gluc: 94 mg/dL / Ketone: x  / Bili: x / Urobili: x   Blood: x / Protein: x / Nitrite: x   Leuk Esterase: x / RBC: x / WBC x   Sq Epi: x / Non Sq Epi: x / Bacteria: x                  DVT prophylaxis: > Lovenox 40mg SQ daily  > Heparin   > SCD's
Patient is a 89y old  Female who presents with a chief complaint of Acute respiratory failure likely due to aspiration pna, SBO, possible bowel ischemia (13 May 2025 07:11)    INTERVAL HPI/OVERNIGHT EVENTS: Patients seen and examined at bedside this morning. No acute events overnight. Comfortable playing cards. On 1 L NC.    MEDICATIONS  (STANDING):  aspirin  chewable 81 milliGRAM(s) Oral daily  atorvastatin 40 milliGRAM(s) Oral at bedtime  clopidogrel Tablet 75 milliGRAM(s) Oral daily  losartan 50 milliGRAM(s) Oral daily  metoprolol tartrate Injectable 5 milliGRAM(s) IV Push every 6 hours  simbrinza 1-0.2% 1 Drop(s) 1 Drop(s) Both EYES two times a day    MEDICATIONS  (PRN):  acetaminophen     Tablet .. 650 milliGRAM(s) Oral every 6 hours PRN Temp greater or equal to 38C (100.4F), Mild Pain (1 - 3)  hydrALAZINE Injectable 5 milliGRAM(s) IV Push every 6 hours PRN sbp>160    Allergies    penicillin (Hives)  Tolerated ceftriaxone 2/2023 &amp; cefepime 4/2025 (Other)    Intolerances      REVIEW OF SYSTEMS:  All other systems reviewed and are negative    Vital Signs Last 24 Hrs  T(C): 36.6 (14 May 2025 11:10), Max: 36.8 (13 May 2025 17:50)  T(F): 97.9 (14 May 2025 11:10), Max: 98.3 (13 May 2025 17:50)  HR: 101 (14 May 2025 11:10) (72 - 101)  BP: 157/74 (14 May 2025 11:10) (149/66 - 161/73)  BP(mean): --  RR: 17 (14 May 2025 11:10) (17 - 17)  SpO2: 96% (14 May 2025 11:10) (95% - 96%)    Parameters below as of 14 May 2025 11:13  Patient On (Oxygen Delivery Method): nasal cannula      Daily     Daily   I&O's Summary    13 May 2025 07:01  -  14 May 2025 07:00  --------------------------------------------------------  IN: 500 mL / OUT: 375 mL / NET: 125 mL      CAPILLARY BLOOD GLUCOSE        PHYSICAL EXAM:  GENERAL: NAD, HEAD:  Atraumatic, Normocephalic  EYES: EOMI, PERRLA, conjunctiva and sclera clear  ENMT: No tonsillar erythema, exudates, or enlargement; Moist mucous membranes, Good dentition, No lesions  NECK: Supple, No JVD, Normal thyroid  NERVOUS SYSTEM:  awakable but lethargic   CHEST/LUNG: Clear to ascultation  bilaterally; No rales, rhonchi, wheezing, or rubs  HEART: Regular rate and rhythm; No murmurs, rubs, or gallops  ABDOMEN: Soft, Nontender, Nondistended; Bowel sounds present  EXTREMITIES:  edema legs   LYMPH: No lymphadenopathy noted  SKIN: No rashes or lesions      Labs      05-14    142  |  113[H]  |  18  ----------------------------<  89  3.5   |  29  |  0.61    Ca    7.2[L]      14 May 2025 10:03  Phos  2.7     05-14  Mg     1.6     05-14            Urinalysis Basic - ( 14 May 2025 10:03 )    Color: x / Appearance: x / SG: x / pH: x  Gluc: 89 mg/dL / Ketone: x  / Bili: x / Urobili: x   Blood: x / Protein: x / Nitrite: x   Leuk Esterase: x / RBC: x / WBC x   Sq Epi: x / Non Sq Epi: x / Bacteria: x                      Radiology and Imaging reviewed.
[facility]  INFECTIOUS DISEASES   [address] [place] [address]  Tel: [phone]     Fax: [phone]  ==============================================================================  DO Marlo Garza MD Alexandra Gutman, NP   ==============================================================================      MARTHA LEIGH  MRN-[id number]  89y ([birth year])      Interval History: patient seen and examined, more lethargic, says nothing is bothering her, daughter says she is more tired, patient failed speech and swallow eval     ROS:    [ ] Unobtainable because:  [x] All other systems negative except as noted    Constitutional: no fever, no chills  Head: no trauma  Eyes: no vision changes, no eye pain  ENT:  no sore throat, no rhinorrhea  Cardiovascular:  no chest pain, no palpitation  Respiratory:  no SOB, no cough  GI:  no abd pain, no vomiting, no diarrhea  urinary: no dysuria, no hematuria, no flank pain  musculoskeletal:  no joint pain, no joint swelling  skin:  no rash  neurology:  no headache, no seizure, no change in mental status  psych: no anxiety, no depression         Allergies  penicillin (Hives)  Tolerated ceftriaxone 2/2023 &amp; cefepime 4/2025 (Other)        ANTIMICROBIALS:  cefTRIAXone   IVPB 1000 every 24 hours  nystatin    Suspension [id number] four times a day      OTHER MEDS:  acetaminophen     Tablet .. 650 milliGRAM(s) Oral every 6 hours PRN  aspirin  chewable 81 milliGRAM(s) Oral daily  atorvastatin 40 milliGRAM(s) Oral at bedtime  clopidogrel Tablet 75 milliGRAM(s) Oral daily  dextrose 5% + lactated ringers. 1000 milliLiter(s) IV Continuous <Continuous>  furosemide    Tablet 20 milliGRAM(s) Oral daily  hydrALAZINE Injectable 5 milliGRAM(s) IV Push every 6 hours PRN  losartan 50 milliGRAM(s) Oral daily  metoprolol tartrate Injectable 5 milliGRAM(s) IV Push every 6 hours      Physical Exam:  Vital Signs Last 24 Hrs  T(C): 36.6 (02 May 2025 17:37), Max: 36.9 (02 May 2025 05:24)  T(F): 97.9 (02 May 2025 17:37), Max: 98.5 (02 May 2025 05:24)  HR: 99 (02 May 2025 17:37) (90 - 100)  BP: 162/78 (02 May 2025 17:37) (162/78 - 195/77)  BP(mean): --  RR: 18 (02 May 2025 17:37) (18 - 18)  SpO2: 95% (02 May 2025 17:37) (94% - 98%)    Parameters below as of 02 May 2025 17:37  Patient On (Oxygen Delivery Method): nasal cannula        Constitutional: non-toxic, no distress, elderly female pleasant lethargic but arousable   HEAD/EYES: anicteric, no conjunctival injection  ENT:  supple, thrush vs leukoplakia on tongue   Cardiovascular:   normal S1, S2, no murmur, no edema  Respiratory:  relatively clear BS bilaterally, no wheezes, no rales  GI:  soft, non-tender, normal bowel sounds  :  no wilkerson, no CVA tenderness  Musculoskeletal:  no synovitis, normal ROM  Neurologic: awake and alertx3, 4/5 strength, no focal findings  Skin:  no rash, no erythema, no phlebitis  Psychiatric:  awake, alert, appropriate mood    WBC Count: 5.72 K/uL (05-01 @ 07:10)  WBC Count: 5.44 K/uL (04-30 @ 10:55)  WBC Count: 6.78 K/uL (04-29 @ 20:58)  WBC Count: 8.80 K/uL (04-29 @ 10:50)  WBC Count: 8.06 K/uL (04-28 @ 07:15)  WBC Count: 5.74 K/uL (04-26 @ 06:20)                            12.2   5.72  )-----------( 201      ( 01 May 2025 07:10 )             38.0       05-01    146[H]  |  120[H]  |  33[H]  ----------------------------<  97  3.9   |  20[L]  |  0.92    Ca    8.6      01 May 2025 07:10    TPro  5.5[L]  /  Alb  2.1[L]  /  TBili  0.5  /  DBili  x   /  AST  15  /  ALT  20  /  AlkPhos  67  05-01      Urinalysis Basic - ( 01 May 2025 07:10 )    Color: x / Appearance: x / SG: x / pH: x  Gluc: 97 mg/dL / Ketone: x  / Bili: x / Urobili: x   Blood: x / Protein: x / Nitrite: x   Leuk Esterase: x / RBC: x / WBC x   Sq Epi: x / Non Sq Epi: x / Bacteria: x        Lipase: 9 U/L (04-29-25 @ 20:58)    Creatinine Trend: 0.92<--, 1.07<--, 1.37<--, 1.30<--, 1.32<--, 1.41<--      MICROBIOLOGY:        RADIOLOGY:  ACC: [id number] EXAM:  XR CHEST PORTABLE URGENT 1V   ORDERED BY: EDWIN CHAVIS     PROCEDURE DATE:  04/29/2025          INTERPRETATION:  An AP portable semiupright chest radiograph was   performed for shortness of breath.    Comparison is made to 4/1/2025.    There are linear atelectatic bands in the right middle and right lower   lobe, not present previously, while the remaining lungs are clear. No   infiltrates are seen. There is no pneumothorax. There is no pleural   effusion identified on either side. The thoracic aorta is atherosclerotic   and tortuous. There is a calcified mitral annulus. The cardiac silhouette   remains unchanged and is without CHF. There are degenerative changes of   the thoracic spine and shoulders.    IMPRESSION:  1. Minimal linear atelectasis in the right middle and right lower lobes   compared to the prior exam.  2. Prominent left ventricle, atherosclerosis but no CHF, unchanged.      RENETTA REYES MD  This document has been electronically signed. Apr 30 2025 10:56AM    
HPI:  89-year-old female with past medical history of hypertension, combined CHF, CVA, cervical cancer status post radiation, glaucoma, discharged yesterday after being admitted for acute CVA, gastroenteritis, and elevated troponin who was brought back to the ED due to hypoxia and vomiting. ROS not obtained due to altered mental status.  On presentation, the patient was tachycardic, hypertensive, and tachypneic. Labs notable for troponin 160, bicarb 20, creatinine 1.3(around baseline), lactate 2.1. CT chest findings consistent with likely aspiration pneumonia. CT abdomen/pelvis revealed possible SBO, bowel ischemia, scattered punctate foci of gas in the right lower quadrant ventral abdominal wall and in the right groin soft tissues possibly soft tissue infection. Ill-defined 1.8 x 1.4 cm hypodense ending focus in the region of the lower uterus. Surgery was consulted; however, recommend conservative management due to comorbidities. The patient receives cefepime, vancomycin, 500 cc ivf bolus, hydralazine, labetalol, Zofran in the ED.  (30 Apr 2025 04:53)  Patient is a 89y old  Female who presents with a chief complaint of Acute respiratory failure likely due to aspiration pna, SBO, possible bowel ischemia (10 May 2025 19:18)      INTERVAL HPI/OVERNIGHT EVENTS: no acute events overnight     MEDICATIONS  (STANDING):  aspirin  chewable 81 milliGRAM(s) Oral daily  atorvastatin 40 milliGRAM(s) Oral at bedtime  clopidogrel Tablet 75 milliGRAM(s) Oral daily  furosemide   Injectable 40 milliGRAM(s) IV Push daily  losartan 50 milliGRAM(s) Oral daily  metoprolol tartrate Injectable 5 milliGRAM(s) IV Push every 6 hours  simbrinza 1-0.2% 1 Drop(s) 1 Drop(s) Both EYES two times a day    MEDICATIONS  (PRN):  acetaminophen     Tablet .. 650 milliGRAM(s) Oral every 6 hours PRN Temp greater or equal to 38C (100.4F), Mild Pain (1 - 3)  hydrALAZINE Injectable 5 milliGRAM(s) IV Push every 6 hours PRN sbp>160      Allergies    penicillin (Hives)  Tolerated ceftriaxone 2/2023 &amp; cefepime 4/2025 (Other)    Intolerances        REVIEW OF SYSTEMS:  CONSTITUTIONAL: No fever, weight loss, or fatigue  EYES: No eye pain, visual disturbances, or discharge  ENMT:  No difficulty hearing, tinnitus, vertigo; No sinus or throat pain  NECK: No pain or stiffness  BREASTS: No pain, masses, or nipple discharge  RESPIRATORY: No cough, wheezing, chills or hemoptysis; No shortness of breath  CARDIOVASCULAR: No chest pain, palpitations, dizziness, or leg swelling  GASTROINTESTINAL: No abdominal or epigastric pain. No nausea, vomiting, or hematemesis; No diarrhea or constipation. No melena or hematochezia.  GENITOURINARY: No dysuria, frequency, hematuria, or incontinence  NEUROLOGICAL: No headaches, memory loss, loss of strength, numbness, or tremors  SKIN: No itching, burning, rashes, or lesions   LYMPH NODES: No enlarged glands  ENDOCRINE: No heat or cold intolerance; No hair loss  MUSCULOSKELETAL: No joint pain or swelling; No muscle, back, or extremity pain  PSYCHIATRIC: No depression, anxiety, mood swings, or difficulty sleeping  HEME/LYMPH: No easy bruising, or bleeding gums  ALLERGY AND IMMUNOLOGIC: No hives or eczema    Vital Signs Last 24 Hrs  T(C): 36.7 (11 May 2025 05:12), Max: 36.9 (10 May 2025 17:01)  T(F): 98 (11 May 2025 05:12), Max: 98.4 (10 May 2025 17:01)  HR: 84 (11 May 2025 05:12) (69 - 84)  BP: 170/72 (11 May 2025 05:12) (145/60 - 170/72)  RR: 17 (11 May 2025 05:12) (17 - 17)  SpO2: 97% (11 May 2025 05:12) (97% - 97%)    Parameters below as of 10 May 2025 17:01  Patient On (Oxygen Delivery Method): room air        PHYSICAL EXAM:  GENERAL: chronically ill appearing   HEAD:  Atraumatic, Normocephalic  EYES: EOMI, PERRLA, conjunctiva and sclera clear  ENMT: No tonsillar erythema, exudates, or enlargement; Moist mucous membranes, Good dentition, No lesions  NECK: Supple, No JVD, Normal thyroid  NERVOUS SYSTEM: arousable   CHEST/LUNG: Clear to ascultation  bilaterally; No rales, rhonchi, wheezing, or rubs  HEART: Regular rate and rhythm; No murmurs, rubs, or gallops  ABDOMEN: Soft, Nontender, Nondistended; Bowel sounds present  EXTREMITIES:  2+ Peripheral Pulses, No clubbing, cyanosis, or edema  LYMPH: No lymphadenopathy noted  SKIN: No rashes or lesions    LABS:              CAPILLARY BLOOD GLUCOSE          RADIOLOGY & ADDITIONAL TESTS:    Imaging Personally Reviewed:  [ ] YES  [ ] NO    Consultant(s) Notes Reviewed:  [ ] YES  [ ] NO    Care Discussed with Consultants/Other Providers [ ] YES  [ ] NO
Patient is a 89y old  Female who presents with a chief complaint of Acute respiratory failure likely due to aspiration pna, SBO, possible bowel ischemia (02 May 2025 13:34)    INTERVAL HPI/OVERNIGHT EVENTS:    MEDICATIONS  (STANDING):  aspirin  chewable 81 milliGRAM(s) Oral daily  atorvastatin 40 milliGRAM(s) Oral at bedtime  cefepime   IVPB 1000 milliGRAM(s) IV Intermittent every 24 hours  clopidogrel Tablet 75 milliGRAM(s) Oral daily  dextrose 5% + lactated ringers. 1000 milliLiter(s) (60 mL/Hr) IV Continuous <Continuous>  furosemide    Tablet 20 milliGRAM(s) Oral daily  losartan 50 milliGRAM(s) Oral daily  metoprolol tartrate Injectable 5 milliGRAM(s) IV Push every 6 hours  nystatin    Suspension [id number] Unit(s) Oral four times a day    MEDICATIONS  (PRN):  acetaminophen     Tablet .. 650 milliGRAM(s) Oral every 6 hours PRN Temp greater or equal to 38C (100.4F), Mild Pain (1 - 3)  hydrALAZINE Injectable 5 milliGRAM(s) IV Push every 6 hours PRN sbp>160    Allergies    penicillin (Hives)  Tolerated ceftriaxone 2/2023 &amp; cefepime 4/2025 (Other)    Intolerances      REVIEW OF SYSTEMS:  All other systems reviewed and are negative    Vital Signs Last 24 Hrs  T(C): 36.8 (02 May 2025 11:04), Max: 36.9 (02 May 2025 05:24)  T(F): 98.3 (02 May 2025 11:04), Max: 98.5 (02 May 2025 05:24)  HR: 100 (02 May 2025 11:04) (90 - 100)  BP: 173/84 (02 May 2025 11:04) (163/72 - 195/77)  BP(mean): --  RR: 18 (02 May 2025 11:04) (18 - 18)  SpO2: 94% (02 May 2025 11:04) (94% - 98%)    Parameters below as of 02 May 2025 05:24  Patient On (Oxygen Delivery Method): nasal cannula      Daily     Daily   I&O's Summary    01 May 2025 07:01  -  02 May 2025 07:00  --------------------------------------------------------  IN: 720 mL / OUT: 800 mL / NET: -80 mL      CAPILLARY BLOOD GLUCOSE      POCT Blood Glucose.: 130 mg/dL (02 May 2025 11:17)  POCT Blood Glucose.: 113 mg/dL (02 May 2025 07:46)  POCT Blood Glucose.: 102 mg/dL (01 May 2025 22:44)  POCT Blood Glucose.: 401 mg/dL (01 May 2025 22:40)    PHYSICAL EXAM:  GENERAL: NAD,    HEAD:  Atraumatic, Normocephalic  EYES: EOMI, PERRLA, conjunctiva and sclera clear  ENMT: No tonsillar erythema, exudates, or enlargement; Moist mucous membranes, Good dentition, No lesions  NECK: Supple, No JVD, Normal thyroid  NERVOUS SYSTEM:  Alert & Oriented X3, Good concentration; Motor Strength 5/5 B/L upper and lower extremities; DTRs 2+ intact and symmetric  CHEST/LUNG: Clear to percussion bilaterally; No rales, rhonchi, wheezing, or rubs  HEART: Regular rate and rhythm; No murmurs, rubs, or gallops  ABDOMEN: Soft, Nontender, Nondistended; Bowel sounds present  EXTREMITIES:  2+ Peripheral Pulses, No clubbing, cyanosis, or edema  LYMPH: No lymphadenopathy noted  SKIN: No rashes or lesions    Labs                          12.2   5.72  )-----------( 201      ( 01 May 2025 07:10 )             38.0     05-01    146[H]  |  120[H]  |  33[H]  ----------------------------<  97  3.9   |  20[L]  |  0.92    Ca    8.6      01 May 2025 07:10    TPro  5.5[L]  /  Alb  2.1[L]  /  TBili  0.5  /  DBili  x   /  AST  15  /  ALT  20  /  AlkPhos  67  05-01          Urinalysis Basic - ( 01 May 2025 07:10 )    Color: x / Appearance: x / SG: x / pH: x  Gluc: 97 mg/dL / Ketone: x  / Bili: x / Urobili: x   Blood: x / Protein: x / Nitrite: x   Leuk Esterase: x / RBC: x / WBC x   Sq Epi: x / Non Sq Epi: x / Bacteria: x        Urinalysis with Rflx Culture (collected 29 Apr 2025 22:35)                DVT prophylaxis: > Lovenox 40mg SQ daily  > Heparin   > SCD's
Patient is a 89y old  Female who presents with a chief complaint of Acute respiratory failure likely due to aspiration pna, SBO, possible bowel ischemia (2025 12:01)    INTERVAL HPI/OVERNIGHT EVENTS:    MEDICATIONS  (STANDING):  aspirin  chewable 81 milliGRAM(s) Oral daily  atorvastatin 40 milliGRAM(s) Oral at bedtime  cefepime   IVPB 1000 milliGRAM(s) IV Intermittent every 24 hours  clopidogrel Tablet 75 milliGRAM(s) Oral daily  dextrose 5% + lactated ringers. 1000 milliLiter(s) (60 mL/Hr) IV Continuous <Continuous>  losartan 50 milliGRAM(s) Oral daily  metoprolol succinate ER 25 milliGRAM(s) Oral daily    MEDICATIONS  (PRN):  acetaminophen     Tablet .. 650 milliGRAM(s) Oral every 6 hours PRN Temp greater or equal to 38C (100.4F), Mild Pain (1 - 3)    Allergies    penicillin (Hives)    Intolerances      REVIEW OF SYSTEMS:  All other systems reviewed and are negative    Vital Signs Last 24 Hrs  T(C): 37.3 (01 May 2025 05:07), Max: 37.3 (01 May 2025 05:07)  T(F): 99.2 (01 May 2025 05:07), Max: 99.2 (01 May 2025 05:07)  HR: 84 (01 May 2025 05:07) (84 - 95)  BP: 148/64 (01 May 2025 05:07) (148/64 - 156/77)  BP(mean): --  RR: 18 (01 May 2025 05:07) (18 - 18)  SpO2: 98% (01 May 2025 05:07) (95% - 98%)      Daily     Daily Weight in k (01 May 2025 05:07)  I&O's Summary    2025 07:01  -  01 May 2025 07:00  --------------------------------------------------------  IN: 0 mL / OUT: 300 mL / NET: -300 mL      CAPILLARY BLOOD GLUCOSE        PHYSICAL EXAM:  GENERAL: NAD,    HEAD:  Atraumatic, Normocephalic  EYES: EOMI, PERRLA, conjunctiva and sclera clear  ENMT: No tonsillar erythema, exudates, or enlargement; Moist mucous membranes, Good dentition, No lesions  NECK: Supple, No JVD, Normal thyroid  NERVOUS SYSTEM:  Alert & Oriented X3, Good concentration; Motor Strength 5/5 B/L upper and lower extremities; DTRs 2+ intact and symmetric  CHEST/LUNG: Clear to percussion bilaterally; No rales, rhonchi, wheezing, or rubs  HEART: Regular rate and rhythm; No murmurs, rubs, or gallops  ABDOMEN: Soft, Nontender, Nondistended; Bowel sounds diminished   EXTREMITIES:  2+ Peripheral Pulses, No clubbing, cyanosis, or edema  LYMPH: No lymphadenopathy noted  SKIN: No rashes or lesions    Labs                          12.2   5.72  )-----------( 201      ( 01 May 2025 07:10 )             38.0     -    146[H]  |  120[H]  |  33[H]  ----------------------------<  97  3.9   |  20[L]  |  0.92    Ca    8.6      01 May 2025 07:10    TPro  5.5[L]  /  Alb  2.1[L]  /  TBili  0.5  /  DBili  x   /  AST  15  /  ALT  20  /  AlkPhos  67  05-          Urinalysis Basic - ( 01 May 2025 07:10 )    Color: x / Appearance: x / SG: x / pH: x  Gluc: 97 mg/dL / Ketone: x  / Bili: x / Urobili: x   Blood: x / Protein: x / Nitrite: x   Leuk Esterase: x / RBC: x / WBC x   Sq Epi: x / Non Sq Epi: x / Bacteria: x        Urinalysis with Rflx Culture (collected 2025 22:35)                DVT prophylaxis: > Lovenox 40mg SQ daily  > Heparin   > SCD's
SUBJECTIVE AND OBJECTIVE: Patient sitting up in bed, alert daughter Umu at bedside  INTERVAL HPI/OVERNIGHT EVENTS:    DNR on chart: yes  Allergies    penicillin (Hives)  Tolerated ceftriaxone 2/2023 &amp; cefepime 4/2025 (Other)    Intolerances    MEDICATIONS  (STANDING):  aspirin  chewable 81 milliGRAM(s) Oral daily  atorvastatin 40 milliGRAM(s) Oral at bedtime  clopidogrel Tablet 75 milliGRAM(s) Oral daily  dextrose 5% + lactated ringers. 1000 milliLiter(s) (60 mL/Hr) IV Continuous <Continuous>  furosemide    Tablet 20 milliGRAM(s) Oral daily  losartan 50 milliGRAM(s) Oral daily  metoprolol tartrate Injectable 5 milliGRAM(s) IV Push every 6 hours  nystatin    Suspension [id number] Unit(s) Oral four times a day    MEDICATIONS  (PRN):  acetaminophen     Tablet .. 650 milliGRAM(s) Oral every 6 hours PRN Temp greater or equal to 38C (100.4F), Mild Pain (1 - 3)  hydrALAZINE Injectable 5 milliGRAM(s) IV Push every 6 hours PRN sbp>160      ITEMS UNCHECKED ARE NOT PRESENT    PRESENT SYMPTOMS: [x ]Unable to obtain due to poor mentation   Source if other than patient:  [ ]Family   [ ]Team     Pain:  [ ]yes [x ]no  QOL impact -   Location -                    Aggravating factors -  Quality -  Radiation -  Timing-  Severity (0-10 scale):  Minimal acceptable level (0-10 scale):     Dyspnea:                           [ ]Mild [ ]Moderate [ ]Severe  Anxiety:                             [ ]Mild [ ]Moderate [ ]Severe  Fatigue:                             [ ]Mild [ ]Moderate [ ]Severe  Nausea:                             [ ]Mild [ ]Moderate [ ]Severe  Loss of appetite:              [ ]Mild [ ]Moderate [ ]Severe  Constipation:                    [ ]Mild [ ]Moderate [ ]Severe    PAIN AD Score:	  [email or web address].[email or web address]/[email or web address] (Ctrl + left click to view)    Other Symptoms:  [ ]All other review of systems negative     Palliative Performance Status Version 2:         40%      http://npcrc.org/files/news/palliative_performance_scale_ppsv2.pdf  PHYSICAL EXAM:  Vital Signs Last 24 Hrs  T(C): 36.9 (06 May 2025 10:58), Max: 37.1 (06 May 2025 00:11)  T(F): 98.4 (06 May 2025 10:58), Max: 98.7 (06 May 2025 00:11)  HR: 84 (06 May 2025 10:58) (67 - 97)  BP: 174/72 (06 May 2025 10:58) (156/67 - 174/72)  BP(mean): --  RR: 17 (06 May 2025 10:58) (17 - 18)  SpO2: 98% (06 May 2025 10:58) (94% - 98%)    Parameters below as of 06 May 2025 10:58  Patient On (Oxygen Delivery Method): nasal cannula     I&O's Summary    05 May 2025 07:01  -  06 May 2025 07:00  --------------------------------------------------------  IN: 0 mL / OUT: 450 mL / NET: -450 mL       GENERAL:  [x ]Alert  [ ]Oriented x   [ ]Lethargic  [ ]Cachexia  [ ]Unarousable  [ x]Verbal  [ ]Non-Verbal  Behavioral:   [ ]Anxiety  [ ]Delirium [ ]Agitation [ ]Other  HEENT:  [ ]Normal   [ ]Dry mouth   [ ]ET Tube/Trach  [ ]Oral lesions  PULMONARY:   [ ]Clear [ ]Tachypnea  [ ]Audible excessive secretions   [ ]Rhonchi        [ ]Right [ ]Left [ ]Bilateral  [ ]Crackles        [ ]Right [ ]Left [ ]Bilateral  [ ]Wheezing     [ ]Right [ ]Left [ ]Bilateral  [ ]Diminished BS [ ] Right [ ]Left [ ]Bilateral  CARDIOVASCULAR:    [ ]Regular [ ]Irregular [ ]Tachy  [ ][place]den [ ]Murmur [ ]Other  GASTROINTESTINAL:  [ ]Soft  [ ]Distended   [ ]+BS  [ ]Non tender [ ]Tender  [ ]PEG [ ]OGT/ NGT   Last BM:  5/6/25  GENITOURINARY:  [ ]Normal [x]Incontinent   [ ]Oliguria/Anuria   [ ][place]  MUSCULOSKELETAL:   [ ]Normal   [x ]Weakness  [ ]Bed/Wheelchair bound [ ]Edema  NEUROLOGIC:   [ ]No focal deficits  [ x] Cognitive impairment  [ ] Dysphagia [ ]Dysarthria [ ] Paresis [ ]Other   SKIN:   [ ]Normal  [ ]Rash   [ ]Pressure ulcer(s) [ ]y [ ]n present on admission    CRITICAL CARE:  [ ]Shock Present  [ ]Septic [ ]Cardiogenic [ ]Neurologic [ ]Hypovolemic  [ ]Vasopressors [ ]Inotropes  [ ]Respiratory failure present [ ]Mechanical Ventilation [ ]Non-invasive ventilatory support [ ]High-Flow  [ ]Acute  [ ]Chronic [ ]Hypoxic  [ ]Hypercarbic [ ]Other  [ ]Other organ failure     LABS:  05-05    146[H]  |  118[H]  |  16  ----------------------------<  94  3.5   |  23  |  0.65    Ca    8.5      05 May 2025 08:00    Urinalysis with Rflx Culture (04.29.25 @ 22:35)    Urine Appearance: Clear   Color: Yellow   Specific Gravity: 1.023   pH Urine: 5.5   Protein, Urine: 30 mg/dL   Glucose Qualitative, Urine: Negative mg/dL   Ketone - Urine: Negative mg/dL   Blood, Urine: Trace   Bilirubin: Negative   Urobilinogen: 0.2 mg/dL   Leukocyte Esterase Concentration: Trace   Nitrite: Negative    RADIOLOGY & ADDITIONAL STUDIES: none new    Protein Calorie Malnutrition Present: [ ]mild [ ]moderate [ ]severe [ ]underweight [ ]morbid obesity  https://www.andeal.org/vault/2440/web/files/ONC/Table_Clinical%20Characteristics%20to%20Document%20Malnutrition-White%20JV%20et%20al%202012.pdf    Height (cm): 152.4 (04-29-25 @ 19:34), 152.4 (04-23-25 @ 15:04), 152.4 (11-13-24 @ 18:12)  Weight (kg): 52.6 (04-30-25 @ 06:41), 53.1 (04-23-25 @ 15:04), 49.9 (04-02-25 @ 18:20)  BMI (kg/m2): 22.6 (04-30-25 @ 06:41), 22.9 (04-29-25 @ 19:34), 22.9 (04-23-25 @ 15:04)    [ ]PPSV2 < or = 30%  [ ]significant weight loss [ ]poor nutritional intake [ ]anasarca    [ ]Artificial Nutrition    REFERRALS:   [ ]Chaplaincy  [ ]Hospice  [ ]Child Life  [ ]Social Work  [ ]Case management [ ]Holistic Therapy     Goals of Care Document:

## 2025-05-14 NOTE — PROGRESS NOTE ADULT - REASON FOR ADMISSION
Acute respiratory failure likely due to aspiration pna, SBO, possible bowel ischemia

## 2025-05-14 NOTE — PROGRESS NOTE ADULT - ASSESSMENT
89-year-old female with a complex past medical history including hypertension, combined systolic and diastolic heart failure, cerebrovascular accident (CVA) (discharged yesterday after being admitted for acute right KASSIE infarct), cervical cancer status post radiation, and glaucoma, presented to the ED with hypoxia and vomiting. The patient was recently discharged yesterday after an admission for acute CVA, gastroenteritis, and elevated troponin. CT chest findings are consistent with likely aspiration pneumonia. CT abdomen/pelvis revealed possible small bowel obstruction (SBO), bowel ischemia, scattered punctate foci of gas in the right lower quadrant ventral abdominal wall and in the right groin soft tissues possibly representing a soft tissue infection, and an ill-defined 1.8 x 1.4 cm hypodense enhancing focus in the region of the lower uterus. Surgery was consulted; however, recommended conservative management due to patient comorbidities. Patient also has dysphagia with failed swallow evaluations and is being started on pleasure feedings due to daughter's request.        Family deciding between home hospice vs. return to [facility]. Palliative to follow today     Problem/Plan - 1:  ·  Problem: Acute Hypoxic Respiratory Failure secondary to Aspiration Pneumonia:  ·  Plan: - likely multifactorial: aspiration pneumonia, less likely CHF   - CT reviewed as above  - Currently on 2L NC   - c/w abx: cefepime changed to cftx  , ID following  -Started on nystatin for thrush   - Supplemental O2 to keep SpO2 >92%  - bronchodilators as needed.    Hypokalemia  -replaced    Problem/Plan - 2:  ·  Problem: Pneumonia, aspiration.   ·  Plan: as above.    Dysphagia with Risk of Aspiration  discussed with daughters ; they are in agreement to start pleasure feedings , explained that patient is at risk for aspiration/pna/death from aspiration. They are agreeable to go forward with pleasure feeds     Problem/Plan - 3:  ·  Problem: Hypertensive emergency.   ·  Plan: improved bp now at baseline. add iv hydralizine     Problem/Plan - 4:  ·  Problem: SBO (small bowel obstruction).   ·  Plan: - CT abdomen/pelvis revealed possible SBO, bowel ischemia, scattered punctate foci of gas in the right lower quadrant ventral abdominal wall and in the right groin soft tissues possibly soft tissue infection. Ill-defined 1.8 x 1.4 cm hypodense ending focus in the region of the lower uterus.  - surgery consulted: no intervention due to comorbidities  - c/w conservative management  - had bm, bs+, cleared for oral diet by surgery.    Problem/Plan - 5:  ·  Problem: Ischemia, bowel.   ·  Plan: as above.    Problem/Plan - 6:  ·  Problem: Suspected soft tissue infection.   ·  Plan: - CT shows scattered punctate foci of gas in the right lower quadrant ventral abdominal wall and in the right groin soft tissues possibly soft tissue infection.  - on abx   - ctm.    Problem/Plan - 7:  ·  Problem: Lesion of uterus.   ·  Plan: - incidental finding on CT: lll-defined 1.8 x 1.4 cm hypodense ending focus in the region of the lower uterus.  - f/u outpatient.    Problem/Plan - 8:  ·  Problem: Chronic combined systolic and diastolic heart failure.   ·  Plan: - chronic not in exacerbation currently.    Problem/Plan - 9:  ·  Problem: Abnormal renal function.   ·  Plan: - cr 1.3 (unchanged from previous admission)  - Unclear if history of kidney disease  - Avoid nephrotoxin  - i/os  - f/u am labs  - Monitor electrolytes and replete as needed.    Problem/Plan - 10:  ·  Problem: H/O: CVA (cerebrovascular accident).   ·  Plan; - discharged yesterday after being admitted for R KASSIE infarct  - [facility] today: evolving R KASSIE territory infarct, no acute hemorrhage.   - c/w: statin, plavix.
89-year-old female with a complex past medical history including hypertension, combined systolic and diastolic heart failure, cerebrovascular accident (CVA) (discharged yesterday after being admitted for acute right KASSIE infarct), cervical cancer status post radiation, and glaucoma, presented to the ED with hypoxia and vomiting. The patient was recently discharged yesterday after an admission for acute CVA, gastroenteritis, and elevated troponin. CT chest findings are consistent with likely aspiration pneumonia. CT abdomen/pelvis revealed possible small bowel obstruction (SBO), bowel ischemia, scattered punctate foci of gas in the right lower quadrant ventral abdominal wall and in the right groin soft tissues possibly representing a soft tissue infection, and an ill-defined 1.8 x 1.4 cm hypodense enhancing focus in the region of the lower uterus. Surgery was consulted; however, recommended conservative management due to patient comorbidities. Patient also has dysphagia with failed swallow evaluations and is being started on pleasure feedings due to daughter's request.        family [facility]    RSV positive  supportive care resolving         Patient has never been nor has never needed enhanced supervision is clear for a regular [facility] bed     Problem/Plan - 1:  ·  Problem: Acute Hypoxic Respiratory Failure secondary to Aspiration Pneumonia:  ·  Plan: - likely multifactorial: aspiration pneumonia, less likely CHF   - CT reviewed as above  - Currently on 2L NC   - completed course of abx   -Started on nystatin for thrush   - Supplemental O2 to keep SpO2 >92%  - bronchodilators as needed.    Hypokalemia  -replaced    Problem/Plan - 2:  ·  Problem: Pneumonia, aspiration.   ·  Plan: as above.    Dysphagia with Risk of Aspiration  discussed with daughters ; they are in agreement to start pleasure feedings , explained that patient is at risk for aspiration/pna/death from aspiration. They are agreeable to go forward with pleasure feeds     Problem/Plan - 3:  ·  Problem: Hypertensive emergency.   ·  Plan: improved bp now at baseline.  iv hydralizine prn    Problem/Plan - 4:  ·  Problem: SBO (small bowel obstruction).   ·  Plan: - CT abdomen/pelvis revealed possible SBO, bowel ischemia, scattered punctate foci of gas in the right lower quadrant ventral abdominal wall and in the right groin soft tissues possibly soft tissue infection. Ill-defined 1.8 x 1.4 cm hypodense ending focus in the region of the lower uterus.  - surgery consulted: no intervention due to comorbidities  - c/w conservative management  - had bm, bs+,  tolerating diet   Problem/Plan - 5:  ·  Problem: Ischemia, bowel.   ·  Plan: as above.    Problem/Plan - 6:  ·  Problem: Suspected soft tissue infection.   ·  Plan: - CT shows scattered punctate foci of gas in the right lower quadrant ventral abdominal wall and in the right groin soft tissues possibly soft tissue infection.  - abx  complete   - ctm.    Problem/Plan - 7:  ·  Problem: Lesion of uterus.   ·  Plan: - incidental finding on CT: lll-defined 1.8 x 1.4 cm hypodense ending focus in the region of the lower uterus.  - f/u outpatient.    Problem/Plan - 8:  ·  Problem: Chronic combined systolic and diastolic heart failure.   ·  Plan: - chronic not in exacerbation currently.    Problem/Plan - 9:  ·  Problem: Abnormal renal function.   ·  Plan: - cr 1.3 (unchanged from previous admission)  - Unclear if history of kidney disease  - Avoid nephrotoxin  - i/os  - f/u am labs  - Monitor electrolytes and replete as needed.    Problem/Plan - 10:  ·  Problem: H/O: CVA (cerebrovascular accident).   ·  Plan; - discharged fter being admitted for R KASSIE infarct  - CTH  repeated  evolving R KASSIE territory infarct, no acute hemorrhage.   - c/w: statin, plavix.    
89-year-old female with a complex past medical history including hypertension, combined systolic and diastolic heart failure, cerebrovascular accident (CVA) (discharged yesterday after being admitted for acute right KASSIE infarct), cervical cancer status post radiation, and glaucoma, presented to the ED with hypoxia and vomiting. The patient was recently discharged yesterday after an admission for acute CVA, gastroenteritis, and elevated troponin. CT chest findings are consistent with likely aspiration pneumonia. CT abdomen/pelvis revealed possible small bowel obstruction (SBO), bowel ischemia, scattered punctate foci of gas in the right lower quadrant ventral abdominal wall and in the right groin soft tissues possibly representing a soft tissue infection, and an ill-defined 1.8 x 1.4 cm hypodense enhancing focus in the region of the lower uterus. Surgery was consulted; however, recommended conservative management due to patient comorbidities. Patient also has dysphagia with failed swallow evaluations and is being started on pleasure feedings due to daughter's request.        family interested in putting patient back into [facility], [facility]     Problem/Plan - 1:  ·  Problem: Acute Hypoxic Respiratory Failure secondary to Aspiration Pneumonia:  ·  Plan: - likely multifactorial: aspiration pneumonia, less likely CHF   - CT reviewed as above  - Currently on 2L NC   - completed course of abx   -Started on nystatin for thrush   - Supplemental O2 to keep SpO2 >92%  - bronchodilators as needed.    Hypokalemia  -replaced    Problem/Plan - 2:  ·  Problem: Pneumonia, aspiration.   ·  Plan: as above.    Dysphagia with Risk of Aspiration  discussed with daughters ; they are in agreement to start pleasure feedings , explained that patient is at risk for aspiration/pna/death from aspiration. They are agreeable to go forward with pleasure feeds     Problem/Plan - 3:  ·  Problem: Hypertensive emergency.   ·  Plan: improved bp now at baseline. add iv hydralizine     Problem/Plan - 4:  ·  Problem: SBO (small bowel obstruction).   ·  Plan: - CT abdomen/pelvis revealed possible SBO, bowel ischemia, scattered punctate foci of gas in the right lower quadrant ventral abdominal wall and in the right groin soft tissues possibly soft tissue infection. Ill-defined 1.8 x 1.4 cm hypodense ending focus in the region of the lower uterus.  - surgery consulted: no intervention due to comorbidities  - c/w conservative management  - had bm, bs+, cleared for oral diet by surgery.    Problem/Plan - 5:  ·  Problem: Ischemia, bowel.   ·  Plan: as above.    Problem/Plan - 6:  ·  Problem: Suspected soft tissue infection.   ·  Plan: - CT shows scattered punctate foci of gas in the right lower quadrant ventral abdominal wall and in the right groin soft tissues possibly soft tissue infection.  - on abx   - ctm.    Problem/Plan - 7:  ·  Problem: Lesion of uterus.   ·  Plan: - incidental finding on CT: lll-defined 1.8 x 1.4 cm hypodense ending focus in the region of the lower uterus.  - f/u outpatient.    Problem/Plan - 8:  ·  Problem: Chronic combined systolic and diastolic heart failure.   ·  Plan: - chronic not in exacerbation currently.    Problem/Plan - 9:  ·  Problem: Abnormal renal function.   ·  Plan: - cr 1.3 (unchanged from previous admission)  - Unclear if history of kidney disease  - Avoid nephrotoxin  - i/os  - f/u am labs  - Monitor electrolytes and replete as needed.    Problem/Plan - 10:  ·  Problem: H/O: CVA (cerebrovascular accident).   ·  Plan; - discharged yesterday after being admitted for R KASSIE infarct  - [facility] today: evolving R KASSIE territory infarct, no acute hemorrhage.   - c/w: statin, plavix.
89-year-old female with past medical history of hypertension, combined CHF, CVA, cervical cancer status post radiation, glaucoma, discharged yesterday after being admitted for acute CVA, gastroenteritis, and elevated troponin who was brought back to the ED due to hypoxia and vomiting. ROS not obtained due to altered mental status.  On presentation, the patient was tachycardic, hypertensive, and tachypneic.   Labs notable for troponin 160, bicarb 20, creatinine 1.3(around baseline), lactate 2.1.   CT chest (I personally reviewed) findings consistent with likely aspiration pneumonia.   CT abdomen/pelvis revealed possible SBO, bowel ischemia, scattered punctate foci of gas in the right lower quadrant ventral abdominal wall and in the right groin soft tissues possibly soft tissue infection. Ill-defined 1.8 x 1.4 cm hypodense ending focus in the region of the lower uterus.   patient appears well and if comfortable   Imaging suggest RLL pneumonia      Plan:  we stopped cefepime last week   started ceftriaxone q24hrs (Day #6 today of total antibiotics)  would stop antibiotics after todays dose of ceftriaxone   advise to introduce incentive spirometer  swallowing evaluation ongoing   clotrimazole lozenge for possible thrush or nystatin lollypop   frequent turns, offloading, and nutrition optimization to avoid bedsores-consider protective heel/leg protectors   early PT and OT   patient possibly heading towards hospice     Discussed with Dr Avel Suazo, DO  Chief, Infectious Disease at [facility]  Reachable via [employer] Teams or ID office: [phone]  Weekdays: After 5pm, please call [phone] for all inquiries and new consults  Weekends: Message on-call infectious disease physician via teams (see [place]) 
89-year-old female with past medical history of hypertension, combined CHF, CVA, cervical cancer status post radiation, glaucoma, discharged yesterday after being admitted for acute CVA, gastroenteritis, and elevated troponin who was brought back to the ED due to hypoxia and vomiting. ROS not obtained due to altered mental status.  On presentation, the patient was tachycardic, hypertensive, and tachypneic.   Labs notable for troponin 160, bicarb 20, creatinine 1.3(around baseline), lactate 2.1.   CT chest (I personally reviewed) findings consistent with likely aspiration pneumonia.   CT abdomen/pelvis revealed possible SBO, bowel ischemia, scattered punctate foci of gas in the right lower quadrant ventral abdominal wall and in the right groin soft tissues possibly soft tissue infection. Ill-defined 1.8 x 1.4 cm hypodense ending focus in the region of the lower uterus.   patient appears well and if comfortable   Imaging suggest RLL pneumonia    Plan:  stop cefepime   start ceftriaxone q24hrs   follow all cultures until final   advise to introduce incentive spirometer  swallowing evaluation ongoing   clotrimazole lozenge for possible thrush or nystatin lollypop   frequent turns, offloading, and nutrition optimization to avoid bedsores-consider protective heel/leg protectors   early PT and OT   monitor for further signs of ileus vs bowel obstuction     Discussed with Dr. Avel Suazo, DO  Chief, Infectious Disease at [facility]  Reachable via [employer] Teams or ID office: [phone]  Weekdays: After 5pm, please call [phone] for all inquiries and new consults  Weekends: Message on-call infectious disease physician via teams (jolene York) 
89-year-old female with a complex past medical history including hypertension, combined systolic and diastolic heart failure, cerebrovascular accident (CVA) (discharged yesterday after being admitted for acute right KASSIE infarct), cervical cancer status post radiation, and glaucoma, presented to the ED with hypoxia and vomiting. The patient was recently discharged yesterday after an admission for acute CVA, gastroenteritis, and elevated troponin. CT chest findings are consistent with likely aspiration pneumonia. CT abdomen/pelvis revealed possible small bowel obstruction (SBO), bowel ischemia, scattered punctate foci of gas in the right lower quadrant ventral abdominal wall and in the right groin soft tissues possibly representing a soft tissue infection, and an ill-defined 1.8 x 1.4 cm hypodense enhancing focus in the region of the lower uterus. Surgery was consulted; however, recommended conservative management due to patient comorbidities. Patient also has dysphagia with failed swallow evaluations and is being started on pleasure feedings due to daughter's request.        Problem/Plan - 1:  ·  Problem: Acute Hypoxic Respiratory Failure secondary to Aspiration Pneumonia:  ·  Plan: - likely multifactorial: aspiration pneumonia, less likely CHF   - CT reviewed as above  - Currently on 2L NC   - c/w abx: cefepime changed to cftx  , ID following  -Started on nystatin for thrush   - Supplemental O2 to keep SpO2 >92%  - bronchodilators as needed.    Hypokalemia  -replaced    Problem/Plan - 2:  ·  Problem: Pneumonia, aspiration.   ·  Plan: as above.    Dysphagia with Risk of Aspiration  discussed with daughters ; they are in agreement to start pleasure feedings , explained that patient is at risk for aspiration/pna/death from aspiration. They are agreeable to go forward with pleasure feeds     Problem/Plan - 3:  ·  Problem: Hypertensive emergency.   ·  Plan: improved bp now at baseline. add iv hydralizine     Problem/Plan - 4:  ·  Problem: SBO (small bowel obstruction).   ·  Plan: - CT abdomen/pelvis revealed possible SBO, bowel ischemia, scattered punctate foci of gas in the right lower quadrant ventral abdominal wall and in the right groin soft tissues possibly soft tissue infection. Ill-defined 1.8 x 1.4 cm hypodense ending focus in the region of the lower uterus.  - surgery consulted: no intervention due to comorbidities  - c/w conservative management  - had bm, bs+, cleared for oral diet by surgery.    Problem/Plan - 5:  ·  Problem: Ischemia, bowel.   ·  Plan: as above.    Problem/Plan - 6:  ·  Problem: Suspected soft tissue infection.   ·  Plan: - CT shows scattered punctate foci of gas in the right lower quadrant ventral abdominal wall and in the right groin soft tissues possibly soft tissue infection.  - on abx   - ctm.    Problem/Plan - 7:  ·  Problem: Lesion of uterus.   ·  Plan: - incidental finding on CT: lll-defined 1.8 x 1.4 cm hypodense ending focus in the region of the lower uterus.  - f/u outpatient.    Problem/Plan - 8:  ·  Problem: Chronic combined systolic and diastolic heart failure.   ·  Plan: - chronic not in exacerbation currently.    Problem/Plan - 9:  ·  Problem: Abnormal renal function.   ·  Plan: - cr 1.3 (unchanged from previous admission)  - Unclear if history of kidney disease  - Avoid nephrotoxin  - i/os  - f/u am labs  - Monitor electrolytes and replete as needed.    Problem/Plan - 10:  ·  Problem: H/O: CVA (cerebrovascular accident).   ·  Plan; - discharged yesterday after being admitted for R KASSIE infarct  - [facility] today: evolving R KASSIE territory infarct, no acute hemorrhage.   - c/w: statin, plavix.
89-year-old female with a complex past medical history including hypertension, combined systolic and diastolic heart failure, cerebrovascular accident (CVA) (discharged yesterday after being admitted for acute right KASSIE infarct), cervical cancer status post radiation, and glaucoma, presented to the ED with hypoxia and vomiting. The patient was recently discharged yesterday after an admission for acute CVA, gastroenteritis, and elevated troponin. CT chest findings are consistent with likely aspiration pneumonia. CT abdomen/pelvis revealed possible small bowel obstruction (SBO), bowel ischemia, scattered punctate foci of gas in the right lower quadrant ventral abdominal wall and in the right groin soft tissues possibly representing a soft tissue infection, and an ill-defined 1.8 x 1.4 cm hypodense enhancing focus in the region of the lower uterus. Surgery was consulted; however, recommended conservative management due to patient comorbidities. Patient also has dysphagia with failed swallow evaluations and is being started on pleasure feedings due to daughter's request.        family interested in putting patient back into [facility], prefer [facility] discussed with daughters that this may be unrealistic   may benefit from inpatient hospice or similar as family may not be able to provide 24 hour care   will discuss with social work/case management  Monday     Patient now RSV positive  supportive care  ct head and chest xray reviewed retrocardiac findings are result of known aspiration   lasix intravenous push ivf stopped   hyponatremia follow am basic       Patient has never been nor has never needed enhanced [facility]     Problem/Plan - 1:  ·  Problem: Acute Hypoxic Respiratory Failure secondary to Aspiration Pneumonia:  ·  Plan: - likely multifactorial: aspiration pneumonia, less likely CHF   - CT reviewed as above  - Currently on 2L NC   - completed course of abx   -Started on nystatin for thrush   - Supplemental O2 to keep SpO2 >92%  - bronchodilators as needed.    Hypokalemia  -replaced    Problem/Plan - 2:  ·  Problem: Pneumonia, aspiration.   ·  Plan: as above.    Dysphagia with Risk of Aspiration  discussed with daughters ; they are in agreement to start pleasure feedings , explained that patient is at risk for aspiration/pna/death from aspiration. They are agreeable to go forward with pleasure feeds     Problem/Plan - 3:  ·  Problem: Hypertensive emergency.   ·  Plan: improved bp now at baseline.  iv hydralizine prn    Problem/Plan - 4:  ·  Problem: SBO (small bowel obstruction).   ·  Plan: - CT abdomen/pelvis revealed possible SBO, bowel ischemia, scattered punctate foci of gas in the right lower quadrant ventral abdominal wall and in the right groin soft tissues possibly soft tissue infection. Ill-defined 1.8 x 1.4 cm hypodense ending focus in the region of the lower uterus.  - surgery consulted: no intervention due to comorbidities  - c/w conservative management  - had bm, bs+,  tolerating diet   Problem/Plan - 5:  ·  Problem: Ischemia, bowel.   ·  Plan: as above.    Problem/Plan - 6:  ·  Problem: Suspected soft tissue infection.   ·  Plan: - CT shows scattered punctate foci of gas in the right lower quadrant ventral abdominal wall and in the right groin soft tissues possibly soft tissue infection.  - abx  complete   - ctm.    Problem/Plan - 7:  ·  Problem: Lesion of uterus.   ·  Plan: - incidental finding on CT: lll-defined 1.8 x 1.4 cm hypodense ending focus in the region of the lower uterus.  - f/u outpatient.    Problem/Plan - 8:  ·  Problem: Chronic combined systolic and diastolic heart failure.   ·  Plan: - chronic not in exacerbation currently.    Problem/Plan - 9:  ·  Problem: Abnormal renal function.   ·  Plan: - cr 1.3 (unchanged from previous admission)  - Unclear if history of kidney disease  - Avoid nephrotoxin  - i/os  - f/u am labs  - Monitor electrolytes and replete as needed.    Problem/Plan - 10:  ·  Problem: H/O: CVA (cerebrovascular accident).   ·  Plan; - discharged fter being admitted for R KASSIE infarct  - CTH  repeated  evolving R KASSIE territory infarct, no acute hemorrhage.   - c/w: statin, plavix.
89-year-old female with a complex past medical history including hypertension, combined systolic and diastolic heart failure, cerebrovascular accident (CVA) (discharged yesterday after being admitted for acute right KASSIE infarct), cervical cancer status post radiation, and glaucoma, presented to the ED with hypoxia and vomiting. The patient was recently discharged yesterday after an admission for acute CVA, gastroenteritis, and elevated troponin. CT chest findings are consistent with likely aspiration pneumonia. CT abdomen/pelvis revealed possible small bowel obstruction (SBO), bowel ischemia, scattered punctate foci of gas in the right lower quadrant ventral abdominal wall and in the right groin soft tissues possibly representing a soft tissue infection, and an ill-defined 1.8 x 1.4 cm hypodense enhancing focus in the region of the lower uterus. Surgery was consulted; however, recommended conservative management due to patient comorbidities. Patient also has dysphagia with failed swallow evaluations and is being started on pleasure feedings due to daughter's request.        family interested in putting patient back into [facility], prefer [facility] discussed with daughters that this may be unrealistic   may benefit from inpatient hospice or similar as family may not be able to provide 24 hour care   will discuss with social work/case management  Monday     Patient now RSV positive  supportive care  ct head and chest xray reviewed retrocardiac findings are result of known aspiration   lasix intravenous push ivf stopped   hyponatremia follow am basic       Patient has never been nor has never needed enhanced supervision [facility]     Problem/Plan - 1:  ·  Problem: Acute Hypoxic Respiratory Failure secondary to Aspiration Pneumonia:  ·  Plan: - likely multifactorial: aspiration pneumonia, less likely CHF   - CT reviewed as above  - Currently on 2L NC   - completed course of abx   -Started on nystatin for thrush   - Supplemental O2 to keep SpO2 >92%  - bronchodilators as needed.    Hypokalemia  -replaced    Problem/Plan - 2:  ·  Problem: Pneumonia, aspiration.   ·  Plan: as above.    Dysphagia with Risk of Aspiration  discussed with daughters ; they are in agreement to start pleasure feedings , explained that patient is at risk for aspiration/pna/death from aspiration. They are agreeable to go forward with pleasure feeds     Problem/Plan - 3:  ·  Problem: Hypertensive emergency.   ·  Plan: improved bp now at baseline.  iv hydralizine prn    Problem/Plan - 4:  ·  Problem: SBO (small bowel obstruction).   ·  Plan: - CT abdomen/pelvis revealed possible SBO, bowel ischemia, scattered punctate foci of gas in the right lower quadrant ventral abdominal wall and in the right groin soft tissues possibly soft tissue infection. Ill-defined 1.8 x 1.4 cm hypodense ending focus in the region of the lower uterus.  - surgery consulted: no intervention due to comorbidities  - c/w conservative management  - had bm, bs+,  tolerating diet   Problem/Plan - 5:  ·  Problem: Ischemia, bowel.   ·  Plan: as above.    Problem/Plan - 6:  ·  Problem: Suspected soft tissue infection.   ·  Plan: - CT shows scattered punctate foci of gas in the right lower quadrant ventral abdominal wall and in the right groin soft tissues possibly soft tissue infection.  - abx  complete   - ctm.    Problem/Plan - 7:  ·  Problem: Lesion of uterus.   ·  Plan: - incidental finding on CT: lll-defined 1.8 x 1.4 cm hypodense ending focus in the region of the lower uterus.  - f/u outpatient.    Problem/Plan - 8:  ·  Problem: Chronic combined systolic and diastolic heart failure.   ·  Plan: - chronic not in exacerbation currently.    Problem/Plan - 9:  ·  Problem: Abnormal renal function.   ·  Plan: - cr 1.3 (unchanged from previous admission)  - Unclear if history of kidney disease  - Avoid nephrotoxin  - i/os  - f/u am labs  - Monitor electrolytes and replete as needed.    Problem/Plan - 10:  ·  Problem: H/O: CVA (cerebrovascular accident).   ·  Plan; - discharged fter being admitted for R KASSIE infarct  - CTH  repeated  evolving R KASSIE territory infarct, no acute hemorrhage.   - c/w: statin, plavix.
89-year-old female with a complex past medical history including hypertension, combined systolic and diastolic heart failure, cerebrovascular accident (CVA) (discharged yesterday after being admitted for acute right KASSIE infarct), cervical cancer status post radiation, and glaucoma, presented to the ED with hypoxia and vomiting. The patient was recently discharged yesterday after an admission for acute CVA, gastroenteritis, and elevated troponin. CT chest findings are consistent with likely aspiration pneumonia. CT abdomen/pelvis revealed possible small bowel obstruction (SBO), bowel ischemia, scattered punctate foci of gas in the right lower quadrant ventral abdominal wall and in the right groin soft tissues possibly representing a soft tissue infection, and an ill-defined 1.8 x 1.4 cm hypodense enhancing focus in the region of the lower uterus. Surgery was consulted; however, recommended conservative management due to patient comorbidities. Patient also has dysphagia with failed swallow evaluations and is being started on pleasure feedings due to daughter's request.        family [facility]    RSV positive  supportive care resolving         Patient has never been nor has never needed enhanced supervision is clear for a regular [facility] bed     Problem/Plan - 1:  ·  Problem: Acute Hypoxic Respiratory Failure secondary to Aspiration Pneumonia:  ·  Plan: - likely multifactorial: aspiration pneumonia, less likely CHF   - CT reviewed as above  - Currently on 2L NC   - completed course of abx   -Started on nystatin for thrush   - Supplemental O2 to keep SpO2 >92%  - bronchodilators as needed.    Hypokalemia  -replaced    Problem/Plan - 2:  ·  Problem: Pneumonia, aspiration.   ·  Plan: as above.    Dysphagia with Risk of Aspiration  discussed with daughters ; they are in agreement to start pleasure feedings , explained that patient is at risk for aspiration/pna/death from aspiration. They are agreeable to go forward with pleasure feeds     Problem/Plan - 3:  ·  Problem: Hypertensive emergency.   ·  Plan: improved bp now at baseline.  iv hydralizine prn    Problem/Plan - 4:  ·  Problem: SBO (small bowel obstruction).   ·  Plan: - CT abdomen/pelvis revealed possible SBO, bowel ischemia, scattered punctate foci of gas in the right lower quadrant ventral abdominal wall and in the right groin soft tissues possibly soft tissue infection. Ill-defined 1.8 x 1.4 cm hypodense ending focus in the region of the lower uterus.  - surgery consulted: no intervention due to comorbidities  - c/w conservative management  - had bm, bs+,  tolerating diet   Problem/Plan - 5:  ·  Problem: Ischemia, bowel.   ·  Plan: as above.    Problem/Plan - 6:  ·  Problem: Suspected soft tissue infection.   ·  Plan: - CT shows scattered punctate foci of gas in the right lower quadrant ventral abdominal wall and in the right groin soft tissues possibly soft tissue infection.  - abx  complete   - ctm.    Problem/Plan - 7:  ·  Problem: Lesion of uterus.   ·  Plan: - incidental finding on CT: lll-defined 1.8 x 1.4 cm hypodense ending focus in the region of the lower uterus.  - f/u outpatient.    Problem/Plan - 8:  ·  Problem: Chronic combined systolic and diastolic heart failure.   ·  Plan: - chronic not in exacerbation currently.    Problem/Plan - 9:  ·  Problem: Abnormal renal function.   ·  Plan: - cr 1.3 (unchanged from previous admission)  - Unclear if history of kidney disease  - Avoid nephrotoxin  - i/os  - f/u am labs  - Monitor electrolytes and replete as needed.    Problem/Plan - 10:  ·  Problem: H/O: CVA (cerebrovascular accident).   ·  Plan; - discharged fter being admitted for R KASSIE infarct  - CTH  repeated  evolving R KASSIE territory infarct, no acute hemorrhage.   - c/w: statin, plavix.
89-year-old female with a complex past medical history including hypertension, combined systolic and diastolic heart failure, cerebrovascular accident (CVA) (discharged yesterday after being admitted for acute right KASSIE infarct), cervical cancer status post radiation, and glaucoma, presented to the ED with hypoxia and vomiting. The patient was recently discharged yesterday after an admission for acute CVA, gastroenteritis, and elevated troponin. CT chest findings are consistent with likely aspiration pneumonia. CT abdomen/pelvis revealed possible small bowel obstruction (SBO), bowel ischemia, scattered punctate foci of gas in the right lower quadrant ventral abdominal wall and in the right groin soft tissues possibly representing a soft tissue infection, and an ill-defined 1.8 x 1.4 cm hypodense enhancing focus in the region of the lower uterus. Surgery was consulted; however, recommended conservative management due to patient comorbidities. Patient also has dysphagia with failed swallow evaluations and is being started on pleasure feedings due to daughter's request.        Problem/Plan - 1:  ·  Problem: Acute Hypoxic Respiratory Failure secondary to Aspiration Pneumonia:  ·  Plan: - likely multifactorial: aspiration pneumonia, less likely CHF   - CT reviewed as above  - Currently on 2L NC   - c/w abx: cefepime , ID following  -Started on nystatin for thrush   - Supplemental O2 to keep SpO2 >92%  - bronchodilators as needed.    Problem/Plan - 2:  ·  Problem: Pneumonia, aspiration.   ·  Plan: as above.    Dysphagia with Risk of Aspiration  discussed with daughters ; they are in agreement to start pleasure feedings , explained that patient is at risk for aspiration/pna/death from aspiration. They are agreeable to go forward with pleasure feeds     Problem/Plan - 3:  ·  Problem: Hypertensive emergency.   ·  Plan: improved bp now at baseline. add iv hydralizine     Problem/Plan - 4:  ·  Problem: SBO (small bowel obstruction).   ·  Plan: - CT abdomen/pelvis revealed possible SBO, bowel ischemia, scattered punctate foci of gas in the right lower quadrant ventral abdominal wall and in the right groin soft tissues possibly soft tissue infection. Ill-defined 1.8 x 1.4 cm hypodense ending focus in the region of the lower uterus.  - surgery consulted: no intervention due to comorbidities  - c/w conservative management  - had bm, bs+, cleared for oral diet by surgery.    Problem/Plan - 5:  ·  Problem: Ischemia, bowel.   ·  Plan: as above.    Problem/Plan - 6:  ·  Problem: Suspected soft tissue infection.   ·  Plan: - CT shows scattered punctate foci of gas in the right lower quadrant ventral abdominal wall and in the right groin soft tissues possibly soft tissue infection.  - on abx   - ctm.    Problem/Plan - 7:  ·  Problem: Lesion of uterus.   ·  Plan: - incidental finding on CT: lll-defined 1.8 x 1.4 cm hypodense ending focus in the region of the lower uterus.  - f/u outpatient.    Problem/Plan - 8:  ·  Problem: Chronic combined systolic and diastolic heart failure.   ·  Plan: - chronic not in exacerbation currently.    Problem/Plan - 9:  ·  Problem: Abnormal renal function.   ·  Plan: - cr 1.3 (unchanged from previous admission)  - Unclear if history of kidney disease  - Avoid nephrotoxin  - i/os  - f/u am labs  - Monitor electrolytes and replete as needed.    Problem/Plan - 10:  ·  Problem: H/O: CVA (cerebrovascular accident).   ·  Plan; - discharged yesterday after being admitted for R KASSIE infarct  - [facility] today: evolving R KASSIE territory infarct, no acute hemorrhage.   - c/w: statin, plavix.
89-year-old female with a complex past medical history including hypertension, combined systolic and diastolic heart failure, cerebrovascular accident (CVA) (discharged yesterday after being admitted for acute right KASSIE infarct), cervical cancer status post radiation, and glaucoma, presented to the ED with hypoxia and vomiting. The patient was recently discharged yesterday after an admission for acute CVA, gastroenteritis, and elevated troponin. CT chest findings are consistent with likely aspiration pneumonia. CT abdomen/pelvis revealed possible small bowel obstruction (SBO), bowel ischemia, scattered punctate foci of gas in the right lower quadrant ventral abdominal wall and in the right groin soft tissues possibly representing a soft tissue infection, and an ill-defined 1.8 x 1.4 cm hypodense enhancing focus in the region of the lower uterus. Surgery was consulted; however, recommended conservative management due to patient comorbidities. Patient also has dysphagia with failed swallow evaluations and is being started on pleasure feedings due to daughter's request.        family interested in putting patient back into [facility], prefer [facility]  Patient now RSV positive  supportive care ct head and chest xray reviewed retrocardiac findingings are result of known aspiration   Problem/Plan - 1:  ·  Problem: Acute Hypoxic Respiratory Failure secondary to Aspiration Pneumonia:  ·  Plan: - likely multifactorial: aspiration pneumonia, less likely CHF   - CT reviewed as above  - Currently on 2L NC   - completed course of abx   -Started on nystatin for thrush   - Supplemental O2 to keep SpO2 >92%  - bronchodilators as needed.    Hypokalemia  -replaced    Problem/Plan - 2:  ·  Problem: Pneumonia, aspiration.   ·  Plan: as above.    Dysphagia with Risk of Aspiration  discussed with daughters ; they are in agreement to start pleasure feedings , explained that patient is at risk for aspiration/pna/death from aspiration. They are agreeable to go forward with pleasure feeds     Problem/Plan - 3:  ·  Problem: Hypertensive emergency.   ·  Plan: improved bp now at baseline.  iv hydralizine prn    Problem/Plan - 4:  ·  Problem: SBO (small bowel obstruction).   ·  Plan: - CT abdomen/pelvis revealed possible SBO, bowel ischemia, scattered punctate foci of gas in the right lower quadrant ventral abdominal wall and in the right groin soft tissues possibly soft tissue infection. Ill-defined 1.8 x 1.4 cm hypodense ending focus in the region of the lower uterus.  - surgery consulted: no intervention due to comorbidities  - c/w conservative management  - had bm, bs+,  tolerating diet   Problem/Plan - 5:  ·  Problem: Ischemia, bowel.   ·  Plan: as above.    Problem/Plan - 6:  ·  Problem: Suspected soft tissue infection.   ·  Plan: - CT shows scattered punctate foci of gas in the right lower quadrant ventral abdominal wall and in the right groin soft tissues possibly soft tissue infection.  - abx  complete   - ctm.    Problem/Plan - 7:  ·  Problem: Lesion of uterus.   ·  Plan: - incidental finding on CT: lll-defined 1.8 x 1.4 cm hypodense ending focus in the region of the lower uterus.  - f/u outpatient.    Problem/Plan - 8:  ·  Problem: Chronic combined systolic and diastolic heart failure.   ·  Plan: - chronic not in exacerbation currently.    Problem/Plan - 9:  ·  Problem: Abnormal renal function.   ·  Plan: - cr 1.3 (unchanged from previous admission)  - Unclear if history of kidney disease  - Avoid nephrotoxin  - i/os  - f/u am labs  - Monitor electrolytes and replete as needed.    Problem/Plan - 10:  ·  Problem: H/O: CVA (cerebrovascular accident).   ·  Plan; - discharged yesterday after being admitted for R KASSIE infarct  - CTH  repeated  evolving R KASSIE territory infarct, no acute hemorrhage.   - c/w: statin, plavix.
89-year-old female with a complex past medical history including hypertension, combined systolic and diastolic heart failure, cerebrovascular accident (CVA) (discharged yesterday after being admitted for acute right KASSIE infarct), cervical cancer status post radiation, and glaucoma, presented to the ED with hypoxia and vomiting. The patient was recently discharged yesterday after an admission for acute CVA, gastroenteritis, and elevated troponin. CT chest findings are consistent with likely aspiration pneumonia. CT abdomen/pelvis revealed possible small bowel obstruction (SBO), bowel ischemia, scattered punctate foci of gas in the right lower quadrant ventral abdominal wall and in the right groin soft tissues possibly representing a soft tissue infection, and an ill-defined 1.8 x 1.4 cm hypodense enhancing focus in the region of the lower uterus. Surgery was consulted; however, recommended conservative management due to patient comorbidities. Patient also has dysphagia with failed swallow evaluations and is being started on pleasure feedings due to daughter's request.        family interested in putting patient back into [facility], prefer [facility]  Patient now RSV positive  supportive care isolation supportive care     Problem/Plan - 1:  ·  Problem: Acute Hypoxic Respiratory Failure secondary to Aspiration Pneumonia:  ·  Plan: - likely multifactorial: aspiration pneumonia, less likely CHF   - CT reviewed as above  - Currently on 2L NC   - completed course of abx   -Started on nystatin for thrush   - Supplemental O2 to keep SpO2 >92%  - bronchodilators as needed.    Hypokalemia  -replaced    Problem/Plan - 2:  ·  Problem: Pneumonia, aspiration.   ·  Plan: as above.    Dysphagia with Risk of Aspiration  discussed with daughters ; they are in agreement to start pleasure feedings , explained that patient is at risk for aspiration/pna/death from aspiration. They are agreeable to go forward with pleasure feeds     Problem/Plan - 3:  ·  Problem: Hypertensive emergency.   ·  Plan: improved bp now at baseline.  iv hydralizine prn    Problem/Plan - 4:  ·  Problem: SBO (small bowel obstruction).   ·  Plan: - CT abdomen/pelvis revealed possible SBO, bowel ischemia, scattered punctate foci of gas in the right lower quadrant ventral abdominal wall and in the right groin soft tissues possibly soft tissue infection. Ill-defined 1.8 x 1.4 cm hypodense ending focus in the region of the lower uterus.  - surgery consulted: no intervention due to comorbidities  - c/w conservative management  - had bm, bs+,  tolerating diet   Problem/Plan - 5:  ·  Problem: Ischemia, bowel.   ·  Plan: as above.    Problem/Plan - 6:  ·  Problem: Suspected soft tissue infection.   ·  Plan: - CT shows scattered punctate foci of gas in the right lower quadrant ventral abdominal wall and in the right groin soft tissues possibly soft tissue infection.  - abx  complete   - ctm.    Problem/Plan - 7:  ·  Problem: Lesion of uterus.   ·  Plan: - incidental finding on CT: lll-defined 1.8 x 1.4 cm hypodense ending focus in the region of the lower uterus.  - f/u outpatient.    Problem/Plan - 8:  ·  Problem: Chronic combined systolic and diastolic heart failure.   ·  Plan: - chronic not in exacerbation currently.    Problem/Plan - 9:  ·  Problem: Abnormal renal function.   ·  Plan: - cr 1.3 (unchanged from previous admission)  - Unclear if history of kidney disease  - Avoid nephrotoxin  - i/os  - f/u am labs  - Monitor electrolytes and replete as needed.    Problem/Plan - 10:  ·  Problem: H/O: CVA (cerebrovascular accident).   ·  Plan; - discharged yesterday after being admitted for R KASSIE infarct  - CTH  repeated  evolving R KASSIE territory infarct, no acute hemorrhage.   - c/w: statin, plavix.
89-year-old female with past medical history of hypertension, combined CHF, CVA, cervical cancer status post radiation, glaucoma, discharged yesterday after being admitted for acute CVA, gastroenteritis, and elevated troponin who was brought back to the ED due to hypoxia and vomiting. ROS not obtained due to altered mental status.  On presentation, the patient was tachycardic, hypertensive, and tachypneic.   Labs notable for troponin 160, bicarb 20, creatinine 1.3(around baseline), lactate 2.1.   CT chest (I personally reviewed) findings consistent with likely aspiration pneumonia.   CT abdomen/pelvis revealed possible SBO, bowel ischemia, scattered punctate foci of gas in the right lower quadrant ventral abdominal wall and in the right groin soft tissues possibly soft tissue infection. Ill-defined 1.8 x 1.4 cm hypodense ending focus in the region of the lower uterus.   patient appears well and if comfortable   Imaging suggest RLL pneumonia    Plan:  we stopped cefepime last week   started ceftriaxone q24hrs   follow all cultures until final   advise to introduce incentive spirometer, patient did 5 reps in front of me, encouraged to continue  swallowing evaluation ongoing   clotrimazole lozenge for possible thrush or nystatin lollypop   frequent turns, offloading, and nutrition optimization to avoid bedsores-consider protective heel/leg protectors   early PT and OT   monitor for further signs of ileus vs bowel obstruction   patient possibly heading towards hospice     Discussed with daughter and RN at the bedside     Bravo Suazo DO  Chief, Infectious Disease at [facility]  Reachable via [employer] Teams or ID office: [phone]  Weekdays: After 5pm, please call [phone] for all inquiries and new consults  Weekends: Message on-call infectious disease physician via teams (see Chela) 
89F with ?SBO vs ileus. No leukocytosis. +BM  - Okay to advance to [facility]  - Possible speech and swallow eval prior to advancement of diet given hx of choking as per daughter at bedside  - No acute surgical intervention at this time  - Medical management as per primary team  Plan discussed with Dr. KARTHIK Martinez
89-year-old female with a complex past medical history including hypertension, combined systolic and diastolic heart failure, cerebrovascular accident (CVA) (discharged yesterday after being admitted for acute right KASSIE infarct), cervical cancer status post radiation, and glaucoma, presented to the ED with hypoxia and vomiting. The patient was recently discharged yesterday after an admission for acute CVA, gastroenteritis, and elevated troponin. CT chest findings are consistent with likely aspiration pneumonia. CT abdomen/pelvis revealed possible small bowel obstruction (SBO), bowel ischemia, scattered punctate foci of gas in the right lower quadrant ventral abdominal wall and in the right groin soft tissues possibly representing a soft tissue infection, and an ill-defined 1.8 x 1.4 cm hypodense enhancing focus in the region of the lower uterus. Surgery was consulted; however, recommended conservative management due to patient comorbidities. Patient also has dysphagia with failed swallow evaluations and is being started on pleasure feedings due to daughter's request.        Family deciding between home hospice vs. return to [facility]. Requested to speak with palliative in AM     Problem/Plan - 1:  ·  Problem: Acute Hypoxic Respiratory Failure secondary to Aspiration Pneumonia:  ·  Plan: - likely multifactorial: aspiration pneumonia, less likely CHF   - CT reviewed as above  - Currently on 2L NC   - c/w abx: cefepime changed to cftx  , ID following  -Started on nystatin for thrush   - Supplemental O2 to keep SpO2 >92%  - bronchodilators as needed.    Hypokalemia  -replaced    Problem/Plan - 2:  ·  Problem: Pneumonia, aspiration.   ·  Plan: as above.    Dysphagia with Risk of Aspiration  discussed with daughters ; they are in agreement to start pleasure feedings , explained that patient is at risk for aspiration/pna/death from aspiration. They are agreeable to go forward with pleasure feeds     Problem/Plan - 3:  ·  Problem: Hypertensive emergency.   ·  Plan: improved bp now at baseline. add iv hydralizine     Problem/Plan - 4:  ·  Problem: SBO (small bowel obstruction).   ·  Plan: - CT abdomen/pelvis revealed possible SBO, bowel ischemia, scattered punctate foci of gas in the right lower quadrant ventral abdominal wall and in the right groin soft tissues possibly soft tissue infection. Ill-defined 1.8 x 1.4 cm hypodense ending focus in the region of the lower uterus.  - surgery consulted: no intervention due to comorbidities  - c/w conservative management  - had bm, bs+, cleared for oral diet by surgery.    Problem/Plan - 5:  ·  Problem: Ischemia, bowel.   ·  Plan: as above.    Problem/Plan - 6:  ·  Problem: Suspected soft tissue infection.   ·  Plan: - CT shows scattered punctate foci of gas in the right lower quadrant ventral abdominal wall and in the right groin soft tissues possibly soft tissue infection.  - on abx   - ctm.    Problem/Plan - 7:  ·  Problem: Lesion of uterus.   ·  Plan: - incidental finding on CT: lll-defined 1.8 x 1.4 cm hypodense ending focus in the region of the lower uterus.  - f/u outpatient.    Problem/Plan - 8:  ·  Problem: Chronic combined systolic and diastolic heart failure.   ·  Plan: - chronic not in exacerbation currently.    Problem/Plan - 9:  ·  Problem: Abnormal renal function.   ·  Plan: - cr 1.3 (unchanged from previous admission)  - Unclear if history of kidney disease  - Avoid nephrotoxin  - i/os  - f/u am labs  - Monitor electrolytes and replete as needed.    Problem/Plan - 10:  ·  Problem: H/O: CVA (cerebrovascular accident).   ·  Plan; - discharged yesterday after being admitted for R KASSIE infarct  - [facility] today: evolving R KASSIE territory infarct, no acute hemorrhage.   - c/w: statin, plavix.
89-year-old female with a complex past medical history including hypertension, combined systolic and diastolic heart failure, cerebrovascular accident (CVA) (discharged yesterday after being admitted for acute right KASSIE infarct), cervical cancer status post radiation, and glaucoma, presented to the ED with hypoxia and vomiting. The patient was recently discharged yesterday after an admission for acute CVA, gastroenteritis, and elevated troponin. CT chest findings are consistent with likely aspiration pneumonia. CT abdomen/pelvis revealed possible small bowel obstruction (SBO), bowel ischemia, scattered punctate foci of gas in the right lower quadrant ventral abdominal wall and in the right groin soft tissues possibly representing a soft tissue infection, and an ill-defined 1.8 x 1.4 cm hypodense enhancing focus in the region of the lower uterus. Surgery was consulted; however, recommended conservative management due to patient comorbidities. Patient also has dysphagia with failed swallow evaluations and is being started on pleasure feedings due to daughter's request.        family interested in putting patient back into [facility], prefer [facility]  Patient now RSV positive  supportive care ct head and chest xray reviewed retrocardiac findingings are result of known aspiration   Problem/Plan - 1:  ·  Problem: Acute Hypoxic Respiratory Failure secondary to Aspiration Pneumonia:  ·  Plan: - likely multifactorial: aspiration pneumonia, less likely CHF   - CT reviewed as above  - Currently on 2L NC   - completed course of abx   -Started on nystatin for thrush   - Supplemental O2 to keep SpO2 >92%  - bronchodilators as needed.    Hypokalemia  -replaced    Problem/Plan - 2:  ·  Problem: Pneumonia, aspiration.   ·  Plan: as above.    Dysphagia with Risk of Aspiration  discussed with daughters ; they are in agreement to start pleasure feedings , explained that patient is at risk for aspiration/pna/death from aspiration. They are agreeable to go forward with pleasure feeds     Problem/Plan - 3:  ·  Problem: Hypertensive emergency.   ·  Plan: improved bp now at baseline.  iv hydralizine prn    Problem/Plan - 4:  ·  Problem: SBO (small bowel obstruction).   ·  Plan: - CT abdomen/pelvis revealed possible SBO, bowel ischemia, scattered punctate foci of gas in the right lower quadrant ventral abdominal wall and in the right groin soft tissues possibly soft tissue infection. Ill-defined 1.8 x 1.4 cm hypodense ending focus in the region of the lower uterus.  - surgery consulted: no intervention due to comorbidities  - c/w conservative management  - had bm, bs+,  tolerating diet   Problem/Plan - 5:  ·  Problem: Ischemia, bowel.   ·  Plan: as above.    Problem/Plan - 6:  ·  Problem: Suspected soft tissue infection.   ·  Plan: - CT shows scattered punctate foci of gas in the right lower quadrant ventral abdominal wall and in the right groin soft tissues possibly soft tissue infection.  - abx  complete   - ctm.    Problem/Plan - 7:  ·  Problem: Lesion of uterus.   ·  Plan: - incidental finding on CT: lll-defined 1.8 x 1.4 cm hypodense ending focus in the region of the lower uterus.  - f/u outpatient.    Problem/Plan - 8:  ·  Problem: Chronic combined systolic and diastolic heart failure.   ·  Plan: - chronic not in exacerbation currently.    Problem/Plan - 9:  ·  Problem: Abnormal renal function.   ·  Plan: - cr 1.3 (unchanged from previous admission)  - Unclear if history of kidney disease  - Avoid nephrotoxin  - i/os  - f/u am labs  - Monitor electrolytes and replete as needed.    Problem/Plan - 10:  ·  Problem: H/O: CVA (cerebrovascular accident).   ·  Plan; - discharged yesterday after being admitted for R KASSIE infarct  - CTH  repeated  evolving R KASSIE territory infarct, no acute hemorrhage.   - c/w: statin, plavix.
89-year-old female with a complex past medical history including hypertension, combined systolic and diastolic heart failure, cerebrovascular accident (CVA) (discharged yesterday after being admitted for acute right KASSIE infarct), cervical cancer status post radiation, and glaucoma, presented to the ED with hypoxia and vomiting. The patient was recently discharged yesterday after an admission for acute CVA, gastroenteritis, and elevated troponin. CT chest findings are consistent with likely aspiration pneumonia. CT abdomen/pelvis revealed possible small bowel obstruction (SBO), bowel ischemia, scattered punctate foci of gas in the right lower quadrant ventral abdominal wall and in the right groin soft tissues possibly representing a soft tissue infection, and an ill-defined 1.8 x 1.4 cm hypodense enhancing focus in the region of the lower uterus. Surgery was consulted; however, recommended conservative management due to patient comorbidities. Patient also has dysphagia with failed swallow evaluations and is being started on pleasure feedings due to daughter's request.        family interested in putting patient back into [facility], prefer [facility] discussed with daughters that this may be unrealistic   maay benefit from inpatient hospice or similiar as family may not be able to provide 24 hour care   will discuss with social work/case management in am nor Monday     Patient now RSV positive  supportive care  ct head and chest xray reviewed retrocardiac findings are result of known aspiration   lasix intravenous push ivf stopped   hyponatremia follow am basic     Problem/Plan - 1:  ·  Problem: Acute Hypoxic Respiratory Failure secondary to Aspiration Pneumonia:  ·  Plan: - likely multifactorial: aspiration pneumonia, less likely CHF   - CT reviewed as above  - Currently on 2L NC   - completed course of abx   -Started on nystatin for thrush   - Supplemental O2 to keep SpO2 >92%  - bronchodilators as needed.    Hypokalemia  -replaced    Problem/Plan - 2:  ·  Problem: Pneumonia, aspiration.   ·  Plan: as above.    Dysphagia with Risk of Aspiration  discussed with daughters ; they are in agreement to start pleasure feedings , explained that patient is at risk for aspiration/pna/death from aspiration. They are agreeable to go forward with pleasure feeds     Problem/Plan - 3:  ·  Problem: Hypertensive emergency.   ·  Plan: improved bp now at baseline.  iv hydralizine prn    Problem/Plan - 4:  ·  Problem: SBO (small bowel obstruction).   ·  Plan: - CT abdomen/pelvis revealed possible SBO, bowel ischemia, scattered punctate foci of gas in the right lower quadrant ventral abdominal wall and in the right groin soft tissues possibly soft tissue infection. Ill-defined 1.8 x 1.4 cm hypodense ending focus in the region of the lower uterus.  - surgery consulted: no intervention due to comorbidities  - c/w conservative management  - had bm, bs+,  tolerating diet   Problem/Plan - 5:  ·  Problem: Ischemia, bowel.   ·  Plan: as above.    Problem/Plan - 6:  ·  Problem: Suspected soft tissue infection.   ·  Plan: - CT shows scattered punctate foci of gas in the right lower quadrant ventral abdominal wall and in the right groin soft tissues possibly soft tissue infection.  - abx  complete   - ctm.    Problem/Plan - 7:  ·  Problem: Lesion of uterus.   ·  Plan: - incidental finding on CT: lll-defined 1.8 x 1.4 cm hypodense ending focus in the region of the lower uterus.  - f/u outpatient.    Problem/Plan - 8:  ·  Problem: Chronic combined systolic and diastolic heart failure.   ·  Plan: - chronic not in exacerbation currently.    Problem/Plan - 9:  ·  Problem: Abnormal renal function.   ·  Plan: - cr 1.3 (unchanged from previous admission)  - Unclear if history of kidney disease  - Avoid nephrotoxin  - i/os  - f/u am labs  - Monitor electrolytes and replete as needed.    Problem/Plan - 10:  ·  Problem: H/O: CVA (cerebrovascular accident).   ·  Plan; - discharged yesterday after being admitted for R KASSIE infarct  - CTH  repeated  evolving R KASSIE territory infarct, no acute hemorrhage.   - c/w: statin, plavix.
89-year-old female with past medical history of hypertension, combined CHF, CVA, cervical cancer status post radiation, glaucoma, discharged yesterday after being admitted for acute CVA, gastroenteritis, and elevated troponin who was brought back to the ED due to hypoxia and vomiting. CT chest findings consistent with likely aspiration pneumonia. CT abdomen/pelvis revealed possible SBO, bowel ischemia, scattered punctate foci of gas in the right lower quadrant ventral abdominal wall and in the right groin soft tissues possibly soft tissue infection. Ill-defined 1.8 x 1.4 cm hypodense ending focus in the region of the lower uterus. Surgery was consulted; however, recommend conservative management due to comorbidities.  
89 year old female PMH of HF, CVA, cervical cancer s/p RT, presented to the ED for hypoxemia and vomiting.  Patient found to have SBO no surgical intervention planned and scattered punctate foci of gas in RLQ of ventral abdominal wall, concern for soft tissue infection and started on abx.  
89 year old female PMH of HF, CVA, cervical cancer s/p RT, presented to the ED for hypoxemia and vomiting.  Patient found to have SBO no surgical intervention planned and scattered punctate foci of gas in RLQ of ventral abdominal wall, concern for soft tissue infection and started on abx.

## 2025-05-14 NOTE — PROGRESS NOTE ADULT - PROVIDER SPECIALTY LIST ADULT
Hospitalist
Infectious Disease
Palliative Care
Palliative Care
Surgery
Hospitalist
Infectious Disease
Infectious Disease
Hospitalist
Surgery
Hospitalist
Palliative Care
Hospitalist

## 2025-05-15 ENCOUNTER — TRANSCRIPTION ENCOUNTER (OUTPATIENT)
Age: 89
End: 2025-05-15

## 2025-05-15 VITALS
OXYGEN SATURATION: 94 % | SYSTOLIC BLOOD PRESSURE: 157 MMHG | HEART RATE: 94 BPM | RESPIRATION RATE: 17 BRPM | DIASTOLIC BLOOD PRESSURE: 66 MMHG | TEMPERATURE: 97 F

## 2025-05-15 PROCEDURE — 99239 HOSP IP/OBS DSCHRG MGMT >30: CPT

## 2025-05-15 RX ORDER — LOSARTAN POTASSIUM 100 MG/1
1 TABLET, FILM COATED ORAL
Qty: 0 | Refills: 0 | DISCHARGE
Start: 2025-05-15

## 2025-05-15 RX ORDER — CLOPIDOGREL BISULFATE 75 MG/1
1 TABLET, FILM COATED ORAL
Qty: 0 | Refills: 0 | DISCHARGE
Start: 2025-05-15

## 2025-05-15 RX ORDER — ASPIRIN 325 MG
1 TABLET ORAL
Qty: 0 | Refills: 0 | DISCHARGE
Start: 2025-05-15

## 2025-05-15 RX ORDER — OMEGA-3-ACID ETHYL ESTERS CAPSULES 1 G/1
1 CAPSULE, LIQUID FILLED ORAL
Refills: 0 | DISCHARGE

## 2025-05-15 RX ORDER — ACETAMINOPHEN 500 MG/5ML
2 LIQUID (ML) ORAL
Qty: 0 | Refills: 0 | DISCHARGE
Start: 2025-05-15

## 2025-05-15 RX ORDER — UBIDECARENONE 100 MG
1 CAPSULE ORAL
Refills: 0 | DISCHARGE

## 2025-05-15 RX ORDER — ATORVASTATIN CALCIUM 80 MG/1
1 TABLET, FILM COATED ORAL
Qty: 0 | Refills: 0 | DISCHARGE
Start: 2025-05-15

## 2025-05-15 RX ADMIN — CLOPIDOGREL BISULFATE 75 MILLIGRAM(S): 75 TABLET, FILM COATED ORAL at 12:41

## 2025-05-15 RX ADMIN — METOPROLOL SUCCINATE 5 MILLIGRAM(S): 50 TABLET, EXTENDED RELEASE ORAL at 17:44

## 2025-05-15 RX ADMIN — LOSARTAN POTASSIUM 50 MILLIGRAM(S): 100 TABLET, FILM COATED ORAL at 06:47

## 2025-05-15 RX ADMIN — METOPROLOL SUCCINATE 5 MILLIGRAM(S): 50 TABLET, EXTENDED RELEASE ORAL at 02:59

## 2025-05-15 RX ADMIN — Medication 81 MILLIGRAM(S): at 12:41

## 2025-05-15 RX ADMIN — METOPROLOL SUCCINATE 5 MILLIGRAM(S): 50 TABLET, EXTENDED RELEASE ORAL at 06:44

## 2025-05-15 RX ADMIN — METOPROLOL SUCCINATE 5 MILLIGRAM(S): 50 TABLET, EXTENDED RELEASE ORAL at 12:40

## 2025-05-15 NOTE — DISCHARGE NOTE PROVIDER - ATTENDING DISCHARGE PHYSICAL EXAMINATION:
GENERAL: NAD, HEAD:  Atraumatic, Normocephalic  EYES: EOMI, PERRLA, conjunctiva and sclera clear  ENMT: No tonsillar erythema, exudates, or enlargement; Moist mucous membranes, Good dentition, No lesions  NECK: Supple, No JVD, Normal thyroid  NERVOUS SYSTEM:  awakable but lethargic   CHEST/LUNG: Clear to ascultation  bilaterally; No rales, rhonchi, wheezing, or rubs  HEART: Regular rate and rhythm; No murmurs, rubs, or gallops  ABDOMEN: Soft, Nontender, Nondistended; Bowel sounds present  EXTREMITIES:  edema legs   LYMPH: No lymphadenopathy noted  SKIN: No rashes or lesions

## 2025-05-15 NOTE — DISCHARGE NOTE NURSING/CASE MANAGEMENT/SOCIAL WORK - FINANCIAL ASSISTANCE
[facility] provides services at a reduced cost to those who are determined to be eligible through [facility]’s financial assistance program. Information regarding [facility]’s financial assistance program can be found by going to [email or web address] or by calling [phone].

## 2025-05-15 NOTE — DISCHARGE NOTE PROVIDER - NSDCCPCAREPLAN_GEN_ALL_CORE_FT
PRINCIPAL DISCHARGE DIAGNOSIS  Diagnosis: Pneumonia, aspiration  Assessment and Plan of Treatment:       SECONDARY DISCHARGE DIAGNOSES  Diagnosis: SBO (small bowel obstruction)  Assessment and Plan of Treatment:     Diagnosis: Ischemia, bowel  Assessment and Plan of Treatment:     Diagnosis: Disorder of portal venous system  Assessment and Plan of Treatment:

## 2025-05-15 NOTE — DISCHARGE NOTE PROVIDER - NSDCMRMEDTOKEN_GEN_ALL_CORE_FT
acetaminophen 325 mg oral tablet: 2 tab(s) orally every 6 hours As needed Temp greater or equal to 38C (100.4F), Mild Pain (1 - 3)  aspirin 81 mg oral tablet, chewable: 1 tab(s) orally once a day  atorvastatin 40 mg oral tablet: 1 tab(s) orally once a day (at bedtime)  Centrum: 1 tab(s) orally once a day with breakfast  cholecalciferol 25 mcg (1000 intl units) oral tablet: 1 tab(s) orally once a day with breakfast  clopidogrel 75 mg oral tablet: 1 tab(s) orally once a day  losartan 50 mg oral tablet: 1 tab(s) orally once a day  metoprolol succinate 25 mg oral tablet, extended release: 0.5 tab(s) orally once a day  Simbrinza 1%- 0.2% ophthalmic suspension: 1 drop(s) to each affected eye 2 times a day at awakening and bedtime  tolterodine 4 mg oral capsule, extended release: 1 cap(s) orally once a day

## 2025-05-15 NOTE — DISCHARGE NOTE NURSING/CASE MANAGEMENT/SOCIAL WORK - NSDCPEFALRISK_GEN_ALL_CORE
For information on Fall & Injury Prevention, visit: [email or web address] OR  [email or web address] OR  https://www.cdc.gov/steadi/patient.html

## 2025-05-15 NOTE — DISCHARGE NOTE NURSING/CASE MANAGEMENT/SOCIAL WORK - PATIENT PORTAL LINK FT
You can access the FollowMyHealth Patient Portal offered by [facility] by registering at the following website: [email or web address]. By joining [facility]’s FollowMyHealth portal, you will also be able to view your health information using other applications (apps) compatible with our system.

## 2025-05-15 NOTE — DISCHARGE NOTE PROVIDER - NSDCADMDATE_GEN_ALL_CORE_FT
[de-identified] : Pt. presents for skin check;\par c/o few spots of concern;  \par Severity:  mild  \par Modifying factors:  none\par Associated symptoms:  none\par Context:  no association with activity\par  30-Apr-2025 04:38

## 2025-05-15 NOTE — DISCHARGE NOTE PROVIDER - HOSPITAL COURSE
89-year-old female with a complex past medical history including hypertension, combined systolic and diastolic heart failure, cerebrovascular accident (CVA) (discharged yesterday after being admitted for acute right KASSIE infarct), cervical cancer status post radiation, and glaucoma, presented to the ED with hypoxia and vomiting. The patient was recently discharged yesterday after an admission for acute CVA, gastroenteritis, and elevated troponin. CT chest findings are consistent with likely aspiration pneumonia. CT abdomen/pelvis revealed possible small bowel obstruction (SBO), bowel ischemia, scattered punctate foci of gas in the right lower quadrant ventral abdominal wall and in the right groin soft tissues possibly representing a soft tissue infection, and an ill-defined 1.8 x 1.4 cm hypodense enhancing focus in the region of the lower uterus. Surgery was consulted; however, recommended conservative management due to patient comorbidities. Patient also has dysphagia with failed swallow evaluations and is being started on pleasure feedings due to daughter's request.     Patient stable for discharge into [facility] to [facility]    RSV positive  supportive care resolving         Patient has never been nor has never needed enhanced supervision is clear for a regular [facility] bed     Problem/Plan - 1:  ·  Problem: Acute Hypoxic Respiratory Failure secondary to Aspiration Pneumonia:  ·  Plan: - likely multifactorial: aspiration pneumonia, less likely CHF   - CT reviewed as above  - Currently on 2L NC   - completed course of abx   -Started on nystatin for thrush   - Supplemental O2 to keep SpO2 >92%  - bronchodilators as needed.    Hypokalemia  -replaced    Problem/Plan - 2:  ·  Problem: Pneumonia, aspiration.   ·  Plan: as above.    Dysphagia with Risk of Aspiration  discussed with daughters ; they are in agreement to start pleasure feedings , explained that patient is at risk for aspiration/pna/death from aspiration. They are agreeable to go forward with pleasure feeds     Problem/Plan - 3:  ·  Problem: Hypertensive emergency.   ·  Plan: improved bp now at baseline.  iv hydralizine prn    Problem/Plan - 4:  ·  Problem: SBO (small bowel obstruction).   ·  Plan: - CT abdomen/pelvis revealed possible SBO, bowel ischemia, scattered punctate foci of gas in the right lower quadrant ventral abdominal wall and in the right groin soft tissues possibly soft tissue infection. Ill-defined 1.8 x 1.4 cm hypodense ending focus in the region of the lower uterus.  - surgery consulted: no intervention due to comorbidities  - c/w conservative management  - had bm, bs+,  tolerating diet   Problem/Plan - 5:  ·  Problem: Ischemia, bowel.   ·  Plan: as above.    Problem/Plan - 6:  ·  Problem: Suspected soft tissue infection.   ·  Plan: - CT shows scattered punctate foci of gas in the right lower quadrant ventral abdominal wall and in the right groin soft tissues possibly soft tissue infection.  - abx  complete   - ctm.    Problem/Plan - 7:  ·  Problem: Lesion of uterus.   ·  Plan: - incidental finding on CT: lll-defined 1.8 x 1.4 cm hypodense ending focus in the region of the lower uterus.  - f/u outpatient.    Problem/Plan - 8:  ·  Problem: Chronic combined systolic and diastolic heart failure.   ·  Plan: - chronic not in exacerbation currently.    Problem/Plan - 9:  ·  Problem: Abnormal renal function.   ·  Plan: - cr 1.3 (unchanged from previous admission)  - Unclear if history of kidney disease  - Avoid nephrotoxin  - i/os  - f/u am labs  - Monitor electrolytes and replete as needed.    Problem/Plan - 10:  ·  Problem: H/O: CVA (cerebrovascular accident).   ·  Plan; - discharged fter being admitted for R KASSIE infarct  - CTH  repeated  evolving R KASSIE territory infarct, no acute hemorrhage.   - c/w: statin, plavix.

## 2025-05-28 ENCOUNTER — OUTPATIENT (OUTPATIENT)
Dept: OUTPATIENT SERVICES | Facility: HOSPITAL | Age: 89
LOS: 1 days | End: 2025-05-28
Payer: MEDICARE

## 2025-05-28 DIAGNOSIS — Z98.89 OTHER SPECIFIED POSTPROCEDURAL STATES: Chronic | ICD-10-CM

## 2025-05-28 DIAGNOSIS — R06.02 SHORTNESS OF BREATH: ICD-10-CM

## 2025-05-28 PROCEDURE — 71046 X-RAY EXAM CHEST 2 VIEWS: CPT | Mod: 26

## 2025-06-10 ENCOUNTER — EMERGENCY (EMERGENCY)
Facility: HOSPITAL | Age: 89
LOS: 0 days | Discharge: HOME | End: 2025-06-11
Attending: STUDENT IN AN ORGANIZED HEALTH CARE EDUCATION/TRAINING PROGRAM
Payer: MEDICARE

## 2025-06-10 VITALS
HEIGHT: 60 IN | TEMPERATURE: 98 F | OXYGEN SATURATION: 98 % | HEART RATE: 78 BPM | DIASTOLIC BLOOD PRESSURE: 72 MMHG | WEIGHT: 151.9 LBS | SYSTOLIC BLOOD PRESSURE: 155 MMHG | RESPIRATION RATE: 20 BRPM

## 2025-06-10 DIAGNOSIS — Z88.8 ALLERGY STATUS TO OTHER DRUGS, MEDICAMENTS AND BIOLOGICAL SUBSTANCES: ICD-10-CM

## 2025-06-10 DIAGNOSIS — K64.4 RESIDUAL HEMORRHOIDAL SKIN TAGS: ICD-10-CM

## 2025-06-10 DIAGNOSIS — I50.9 HEART FAILURE, UNSPECIFIED: ICD-10-CM

## 2025-06-10 DIAGNOSIS — K62.5 HEMORRHAGE OF ANUS AND RECTUM: ICD-10-CM

## 2025-06-10 DIAGNOSIS — H40.9 UNSPECIFIED GLAUCOMA: ICD-10-CM

## 2025-06-10 DIAGNOSIS — Z88.0 ALLERGY STATUS TO PENICILLIN: ICD-10-CM

## 2025-06-10 DIAGNOSIS — Z85.41 PERSONAL HISTORY OF MALIGNANT NEOPLASM OF CERVIX UTERI: ICD-10-CM

## 2025-06-10 DIAGNOSIS — K57.30 DIVERTICULOSIS OF LARGE INTESTINE WITHOUT PERFORATION OR ABSCESS WITHOUT BLEEDING: ICD-10-CM

## 2025-06-10 DIAGNOSIS — Z98.89 OTHER SPECIFIED POSTPROCEDURAL STATES: Chronic | ICD-10-CM

## 2025-06-10 LAB
ALBUMIN SERPL ELPH-MCNC: 2.4 G/DL — LOW (ref 3.3–5)
ALP SERPL-CCNC: 116 U/L — SIGNIFICANT CHANGE UP (ref 40–120)
ALT FLD-CCNC: 22 U/L — SIGNIFICANT CHANGE UP (ref 12–78)
ANION GAP SERPL CALC-SCNC: 4 MMOL/L — LOW (ref 5–17)
APTT BLD: 30.1 SEC — SIGNIFICANT CHANGE UP (ref 26.1–36.8)
AST SERPL-CCNC: 25 U/L — SIGNIFICANT CHANGE UP (ref 15–37)
BASOPHILS # BLD AUTO: 0.05 K/UL — SIGNIFICANT CHANGE UP (ref 0–0.2)
BASOPHILS NFR BLD AUTO: 0.7 % — SIGNIFICANT CHANGE UP (ref 0–2)
BILIRUB SERPL-MCNC: 0.4 MG/DL — SIGNIFICANT CHANGE UP (ref 0.2–1.2)
BUN SERPL-MCNC: 19 MG/DL — SIGNIFICANT CHANGE UP (ref 7–23)
CALCIUM SERPL-MCNC: 8.5 MG/DL — SIGNIFICANT CHANGE UP (ref 8.5–10.1)
CHLORIDE SERPL-SCNC: 109 MMOL/L — HIGH (ref 96–108)
CO2 SERPL-SCNC: 27 MMOL/L — SIGNIFICANT CHANGE UP (ref 22–31)
CREAT SERPL-MCNC: 0.81 MG/DL — SIGNIFICANT CHANGE UP (ref 0.5–1.3)
EGFR: 66 ML/MIN/1.73M2 — SIGNIFICANT CHANGE UP
EGFR: 66 ML/MIN/1.73M2 — SIGNIFICANT CHANGE UP
EOSINOPHIL # BLD AUTO: 0.21 K/UL — SIGNIFICANT CHANGE UP (ref 0–0.5)
EOSINOPHIL NFR BLD AUTO: 2.9 % — SIGNIFICANT CHANGE UP (ref 0–6)
GLUCOSE SERPL-MCNC: 148 MG/DL — HIGH (ref 70–99)
HCT VFR BLD CALC: 33.6 % — LOW (ref 34.5–45)
HGB BLD-MCNC: 10.2 G/DL — LOW (ref 11.5–15.5)
IMM GRANULOCYTES NFR BLD AUTO: 0.3 % — SIGNIFICANT CHANGE UP (ref 0–0.9)
INR BLD: 1.11 RATIO — SIGNIFICANT CHANGE UP (ref 0.85–1.16)
LIDOCAIN IGE QN: 44 U/L — SIGNIFICANT CHANGE UP (ref 13–75)
LYMPHOCYTES # BLD AUTO: 0.65 K/UL — LOW (ref 1–3.3)
LYMPHOCYTES # BLD AUTO: 8.9 % — LOW (ref 13–44)
MCHC RBC-ENTMCNC: 30 PG — SIGNIFICANT CHANGE UP (ref 27–34)
MCHC RBC-ENTMCNC: 30.4 G/DL — LOW (ref 32–36)
MCV RBC AUTO: 98.8 FL — SIGNIFICANT CHANGE UP (ref 80–100)
MONOCYTES # BLD AUTO: 0.48 K/UL — SIGNIFICANT CHANGE UP (ref 0–0.9)
MONOCYTES NFR BLD AUTO: 6.6 % — SIGNIFICANT CHANGE UP (ref 2–14)
NEUTROPHILS # BLD AUTO: 5.88 K/UL — SIGNIFICANT CHANGE UP (ref 1.8–7.4)
NEUTROPHILS NFR BLD AUTO: 80.6 % — HIGH (ref 43–77)
NRBC BLD AUTO-RTO: 0 /100 WBCS — SIGNIFICANT CHANGE UP (ref 0–0)
PLATELET # BLD AUTO: 308 K/UL — SIGNIFICANT CHANGE UP (ref 150–400)
POTASSIUM SERPL-MCNC: 4.6 MMOL/L — SIGNIFICANT CHANGE UP (ref 3.5–5.3)
POTASSIUM SERPL-SCNC: 4.6 MMOL/L — SIGNIFICANT CHANGE UP (ref 3.5–5.3)
PROT SERPL-MCNC: 5.9 GM/DL — LOW (ref 6–8.3)
PROTHROM AB SERPL-ACNC: 12.5 SEC — SIGNIFICANT CHANGE UP (ref 9.9–13.4)
RBC # BLD: 3.4 M/UL — LOW (ref 3.8–5.2)
RBC # FLD: 15.7 % — HIGH (ref 10.3–14.5)
SODIUM SERPL-SCNC: 140 MMOL/L — SIGNIFICANT CHANGE UP (ref 135–145)
WBC # BLD: 7.29 K/UL — SIGNIFICANT CHANGE UP (ref 3.8–10.5)
WBC # FLD AUTO: 7.29 K/UL — SIGNIFICANT CHANGE UP (ref 3.8–10.5)

## 2025-06-10 PROCEDURE — 74174 CTA ABD&PLVS W/CONTRAST: CPT | Mod: 26

## 2025-06-10 PROCEDURE — 99285 EMERGENCY DEPT VISIT HI MDM: CPT

## 2025-06-10 PROCEDURE — 99284 EMERGENCY DEPT VISIT MOD MDM: CPT | Mod: FS

## 2025-06-10 NOTE — ED ADULT TRIAGE NOTE - CHIEF COMPLAINT QUOTE
Sent by [facility] for rectal bleeding, arrived with nasal cannula that she is been wearing since admitted to [facility] Sent by [facility] for rectal bleeding, arrived with nasal cannula that she is been wearing since admitted to [facility]  DNR with pt

## 2025-06-10 NOTE — CONSULT NOTE ADULT - SUBJECTIVE AND OBJECTIVE BOX
Patient is a 89y old  Female who presents with a chief complaint of     HPI: 89 yo F pmhx of glaucoma, chf, cervical ca s/p rt 2011, CVA, recent admission for KASSIE infarct sent to ed from [facility] for [facility]. pt has a hx of hemorrhoids, had a normal bm this am, and another one around 3pm with some blood so came to ed. per ed attending had one nonbloody bm while in ed. pt is nonverbal poor historian so hx provided by chart review and daughter at bedside. denies any n/v, fevers, chills. hx of abd surgery in 1980's had a removal of benign mass, unsure location of mass. prior to recent KASSIE infarct lived at home with daughter      PAST MEDICAL & SURGICAL HISTORY:  Hypertension      History of cervical cancer      History of cerebrovascular accident (CVA) with residual deficit      Chronic heart failure with preserved ejection fraction (HFpEF)      Urinary and bowel incontinence      S/P lumpectomy of breast          Review of Systems:  Negative except  as above in HPI    MEDICATIONS  (STANDING):    MEDICATIONS  (PRN):      Allergies    Tolerated ceftriaxone 2/2023 &amp; cefepime 4/2025 (Other)  penicillin (Hives)    Intolerances        SOCIAL HISTORY nonsmoker, no etoh use, currently resides at [facility]                Vital Signs Last 24 Hrs  T(C): 36.9 (10 Chris 2025 21:58), Max: 36.9 (10 Chris 2025 21:58)  T(F): 98.4 (10 Chris 2025 21:58), Max: 98.4 (10 Chris 2025 21:58)  HR: 82 (10 Chris 2025 21:58) (78 - 82)  BP: 188/65 (10 Chris 2025 21:58) (155/72 - 188/65)  BP(mean): --  RR: 19 (10 Chris 2025 21:58) (19 - 20)  SpO2: 99% (10 Chris 2025 21:58) (98% - 99%)    Parameters below as of 10 Chris 2025 21:58  Patient On (Oxygen Delivery Method): nasal cannula  O2 Flow (L/min): 2      Physical Exam:  General:  Appears stated age, well-groomed, no distress  Chest: no respiratory distress, no accessory muscle use  Abdomen:  softly distended. nontender to palpation. no rebound or guarding   Extremities:  no pedal edema or calf tenderness noted  Skin:  No rash    LABS:                        10.2   7.29  )-----------( 308      ( 10 Chris 2025 17:30 )             33.6     06-10    140  |  109[H]  |  19  ----------------------------<  148[H]  4.6   |  27  |  0.81    Ca    8.5      10 Chris 2025 17:30    TPro  5.9[L]  /  Alb  2.4[L]  /  TBili  0.4  /  DBili  x   /  AST  25  /  ALT  22  /  AlkPhos  116  06-10    PT/INR - ( 10 Chris 2025 17:30 )   PT: 12.5 sec;   INR: 1.11 ratio         PTT - ( 10 Chris 2025 17:30 )  PTT:30.1 sec  Urinalysis Basic - ( 10 Chris 2025 17:30 )    Color: x / Appearance: x / SG: x / pH: x  Gluc: 148 mg/dL / Ketone: x  / Bili: x / Urobili: x   Blood: x / Protein: x / Nitrite: x   Leuk Esterase: x / RBC: x / WBC x   Sq Epi: x / Non Sq Epi: x / Bacteria: x          RADIOLOGY & ADDITIONAL STUDIES:  < from: CT Angio Abdomen and Pelvis w/ IV Cont (06.10.25 @ 20:23) >  PROCEDURE:  CT of the Abdomen and Pelvis was performed.  Sagittal and coronal reformats were performed.    FINDINGS:  LOWER CHEST: Cardiomegaly. Small pericardial effusion. Small bilateral   pleural effusions. Mitral annular valve calcifications. Anemia.    LIVER: Increased attenuation of the noncontrast liver compared to spleen   as can be seen with iron deposition. No enhancing focal liver lesions.  BILE DUCTS: Mild prominence of the intrahepatic bile ducts. The CBD   measures approximately 8 mm and tapers adequately towards the papilla.  GALLBLADDER: Contracted. Cholelithiasis.  SPLEEN: Within normal limits.  PANCREAS: Fatty involution. No ductal dilatation.  ADRENALS: Within normal limits.  KIDNEYS/URETERS: Bilateral renal cysts. No hydronephrosis. 2 mm   nonobstructing calculus in the right kidney interpolar region. A 5 mm   calcification in the left renal hilum is likely atherosclerotic   calcification. No obstructive ureterolithiasis.    BLADDER: Within normal limits.  REPRODUCTIVE ORGANS: Uterus appears heterogeneous with question of a   mass, unchanged from prior. Adnexa within normal limits. Metallic   radiopacities in the area of the proximal vagina/cervix.    BOWEL:  No evidence of acute GI bleed in the stomach, duodenum or small bowel.  Small hiatal hernia contains fluid. The stomach is moderately distended.   Gastric contraction in the antrum. The duodenum appears normal. There are   again several mid to distal ileal small bowel loops which are dilated and   demonstrate small bowel feces sign suggesting stasis. More proximal loops   contain air-fluid levels. There is no discrete clear transition point.   Substantial stool burden is noted in the colon. Appendix is visualized   and contains a small amount of air. No acute appendicitis. Extensive   colonic diverticulosis in the sigmoid and descending colon without   evidence of acute diverticulitis or active bleed.  No pneumatosis   intestinalis.  PERITONEUM/RETROPERITONEUM: No pneumoperitoneum. No free or loculated   fluid collections.  VESSELS: Aorta has normal caliber. There is a large calcific plaque at   the origin of the patent celiac axis with moderate stenosis. The SMA is   patent but demonstrates extensive soft and calcified atherosclerotic   plaques with narrowing predominantly in the midportion of its course.   Portal vein, SMV, splenic vein are patent. There is no definitive   mesenteric venous gas or portal venous gas. Foci of air seen previously   are associated with the course of the appendix in current study.  LYMPH NODES: No lymphadenopathy.  ABDOMINAL WALL: Within normal limits.  BONES: Diffuse osteopenia. Mild degeneration of the hip joints.   Degeneration of the spine with scoliotic deformity.    IMPRESSION:  No evidence of acute ongoing active GI bleed.    Extensive colonic diverticulosis without diverticulitis or active bleed.    Prominent stool burden in thecolon and some fecal-like material in the   distal ileum with several dilated small bowel loops raising concern for   stasis and mild partial small bowel obstruction likely due to   constipation. The imaging appearance is similar to prior study 4/30/2025.    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        --- End of Report ---    < end of copied text >      A/p: 89 yo F pmhx of glaucoma, chf, cervical ca s/p rt 2011, CVA, recent admission for KASSIE infarct sent to ed from [facility] for [facility].   CT shows large stool burden, concern of mild PSBO, pt with 3 bms today. no n/v, tolerating diet  -no acute surgical intervention  -advise stool regimen  -discussed with surgical attending Dr Llanos and ED attending Dr Howard

## 2025-06-10 NOTE — ED PROVIDER NOTE - CLINICAL SUMMARY MEDICAL DECISION MAKING FREE TEXT BOX
HUB:  Please send call to office to schedule.  Thank you, Letha      [facility] to call and schedule an appointment in order for me to fill out fmla paperwork.    
Elderly female with hx of no blood thinners.  Patient presenting to the ED reporting 1 day of bright red blood per rectum 1 episode earlier this morning.  Resolved upon ED arrival.  Vitals normal.  Well-appearing on exam no distress no complaints.  Abdomen soft nontender.  Rectal exam with very small amount of brown stool with a few red specks in it.  No clots.  No melena.  External hemorrhoids that are nonthrombosed at present on exam.  Positive S1 positive S2 lungs CTA all fields.  Oropharynx normal.    CT shows diverticulosis but no active bleed.  Patient observed in the ER for many hours with bowel movements that are no longer bloody.  Stable H&H.  Surgical consult given CT read.  See update below.  Stable for discharge.      Update: Surgery saw the patient.  Recommending bowel regimen.  No surgical intervention.  Extensive discussion had with patient and her daughter.  Prefer to go back to nursing home.  Would not prefer hospital admission.  Patient stable at this time normal vitals having bowel movements that are nonbloody.  Tolerating p.o.  Acting normal self.  No abdominal pain.  No complaints.  Again strict return precautions for new or worsening symptoms.

## 2025-06-10 NOTE — ED ADULT NURSE NOTE - OBJECTIVE STATEMENT
As per pt she has rectal bleeding since this AM as per pt's daughter. Pt is on orzac due to stroke, requiring PT, not sure of what medication mother is on. Presents to ED with minimal bright red blood in diaper, daughter states this has happened 1x before, AAOX1 in Er at baseline, intermittent confusion

## 2025-06-10 NOTE — ED ADULT NURSE NOTE - NSFALLHARMRISKINTERV_ED_ALL_ED
Assistance OOB with selected safe patient handling equipment if applicable/Assistance with ambulation/Communicate risk of Fall with Harm to all staff, patient, and family/Monitor gait and stability/Monitor for mental status changes and reorient to person, place, and time, as needed/Move patient closer to nursing station/within visual sight of ED staff/Provide patient with walking aids/Provide visual cue: red socks, yellow wristband, yellow gown, etc/Reinforce activity limits and safety measures with patient and family/Toileting schedule using arm’s reach rule for commode and bathroom/Use of alarms - bed, stretcher, chair and/or video monitoring/Bed in lowest position, wheels locked, appropriate side rails in place/Call bell, personal items and telephone in reach/Instruct patient to call for assistance before getting out of bed/chair/stretcher/Non-slip footwear applied when patient is off stretcher/[place] to call system/Physically safe environment - no spills, clutter or unnecessary equipment/Purposeful Proactive Rounding/Room/bathroom lighting operational, light cord in reach

## 2025-06-10 NOTE — ED PROVIDER NOTE - PATIENT PORTAL LINK FT
You can access the FollowMyHealth Patient Portal offered by [facility] by registering at the following website: [email or web address]. By joining [facility]’s FollowMyHealth portal, you will also be able to view your health information using other applications (apps) compatible with our system.

## 2025-06-10 NOTE — ED ADULT NURSE NOTE - CHIEF COMPLAINT QUOTE
Sent by [facility] for rectal bleeding, arrived with nasal cannula that she is been wearing since admitted to [facility]  DNR with pt

## 2025-06-10 NOTE — ED PROVIDER NOTE - NSFOLLOWUPINSTRUCTIONS_ED_ALL_ED_FT
Seek immediate medical assistance for any new or worsening symptoms. If you have issues obtaining follow up, please call: [phone] or [phone]  to obtain a doctor or specialist who takes your insurance in your area.

## 2025-06-11 VITALS
TEMPERATURE: 98 F | OXYGEN SATURATION: 96 % | DIASTOLIC BLOOD PRESSURE: 70 MMHG | SYSTOLIC BLOOD PRESSURE: 102 MMHG | HEART RATE: 95 BPM | RESPIRATION RATE: 14 BRPM

## 2025-06-12 ENCOUNTER — OUTPATIENT (OUTPATIENT)
Dept: OUTPATIENT SERVICES | Facility: HOSPITAL | Age: 89
LOS: 1 days | End: 2025-06-12
Payer: MEDICARE

## 2025-06-12 ENCOUNTER — APPOINTMENT (OUTPATIENT)
Dept: RADIOLOGY | Facility: HOSPITAL | Age: 89
End: 2025-06-12

## 2025-06-12 DIAGNOSIS — R13.12 DYSPHAGIA, OROPHARYNGEAL PHASE: ICD-10-CM

## 2025-06-12 DIAGNOSIS — Z98.89 OTHER SPECIFIED POSTPROCEDURAL STATES: Chronic | ICD-10-CM

## 2025-06-12 PROCEDURE — 70371 SPEECH EVALUATION COMPLEX: CPT | Mod: 26

## 2025-06-12 NOTE — ED PROVIDER NOTE - WR ORDER ID 1
Gastroesophageal reflux disease    Midline low back pain without sciatica    Other secondary scoliosis, thoracolumbar region    Pain of left lower leg     premature rupture of membranes (PPROM) with unknown onset of labor    Gastric ulcer    Sciatica    Headache     Past Medical History:   Diagnosis Date    Anxiety     Chronic back pain     Depression     Headache     History of seizures as a child     Insomnia     PANCHO (obstructive sleep apnea)     Patellar tendinitis     PTSD (post-traumatic stress disorder)     PTSD (post-traumatic stress disorder)     Raynaud's disease     Scoliosis       Past Surgical History:   Procedure Laterality Date    CHOLECYSTECTOMY  2018    COLONOSCOPY      ESOPHAGOGASTRODUODENOSCOPY         Family History   Problem Relation Age of Onset    Lupus Mother     Arthritis Mother     Cancer Mother     Depression Mother     High Blood Pressure Mother     Osteoarthritis Mother     Osteoporosis Mother     Rheum Arthritis Mother     Diabetes Father     Hypertension Father     Emphysema Father     Asthma Father     Cancer Father     Clotting Disorder Father     Hearing Loss Father     Heart Attack Father     High Cholesterol Father     Stroke Father     Kidney Disease Maternal Grandmother       No Known Allergies      This chart was generated using the Dragon dictation system.  I created this record but it may contain dictation errors due to the limitation of the software.    The patient (or guardian, if applicable) and other individuals in attendance with the patient were advised that Artificial Intelligence will be utilized during this visit to record, process the conversation to generate a clinical note, and support improvement of the AI technology. The patient (or guardian, if applicable) and other individuals in attendance at the appointment consented to the use of AI, including the recording.                     
[id number]

## 2025-06-12 NOTE — H&P ADULT - NSCORESITESY/N_GEN_A_CORE_RD
Patient to be transferred to [facility] bed.  Continue lower dose of metoprolol.  Continue AVAPS on 2 hours off 1 hour and at bedtime.  Continue prednisone.     Yes

## 2025-06-18 ENCOUNTER — APPOINTMENT (OUTPATIENT)
Dept: RADIOLOGY | Facility: HOSPITAL | Age: 89
End: 2025-06-18

## 2025-08-18 ENCOUNTER — INPATIENT (INPATIENT)
Facility: HOSPITAL | Age: 89
LOS: 2 days | Discharge: NURSING, REHAB OR LONG-TERM CARE | End: 2025-08-21
Attending: STUDENT IN AN ORGANIZED HEALTH CARE EDUCATION/TRAINING PROGRAM | Admitting: STUDENT IN AN ORGANIZED HEALTH CARE EDUCATION/TRAINING PROGRAM
Payer: MEDICARE

## 2025-08-18 VITALS
TEMPERATURE: 98 F | HEIGHT: 60 IN | SYSTOLIC BLOOD PRESSURE: 159 MMHG | OXYGEN SATURATION: 97 % | WEIGHT: 95.02 LBS | HEART RATE: 44 BPM | RESPIRATION RATE: 16 BRPM | DIASTOLIC BLOOD PRESSURE: 50 MMHG

## 2025-08-18 DIAGNOSIS — G93.41 METABOLIC ENCEPHALOPATHY: ICD-10-CM

## 2025-08-18 DIAGNOSIS — E78.5 HYPERLIPIDEMIA, UNSPECIFIED: ICD-10-CM

## 2025-08-18 DIAGNOSIS — R00.1 BRADYCARDIA, UNSPECIFIED: ICD-10-CM

## 2025-08-18 DIAGNOSIS — Z86.73 PERSONAL HISTORY OF TRANSIENT ISCHEMIC ATTACK (TIA), AND CEREBRAL INFARCTION WITHOUT RESIDUAL DEFICITS: ICD-10-CM

## 2025-08-18 DIAGNOSIS — Z98.89 OTHER SPECIFIED POSTPROCEDURAL STATES: Chronic | ICD-10-CM

## 2025-08-18 DIAGNOSIS — N39.0 URINARY TRACT INFECTION, SITE NOT SPECIFIED: ICD-10-CM

## 2025-08-18 DIAGNOSIS — I10 ESSENTIAL (PRIMARY) HYPERTENSION: ICD-10-CM

## 2025-08-18 LAB
ALBUMIN SERPL ELPH-MCNC: 2.8 G/DL — LOW (ref 3.3–5)
ALP SERPL-CCNC: 121 U/L — HIGH (ref 40–120)
ALT FLD-CCNC: 21 U/L — SIGNIFICANT CHANGE UP (ref 12–78)
ANION GAP SERPL CALC-SCNC: 1 MMOL/L — LOW (ref 5–17)
APPEARANCE UR: ABNORMAL
APTT BLD: 26.5 SEC — SIGNIFICANT CHANGE UP (ref 26.1–36.8)
AST SERPL-CCNC: 22 U/L — SIGNIFICANT CHANGE UP (ref 15–37)
BACTERIA # UR AUTO: ABNORMAL /HPF
BASE EXCESS BLDV CALC-SCNC: 5.9 MMOL/L — HIGH (ref -2–3)
BASOPHILS # BLD AUTO: 0.06 K/UL — SIGNIFICANT CHANGE UP (ref 0–0.2)
BASOPHILS NFR BLD AUTO: 1.1 % — SIGNIFICANT CHANGE UP (ref 0–2)
BILIRUB SERPL-MCNC: 0.3 MG/DL — SIGNIFICANT CHANGE UP (ref 0.2–1.2)
BILIRUB UR-MCNC: NEGATIVE — SIGNIFICANT CHANGE UP
BLOOD GAS COMMENTS, VENOUS: SIGNIFICANT CHANGE UP
BUN SERPL-MCNC: 34 MG/DL — HIGH (ref 7–23)
CALCIUM SERPL-MCNC: 9 MG/DL — SIGNIFICANT CHANGE UP (ref 8.5–10.1)
CHLORIDE BLDV-SCNC: 108 MMOL/L — HIGH (ref 98–107)
CHLORIDE SERPL-SCNC: 111 MMOL/L — HIGH (ref 96–108)
CO2 BLDV-SCNC: 35 MMOL/L — HIGH (ref 22–26)
CO2 SERPL-SCNC: 30 MMOL/L — SIGNIFICANT CHANGE UP (ref 22–31)
COLOR SPEC: YELLOW — SIGNIFICANT CHANGE UP
CREAT SERPL-MCNC: 1.02 MG/DL — SIGNIFICANT CHANGE UP (ref 0.5–1.3)
DIFF PNL FLD: ABNORMAL
EGFR: 50 ML/MIN/1.73M2 — LOW
EGFR: 50 ML/MIN/1.73M2 — LOW
EOSINOPHIL # BLD AUTO: 0.26 K/UL — SIGNIFICANT CHANGE UP (ref 0–0.5)
EOSINOPHIL NFR BLD AUTO: 4.9 % — SIGNIFICANT CHANGE UP (ref 0–6)
FLUAV AG NPH QL: SIGNIFICANT CHANGE UP
FLUBV AG NPH QL: SIGNIFICANT CHANGE UP
GAS PNL BLDV: 138 MMOL/L — SIGNIFICANT CHANGE UP (ref 136–145)
GAS PNL BLDV: SIGNIFICANT CHANGE UP
GAS PNL BLDV: SIGNIFICANT CHANGE UP
GLUCOSE BLDV-MCNC: 94 MG/DL — SIGNIFICANT CHANGE UP (ref 65–95)
GLUCOSE SERPL-MCNC: 97 MG/DL — SIGNIFICANT CHANGE UP (ref 70–99)
GLUCOSE UR QL: NEGATIVE MG/DL — SIGNIFICANT CHANGE UP
HCO3 BLDV-SCNC: 33 MMOL/L — HIGH (ref 22–28)
HCT VFR BLD CALC: 36.3 % — SIGNIFICANT CHANGE UP (ref 34.5–45)
HCT VFR BLDA CALC: 35 % — LOW (ref 37–47)
HGB BLD CALC-MCNC: 11.7 G/DL — SIGNIFICANT CHANGE UP (ref 11.7–16.1)
HGB BLD-MCNC: 11.2 G/DL — LOW (ref 11.5–15.5)
HOROWITZ INDEX BLDV+IHG-RTO: 21 — SIGNIFICANT CHANGE UP
IMM GRANULOCYTES NFR BLD AUTO: 0.2 % — SIGNIFICANT CHANGE UP (ref 0–0.9)
INR BLD: 0.95 RATIO — SIGNIFICANT CHANGE UP (ref 0.85–1.16)
KETONES UR QL: NEGATIVE MG/DL — SIGNIFICANT CHANGE UP
LACTATE BLDV-MCNC: 1.2 MMOL/L — SIGNIFICANT CHANGE UP (ref 0.56–1.39)
LACTATE SERPL-SCNC: 1.4 MMOL/L — SIGNIFICANT CHANGE UP (ref 0.7–2)
LEUKOCYTE ESTERASE UR-ACNC: ABNORMAL
LYMPHOCYTES # BLD AUTO: 0.99 K/UL — LOW (ref 1–3.3)
LYMPHOCYTES # BLD AUTO: 18.6 % — SIGNIFICANT CHANGE UP (ref 13–44)
MCHC RBC-ENTMCNC: 30.3 PG — SIGNIFICANT CHANGE UP (ref 27–34)
MCHC RBC-ENTMCNC: 30.9 G/DL — LOW (ref 32–36)
MCV RBC AUTO: 98.1 FL — SIGNIFICANT CHANGE UP (ref 80–100)
MONOCYTES # BLD AUTO: 0.49 K/UL — SIGNIFICANT CHANGE UP (ref 0–0.9)
MONOCYTES NFR BLD AUTO: 9.2 % — SIGNIFICANT CHANGE UP (ref 2–14)
NEUTROPHILS # BLD AUTO: 3.51 K/UL — SIGNIFICANT CHANGE UP (ref 1.8–7.4)
NEUTROPHILS NFR BLD AUTO: 66 % — SIGNIFICANT CHANGE UP (ref 43–77)
NITRITE UR-MCNC: NEGATIVE — SIGNIFICANT CHANGE UP
NRBC BLD AUTO-RTO: 0 /100 WBCS — SIGNIFICANT CHANGE UP (ref 0–0)
NT-PROBNP SERPL-SCNC: 3194 PG/ML — HIGH (ref 0–450)
PCO2 BLDV: 60 MMHG — HIGH (ref 42–55)
PH BLDV: 7.35 — SIGNIFICANT CHANGE UP (ref 7.32–7.43)
PH UR: 8 — SIGNIFICANT CHANGE UP (ref 5–8)
PLATELET # BLD AUTO: 222 K/UL — SIGNIFICANT CHANGE UP (ref 150–400)
PO2 BLDV: 23 MMHG — LOW (ref 25–45)
POTASSIUM BLDV-SCNC: 4.9 MMOL/L — SIGNIFICANT CHANGE UP (ref 3.5–5.1)
POTASSIUM SERPL-MCNC: 4.5 MMOL/L — SIGNIFICANT CHANGE UP (ref 3.5–5.3)
POTASSIUM SERPL-SCNC: 4.5 MMOL/L — SIGNIFICANT CHANGE UP (ref 3.5–5.3)
PROT SERPL-MCNC: 5.9 GM/DL — LOW (ref 6–8.3)
PROT UR-MCNC: 100 MG/DL
PROTHROM AB SERPL-ACNC: 10.7 SEC — SIGNIFICANT CHANGE UP (ref 9.9–13.4)
RBC # BLD: 3.7 M/UL — LOW (ref 3.8–5.2)
RBC # FLD: 14.4 % — SIGNIFICANT CHANGE UP (ref 10.3–14.5)
RBC CASTS # UR COMP ASSIST: 15 /HPF — HIGH (ref 0–4)
RSV RNA NPH QL NAA+NON-PROBE: SIGNIFICANT CHANGE UP
SAO2 % BLDV: 41.8 % — LOW (ref 94–98)
SARS-COV-2 RNA SPEC QL NAA+PROBE: SIGNIFICANT CHANGE UP
SODIUM SERPL-SCNC: 142 MMOL/L — SIGNIFICANT CHANGE UP (ref 135–145)
SOURCE RESPIRATORY: SIGNIFICANT CHANGE UP
SP GR SPEC: 1.02 — SIGNIFICANT CHANGE UP (ref 1–1.03)
TROPONIN I, HIGH SENSITIVITY RESULT: 31 NG/L — SIGNIFICANT CHANGE UP
TROPONIN I, HIGH SENSITIVITY RESULT: 32.3 NG/L — SIGNIFICANT CHANGE UP
UROBILINOGEN FLD QL: 0.2 MG/DL — SIGNIFICANT CHANGE UP (ref 0.2–1)
WBC # BLD: 5.32 K/UL — SIGNIFICANT CHANGE UP (ref 3.8–10.5)
WBC # FLD AUTO: 5.32 K/UL — SIGNIFICANT CHANGE UP (ref 3.8–10.5)
WBC UR QL: ABNORMAL /HPF (ref 0–5)

## 2025-08-18 PROCEDURE — 99223 1ST HOSP IP/OBS HIGH 75: CPT

## 2025-08-18 PROCEDURE — 71045 X-RAY EXAM CHEST 1 VIEW: CPT | Mod: 26

## 2025-08-18 PROCEDURE — 93010 ELECTROCARDIOGRAM REPORT: CPT

## 2025-08-18 PROCEDURE — 99285 EMERGENCY DEPT VISIT HI MDM: CPT

## 2025-08-18 PROCEDURE — 70450 CT HEAD/BRAIN W/O DYE: CPT | Mod: 26

## 2025-08-18 RX ORDER — ASPIRIN 325 MG
81 TABLET ORAL DAILY
Refills: 0 | Status: DISCONTINUED | OUTPATIENT
Start: 2025-08-18 | End: 2025-08-21

## 2025-08-18 RX ORDER — ATORVASTATIN CALCIUM 80 MG/1
40 TABLET, FILM COATED ORAL AT BEDTIME
Refills: 0 | Status: DISCONTINUED | OUTPATIENT
Start: 2025-08-18 | End: 2025-08-21

## 2025-08-18 RX ORDER — LOSARTAN POTASSIUM 100 MG/1
100 TABLET, FILM COATED ORAL DAILY
Refills: 0 | Status: DISCONTINUED | OUTPATIENT
Start: 2025-08-18 | End: 2025-08-21

## 2025-08-18 RX ORDER — CEFTRIAXONE 500 MG/1
1000 INJECTION, POWDER, FOR SOLUTION INTRAMUSCULAR; INTRAVENOUS ONCE
Refills: 0 | Status: COMPLETED | OUTPATIENT
Start: 2025-08-19 | End: 2025-08-19

## 2025-08-18 RX ORDER — CEFTRIAXONE 500 MG/1
1000 INJECTION, POWDER, FOR SOLUTION INTRAMUSCULAR; INTRAVENOUS ONCE
Refills: 0 | Status: DISCONTINUED | OUTPATIENT
Start: 2025-08-18 | End: 2025-08-18

## 2025-08-18 RX ORDER — ACETAMINOPHEN 500 MG/5ML
650 LIQUID (ML) ORAL EVERY 6 HOURS
Refills: 0 | Status: DISCONTINUED | OUTPATIENT
Start: 2025-08-18 | End: 2025-08-21

## 2025-08-18 RX ORDER — CEFTRIAXONE 500 MG/1
1000 INJECTION, POWDER, FOR SOLUTION INTRAMUSCULAR; INTRAVENOUS ONCE
Refills: 0 | Status: COMPLETED | OUTPATIENT
Start: 2025-08-18 | End: 2025-08-18

## 2025-08-18 RX ORDER — HEPARIN SODIUM 1000 [USP'U]/ML
5000 INJECTION INTRAVENOUS; SUBCUTANEOUS EVERY 12 HOURS
Refills: 0 | Status: DISCONTINUED | OUTPATIENT
Start: 2025-08-18 | End: 2025-08-21

## 2025-08-18 RX ORDER — CEFTRIAXONE 500 MG/1
INJECTION, POWDER, FOR SOLUTION INTRAMUSCULAR; INTRAVENOUS
Refills: 0 | Status: DISCONTINUED | OUTPATIENT
Start: 2025-08-19 | End: 2025-08-20

## 2025-08-18 RX ORDER — CLOPIDOGREL BISULFATE 75 MG/1
75 TABLET, FILM COATED ORAL DAILY
Refills: 0 | Status: DISCONTINUED | OUTPATIENT
Start: 2025-08-18 | End: 2025-08-21

## 2025-08-18 RX ORDER — DORZOLAMIDE HYDROCHLORIDE AND TIMOLOL MALEATE 20; 5 MG/ML; MG/ML
1 SOLUTION/ DROPS OPHTHALMIC
Refills: 0 | Status: DISCONTINUED | OUTPATIENT
Start: 2025-08-18 | End: 2025-08-21

## 2025-08-18 RX ORDER — CYST/ALA/Q10/PHOS.SER/DHA/BROC 100-20-50
15 POWDER (GRAM) ORAL DAILY
Refills: 0 | Status: DISCONTINUED | OUTPATIENT
Start: 2025-08-18 | End: 2025-08-21

## 2025-08-18 RX ORDER — FUROSEMIDE 10 MG/ML
20 INJECTION INTRAMUSCULAR; INTRAVENOUS DAILY
Refills: 0 | Status: DISCONTINUED | OUTPATIENT
Start: 2025-08-18 | End: 2025-08-21

## 2025-08-18 RX ADMIN — CEFTRIAXONE 1000 MILLIGRAM(S): 500 INJECTION, POWDER, FOR SOLUTION INTRAMUSCULAR; INTRAVENOUS at 16:27

## 2025-08-18 RX ADMIN — ATORVASTATIN CALCIUM 40 MILLIGRAM(S): 80 TABLET, FILM COATED ORAL at 22:38

## 2025-08-18 RX ADMIN — Medication 81 MILLIGRAM(S): at 18:52

## 2025-08-18 RX ADMIN — Medication 5 MILLIGRAM(S): at 18:50

## 2025-08-18 RX ADMIN — Medication 1000 UNIT(S): at 18:53

## 2025-08-18 RX ADMIN — CLOPIDOGREL BISULFATE 75 MILLIGRAM(S): 75 TABLET, FILM COATED ORAL at 18:53

## 2025-08-18 RX ADMIN — FUROSEMIDE 20 MILLIGRAM(S): 10 INJECTION INTRAMUSCULAR; INTRAVENOUS at 18:53

## 2025-08-18 RX ADMIN — HEPARIN SODIUM 5000 UNIT(S): 1000 INJECTION INTRAVENOUS; SUBCUTANEOUS at 18:50

## 2025-08-19 PROCEDURE — 99232 SBSQ HOSP IP/OBS MODERATE 35: CPT | Mod: FS

## 2025-08-19 PROCEDURE — 99232 SBSQ HOSP IP/OBS MODERATE 35: CPT

## 2025-08-19 RX ORDER — METOPROLOL SUCCINATE 50 MG/1
12.5 TABLET, EXTENDED RELEASE ORAL ONCE
Refills: 0 | Status: COMPLETED | OUTPATIENT
Start: 2025-08-19 | End: 2025-08-19

## 2025-08-19 RX ORDER — SODIUM CHLORIDE 9 G/1000ML
1000 INJECTION, SOLUTION INTRAVENOUS
Refills: 0 | Status: COMPLETED | OUTPATIENT
Start: 2025-08-19 | End: 2025-08-20

## 2025-08-19 RX ORDER — CEFTRIAXONE 500 MG/1
1000 INJECTION, POWDER, FOR SOLUTION INTRAMUSCULAR; INTRAVENOUS EVERY 24 HOURS
Refills: 0 | Status: DISCONTINUED | OUTPATIENT
Start: 2025-08-20 | End: 2025-08-20

## 2025-08-19 RX ORDER — METOPROLOL SUCCINATE 50 MG/1
12.5 TABLET, EXTENDED RELEASE ORAL
Refills: 0 | Status: DISCONTINUED | OUTPATIENT
Start: 2025-08-19 | End: 2025-08-21

## 2025-08-19 RX ADMIN — Medication 15 MILLILITER(S): at 14:29

## 2025-08-19 RX ADMIN — LOSARTAN POTASSIUM 100 MILLIGRAM(S): 100 TABLET, FILM COATED ORAL at 06:18

## 2025-08-19 RX ADMIN — CEFTRIAXONE 100 MILLIGRAM(S): 500 INJECTION, POWDER, FOR SOLUTION INTRAMUSCULAR; INTRAVENOUS at 06:24

## 2025-08-19 RX ADMIN — DORZOLAMIDE HYDROCHLORIDE AND TIMOLOL MALEATE 1 DROP(S): 20; 5 SOLUTION/ DROPS OPHTHALMIC at 06:19

## 2025-08-19 RX ADMIN — HEPARIN SODIUM 5000 UNIT(S): 1000 INJECTION INTRAVENOUS; SUBCUTANEOUS at 18:00

## 2025-08-19 RX ADMIN — HEPARIN SODIUM 5000 UNIT(S): 1000 INJECTION INTRAVENOUS; SUBCUTANEOUS at 06:17

## 2025-08-19 RX ADMIN — METOPROLOL SUCCINATE 12.5 MILLIGRAM(S): 50 TABLET, EXTENDED RELEASE ORAL at 12:30

## 2025-08-19 RX ADMIN — DORZOLAMIDE HYDROCHLORIDE AND TIMOLOL MALEATE 1 DROP(S): 20; 5 SOLUTION/ DROPS OPHTHALMIC at 18:00

## 2025-08-19 RX ADMIN — SODIUM CHLORIDE 55 MILLILITER(S): 9 INJECTION, SOLUTION INTRAVENOUS at 16:57

## 2025-08-19 RX ADMIN — Medication 1000 UNIT(S): at 11:55

## 2025-08-19 RX ADMIN — ATORVASTATIN CALCIUM 40 MILLIGRAM(S): 80 TABLET, FILM COATED ORAL at 23:46

## 2025-08-19 RX ADMIN — CLOPIDOGREL BISULFATE 75 MILLIGRAM(S): 75 TABLET, FILM COATED ORAL at 11:54

## 2025-08-19 RX ADMIN — SODIUM CHLORIDE 55 MILLILITER(S): 9 INJECTION, SOLUTION INTRAVENOUS at 23:46

## 2025-08-19 RX ADMIN — METOPROLOL SUCCINATE 12.5 MILLIGRAM(S): 50 TABLET, EXTENDED RELEASE ORAL at 18:00

## 2025-08-19 RX ADMIN — Medication 81 MILLIGRAM(S): at 11:55

## 2025-08-19 RX ADMIN — FUROSEMIDE 20 MILLIGRAM(S): 10 INJECTION INTRAMUSCULAR; INTRAVENOUS at 06:18

## 2025-08-20 ENCOUNTER — APPOINTMENT (OUTPATIENT)
Dept: CARDIOLOGY | Facility: CLINIC | Age: 89
End: 2025-08-20

## 2025-08-20 PROCEDURE — 99232 SBSQ HOSP IP/OBS MODERATE 35: CPT

## 2025-08-20 RX ORDER — CEFPODOXIME PROXETIL 200 MG/1
200 TABLET, FILM COATED ORAL EVERY 24 HOURS
Refills: 0 | Status: DISCONTINUED | OUTPATIENT
Start: 2025-08-21 | End: 2025-08-21

## 2025-08-20 RX ORDER — CEFPODOXIME PROXETIL 200 MG/1
200 TABLET, FILM COATED ORAL EVERY 12 HOURS
Refills: 0 | Status: DISCONTINUED | OUTPATIENT
Start: 2025-08-20 | End: 2025-08-20

## 2025-08-20 RX ORDER — MELATONIN 5 MG
3 TABLET ORAL AT BEDTIME
Refills: 0 | Status: DISCONTINUED | OUTPATIENT
Start: 2025-08-20 | End: 2025-08-21

## 2025-08-20 RX ADMIN — HEPARIN SODIUM 5000 UNIT(S): 1000 INJECTION INTRAVENOUS; SUBCUTANEOUS at 05:56

## 2025-08-20 RX ADMIN — Medication 3 MILLIGRAM(S): at 21:21

## 2025-08-20 RX ADMIN — SODIUM CHLORIDE 55 MILLILITER(S): 9 INJECTION, SOLUTION INTRAVENOUS at 12:23

## 2025-08-20 RX ADMIN — ATORVASTATIN CALCIUM 40 MILLIGRAM(S): 80 TABLET, FILM COATED ORAL at 21:21

## 2025-08-20 RX ADMIN — DORZOLAMIDE HYDROCHLORIDE AND TIMOLOL MALEATE 1 DROP(S): 20; 5 SOLUTION/ DROPS OPHTHALMIC at 17:23

## 2025-08-20 RX ADMIN — METOPROLOL SUCCINATE 12.5 MILLIGRAM(S): 50 TABLET, EXTENDED RELEASE ORAL at 05:57

## 2025-08-20 RX ADMIN — Medication 15 MILLILITER(S): at 12:22

## 2025-08-20 RX ADMIN — Medication 81 MILLIGRAM(S): at 12:22

## 2025-08-20 RX ADMIN — FUROSEMIDE 20 MILLIGRAM(S): 10 INJECTION INTRAMUSCULAR; INTRAVENOUS at 05:57

## 2025-08-20 RX ADMIN — CLOPIDOGREL BISULFATE 75 MILLIGRAM(S): 75 TABLET, FILM COATED ORAL at 12:22

## 2025-08-20 RX ADMIN — LOSARTAN POTASSIUM 100 MILLIGRAM(S): 100 TABLET, FILM COATED ORAL at 05:57

## 2025-08-20 RX ADMIN — HEPARIN SODIUM 5000 UNIT(S): 1000 INJECTION INTRAVENOUS; SUBCUTANEOUS at 17:22

## 2025-08-20 RX ADMIN — Medication 1000 UNIT(S): at 12:28

## 2025-08-20 RX ADMIN — METOPROLOL SUCCINATE 12.5 MILLIGRAM(S): 50 TABLET, EXTENDED RELEASE ORAL at 17:22

## 2025-08-20 RX ADMIN — DORZOLAMIDE HYDROCHLORIDE AND TIMOLOL MALEATE 1 DROP(S): 20; 5 SOLUTION/ DROPS OPHTHALMIC at 05:58

## 2025-08-20 RX ADMIN — CEFTRIAXONE 100 MILLIGRAM(S): 500 INJECTION, POWDER, FOR SOLUTION INTRAMUSCULAR; INTRAVENOUS at 05:56

## 2025-08-21 ENCOUNTER — TRANSCRIPTION ENCOUNTER (OUTPATIENT)
Age: 89
End: 2025-08-21

## 2025-08-21 VITALS
OXYGEN SATURATION: 98 % | HEART RATE: 86 BPM | DIASTOLIC BLOOD PRESSURE: 65 MMHG | SYSTOLIC BLOOD PRESSURE: 137 MMHG | TEMPERATURE: 98 F | RESPIRATION RATE: 18 BRPM

## 2025-08-21 LAB
-  AMIKACIN: SIGNIFICANT CHANGE UP
-  AZTREONAM: SIGNIFICANT CHANGE UP
-  CEFEPIME: SIGNIFICANT CHANGE UP
-  CEFTAZIDIME: SIGNIFICANT CHANGE UP
-  CIPROFLOXACIN: SIGNIFICANT CHANGE UP
-  IMIPENEM: SIGNIFICANT CHANGE UP
-  LEVOFLOXACIN: SIGNIFICANT CHANGE UP
-  MEROPENEM: SIGNIFICANT CHANGE UP
-  PIPERACILLIN/TAZOBACTAM: SIGNIFICANT CHANGE UP
METHOD TYPE: SIGNIFICANT CHANGE UP

## 2025-08-21 PROCEDURE — 99239 HOSP IP/OBS DSCHRG MGMT >30: CPT

## 2025-08-21 RX ORDER — LOSARTAN POTASSIUM 100 MG/1
1 TABLET, FILM COATED ORAL
Qty: 0 | Refills: 0 | DISCHARGE
Start: 2025-08-21

## 2025-08-21 RX ORDER — ATORVASTATIN CALCIUM 80 MG/1
1 TABLET, FILM COATED ORAL
Qty: 0 | Refills: 0 | DISCHARGE
Start: 2025-08-21

## 2025-08-21 RX ORDER — ASPIRIN 325 MG
1 TABLET ORAL
Qty: 0 | Refills: 0 | DISCHARGE
Start: 2025-08-21

## 2025-08-21 RX ORDER — DORZOLAMIDE HYDROCHLORIDE AND TIMOLOL MALEATE 20; 5 MG/ML; MG/ML
1 SOLUTION/ DROPS OPHTHALMIC
Qty: 0 | Refills: 0 | DISCHARGE
Start: 2025-08-21

## 2025-08-21 RX ORDER — CLOPIDOGREL BISULFATE 75 MG/1
1 TABLET, FILM COATED ORAL
Qty: 0 | Refills: 0 | DISCHARGE
Start: 2025-08-21

## 2025-08-21 RX ORDER — CEFPODOXIME PROXETIL 200 MG/1
1 TABLET, FILM COATED ORAL
Qty: 4 | Refills: 0
Start: 2025-08-21 | End: 2025-08-24

## 2025-08-21 RX ORDER — FUROSEMIDE 10 MG/ML
1 INJECTION INTRAMUSCULAR; INTRAVENOUS
Qty: 30 | Refills: 0
Start: 2025-08-21 | End: 2025-09-19

## 2025-08-21 RX ADMIN — DORZOLAMIDE HYDROCHLORIDE AND TIMOLOL MALEATE 1 DROP(S): 20; 5 SOLUTION/ DROPS OPHTHALMIC at 06:45

## 2025-08-21 RX ADMIN — CLOPIDOGREL BISULFATE 75 MILLIGRAM(S): 75 TABLET, FILM COATED ORAL at 11:46

## 2025-08-21 RX ADMIN — CEFPODOXIME PROXETIL 200 MILLIGRAM(S): 200 TABLET, FILM COATED ORAL at 05:25

## 2025-08-21 RX ADMIN — Medication 1000 UNIT(S): at 11:46

## 2025-08-21 RX ADMIN — FUROSEMIDE 20 MILLIGRAM(S): 10 INJECTION INTRAMUSCULAR; INTRAVENOUS at 05:27

## 2025-08-21 RX ADMIN — HEPARIN SODIUM 5000 UNIT(S): 1000 INJECTION INTRAVENOUS; SUBCUTANEOUS at 05:28

## 2025-08-21 RX ADMIN — Medication 15 MILLILITER(S): at 11:46

## 2025-08-21 RX ADMIN — LOSARTAN POTASSIUM 100 MILLIGRAM(S): 100 TABLET, FILM COATED ORAL at 05:26

## 2025-08-21 RX ADMIN — METOPROLOL SUCCINATE 12.5 MILLIGRAM(S): 50 TABLET, EXTENDED RELEASE ORAL at 05:26

## 2025-08-21 RX ADMIN — Medication 81 MILLIGRAM(S): at 11:46

## 2025-08-22 LAB
-  AMPICILLIN: SIGNIFICANT CHANGE UP
-  CIPROFLOXACIN: SIGNIFICANT CHANGE UP
-  LEVOFLOXACIN: SIGNIFICANT CHANGE UP
-  NITROFURANTOIN: SIGNIFICANT CHANGE UP
-  TETRACYCLINE: SIGNIFICANT CHANGE UP
-  VANCOMYCIN: SIGNIFICANT CHANGE UP
CULTURE RESULTS: ABNORMAL
METHOD TYPE: SIGNIFICANT CHANGE UP
ORGANISM # SPEC MICROSCOPIC CNT: ABNORMAL
ORGANISM # SPEC MICROSCOPIC CNT: ABNORMAL
ORGANISM # SPEC MICROSCOPIC CNT: SIGNIFICANT CHANGE UP
SPECIMEN SOURCE: SIGNIFICANT CHANGE UP

## 2025-08-24 LAB
CULTURE RESULTS: SIGNIFICANT CHANGE UP
CULTURE RESULTS: SIGNIFICANT CHANGE UP
SPECIMEN SOURCE: SIGNIFICANT CHANGE UP
SPECIMEN SOURCE: SIGNIFICANT CHANGE UP

## 2025-08-28 ENCOUNTER — APPOINTMENT (OUTPATIENT)
Dept: CARDIOLOGY | Facility: CLINIC | Age: 89
End: 2025-08-28

## 2025-08-28 VITALS
HEIGHT: 62 IN | BODY MASS INDEX: 19.32 KG/M2 | SYSTOLIC BLOOD PRESSURE: 110 MMHG | WEIGHT: 105 LBS | OXYGEN SATURATION: 93 % | HEART RATE: 74 BPM | DIASTOLIC BLOOD PRESSURE: 60 MMHG

## 2025-08-28 PROBLEM — R09.02 HYPOXEMIA: Status: ACTIVE | Noted: 2025-08-28

## 2025-08-28 PROCEDURE — 99214 OFFICE O/P EST MOD 30 MIN: CPT

## 2025-08-28 PROCEDURE — G2211 COMPLEX E/M VISIT ADD ON: CPT

## 2025-08-28 PROCEDURE — 93000 ELECTROCARDIOGRAM COMPLETE: CPT

## 2025-09-01 ENCOUNTER — INPATIENT (INPATIENT)
Facility: HOSPITAL | Age: 89
LOS: 3 days | Discharge: HOME HEALTH | End: 2025-09-05
Attending: HOSPITALIST | Admitting: HOSPITALIST
Payer: MEDICARE

## 2025-09-01 VITALS
OXYGEN SATURATION: 99 % | TEMPERATURE: 95 F | HEIGHT: 60 IN | SYSTOLIC BLOOD PRESSURE: 114 MMHG | RESPIRATION RATE: 14 BRPM | DIASTOLIC BLOOD PRESSURE: 40 MMHG | WEIGHT: 100.09 LBS | HEART RATE: 55 BPM

## 2025-09-01 DIAGNOSIS — Z29.9 ENCOUNTER FOR PROPHYLACTIC MEASURES, UNSPECIFIED: ICD-10-CM

## 2025-09-01 DIAGNOSIS — N39.0 URINARY TRACT INFECTION, SITE NOT SPECIFIED: ICD-10-CM

## 2025-09-01 DIAGNOSIS — R00.1 BRADYCARDIA, UNSPECIFIED: ICD-10-CM

## 2025-09-01 DIAGNOSIS — I50.32 CHRONIC DIASTOLIC (CONGESTIVE) HEART FAILURE: ICD-10-CM

## 2025-09-01 DIAGNOSIS — Z98.89 OTHER SPECIFIED POSTPROCEDURAL STATES: Chronic | ICD-10-CM

## 2025-09-01 DIAGNOSIS — G93.41 METABOLIC ENCEPHALOPATHY: ICD-10-CM

## 2025-09-01 DIAGNOSIS — A41.9 SEPSIS, UNSPECIFIED ORGANISM: ICD-10-CM

## 2025-09-01 DIAGNOSIS — K59.09 OTHER CONSTIPATION: ICD-10-CM

## 2025-09-01 DIAGNOSIS — I69.30 UNSPECIFIED SEQUELAE OF CEREBRAL INFARCTION: ICD-10-CM

## 2025-09-01 DIAGNOSIS — R32 UNSPECIFIED URINARY INCONTINENCE: ICD-10-CM

## 2025-09-01 DIAGNOSIS — Z85.41 PERSONAL HISTORY OF MALIGNANT NEOPLASM OF CERVIX UTERI: ICD-10-CM

## 2025-09-01 DIAGNOSIS — I10 ESSENTIAL (PRIMARY) HYPERTENSION: ICD-10-CM

## 2025-09-01 LAB
ALBUMIN SERPL ELPH-MCNC: 2.2 G/DL — LOW (ref 3.3–5)
ALP SERPL-CCNC: 105 U/L — SIGNIFICANT CHANGE UP (ref 40–120)
ALT FLD-CCNC: 26 U/L — SIGNIFICANT CHANGE UP (ref 12–78)
ANION GAP SERPL CALC-SCNC: 3 MMOL/L — LOW (ref 5–17)
APPEARANCE UR: CLEAR — SIGNIFICANT CHANGE UP
APTT BLD: 31 SEC — SIGNIFICANT CHANGE UP (ref 26.1–36.8)
AST SERPL-CCNC: 16 U/L — SIGNIFICANT CHANGE UP (ref 15–37)
BASOPHILS # BLD AUTO: 0.04 K/UL — SIGNIFICANT CHANGE UP (ref 0–0.2)
BASOPHILS NFR BLD AUTO: 0.7 % — SIGNIFICANT CHANGE UP (ref 0–2)
BILIRUB SERPL-MCNC: 0.4 MG/DL — SIGNIFICANT CHANGE UP (ref 0.2–1.2)
BILIRUB UR-MCNC: NEGATIVE — SIGNIFICANT CHANGE UP
BUN SERPL-MCNC: 35 MG/DL — HIGH (ref 7–23)
CALCIUM SERPL-MCNC: 8.7 MG/DL — SIGNIFICANT CHANGE UP (ref 8.5–10.1)
CHLORIDE SERPL-SCNC: 111 MMOL/L — HIGH (ref 96–108)
CO2 SERPL-SCNC: 26 MMOL/L — SIGNIFICANT CHANGE UP (ref 22–31)
COLOR SPEC: YELLOW — SIGNIFICANT CHANGE UP
CREAT SERPL-MCNC: 0.95 MG/DL — SIGNIFICANT CHANGE UP (ref 0.5–1.3)
DIFF PNL FLD: ABNORMAL
EGFR: 55 ML/MIN/1.73M2 — LOW
EGFR: 55 ML/MIN/1.73M2 — LOW
EOSINOPHIL # BLD AUTO: 0.18 K/UL — SIGNIFICANT CHANGE UP (ref 0–0.5)
EOSINOPHIL NFR BLD AUTO: 3.2 % — SIGNIFICANT CHANGE UP (ref 0–6)
FLUAV AG NPH QL: SIGNIFICANT CHANGE UP
FLUBV AG NPH QL: SIGNIFICANT CHANGE UP
GLUCOSE SERPL-MCNC: 111 MG/DL — HIGH (ref 70–99)
GLUCOSE UR QL: NEGATIVE MG/DL — SIGNIFICANT CHANGE UP
HCT VFR BLD CALC: 31.6 % — LOW (ref 34.5–45)
HGB BLD-MCNC: 9.7 G/DL — LOW (ref 11.5–15.5)
IMM GRANULOCYTES NFR BLD AUTO: 0.4 % — SIGNIFICANT CHANGE UP (ref 0–0.9)
INR BLD: 1.02 RATIO — SIGNIFICANT CHANGE UP (ref 0.85–1.16)
KETONES UR QL: ABNORMAL MG/DL
LACTATE SERPL-SCNC: 1 MMOL/L — SIGNIFICANT CHANGE UP (ref 0.7–2)
LEUKOCYTE ESTERASE UR-ACNC: NEGATIVE — SIGNIFICANT CHANGE UP
LYMPHOCYTES # BLD AUTO: 1 K/UL — SIGNIFICANT CHANGE UP (ref 1–3.3)
LYMPHOCYTES # BLD AUTO: 17.5 % — SIGNIFICANT CHANGE UP (ref 13–44)
MCHC RBC-ENTMCNC: 29.8 PG — SIGNIFICANT CHANGE UP (ref 27–34)
MCHC RBC-ENTMCNC: 30.7 G/DL — LOW (ref 32–36)
MCV RBC AUTO: 96.9 FL — SIGNIFICANT CHANGE UP (ref 80–100)
MONOCYTES # BLD AUTO: 0.5 K/UL — SIGNIFICANT CHANGE UP (ref 0–0.9)
MONOCYTES NFR BLD AUTO: 8.8 % — SIGNIFICANT CHANGE UP (ref 2–14)
NEUTROPHILS # BLD AUTO: 3.96 K/UL — SIGNIFICANT CHANGE UP (ref 1.8–7.4)
NEUTROPHILS NFR BLD AUTO: 69.4 % — SIGNIFICANT CHANGE UP (ref 43–77)
NITRITE UR-MCNC: NEGATIVE — SIGNIFICANT CHANGE UP
NRBC BLD AUTO-RTO: 0 /100 WBCS — SIGNIFICANT CHANGE UP (ref 0–0)
NT-PROBNP SERPL-SCNC: 1856 PG/ML — HIGH (ref 0–450)
PH UR: 6 — SIGNIFICANT CHANGE UP (ref 5–8)
PLATELET # BLD AUTO: 254 K/UL — SIGNIFICANT CHANGE UP (ref 150–400)
POTASSIUM SERPL-MCNC: 4.6 MMOL/L — SIGNIFICANT CHANGE UP (ref 3.5–5.3)
POTASSIUM SERPL-SCNC: 4.6 MMOL/L — SIGNIFICANT CHANGE UP (ref 3.5–5.3)
PROT SERPL-MCNC: 5.5 GM/DL — LOW (ref 6–8.3)
PROT UR-MCNC: SIGNIFICANT CHANGE UP MG/DL
PROTHROM AB SERPL-ACNC: 11.8 SEC — SIGNIFICANT CHANGE UP (ref 9.9–13.4)
RBC # BLD: 3.26 M/UL — LOW (ref 3.8–5.2)
RBC # FLD: 14.1 % — SIGNIFICANT CHANGE UP (ref 10.3–14.5)
RSV RNA NPH QL NAA+NON-PROBE: SIGNIFICANT CHANGE UP
SARS-COV-2 RNA SPEC QL NAA+PROBE: SIGNIFICANT CHANGE UP
SODIUM SERPL-SCNC: 140 MMOL/L — SIGNIFICANT CHANGE UP (ref 135–145)
SOURCE RESPIRATORY: SIGNIFICANT CHANGE UP
SP GR SPEC: 1.01 — SIGNIFICANT CHANGE UP (ref 1–1.03)
TSH SERPL-MCNC: 3.65 UIU/ML — SIGNIFICANT CHANGE UP (ref 0.36–3.74)
UROBILINOGEN FLD QL: 0.2 MG/DL — SIGNIFICANT CHANGE UP (ref 0.2–1)
WBC # BLD: 5.7 K/UL — SIGNIFICANT CHANGE UP (ref 3.8–10.5)
WBC # FLD AUTO: 5.7 K/UL — SIGNIFICANT CHANGE UP (ref 3.8–10.5)

## 2025-09-01 PROCEDURE — 93010 ELECTROCARDIOGRAM REPORT: CPT

## 2025-09-01 PROCEDURE — 99223 1ST HOSP IP/OBS HIGH 75: CPT

## 2025-09-01 PROCEDURE — 99285 EMERGENCY DEPT VISIT HI MDM: CPT

## 2025-09-01 PROCEDURE — 74177 CT ABD & PELVIS W/CONTRAST: CPT | Mod: 26

## 2025-09-01 PROCEDURE — 70450 CT HEAD/BRAIN W/O DYE: CPT | Mod: 26

## 2025-09-01 PROCEDURE — 71260 CT THORAX DX C+: CPT | Mod: 26

## 2025-09-01 PROCEDURE — 71045 X-RAY EXAM CHEST 1 VIEW: CPT | Mod: 26

## 2025-09-01 RX ORDER — ATORVASTATIN CALCIUM 80 MG/1
40 TABLET, FILM COATED ORAL AT BEDTIME
Refills: 0 | Status: DISCONTINUED | OUTPATIENT
Start: 2025-09-01 | End: 2025-09-02

## 2025-09-01 RX ORDER — CEFTRIAXONE 500 MG/1
1000 INJECTION, POWDER, FOR SOLUTION INTRAMUSCULAR; INTRAVENOUS ONCE
Refills: 0 | Status: COMPLETED | OUTPATIENT
Start: 2025-09-01 | End: 2025-09-01

## 2025-09-01 RX ORDER — SODIUM CHLORIDE 9 G/1000ML
250 INJECTION, SOLUTION INTRAVENOUS ONCE
Refills: 0 | Status: DISCONTINUED | OUTPATIENT
Start: 2025-09-01 | End: 2025-09-02

## 2025-09-01 RX ORDER — SENNA 187 MG
2 TABLET ORAL AT BEDTIME
Refills: 0 | Status: DISCONTINUED | OUTPATIENT
Start: 2025-09-01 | End: 2025-09-05

## 2025-09-01 RX ORDER — CEFEPIME 2 G/20ML
2000 INJECTION, POWDER, FOR SOLUTION INTRAVENOUS EVERY 12 HOURS
Refills: 0 | Status: DISCONTINUED | OUTPATIENT
Start: 2025-09-01 | End: 2025-09-04

## 2025-09-01 RX ORDER — CEFTRIAXONE 500 MG/1
1000 INJECTION, POWDER, FOR SOLUTION INTRAMUSCULAR; INTRAVENOUS ONCE
Refills: 0 | Status: DISCONTINUED | OUTPATIENT
Start: 2025-09-01 | End: 2025-09-01

## 2025-09-01 RX ORDER — HEPARIN SODIUM 1000 [USP'U]/ML
5000 INJECTION INTRAVENOUS; SUBCUTANEOUS EVERY 8 HOURS
Refills: 0 | Status: DISCONTINUED | OUTPATIENT
Start: 2025-09-01 | End: 2025-09-05

## 2025-09-01 RX ORDER — POLYETHYLENE GLYCOL 3350 17 G/17G
17 POWDER, FOR SOLUTION ORAL DAILY
Refills: 0 | Status: DISCONTINUED | OUTPATIENT
Start: 2025-09-01 | End: 2025-09-05

## 2025-09-01 RX ORDER — ASPIRIN 325 MG
81 TABLET ORAL DAILY
Refills: 0 | Status: DISCONTINUED | OUTPATIENT
Start: 2025-09-01 | End: 2025-09-02

## 2025-09-01 RX ORDER — VANCOMYCIN HCL IN 5 % DEXTROSE 1.5G/250ML
750 PLASTIC BAG, INJECTION (ML) INTRAVENOUS ONCE
Refills: 0 | Status: COMPLETED | OUTPATIENT
Start: 2025-09-01 | End: 2025-09-01

## 2025-09-01 RX ADMIN — Medication 250 MILLIGRAM(S): at 18:44

## 2025-09-01 RX ADMIN — CEFEPIME 100 MILLIGRAM(S): 2 INJECTION, POWDER, FOR SOLUTION INTRAVENOUS at 18:01

## 2025-09-01 RX ADMIN — POLYETHYLENE GLYCOL 3350 17 GRAM(S): 17 POWDER, FOR SOLUTION ORAL at 22:33

## 2025-09-01 RX ADMIN — CEFTRIAXONE 1000 MILLIGRAM(S): 500 INJECTION, POWDER, FOR SOLUTION INTRAMUSCULAR; INTRAVENOUS at 12:44

## 2025-09-01 RX ADMIN — Medication 2 TABLET(S): at 22:32

## 2025-09-01 RX ADMIN — Medication 81 MILLIGRAM(S): at 22:33

## 2025-09-01 RX ADMIN — HEPARIN SODIUM 5000 UNIT(S): 1000 INJECTION INTRAVENOUS; SUBCUTANEOUS at 22:32

## 2025-09-01 RX ADMIN — ATORVASTATIN CALCIUM 40 MILLIGRAM(S): 80 TABLET, FILM COATED ORAL at 22:32

## 2025-09-01 RX ADMIN — Medication 500 MILLILITER(S): at 12:44

## 2025-09-02 DIAGNOSIS — Z66 DO NOT RESUSCITATE: ICD-10-CM

## 2025-09-02 DIAGNOSIS — E44.0 MODERATE PROTEIN-CALORIE MALNUTRITION: ICD-10-CM

## 2025-09-02 DIAGNOSIS — G93.41 METABOLIC ENCEPHALOPATHY: ICD-10-CM

## 2025-09-02 DIAGNOSIS — H91.90 UNSPECIFIED HEARING LOSS, UNSPECIFIED EAR: ICD-10-CM

## 2025-09-02 DIAGNOSIS — N39.0 URINARY TRACT INFECTION, SITE NOT SPECIFIED: ICD-10-CM

## 2025-09-02 DIAGNOSIS — Z87.891 PERSONAL HISTORY OF NICOTINE DEPENDENCE: ICD-10-CM

## 2025-09-02 DIAGNOSIS — B35.1 TINEA UNGUIUM: ICD-10-CM

## 2025-09-02 DIAGNOSIS — Z79.82 LONG TERM (CURRENT) USE OF ASPIRIN: ICD-10-CM

## 2025-09-02 DIAGNOSIS — R00.1 BRADYCARDIA, UNSPECIFIED: ICD-10-CM

## 2025-09-02 DIAGNOSIS — Z79.02 LONG TERM (CURRENT) USE OF ANTITHROMBOTICS/ANTIPLATELETS: ICD-10-CM

## 2025-09-02 DIAGNOSIS — J44.9 CHRONIC OBSTRUCTIVE PULMONARY DISEASE, UNSPECIFIED: ICD-10-CM

## 2025-09-02 DIAGNOSIS — I50.42 CHRONIC COMBINED SYSTOLIC (CONGESTIVE) AND DIASTOLIC (CONGESTIVE) HEART FAILURE: ICD-10-CM

## 2025-09-02 DIAGNOSIS — E78.5 HYPERLIPIDEMIA, UNSPECIFIED: ICD-10-CM

## 2025-09-02 DIAGNOSIS — Z86.73 PERSONAL HISTORY OF TRANSIENT ISCHEMIC ATTACK (TIA), AND CEREBRAL INFARCTION WITHOUT RESIDUAL DEFICITS: ICD-10-CM

## 2025-09-02 DIAGNOSIS — I11.0 HYPERTENSIVE HEART DISEASE WITH HEART FAILURE: ICD-10-CM

## 2025-09-02 DIAGNOSIS — T44.7X5A ADVERSE EFFECT OF BETA-ADRENORECEPTOR ANTAGONISTS, INITIAL ENCOUNTER: ICD-10-CM

## 2025-09-02 DIAGNOSIS — Z88.0 ALLERGY STATUS TO PENICILLIN: ICD-10-CM

## 2025-09-02 DIAGNOSIS — Z11.52 ENCOUNTER FOR SCREENING FOR COVID-19: ICD-10-CM

## 2025-09-02 LAB
ALBUMIN SERPL ELPH-MCNC: 2.4 G/DL — LOW (ref 3.3–5)
ALP SERPL-CCNC: 112 U/L — SIGNIFICANT CHANGE UP (ref 40–120)
ALT FLD-CCNC: 26 U/L — SIGNIFICANT CHANGE UP (ref 12–78)
ANION GAP SERPL CALC-SCNC: 6 MMOL/L — SIGNIFICANT CHANGE UP (ref 5–17)
AST SERPL-CCNC: 14 U/L — LOW (ref 15–37)
BASOPHILS # BLD AUTO: 0 K/UL — SIGNIFICANT CHANGE UP (ref 0–0.2)
BASOPHILS NFR BLD AUTO: 0 % — SIGNIFICANT CHANGE UP (ref 0–2)
BILIRUB SERPL-MCNC: 0.3 MG/DL — SIGNIFICANT CHANGE UP (ref 0.2–1.2)
BUN SERPL-MCNC: 31 MG/DL — HIGH (ref 7–23)
CALCIUM SERPL-MCNC: 8.9 MG/DL — SIGNIFICANT CHANGE UP (ref 8.5–10.1)
CHLORIDE SERPL-SCNC: 110 MMOL/L — HIGH (ref 96–108)
CO2 SERPL-SCNC: 24 MMOL/L — SIGNIFICANT CHANGE UP (ref 22–31)
CREAT SERPL-MCNC: 0.94 MG/DL — SIGNIFICANT CHANGE UP (ref 0.5–1.3)
EGFR: 56 ML/MIN/1.73M2 — LOW
EGFR: 56 ML/MIN/1.73M2 — LOW
EOSINOPHIL # BLD AUTO: 0.24 K/UL — SIGNIFICANT CHANGE UP (ref 0–0.5)
EOSINOPHIL NFR BLD AUTO: 2 % — SIGNIFICANT CHANGE UP (ref 0–6)
GLUCOSE SERPL-MCNC: 80 MG/DL — SIGNIFICANT CHANGE UP (ref 70–99)
HCT VFR BLD CALC: 37.3 % — SIGNIFICANT CHANGE UP (ref 34.5–45)
HGB BLD-MCNC: 11.4 G/DL — LOW (ref 11.5–15.5)
LYMPHOCYTES # BLD AUTO: 0 % — LOW (ref 13–44)
LYMPHOCYTES # BLD AUTO: 0 K/UL — LOW (ref 1–3.3)
MAGNESIUM SERPL-MCNC: 2 MG/DL — SIGNIFICANT CHANGE UP (ref 1.6–2.6)
MANUAL SMEAR VERIFICATION: SIGNIFICANT CHANGE UP
MCHC RBC-ENTMCNC: 29.2 PG — SIGNIFICANT CHANGE UP (ref 27–34)
MCHC RBC-ENTMCNC: 30.6 G/DL — LOW (ref 32–36)
MCV RBC AUTO: 95.4 FL — SIGNIFICANT CHANGE UP (ref 80–100)
MONOCYTES # BLD AUTO: 0.48 K/UL — SIGNIFICANT CHANGE UP (ref 0–0.9)
MONOCYTES NFR BLD AUTO: 4 % — SIGNIFICANT CHANGE UP (ref 2–14)
NEUTROPHILS # BLD AUTO: 11.31 K/UL — HIGH (ref 1.8–7.4)
NEUTROPHILS NFR BLD AUTO: 94 % — HIGH (ref 43–77)
NRBC # BLD: 0 /100 WBCS — SIGNIFICANT CHANGE UP (ref 0–0)
NRBC BLD AUTO-RTO: SIGNIFICANT CHANGE UP /100 WBCS (ref 0–0)
NRBC BLD-RTO: 0 /100 WBCS — SIGNIFICANT CHANGE UP (ref 0–0)
PHOSPHATE SERPL-MCNC: 3.3 MG/DL — SIGNIFICANT CHANGE UP (ref 2.5–4.5)
PLAT MORPH BLD: NORMAL — SIGNIFICANT CHANGE UP
PLATELET # BLD AUTO: 308 K/UL — SIGNIFICANT CHANGE UP (ref 150–400)
POTASSIUM SERPL-MCNC: 4 MMOL/L — SIGNIFICANT CHANGE UP (ref 3.5–5.3)
POTASSIUM SERPL-SCNC: 4 MMOL/L — SIGNIFICANT CHANGE UP (ref 3.5–5.3)
PROT SERPL-MCNC: 5.9 GM/DL — LOW (ref 6–8.3)
RBC # BLD: 3.91 M/UL — SIGNIFICANT CHANGE UP (ref 3.8–5.2)
RBC # FLD: 14.1 % — SIGNIFICANT CHANGE UP (ref 10.3–14.5)
RBC BLD AUTO: NORMAL — SIGNIFICANT CHANGE UP
SODIUM SERPL-SCNC: 140 MMOL/L — SIGNIFICANT CHANGE UP (ref 135–145)
VANCOMYCIN FLD-MCNC: 10 UG/ML — SIGNIFICANT CHANGE UP
WBC # BLD: 12.03 K/UL — HIGH (ref 3.8–10.5)
WBC # FLD AUTO: 12.03 K/UL — HIGH (ref 3.8–10.5)

## 2025-09-02 PROCEDURE — 99233 SBSQ HOSP IP/OBS HIGH 50: CPT

## 2025-09-02 PROCEDURE — 99223 1ST HOSP IP/OBS HIGH 75: CPT | Mod: FS

## 2025-09-02 RX ORDER — ATORVASTATIN CALCIUM 80 MG/1
10 TABLET, FILM COATED ORAL AT BEDTIME
Refills: 0 | Status: DISCONTINUED | OUTPATIENT
Start: 2025-09-02 | End: 2025-09-05

## 2025-09-02 RX ORDER — DORZOLAMIDE 20 MG/ML
1 SOLUTION/ DROPS OPHTHALMIC THREE TIMES A DAY
Refills: 0 | Status: DISCONTINUED | OUTPATIENT
Start: 2025-09-02 | End: 2025-09-05

## 2025-09-02 RX ORDER — CLOPIDOGREL BISULFATE 75 MG/1
75 TABLET, FILM COATED ORAL DAILY
Refills: 0 | Status: DISCONTINUED | OUTPATIENT
Start: 2025-09-03 | End: 2025-09-05

## 2025-09-02 RX ORDER — ALBUTEROL SULFATE 2.5 MG/3ML
2 VIAL, NEBULIZER (ML) INHALATION
Refills: 0 | DISCHARGE

## 2025-09-02 RX ORDER — IPRATROPIUM BROMIDE AND ALBUTEROL SULFATE .5; 2.5 MG/3ML; MG/3ML
3 SOLUTION RESPIRATORY (INHALATION) EVERY 6 HOURS
Refills: 0 | Status: DISCONTINUED | OUTPATIENT
Start: 2025-09-02 | End: 2025-09-05

## 2025-09-02 RX ORDER — UMECLIDINIUM 62.5 UG/1
1 AEROSOL, POWDER ORAL
Refills: 0 | DISCHARGE

## 2025-09-02 RX ORDER — B1/B2/B3/B5/B6/B12/VIT C/FOLIC 500-0.5 MG
1 TABLET ORAL DAILY
Refills: 0 | Status: DISCONTINUED | OUTPATIENT
Start: 2025-09-02 | End: 2025-09-05

## 2025-09-02 RX ORDER — UBIDECARENONE 100 MG
1 CAPSULE ORAL
Refills: 0 | DISCHARGE

## 2025-09-02 RX ORDER — METOPROLOL SUCCINATE 50 MG/1
25 TABLET, EXTENDED RELEASE ORAL DAILY
Refills: 0 | Status: DISCONTINUED | OUTPATIENT
Start: 2025-09-02 | End: 2025-09-05

## 2025-09-02 RX ORDER — BRIMONIDINE TARTRATE 1.5 MG/ML
1 SOLUTION/ DROPS OPHTHALMIC
Refills: 0 | Status: DISCONTINUED | OUTPATIENT
Start: 2025-09-02 | End: 2025-09-05

## 2025-09-02 RX ORDER — TIOTROPIUM BROMIDE INHALATION SPRAY 3.12 UG/1
2 SPRAY, METERED RESPIRATORY (INHALATION) DAILY
Refills: 0 | Status: DISCONTINUED | OUTPATIENT
Start: 2025-09-02 | End: 2025-09-05

## 2025-09-02 RX ORDER — BUDESONIDE AND FORMOTEROL FUMARATE DIHYDRATE 80; 4.5 UG/1; UG/1
2 AEROSOL RESPIRATORY (INHALATION)
Refills: 0 | DISCHARGE

## 2025-09-02 RX ORDER — OMEGA-3-ACID ETHYL ESTERS CAPSULES 1 G/1
1 CAPSULE, LIQUID FILLED ORAL
Refills: 0 | DISCHARGE

## 2025-09-02 RX ADMIN — DORZOLAMIDE 1 DROP(S): 20 SOLUTION/ DROPS OPHTHALMIC at 21:51

## 2025-09-02 RX ADMIN — Medication 1 TABLET(S): at 13:42

## 2025-09-02 RX ADMIN — Medication 2 TABLET(S): at 21:51

## 2025-09-02 RX ADMIN — ATORVASTATIN CALCIUM 10 MILLIGRAM(S): 80 TABLET, FILM COATED ORAL at 21:51

## 2025-09-02 RX ADMIN — Medication 1 DOSE(S): at 18:33

## 2025-09-02 RX ADMIN — HEPARIN SODIUM 5000 UNIT(S): 1000 INJECTION INTRAVENOUS; SUBCUTANEOUS at 21:51

## 2025-09-02 RX ADMIN — CEFEPIME 100 MILLIGRAM(S): 2 INJECTION, POWDER, FOR SOLUTION INTRAVENOUS at 05:46

## 2025-09-02 RX ADMIN — METOPROLOL SUCCINATE 25 MILLIGRAM(S): 50 TABLET, EXTENDED RELEASE ORAL at 12:49

## 2025-09-02 RX ADMIN — HEPARIN SODIUM 5000 UNIT(S): 1000 INJECTION INTRAVENOUS; SUBCUTANEOUS at 13:42

## 2025-09-02 RX ADMIN — CEFEPIME 100 MILLIGRAM(S): 2 INJECTION, POWDER, FOR SOLUTION INTRAVENOUS at 18:33

## 2025-09-02 RX ADMIN — DORZOLAMIDE 1 DROP(S): 20 SOLUTION/ DROPS OPHTHALMIC at 13:42

## 2025-09-02 RX ADMIN — HEPARIN SODIUM 5000 UNIT(S): 1000 INJECTION INTRAVENOUS; SUBCUTANEOUS at 05:46

## 2025-09-02 RX ADMIN — TIOTROPIUM BROMIDE INHALATION SPRAY 2 PUFF(S): 3.12 SPRAY, METERED RESPIRATORY (INHALATION) at 13:42

## 2025-09-02 RX ADMIN — BRIMONIDINE TARTRATE 1 DROP(S): 1.5 SOLUTION/ DROPS OPHTHALMIC at 18:33

## 2025-09-03 LAB
ALBUMIN SERPL ELPH-MCNC: 2.3 G/DL — LOW (ref 3.3–5)
ALP SERPL-CCNC: 106 U/L — SIGNIFICANT CHANGE UP (ref 40–120)
ALT FLD-CCNC: 24 U/L — SIGNIFICANT CHANGE UP (ref 12–78)
ANION GAP SERPL CALC-SCNC: 6 MMOL/L — SIGNIFICANT CHANGE UP (ref 5–17)
AST SERPL-CCNC: 16 U/L — SIGNIFICANT CHANGE UP (ref 15–37)
BASOPHILS # BLD AUTO: 0.02 K/UL — SIGNIFICANT CHANGE UP (ref 0–0.2)
BASOPHILS NFR BLD AUTO: 0.2 % — SIGNIFICANT CHANGE UP (ref 0–2)
BILIRUB SERPL-MCNC: 0.4 MG/DL — SIGNIFICANT CHANGE UP (ref 0.2–1.2)
BUN SERPL-MCNC: 29 MG/DL — HIGH (ref 7–23)
CALCIUM SERPL-MCNC: 8.6 MG/DL — SIGNIFICANT CHANGE UP (ref 8.5–10.1)
CHLORIDE SERPL-SCNC: 112 MMOL/L — HIGH (ref 96–108)
CO2 SERPL-SCNC: 25 MMOL/L — SIGNIFICANT CHANGE UP (ref 22–31)
CREAT SERPL-MCNC: 0.98 MG/DL — SIGNIFICANT CHANGE UP (ref 0.5–1.3)
EGFR: 53 ML/MIN/1.73M2 — LOW
EGFR: 53 ML/MIN/1.73M2 — LOW
EOSINOPHIL # BLD AUTO: 0.11 K/UL — SIGNIFICANT CHANGE UP (ref 0–0.5)
EOSINOPHIL NFR BLD AUTO: 0.8 % — SIGNIFICANT CHANGE UP (ref 0–6)
GLUCOSE SERPL-MCNC: 141 MG/DL — HIGH (ref 70–99)
HCT VFR BLD CALC: 33.4 % — LOW (ref 34.5–45)
HGB BLD-MCNC: 10.3 G/DL — LOW (ref 11.5–15.5)
IMM GRANULOCYTES NFR BLD AUTO: 0.5 % — SIGNIFICANT CHANGE UP (ref 0–0.9)
LYMPHOCYTES # BLD AUTO: 0.27 K/UL — LOW (ref 1–3.3)
LYMPHOCYTES # BLD AUTO: 2.1 % — LOW (ref 13–44)
MAGNESIUM SERPL-MCNC: 2.1 MG/DL — SIGNIFICANT CHANGE UP (ref 1.6–2.6)
MCHC RBC-ENTMCNC: 29.3 PG — SIGNIFICANT CHANGE UP (ref 27–34)
MCHC RBC-ENTMCNC: 30.8 G/DL — LOW (ref 32–36)
MCV RBC AUTO: 95.2 FL — SIGNIFICANT CHANGE UP (ref 80–100)
MONOCYTES # BLD AUTO: 0.4 K/UL — SIGNIFICANT CHANGE UP (ref 0–0.9)
MONOCYTES NFR BLD AUTO: 3.1 % — SIGNIFICANT CHANGE UP (ref 2–14)
NEUTROPHILS # BLD AUTO: 12.17 K/UL — HIGH (ref 1.8–7.4)
NEUTROPHILS NFR BLD AUTO: 93.3 % — HIGH (ref 43–77)
NRBC BLD AUTO-RTO: 0 /100 WBCS — SIGNIFICANT CHANGE UP (ref 0–0)
PHOSPHATE SERPL-MCNC: 2.2 MG/DL — LOW (ref 2.5–4.5)
PLATELET # BLD AUTO: 280 K/UL — SIGNIFICANT CHANGE UP (ref 150–400)
POTASSIUM SERPL-MCNC: 3.8 MMOL/L — SIGNIFICANT CHANGE UP (ref 3.5–5.3)
POTASSIUM SERPL-SCNC: 3.8 MMOL/L — SIGNIFICANT CHANGE UP (ref 3.5–5.3)
PROT SERPL-MCNC: 5.7 GM/DL — LOW (ref 6–8.3)
RBC # BLD: 3.51 M/UL — LOW (ref 3.8–5.2)
RBC # FLD: 14.1 % — SIGNIFICANT CHANGE UP (ref 10.3–14.5)
SODIUM SERPL-SCNC: 143 MMOL/L — SIGNIFICANT CHANGE UP (ref 135–145)
WBC # BLD: 13.03 K/UL — HIGH (ref 3.8–10.5)
WBC # FLD AUTO: 13.03 K/UL — HIGH (ref 3.8–10.5)

## 2025-09-03 PROCEDURE — 99232 SBSQ HOSP IP/OBS MODERATE 35: CPT

## 2025-09-03 RX ORDER — LANOLIN/MINERAL OIL/PETROLATUM
1 OINTMENT (GRAM) OPHTHALMIC (EYE) THREE TIMES A DAY
Refills: 0 | Status: DISCONTINUED | OUTPATIENT
Start: 2025-09-03 | End: 2025-09-03

## 2025-09-03 RX ORDER — LOSARTAN POTASSIUM 100 MG/1
50 TABLET, FILM COATED ORAL DAILY
Refills: 0 | Status: DISCONTINUED | OUTPATIENT
Start: 2025-09-03 | End: 2025-09-04

## 2025-09-03 RX ORDER — LANOLIN/MINERAL OIL/PETROLATUM
1 OINTMENT (GRAM) OPHTHALMIC (EYE) THREE TIMES A DAY
Refills: 0 | Status: DISCONTINUED | OUTPATIENT
Start: 2025-09-03 | End: 2025-09-05

## 2025-09-03 RX ADMIN — DORZOLAMIDE 1 DROP(S): 20 SOLUTION/ DROPS OPHTHALMIC at 14:12

## 2025-09-03 RX ADMIN — Medication 1 DOSE(S): at 05:53

## 2025-09-03 RX ADMIN — Medication 1 TABLET(S): at 12:07

## 2025-09-03 RX ADMIN — TIOTROPIUM BROMIDE INHALATION SPRAY 2 PUFF(S): 3.12 SPRAY, METERED RESPIRATORY (INHALATION) at 12:11

## 2025-09-03 RX ADMIN — CLOPIDOGREL BISULFATE 75 MILLIGRAM(S): 75 TABLET, FILM COATED ORAL at 12:07

## 2025-09-03 RX ADMIN — DORZOLAMIDE 1 DROP(S): 20 SOLUTION/ DROPS OPHTHALMIC at 22:12

## 2025-09-03 RX ADMIN — BRIMONIDINE TARTRATE 1 DROP(S): 1.5 SOLUTION/ DROPS OPHTHALMIC at 05:53

## 2025-09-03 RX ADMIN — Medication 1 DOSE(S): at 18:14

## 2025-09-03 RX ADMIN — BRIMONIDINE TARTRATE 1 DROP(S): 1.5 SOLUTION/ DROPS OPHTHALMIC at 18:14

## 2025-09-03 RX ADMIN — HEPARIN SODIUM 5000 UNIT(S): 1000 INJECTION INTRAVENOUS; SUBCUTANEOUS at 22:13

## 2025-09-03 RX ADMIN — ATORVASTATIN CALCIUM 10 MILLIGRAM(S): 80 TABLET, FILM COATED ORAL at 22:12

## 2025-09-03 RX ADMIN — CEFEPIME 100 MILLIGRAM(S): 2 INJECTION, POWDER, FOR SOLUTION INTRAVENOUS at 18:13

## 2025-09-03 RX ADMIN — HEPARIN SODIUM 5000 UNIT(S): 1000 INJECTION INTRAVENOUS; SUBCUTANEOUS at 14:13

## 2025-09-03 RX ADMIN — METOPROLOL SUCCINATE 25 MILLIGRAM(S): 50 TABLET, EXTENDED RELEASE ORAL at 05:53

## 2025-09-03 RX ADMIN — HEPARIN SODIUM 5000 UNIT(S): 1000 INJECTION INTRAVENOUS; SUBCUTANEOUS at 05:53

## 2025-09-03 RX ADMIN — DORZOLAMIDE 1 DROP(S): 20 SOLUTION/ DROPS OPHTHALMIC at 05:53

## 2025-09-03 RX ADMIN — Medication 2 TABLET(S): at 22:12

## 2025-09-03 RX ADMIN — CEFEPIME 100 MILLIGRAM(S): 2 INJECTION, POWDER, FOR SOLUTION INTRAVENOUS at 05:52

## 2025-09-04 LAB
-  AMPICILLIN: SIGNIFICANT CHANGE UP
-  CIPROFLOXACIN: SIGNIFICANT CHANGE UP
-  LEVOFLOXACIN: SIGNIFICANT CHANGE UP
-  NITROFURANTOIN: SIGNIFICANT CHANGE UP
-  TETRACYCLINE: SIGNIFICANT CHANGE UP
-  VANCOMYCIN: SIGNIFICANT CHANGE UP
ANION GAP SERPL CALC-SCNC: 4 MMOL/L — LOW (ref 5–17)
BUN SERPL-MCNC: 23 MG/DL — SIGNIFICANT CHANGE UP (ref 7–23)
CALCIUM SERPL-MCNC: 8.5 MG/DL — SIGNIFICANT CHANGE UP (ref 8.5–10.1)
CHLORIDE SERPL-SCNC: 113 MMOL/L — HIGH (ref 96–108)
CO2 SERPL-SCNC: 24 MMOL/L — SIGNIFICANT CHANGE UP (ref 22–31)
CREAT SERPL-MCNC: 0.84 MG/DL — SIGNIFICANT CHANGE UP (ref 0.5–1.3)
CULTURE RESULTS: ABNORMAL
EGFR: 64 ML/MIN/1.73M2 — SIGNIFICANT CHANGE UP
EGFR: 64 ML/MIN/1.73M2 — SIGNIFICANT CHANGE UP
GLUCOSE SERPL-MCNC: 136 MG/DL — HIGH (ref 70–99)
HCT VFR BLD CALC: 31.5 % — LOW (ref 34.5–45)
HGB BLD-MCNC: 9.8 G/DL — LOW (ref 11.5–15.5)
MAGNESIUM SERPL-MCNC: 2 MG/DL — SIGNIFICANT CHANGE UP (ref 1.6–2.6)
MCHC RBC-ENTMCNC: 29.6 PG — SIGNIFICANT CHANGE UP (ref 27–34)
MCHC RBC-ENTMCNC: 31.1 G/DL — LOW (ref 32–36)
MCV RBC AUTO: 95.2 FL — SIGNIFICANT CHANGE UP (ref 80–100)
METHOD TYPE: SIGNIFICANT CHANGE UP
NRBC BLD AUTO-RTO: 0 /100 WBCS — SIGNIFICANT CHANGE UP (ref 0–0)
ORGANISM # SPEC MICROSCOPIC CNT: ABNORMAL
ORGANISM # SPEC MICROSCOPIC CNT: SIGNIFICANT CHANGE UP
PHOSPHATE SERPL-MCNC: 2.1 MG/DL — LOW (ref 2.5–4.5)
PLATELET # BLD AUTO: 252 K/UL — SIGNIFICANT CHANGE UP (ref 150–400)
POTASSIUM SERPL-MCNC: 3.5 MMOL/L — SIGNIFICANT CHANGE UP (ref 3.5–5.3)
POTASSIUM SERPL-SCNC: 3.5 MMOL/L — SIGNIFICANT CHANGE UP (ref 3.5–5.3)
RBC # BLD: 3.31 M/UL — LOW (ref 3.8–5.2)
RBC # FLD: 14.2 % — SIGNIFICANT CHANGE UP (ref 10.3–14.5)
SODIUM SERPL-SCNC: 141 MMOL/L — SIGNIFICANT CHANGE UP (ref 135–145)
SPECIMEN SOURCE: SIGNIFICANT CHANGE UP
WBC # BLD: 9.69 K/UL — SIGNIFICANT CHANGE UP (ref 3.8–10.5)
WBC # FLD AUTO: 9.69 K/UL — SIGNIFICANT CHANGE UP (ref 3.8–10.5)

## 2025-09-04 PROCEDURE — 99233 SBSQ HOSP IP/OBS HIGH 50: CPT | Mod: FS

## 2025-09-04 PROCEDURE — 99232 SBSQ HOSP IP/OBS MODERATE 35: CPT

## 2025-09-04 RX ORDER — SODIUM PHOSPHATE,DIBASIC DIHYD
15 POWDER (GRAM) MISCELLANEOUS ONCE
Refills: 0 | Status: COMPLETED | OUTPATIENT
Start: 2025-09-04 | End: 2025-09-04

## 2025-09-04 RX ORDER — LOSARTAN POTASSIUM 100 MG/1
100 TABLET, FILM COATED ORAL DAILY
Refills: 0 | Status: DISCONTINUED | OUTPATIENT
Start: 2025-09-05 | End: 2025-09-05

## 2025-09-04 RX ORDER — NITROFURANTOIN MACROCRYSTAL 100 MG
100 CAPSULE ORAL
Refills: 0 | Status: DISCONTINUED | OUTPATIENT
Start: 2025-09-04 | End: 2025-09-05

## 2025-09-04 RX ADMIN — HEPARIN SODIUM 5000 UNIT(S): 1000 INJECTION INTRAVENOUS; SUBCUTANEOUS at 17:21

## 2025-09-04 RX ADMIN — DORZOLAMIDE 1 DROP(S): 20 SOLUTION/ DROPS OPHTHALMIC at 06:08

## 2025-09-04 RX ADMIN — CLOPIDOGREL BISULFATE 75 MILLIGRAM(S): 75 TABLET, FILM COATED ORAL at 12:25

## 2025-09-04 RX ADMIN — Medication 62.5 MILLIMOLE(S): at 15:17

## 2025-09-04 RX ADMIN — TIOTROPIUM BROMIDE INHALATION SPRAY 2 PUFF(S): 3.12 SPRAY, METERED RESPIRATORY (INHALATION) at 12:26

## 2025-09-04 RX ADMIN — Medication 1 TABLET(S): at 12:25

## 2025-09-04 RX ADMIN — BRIMONIDINE TARTRATE 1 DROP(S): 1.5 SOLUTION/ DROPS OPHTHALMIC at 17:20

## 2025-09-04 RX ADMIN — HEPARIN SODIUM 5000 UNIT(S): 1000 INJECTION INTRAVENOUS; SUBCUTANEOUS at 06:08

## 2025-09-04 RX ADMIN — Medication 2 TABLET(S): at 21:56

## 2025-09-04 RX ADMIN — Medication 1 DOSE(S): at 06:16

## 2025-09-04 RX ADMIN — METOPROLOL SUCCINATE 25 MILLIGRAM(S): 50 TABLET, EXTENDED RELEASE ORAL at 06:08

## 2025-09-04 RX ADMIN — LOSARTAN POTASSIUM 50 MILLIGRAM(S): 100 TABLET, FILM COATED ORAL at 06:08

## 2025-09-04 RX ADMIN — DORZOLAMIDE 1 DROP(S): 20 SOLUTION/ DROPS OPHTHALMIC at 21:56

## 2025-09-04 RX ADMIN — Medication 100 MILLIGRAM(S): at 17:54

## 2025-09-04 RX ADMIN — Medication 1 DOSE(S): at 17:20

## 2025-09-04 RX ADMIN — CEFEPIME 100 MILLIGRAM(S): 2 INJECTION, POWDER, FOR SOLUTION INTRAVENOUS at 06:07

## 2025-09-04 RX ADMIN — DORZOLAMIDE 1 DROP(S): 20 SOLUTION/ DROPS OPHTHALMIC at 15:15

## 2025-09-04 RX ADMIN — ATORVASTATIN CALCIUM 10 MILLIGRAM(S): 80 TABLET, FILM COATED ORAL at 21:56

## 2025-09-04 RX ADMIN — HEPARIN SODIUM 5000 UNIT(S): 1000 INJECTION INTRAVENOUS; SUBCUTANEOUS at 21:56

## 2025-09-04 RX ADMIN — BRIMONIDINE TARTRATE 1 DROP(S): 1.5 SOLUTION/ DROPS OPHTHALMIC at 06:07

## 2025-09-05 ENCOUNTER — TRANSCRIPTION ENCOUNTER (OUTPATIENT)
Age: 89
End: 2025-09-05

## 2025-09-05 VITALS
HEART RATE: 92 BPM | TEMPERATURE: 98 F | RESPIRATION RATE: 18 BRPM | DIASTOLIC BLOOD PRESSURE: 62 MMHG | OXYGEN SATURATION: 96 % | SYSTOLIC BLOOD PRESSURE: 168 MMHG

## 2025-09-05 LAB — PHOSPHATE SERPL-MCNC: 2.7 MG/DL — SIGNIFICANT CHANGE UP (ref 2.5–4.5)

## 2025-09-05 PROCEDURE — 99239 HOSP IP/OBS DSCHRG MGMT >30: CPT

## 2025-09-05 RX ORDER — ATORVASTATIN CALCIUM 80 MG/1
1 TABLET, FILM COATED ORAL
Refills: 0 | DISCHARGE

## 2025-09-05 RX ORDER — CLOPIDOGREL BISULFATE 75 MG/1
1 TABLET, FILM COATED ORAL
Qty: 0 | Refills: 0 | DISCHARGE
Start: 2025-09-05

## 2025-09-05 RX ORDER — NITROFURANTOIN MACROCRYSTAL 100 MG
1 CAPSULE ORAL
Qty: 6 | Refills: 0
Start: 2025-09-05 | End: 2025-09-07

## 2025-09-05 RX ORDER — B1/B2/B3/B5/B6/B12/VIT C/FOLIC 500-0.5 MG
1 TABLET ORAL
Qty: 0 | Refills: 0 | DISCHARGE
Start: 2025-09-05

## 2025-09-05 RX ORDER — ATORVASTATIN CALCIUM 80 MG/1
1 TABLET, FILM COATED ORAL
Qty: 0 | Refills: 0 | DISCHARGE
Start: 2025-09-05

## 2025-09-05 RX ORDER — POLYETHYLENE GLYCOL 3350 17 G/17G
17 POWDER, FOR SOLUTION ORAL
Qty: 0 | Refills: 0 | DISCHARGE
Start: 2025-09-05

## 2025-09-05 RX ORDER — LOSARTAN POTASSIUM 100 MG/1
1 TABLET, FILM COATED ORAL
Qty: 30 | Refills: 0
Start: 2025-09-05

## 2025-09-05 RX ORDER — METOPROLOL SUCCINATE 50 MG/1
1 TABLET, EXTENDED RELEASE ORAL
Qty: 30 | Refills: 0
Start: 2025-09-05 | End: 2025-10-04

## 2025-09-05 RX ORDER — B1/B2/B3/B5/B6/B12/VIT C/FOLIC 500-0.5 MG
1 TABLET ORAL
Refills: 0 | DISCHARGE

## 2025-09-05 RX ADMIN — BRIMONIDINE TARTRATE 1 DROP(S): 1.5 SOLUTION/ DROPS OPHTHALMIC at 06:46

## 2025-09-05 RX ADMIN — CLOPIDOGREL BISULFATE 75 MILLIGRAM(S): 75 TABLET, FILM COATED ORAL at 11:50

## 2025-09-05 RX ADMIN — HEPARIN SODIUM 5000 UNIT(S): 1000 INJECTION INTRAVENOUS; SUBCUTANEOUS at 13:31

## 2025-09-05 RX ADMIN — DORZOLAMIDE 1 DROP(S): 20 SOLUTION/ DROPS OPHTHALMIC at 13:31

## 2025-09-05 RX ADMIN — Medication 1 DOSE(S): at 06:08

## 2025-09-05 RX ADMIN — LOSARTAN POTASSIUM 100 MILLIGRAM(S): 100 TABLET, FILM COATED ORAL at 06:07

## 2025-09-05 RX ADMIN — HEPARIN SODIUM 5000 UNIT(S): 1000 INJECTION INTRAVENOUS; SUBCUTANEOUS at 06:08

## 2025-09-05 RX ADMIN — METOPROLOL SUCCINATE 25 MILLIGRAM(S): 50 TABLET, EXTENDED RELEASE ORAL at 06:08

## 2025-09-05 RX ADMIN — Medication 100 MILLIGRAM(S): at 18:58

## 2025-09-05 RX ADMIN — Medication 1 TABLET(S): at 11:50

## 2025-09-05 RX ADMIN — Medication 1 DOSE(S): at 18:58

## 2025-09-05 RX ADMIN — TIOTROPIUM BROMIDE INHALATION SPRAY 2 PUFF(S): 3.12 SPRAY, METERED RESPIRATORY (INHALATION) at 13:30

## 2025-09-05 RX ADMIN — Medication 100 MILLIGRAM(S): at 06:07

## 2025-09-05 RX ADMIN — DORZOLAMIDE 1 DROP(S): 20 SOLUTION/ DROPS OPHTHALMIC at 06:08

## 2025-09-05 RX ADMIN — BRIMONIDINE TARTRATE 1 DROP(S): 1.5 SOLUTION/ DROPS OPHTHALMIC at 18:57

## 2025-09-11 DIAGNOSIS — Z92.3 PERSONAL HISTORY OF IRRADIATION: ICD-10-CM

## 2025-09-11 DIAGNOSIS — R32 UNSPECIFIED URINARY INCONTINENCE: ICD-10-CM

## 2025-09-11 DIAGNOSIS — Z99.81 DEPENDENCE ON SUPPLEMENTAL OXYGEN: ICD-10-CM

## 2025-09-11 DIAGNOSIS — Z99.3 DEPENDENCE ON WHEELCHAIR: ICD-10-CM

## 2025-09-11 DIAGNOSIS — K59.09 OTHER CONSTIPATION: ICD-10-CM

## 2025-09-11 DIAGNOSIS — Z88.0 ALLERGY STATUS TO PENICILLIN: ICD-10-CM

## 2025-09-11 DIAGNOSIS — G93.41 METABOLIC ENCEPHALOPATHY: ICD-10-CM

## 2025-09-11 DIAGNOSIS — A41.9 SEPSIS, UNSPECIFIED ORGANISM: ICD-10-CM

## 2025-09-11 DIAGNOSIS — Z79.82 LONG TERM (CURRENT) USE OF ASPIRIN: ICD-10-CM

## 2025-09-11 DIAGNOSIS — R15.9 FULL INCONTINENCE OF FECES: ICD-10-CM

## 2025-09-11 DIAGNOSIS — J44.9 CHRONIC OBSTRUCTIVE PULMONARY DISEASE, UNSPECIFIED: ICD-10-CM

## 2025-09-11 DIAGNOSIS — F03.90 UNSPECIFIED DEMENTIA, UNSPECIFIED SEVERITY, WITHOUT BEHAVIORAL DISTURBANCE, PSYCHOTIC DISTURBANCE, MOOD DISTURBANCE, AND ANXIETY: ICD-10-CM

## 2025-09-11 DIAGNOSIS — N39.0 URINARY TRACT INFECTION, SITE NOT SPECIFIED: ICD-10-CM

## 2025-09-11 DIAGNOSIS — Z11.52 ENCOUNTER FOR SCREENING FOR COVID-19: ICD-10-CM

## 2025-09-11 DIAGNOSIS — Z79.02 LONG TERM (CURRENT) USE OF ANTITHROMBOTICS/ANTIPLATELETS: ICD-10-CM

## 2025-09-11 DIAGNOSIS — R00.1 BRADYCARDIA, UNSPECIFIED: ICD-10-CM

## 2025-09-11 DIAGNOSIS — R64 CACHEXIA: ICD-10-CM

## 2025-09-11 DIAGNOSIS — I47.20 VENTRICULAR TACHYCARDIA, UNSPECIFIED: ICD-10-CM

## 2025-09-11 DIAGNOSIS — K57.30 DIVERTICULOSIS OF LARGE INTESTINE WITHOUT PERFORATION OR ABSCESS WITHOUT BLEEDING: ICD-10-CM

## 2025-09-11 DIAGNOSIS — Z79.899 OTHER LONG TERM (CURRENT) DRUG THERAPY: ICD-10-CM

## 2025-09-11 DIAGNOSIS — I11.0 HYPERTENSIVE HEART DISEASE WITH HEART FAILURE: ICD-10-CM

## 2025-09-11 DIAGNOSIS — Z66 DO NOT RESUSCITATE: ICD-10-CM

## 2025-09-11 DIAGNOSIS — Z85.41 PERSONAL HISTORY OF MALIGNANT NEOPLASM OF CERVIX UTERI: ICD-10-CM

## 2025-09-11 DIAGNOSIS — A41.81 SEPSIS DUE TO ENTEROCOCCUS: ICD-10-CM

## 2025-09-11 DIAGNOSIS — I50.32 CHRONIC DIASTOLIC (CONGESTIVE) HEART FAILURE: ICD-10-CM

## 2025-09-11 DIAGNOSIS — Z86.73 PERSONAL HISTORY OF TRANSIENT ISCHEMIC ATTACK (TIA), AND CEREBRAL INFARCTION WITHOUT RESIDUAL DEFICITS: ICD-10-CM
